# Patient Record
Sex: MALE | Race: WHITE | NOT HISPANIC OR LATINO | Employment: OTHER | URBAN - NONMETROPOLITAN AREA
[De-identification: names, ages, dates, MRNs, and addresses within clinical notes are randomized per-mention and may not be internally consistent; named-entity substitution may affect disease eponyms.]

---

## 2017-01-01 ENCOUNTER — HOSPITAL ENCOUNTER (OUTPATIENT)
Dept: PHYSICAL THERAPY | Facility: HOSPITAL | Age: 62
Setting detail: THERAPIES SERIES
Discharge: HOME | End: 2017-01-20
Attending: ORTHOPAEDIC SURGERY | Admitting: ORTHOPAEDIC SURGERY

## 2017-01-10 ENCOUNTER — OFFICE VISIT (OUTPATIENT)
Dept: ORTHOPEDIC SURGERY | Facility: CLINIC | Age: 62
End: 2017-01-10

## 2017-01-10 ENCOUNTER — DOCUMENTATION (OUTPATIENT)
Dept: CARDIOLOGY | Facility: CLINIC | Age: 62
End: 2017-01-10

## 2017-01-10 ENCOUNTER — OFFICE VISIT (OUTPATIENT)
Dept: CARDIOLOGY | Facility: CLINIC | Age: 62
End: 2017-01-10

## 2017-01-10 VITALS
WEIGHT: 312 LBS | SYSTOLIC BLOOD PRESSURE: 130 MMHG | DIASTOLIC BLOOD PRESSURE: 80 MMHG | HEART RATE: 80 BPM | HEIGHT: 71 IN | BODY MASS INDEX: 43.68 KG/M2

## 2017-01-10 VITALS — WEIGHT: 315 LBS | BODY MASS INDEX: 44.1 KG/M2 | HEIGHT: 71 IN

## 2017-01-10 DIAGNOSIS — Z96.641 PRESENCE OF RIGHT ARTIFICIAL HIP JOINT: Primary | ICD-10-CM

## 2017-01-10 DIAGNOSIS — E78.2 MIXED HYPERLIPIDEMIA: ICD-10-CM

## 2017-01-10 DIAGNOSIS — I25.10 CORONARY ARTERY DISEASE INVOLVING NATIVE CORONARY ARTERY OF NATIVE HEART WITHOUT ANGINA PECTORIS: ICD-10-CM

## 2017-01-10 DIAGNOSIS — I10 ESSENTIAL HYPERTENSION: Primary | ICD-10-CM

## 2017-01-10 PROBLEM — Z95.1 S/P CABG (CORONARY ARTERY BYPASS GRAFT): Status: ACTIVE | Noted: 2017-01-10

## 2017-01-10 PROCEDURE — 99214 OFFICE O/P EST MOD 30 MIN: CPT | Performed by: INTERNAL MEDICINE

## 2017-01-10 PROCEDURE — 99213 OFFICE O/P EST LOW 20 MIN: CPT | Performed by: ORTHOPAEDIC SURGERY

## 2017-01-10 RX ORDER — SULFAMETHOXAZOLE AND TRIMETHOPRIM 800; 160 MG/1; MG/1
1 TABLET ORAL DAILY
Qty: 52 TABLET | Refills: 0 | Status: SHIPPED | OUTPATIENT
Start: 2017-01-10 | End: 2017-08-14

## 2017-01-10 RX ORDER — LOSARTAN POTASSIUM 25 MG/1
50 TABLET ORAL DAILY
COMMUNITY
End: 2018-02-28 | Stop reason: HOSPADM

## 2017-01-10 RX ORDER — ISOSORBIDE MONONITRATE 30 MG/1
30 TABLET, EXTENDED RELEASE ORAL NIGHTLY
COMMUNITY
End: 2018-03-08 | Stop reason: HOSPADM

## 2017-01-10 RX ORDER — CITALOPRAM 20 MG/1
20 TABLET ORAL 2 TIMES DAILY
COMMUNITY
End: 2018-05-21

## 2017-01-10 RX ORDER — ASPIRIN 81 MG/1
81 TABLET, CHEWABLE ORAL TAKE AS DIRECTED
COMMUNITY

## 2017-01-10 RX ORDER — FUROSEMIDE 20 MG/1
20 TABLET ORAL DAILY
COMMUNITY

## 2017-01-10 NOTE — PROGRESS NOTES
"Clint Mack is a 61 y.o. male returns for     Chief Complaint   Patient presents with   • Right Hip - Follow-up   • Results     crp and inr done on 1/4/2017 at [facility].        HISTORY OF PRESENT ILLNESS:  Patient is having no new complaints.  No fevers or chills.  No change in activity.  No change in feeling of leg.  Using a walker for gait.  No cellulitis and no changes in wound appearance.       CONCURRENT MEDICAL HISTORY:    Past Medical History   Diagnosis Date   • Anxiety    • Arthritis      osteoarthritis   • Depression    • Diabetes mellitus    • DVT (deep venous thrombosis)    • Heart disease    • Hyperlipidemia    • Hypertension    • LUCIANA on CPAP        Allergies   Allergen Reactions   • Heparin Anaphylaxis     thrombocytopenia   • Atorvastatin    • Crestor [Rosuvastatin Calcium]    • Lisinopril      Constant cough   • Lyrica [Pregabalin] Swelling         Current Outpatient Prescriptions:   •  aspirin 81 MG chewable tablet, Chew 81 mg Daily., Disp: , Rfl:   •  citalopram (CeleXA) 20 MG tablet, Take 20 mg by mouth Daily., Disp: , Rfl:   •  clonazePAM (KlonoPIN) 1 MG tablet, , Disp: , Rfl:   •  COMFORT ASSIST INSULIN SYRINGE 31G X 5/16\" 0.3 ML misc, , Disp: , Rfl:   •  furosemide (LASIX) 20 MG tablet, Take 20 mg by mouth As Needed., Disp: , Rfl:   •  glimepiride (AMARYL) 4 MG tablet, , Disp: , Rfl:   •  isosorbide mononitrate (IMDUR) 30 MG 24 hr tablet, Take 30 mg by mouth Daily., Disp: , Rfl:   •  losartan (COZAAR) 25 MG tablet, Take 25 mg by mouth Daily., Disp: , Rfl:   •  metoclopramide (REGLAN) 10 MG tablet, , Disp: , Rfl:   •  Morphine (MS CONTIN) 30 MG 12 hr tablet, Take 1 tablet by mouth 3 (Three) Times a Day., Disp: 90 tablet, Rfl: 0  •  predniSONE (DELTASONE) 5 MG tablet, , Disp: , Rfl:   •  RANEXA 500 MG 12 hr tablet, , Disp: , Rfl:   •  sodium chloride 0.9 % solution, , Disp: , Rfl:   •  tamsulosin (FLOMAX) 0.4 MG capsule 24 hr capsule, , Disp: , Rfl:   •  triamcinolone (KENALOG) 0.1 % " "cream, , Disp: , Rfl:   •  vitamin D (ERGOCALCIFEROL) 36686 UNITS capsule capsule, , Disp: , Rfl:   •  warfarin (COUMADIN) 5 MG tablet, , Disp: , Rfl:   •  Evolocumab (REPATHA SURECLICK) 140 MG/ML solution auto-injector, Inject 140 mg under the skin. One shot bi-monthly, Disp: , Rfl:   •  Morphine (MS CONTIN) 30 MG 12 hr tablet, Take 1 tablet by mouth 2 (Two) Times a Day., Disp: 60 tablet, Rfl: 0  •  sulfamethoxazole-trimethoprim (BACTRIM DS) 800-160 MG per tablet, Take 1 tablet by mouth Daily. Bid for two weeks then qd for two weeks., Disp: 52 tablet, Rfl: 0    Past Surgical History   Procedure Laterality Date   • Cardiac surgery  2001   • Gallbladder surgery  2000   • Circumcision     • Joint replacement Right 05/02/2016     hip   • Spine surgery         ROS  No fevers or chills.  No chest pain or shortness of air.  No GI or  disturbances.    PHYSICAL EXAMINATION:       Visit Vitals   • Ht 71\" (180.3 cm)   • Wt (!) 317 lb (144 kg)   • BMI 44.21 kg/m2       Physical Exam   Constitutional: He is oriented to person, place, and time. He appears well-developed and well-nourished.   Neurological: He is alert and oriented to person, place, and time.   Psychiatric: He has a normal mood and affect. His behavior is normal. Judgment and thought content normal.       GAIT:     []  Normal  [x]  Antalgic    Assistive device: []  None  [x]  Walker     []  Crutches  []  Cane     []  Wheelchair  []  Stretcher    Right Hip Exam     Tenderness   The patient is experiencing no tenderness.         Range of Motion   Flexion: 110   Internal Rotation: 30   External Rotation: 40     Muscle Strength   Abduction: 4/5   Flexion: 4/5     Tests   KITTY: negative  Fadir:  Negative FADIR test    Other   Erythema: absent  Scars: present (incision is well healed with no fluctuance, no erythema, and no breakdown)  Sensation: normal  Pulse: present                      ASSESSMENT:    Diagnoses and all orders for this visit:    Presence of right " artificial hip joint  -     C-reactive Protein; Future    Other orders  -     sulfamethoxazole-trimethoprim (BACTRIM DS) 800-160 MG per tablet; Take 1 tablet by mouth Daily. Bid for two weeks then qd for two weeks.          PLAN    He shows no sign of clinical change.  CRP has elevated again.  Will monitor again.  However, I don't expect any change in management without a clinical change.  We discussed continued strength and conditioning    Return in about 4 weeks (around 2/7/2017) for Recheck with repeat xrays.    Jose Raul Leon MD

## 2017-01-10 NOTE — PROGRESS NOTES
Clint Mack  61 y.o. male    01/10/2017  1. Essential hypertension    2. Coronary artery disease involving native coronary artery of native heart without angina pectoris    3. Mixed hyperlipidemia        History of Present Illness    Mr. Mack is here for follow-up of his coronary artery disease, hypertension and hyperlipidemia.  He denied any chest pain but does have chronic shortness of breath.  He is multifactorial dyspnea and no signs of congestive heart failure were noted.  His last echocardiogram in 2014 had shown normal LV systolic function.  No bronchospasm was noted.  He denied any abdominal pain nausea vomiting diarrhea or melena.  I understand that his diabetes has not been under good control recently and Dr. Matute has adjusted his medicines.  His lipid profiles are abnormal with markedly elevated total cholesterol, triglycerides and LDL.  There were 259, 359 an his insurance company will approve this d 152 mg percent respectively.  He has been unable to tolerate statins.  I had a long discussion with him about diet and of the plan would be to consider PCSK9 inhibitor Repatha.  Hopefully his insurance company wouldn't approve this.        SUBJECTIVE    Allergies   Allergen Reactions   • Heparin Anaphylaxis     thrombocytopenia   • Atorvastatin    • Crestor [Rosuvastatin Calcium]    • Lisinopril      Constant cough   • Lyrica [Pregabalin] Swelling         Past Medical History   Diagnosis Date   • Anxiety    • Arthritis      osteoarthritis   • Depression    • Diabetes mellitus    • DVT (deep venous thrombosis)    • Heart disease    • Hyperlipidemia    • Hypertension    • LUCIANA on CPAP          Past Surgical History   Procedure Laterality Date   • Cardiac surgery  2001   • Gallbladder surgery  2000   • Circumcision     • Joint replacement Right 05/02/2016     hip   • Spine surgery           Family History   Problem Relation Age of Onset   • Heart disease Father    • Hypertension Father    • Stroke  "Father    • Diabetes Sister    • Heart disease Brother    • Hypertension Brother    • COPD Brother          Social History     Social History   • Marital status:      Spouse name: N/A   • Number of children: N/A   • Years of education: N/A     Occupational History   • Not on file.     Social History Main Topics   • Smoking status: Never Smoker   • Smokeless tobacco: Never Used   • Alcohol use No   • Drug use: No   • Sexual activity: Defer     Other Topics Concern   • Not on file     Social History Narrative         Current Outpatient Prescriptions   Medication Sig Dispense Refill   • aspirin 81 MG chewable tablet Chew 81 mg Daily.     • citalopram (CeleXA) 20 MG tablet Take 20 mg by mouth Daily.     • furosemide (LASIX) 20 MG tablet Take 20 mg by mouth As Needed.     • isosorbide mononitrate (IMDUR) 30 MG 24 hr tablet Take 30 mg by mouth Daily.     • losartan (COZAAR) 25 MG tablet Take 25 mg by mouth Daily.     • clonazePAM (KlonoPIN) 1 MG tablet      • COMFORT ASSIST INSULIN SYRINGE 31G X 5/16\" 0.3 ML misc      • glimepiride (AMARYL) 4 MG tablet      • metoclopramide (REGLAN) 10 MG tablet      • Morphine (MS CONTIN) 30 MG 12 hr tablet Take 1 tablet by mouth 3 (Three) Times a Day. 90 tablet 0   • predniSONE (DELTASONE) 5 MG tablet      • RANEXA 500 MG 12 hr tablet      • sodium chloride 0.9 % solution      • tamsulosin (FLOMAX) 0.4 MG capsule 24 hr capsule      • triamcinolone (KENALOG) 0.1 % cream      • vitamin D (ERGOCALCIFEROL) 98632 UNITS capsule capsule      • warfarin (COUMADIN) 5 MG tablet        No current facility-administered medications for this visit.          OBJECTIVE    Visit Vitals   • /80   • Pulse 80   • Ht 71\" (180.3 cm)   • Wt (!) 312 lb (142 kg)   • BMI 43.52 kg/m2           Review of Systems     Constitutional:  Denies recent weight loss, weight gain, fever or chills     HENT:  Denies any hearing loss, epistaxis, hoarseness, or difficulty speaking.     Eyes: Wears eyeglasses or " contact lenses     Respiratory:  Dyspnea with exertion, no cough, wheezing, or hemoptysis.     Cardiovascular: See HPI.    Gastrointestinal:  Denies change in bowel habits, dyspepsia, ulcer disease, hematochezia, or melena.     Endocrine: Negative for cold intolerance, heat intolerance, polydipsia, polyphagia and polyuria. Denies any history of weight change, heat/cold intolerance, polydipsia, polyuria     Genitourinary: Negative.      Musculoskeletal: c/o pain secondary to DJD    Skin:  Denies any change in hair or nails, rashes, or skin lesions.     Allergic/Immunologic: Negative.  Negative for environmental allergies, food allergies and immunocompromised state.     Neurological:  Denies any history of recurrent headaches, strokes, TIA, or seizure disorder.     Hematological: Denies any food allergies, seasonal allergies, bleeding disorders, or lymphadenopathy.     Psychiatric/Behavioral: Denies any history of depression, substance abuse, or change in cognitive function.         Physical Exam     Constitutional: Cooperative, alert and oriented, well-developed, well-nourished, in no acute distress.     HENT:   Head: Normocephalic, normal hair patterns, no masses or tenderness.  Ears, Nose, and Throat: No gross abnormalities. No pallor or cyanosis. Dentition good.   Eyes: EOMS intact, PERRL, conjunctivae and lids unremarkable. Fundoscopic exam and visual fields not performed.   Neck: No palpable masses or adenopathy, no thyromegaly, no JVD, carotid pulses are full and equal bilaterally and without  Bruits.     Cardiovascular: Regular rhythm, S1 and S2 normal, no S3 or S4. Apical impulse not displaced. No murmurs, gallops, or rubs detected.     Pulmonary/Chest: Chest: normal symmetry, no tenderness to palpation, normal respiratory excursion, no intercostal retraction, no use of accessory muscles.            Pulmonary: Normal breath sounds. No rales or ronchi.    Abdominal: Abdomen soft, bowel sounds normoactive, no  masses, no hepatosplenomegaly, non-tender, no bruits.     Musculoskeletal: No deformities, clubbing, cyanosis, erythema, or edema observed. There are no spinal abnormalities noted. Normal muscle strength and tone. Pulses full and equal in all extremities, no bruits auscultated.     Neurological: No gross motor or sensory deficits noted, affect appropriate, oriented to time, person, place.     Skin: Warm and dry to the touch, no apparent skin lesions or masses noted.     Psychiatric: He has a normal mood and affect. His behavior is normal. Judgment and thought content normal.         Procedures      Lab Results   Component Value Date    WBC 7.2 06/14/2016    HGB 9.4 (L) 06/14/2016    HCT 27.5 (L) 06/14/2016    MCV 85.1 06/14/2016     06/14/2016     Lab Results   Component Value Date    GLUCOSE 92 06/17/2016    BUN 30 (H) 07/05/2016    CREATININE 1.8 (H) 07/05/2016    CO2 29 06/17/2016    CALCIUM 8.3 (L) 06/17/2016    ALBUMIN 2.5 (L) 05/20/2016    AST 24 05/20/2016    ALT 50 05/20/2016     No results found for: CHOL  No results found for: TRIG  No results found for: HDL  No results found for: LDLCALC  No results found for: LDL  No results found for: HDLLDLRATIO  No components found for: CHOLHDL  Lab Results   Component Value Date    HGBA1C 7.2 (H) 10/20/2014     Lab Results   Component Value Date    TSH 0.69 05/13/2016           ASSESSMENT AND PLAN    Mr. Mack is no clinical evidence of progression of coronary artery disease.  No signs of congestive heart failure was noted.  To make sure that his left frontal function has not changed and echo cardiac gram is being arranged.  As mentioned above we will try to get Repatha approved by his insurance company.  Present antiplatelet therapy with aspirin, antianginal therapy with isosorbide mononitrate and Ranexa, antihypertensive therapy with losartan, anticoagulation with Coumadin have been continued.  Diagnoses and all orders for this visit:    Essential  hypertension  -     Adult Transthoracic Echo Complete; Future    Coronary artery disease involving native coronary artery of native heart without angina pectoris  -     Adult Transthoracic Echo Complete; Future    Mixed hyperlipidemia  -     Adult Transthoracic Echo Complete; Future        Keke Kumar MD  1/10/2017  10:23 AM

## 2017-01-10 NOTE — MR AVS SNAPSHOT
"                        Clint Mack   1/10/2017 9:30 AM   Office Visit    Dept Phone:  [phone]   Encounter #:  [id number]    Provider:  Keke Kumar MD   Department:  [facility] CARDIOLOGY                Your Full Care Plan              Today's Medication Changes          These changes are accurate as of: 1/10/17 10:39 AM.  If you have any questions, ask your nurse or doctor.               Medication(s)that have changed:     isosorbide mononitrate 30 MG 24 hr tablet   Commonly known as:  IMDUR   Take 30 mg by mouth Daily.   What changed:  Another medication with the same name was removed. Continue taking this medication, and follow the directions you see here.   Changed by:  Keke Kumar MD       Morphine 30 MG 12 hr tablet   Commonly known as:  MS CONTIN   Take 1 tablet by mouth 3 (Three) Times a Day.   What changed:  Another medication with the same name was removed. Continue taking this medication, and follow the directions you see here.   Changed by:  Robin Delgadillo MD         Stop taking medication(s)listed here:     ONE TOUCH ULTRA TEST test strip   Generic drug:  glucose blood   Stopped by:  Keke Kumar MD           onetouch ultrasoft lancets   Stopped by:  Keke Kumar MD           sodium chloride 0.9 % flush 0.9 % solution   Stopped by:  Keke Kumar MD           vancomycin 1000 MG injection   Commonly known as:  VANCOCIN   Stopped by:  Keke Kumar MD                      Your Updated Medication List          This list is accurate as of: 1/10/17 10:39 AM.  Always use your most recent med list.                aspirin 81 MG chewable tablet       citalopram 20 MG tablet   Commonly known as:  CeleXA       clonazePAM 1 MG tablet   Commonly known as:  KlonoPIN       COMFORT ASSIST INSULIN SYRINGE 31G X 5/16\" 0.3 ML misc   Generic drug:  Insulin Syringe-Needle U-100       furosemide 20 MG tablet   "   Commonly known as:  LASIX       glimepiride 4 MG tablet   Commonly known as:  AMARYL       isosorbide mononitrate 30 MG 24 hr tablet   Commonly known as:  IMDUR       losartan 25 MG tablet   Commonly known as:  COZAAR       metoclopramide 10 MG tablet   Commonly known as:  REGLAN       Morphine 30 MG 12 hr tablet   Commonly known as:  MS CONTIN   Take 1 tablet by mouth 3 (Three) Times a Day.       predniSONE 5 MG tablet   Commonly known as:  DELTASONE       RANEXA 500 MG 12 hr tablet   Generic drug:  ranolazine       sodium chloride 0.9 % solution       tamsulosin 0.4 MG capsule 24 hr capsule   Commonly known as:  FLOMAX       triamcinolone 0.1 % cream   Commonly known as:  KENALOG       vitamin D 11946 UNITS capsule capsule   Commonly known as:  ERGOCALCIFEROL       warfarin 5 MG tablet   Commonly known as:  COUMADIN               You Were Diagnosed With        Codes Comments    Essential hypertension    -  Primary ICD-10-CM: I10  ICD-9-CM: 401.9     Coronary artery disease involving native coronary artery of native heart without angina pectoris     ICD-10-CM: I25.10  ICD-9-CM: 414.01     Mixed hyperlipidemia     ICD-10-CM: E78.2  ICD-9-CM: 272.2       Instructions     None    Patient Instructions History      Upcoming Appointments     Visit Type Date Time Department    OFFICE VISIT 1/10/2017  9:30 AM [facility] [place] HEART CARE [facility]    FOLLOW UP 1/10/2017 10:20 AM [facility] [place] [facility] ORTHOPEDIC [facility]    FOLLOW UP 1/12/2017  9:10 AM [facility] [place] PAIN [facility]    ULTRASOUND 1/30/2017 11:30 AM [facility] [place] [facility] HEART [facility]    OFFICE VISIT 7/12/2017 11:00 AM [facility] [place] HEART [facility]    FOLLOW UP 9/12/2017  1:00 PM [facility] [place] SLEEP CENTER [facility]      MyChart Signup     Our records indicate that your [facility] [employer] account has been deactivated. If you would like to reactivate your account, please email [email or web address] or call [phone] to talk to our [facility] staff.             Other Info from Your Visit           Your Appointments     Jan 12, 2017  9:10 AM  "CST   Follow Up with Robin Delgadillo MD   [facility] PAIN MANAGEMENT (--)    [address]  [address]  [facility] [address] [phone]           Arrive 15 minutes prior to appointment.            Jan 30, 2017 11:30 AM CST   Ultrasound with [facility] HEARTCARE ECHO [facility] CARDIOLOGY (--)    [address]  [facility] [address] [phone]            Jul 12, 2017 11:00 AM CDT   Office Visit with Keke Kumar MD   [facility] CARDIOLOGY (--)    [address]  [facility] [address] [phone]           Arrive 15 minutes prior to appointment.            Sep 12, 2017  1:00 PM CDT   Follow Up with SLEEP CLINIC [facility] (--)    [address] [place] [address] [address] [place] [address] [phone]           Arrive 15 minutes prior to appointment.              Allergies     Heparin  Anaphylaxis    thrombocytopenia    Atorvastatin      Crestor [Rosuvastatin Calcium]      Lisinopril      Constant cough    Lyrica [Pregabalin]  Swelling      Vital Signs     Blood Pressure Pulse Height Weight Body Mass Index Smoking Status    130/80 80 71\" (180.3 cm) 312 lb (142 kg) 43.52 kg/m2 Never Smoker      Problems and Diagnoses Noted     Coronary artery disease involving native coronary artery of native heart without angina pectoris    High blood pressure    Mixed hyperlipidemia    History of coronary artery bypass graft        "

## 2017-01-10 NOTE — PROGRESS NOTES
Repatha started per Dr Kumar, shot given RT rectus femoris, pt jacky well , no s/s of any obvious reaction

## 2017-01-10 NOTE — MR AVS SNAPSHOT
"                        Clint Mack   1/10/2017 10:20 AM   Office Visit    Dept Phone:  [phone]   Encounter #:  [id number]    Provider:  Jose Raul Leon MD   Department:  [facility] ORTHOPEDICS                Your Full Care Plan              Today's Medication Changes          These changes are accurate as of: 1/10/17 11:40 AM.  If you have any questions, ask your nurse or doctor.               Medication(s)that have changed:     isosorbide mononitrate 30 MG 24 hr tablet   Commonly known as:  IMDUR   Take 30 mg by mouth Daily.   What changed:  Another medication with the same name was removed. Continue taking this medication, and follow the directions you see here.   Changed by:  Keke Kumar MD       Morphine 30 MG 12 hr tablet   Commonly known as:  MS CONTIN   Take 1 tablet by mouth 3 (Three) Times a Day.   What changed:  Another medication with the same name was removed. Continue taking this medication, and follow the directions you see here.   Changed by:  Robin Delgadillo MD         Stop taking medication(s)listed here:     ONE TOUCH ULTRA TEST test strip   Generic drug:  glucose blood   Stopped by:  Keke Kumar MD           onetouch ultrasoft lancets   Stopped by:  Keke Kumar MD           sodium chloride 0.9 % flush 0.9 % solution   Stopped by:  Keke Kumar MD           vancomycin 1000 MG injection   Commonly known as:  VANCOCIN   Stopped by:  Keke Kumar MD                      Your Updated Medication List          This list is accurate as of: 1/10/17 11:40 AM.  Always use your most recent med list.                aspirin 81 MG chewable tablet       citalopram 20 MG tablet   Commonly known as:  CeleXA       clonazePAM 1 MG tablet   Commonly known as:  KlonoPIN       COMFORT ASSIST INSULIN SYRINGE 31G X 5/16\" 0.3 ML misc   Generic drug:  Insulin Syringe-Needle U-100       furosemide 20 MG tablet   "   Commonly known as:  LASIX       glimepiride 4 MG tablet   Commonly known as:  AMARYL       isosorbide mononitrate 30 MG 24 hr tablet   Commonly known as:  IMDUR       losartan 25 MG tablet   Commonly known as:  COZAAR       metoclopramide 10 MG tablet   Commonly known as:  REGLAN       Morphine 30 MG 12 hr tablet   Commonly known as:  MS CONTIN   Take 1 tablet by mouth 3 (Three) Times a Day.       predniSONE 5 MG tablet   Commonly known as:  DELTASONE       RANEXA 500 MG 12 hr tablet   Generic drug:  ranolazine       sodium chloride 0.9 % solution       tamsulosin 0.4 MG capsule 24 hr capsule   Commonly known as:  FLOMAX       triamcinolone 0.1 % cream   Commonly known as:  KENALOG       vitamin D 08477 UNITS capsule capsule   Commonly known as:  ERGOCALCIFEROL       warfarin 5 MG tablet   Commonly known as:  COUMADIN               You Were Diagnosed With        Codes Comments    Presence of right artificial hip joint    -  Primary ICD-10-CM: Z96.641  ICD-9-CM: V43.64       Instructions     None    Patient Instructions History      Upcoming Appointments     Visit Type Date Time Department    OFFICE VISIT 1/10/2017  9:30 AM [facility] HEART CARE [facility]    FOLLOW UP 1/10/2017 10:20 AM [facility] ORTHOPEDIC [facility]    FOLLOW UP 1/12/2017  9:10 AM [facility] PAIN [facility]    ULTRASOUND 1/30/2017 11:30 AM [facility] HEART [facility]    OFFICE VISIT 7/12/2017 11:00 AM [facility] HEART [facility]    FOLLOW UP 9/12/2017  1:00 PM [facility] SLEEP CENTER [facility]      MyChart Signup     Our records indicate that your [employer] account has been deactivated. If you would like to reactivate your account, please email [email or web address] or call [phone] to talk to our [employer] staff.             Other Info from Your Visit           Your Appointments     Jan 12, 2017  9:10 AM CST   Follow Up with Robin Delgadillo MD   [facility] PAIN MANAGEMENT (--)    [address]  [address] [place] [address] [phone]        "    Arrive 15 minutes prior to appointment.            Jan 30, 2017 11:30 AM CST   [facility]    [address] [facility] [address]  [facility] [id number] [phone]            Jul 12, 2017 11:00 AM CDT   Office Visit with Keke Kumar MD   [facility] CARDIOLOGY (--)    [address]  [facility] [address] [phone]           Arrive 15 minutes prior to appointment.            Sep 12, 2017  1:00 PM CDT   Follow Up with SLEEP CLINIC [facility] (--)    [address] [place] [address] [phone]           Arrive 15 minutes prior to appointment.              Allergies     Heparin  Anaphylaxis    thrombocytopenia    Atorvastatin      Crestor [Rosuvastatin Calcium]      Lisinopril      Constant cough    Lyrica [Pregabalin]  Swelling      Reason for Visit     Right Hip - Follow-up     Results crp and inr done on 1/4/2017 at [facility].       Vital Signs     Height Weight Body Mass Index Smoking Status          71\" (180.3 cm) 317 lb (144 kg) 44.21 kg/m2 Never Smoker        Problems and Diagnoses Noted     Presence of right artificial hip joint        "

## 2017-01-12 ENCOUNTER — OFFICE VISIT (OUTPATIENT)
Dept: PAIN MEDICINE | Facility: CLINIC | Age: 62
End: 2017-01-12

## 2017-01-12 VITALS
DIASTOLIC BLOOD PRESSURE: 70 MMHG | WEIGHT: 315 LBS | HEIGHT: 71 IN | BODY MASS INDEX: 44.1 KG/M2 | SYSTOLIC BLOOD PRESSURE: 138 MMHG

## 2017-01-12 DIAGNOSIS — M47.817 LUMBOSACRAL SPONDYLOSIS WITHOUT MYELOPATHY: Primary | ICD-10-CM

## 2017-01-12 DIAGNOSIS — M25.551 PAIN OF RIGHT HIP JOINT: ICD-10-CM

## 2017-01-12 DIAGNOSIS — Z79.899 HIGH RISK MEDICATIONS (NOT ANTICOAGULANTS) LONG-TERM USE: ICD-10-CM

## 2017-01-12 DIAGNOSIS — M51.36 DDD (DEGENERATIVE DISC DISEASE), LUMBAR: ICD-10-CM

## 2017-01-12 PROCEDURE — 99214 OFFICE O/P EST MOD 30 MIN: CPT | Performed by: PAIN MEDICINE

## 2017-01-12 RX ORDER — MORPHINE SULFATE 30 MG/1
30 TABLET, FILM COATED, EXTENDED RELEASE ORAL 2 TIMES DAILY
Qty: 60 TABLET | Refills: 0 | Status: SHIPPED | OUTPATIENT
Start: 2017-01-12 | End: 2017-08-14

## 2017-01-12 NOTE — PROGRESS NOTES
"Clint Mack is a 61 y.o. male.   1955    HPI:   Location: lower back  Quality: aching and dull  Severity: 5/10  Timing: constant  Alleviating: nothing  Aggravating: nothing specific     Doing ok with morphine reduction.  Opioid still helping however.  No signs or symptoms of withdrawal.          The following portions of the patient's history were reviewed by me and updated as appropriate: allergies, current medications, past family history, past medical history, past social history, past surgical history and problem list.    Past Medical History   Diagnosis Date   • Anxiety    • Arthritis      osteoarthritis   • Depression    • Diabetes mellitus    • DVT (deep venous thrombosis)    • Heart disease    • Hyperlipidemia    • Hypertension    • LUCIANA on CPAP        Social History     Social History   • Marital status:      Spouse name: N/A   • Number of children: N/A   • Years of education: N/A     Occupational History   • Not on file.     Social History Main Topics   • Smoking status: Never Smoker   • Smokeless tobacco: Never Used   • Alcohol use No   • Drug use: No   • Sexual activity: Defer     Other Topics Concern   • Not on file     Social History Narrative       Family History   Problem Relation Age of Onset   • Heart disease Father    • Hypertension Father    • Stroke Father    • Diabetes Sister    • Heart disease Brother    • Hypertension Brother    • COPD Brother          Current Outpatient Prescriptions:   •  aspirin 81 MG chewable tablet, Chew 81 mg Daily., Disp: , Rfl:   •  citalopram (CeleXA) 20 MG tablet, Take 20 mg by mouth Daily., Disp: , Rfl:   •  clonazePAM (KlonoPIN) 1 MG tablet, , Disp: , Rfl:   •  COMFORT ASSIST INSULIN SYRINGE 31G X 5/16\" 0.3 ML misc, , Disp: , Rfl:   •  Evolocumab (REPATHA SURECLICK) 140 MG/ML solution auto-injector, Inject 140 mg under the skin. One shot bi-monthly, Disp: , Rfl:   •  furosemide (LASIX) 20 MG tablet, Take 20 mg by mouth As Needed., Disp: , " Rfl:   •  glimepiride (AMARYL) 4 MG tablet, , Disp: , Rfl:   •  isosorbide mononitrate (IMDUR) 30 MG 24 hr tablet, Take 30 mg by mouth Daily., Disp: , Rfl:   •  losartan (COZAAR) 25 MG tablet, Take 25 mg by mouth Daily., Disp: , Rfl:   •  metoclopramide (REGLAN) 10 MG tablet, , Disp: , Rfl:   •  Morphine (MS CONTIN) 30 MG 12 hr tablet, Take 1 tablet by mouth 3 (Three) Times a Day., Disp: 90 tablet, Rfl: 0  •  predniSONE (DELTASONE) 5 MG tablet, , Disp: , Rfl:   •  RANEXA 500 MG 12 hr tablet, , Disp: , Rfl:   •  sodium chloride 0.9 % solution, , Disp: , Rfl:   •  sulfamethoxazole-trimethoprim (BACTRIM DS) 800-160 MG per tablet, Take 1 tablet by mouth Daily. Bid for two weeks then qd for two weeks., Disp: 52 tablet, Rfl: 0  •  tamsulosin (FLOMAX) 0.4 MG capsule 24 hr capsule, , Disp: , Rfl:   •  triamcinolone (KENALOG) 0.1 % cream, , Disp: , Rfl:   •  vitamin D (ERGOCALCIFEROL) 94734 UNITS capsule capsule, , Disp: , Rfl:   •  warfarin (COUMADIN) 5 MG tablet, , Disp: , Rfl:   •  Morphine (MS CONTIN) 30 MG 12 hr tablet, Take 1 tablet by mouth 2 (Two) Times a Day., Disp: 60 tablet, Rfl: 0    Allergies   Allergen Reactions   • Heparin Anaphylaxis     thrombocytopenia   • Atorvastatin    • Crestor [Rosuvastatin Calcium]    • Lisinopril      Constant cough   • Lyrica [Pregabalin] Swelling         Review of Systems  10 system review of systems was reviewed and negative except for above.    Physical Exam   Constitutional: He appears well-developed and well-nourished. No distress.   Musculoskeletal:        Lumbar back: He exhibits decreased range of motion (mild facet loading while seated.  ) and tenderness (ttp lpsm).   Pain with flexion right hip.   Neurological: He is alert. Gait (walker) abnormal.   Psychiatric: He has a normal mood and affect. His behavior is normal. Judgment normal.       Clint was seen today for back pain.    Diagnoses and all orders for this visit:    Lumbosacral spondylosis without myelopathy    Pain  of right hip joint    DDD (degenerative disc disease), lumbar    High risk medications (not anticoagulants) long-term use    Other orders  -     Morphine (MS CONTIN) 30 MG 12 hr tablet; Take 1 tablet by mouth 2 (Two) Times a Day.        Medication: Patient reports no negative side effects, Patient reports appropriate usage and storage habits and Refill opioid medication as above.  This is a reduction.. Plan change to equivalent norco dose next visit.  Pt states norco has helped more in the past.    Interventional: none at this time.  Anticoagulated with coumadin for dvt's.    Rehab: none at this time    Behavioral: No aberrant behavior noted. SAMRA Report #[id number]  was reviewed and is consistent with stated history    Urine drug screen Reviewed from last visit and is appropriate          This document has been electronically signed by Robin Delgadillo MD on January 12, 2017 9:55 AM

## 2017-01-20 DIAGNOSIS — M16.11 PRIMARY OSTEOARTHRITIS OF RIGHT HIP: ICD-10-CM

## 2017-01-20 DIAGNOSIS — Z96.641 PRESENCE OF RIGHT ARTIFICIAL HIP JOINT: ICD-10-CM

## 2017-01-20 DIAGNOSIS — M25.551 RIGHT HIP PAIN: Primary | ICD-10-CM

## 2017-01-24 ENCOUNTER — HOSPITAL ENCOUNTER (OUTPATIENT)
Dept: PHYSICAL THERAPY | Facility: HOSPITAL | Age: 62
Setting detail: THERAPIES SERIES
Discharge: HOME | End: 2017-01-24

## 2017-01-24 DIAGNOSIS — M16.11 UNILATERAL PRIMARY OSTEOARTHRITIS, RIGHT HIP: Primary | ICD-10-CM

## 2017-01-24 PROCEDURE — G0283 ELEC STIM OTHER THAN WOUND: HCPCS | Performed by: PHYSICAL THERAPIST

## 2017-01-24 PROCEDURE — 97110 THERAPEUTIC EXERCISES: CPT | Performed by: PHYSICAL THERAPIST

## 2017-01-24 NOTE — PROGRESS NOTES
Outpatient Physical Therapy Ortho Treatment Note  [facility]     Patient Name: Clint Mack  : 1955  MRN: [id number]  Today's Date: 2017      Visit Date: 2017     25% improvement  Visit 18 of 18  recert date 17    Visit Dx:    ICD-10-CM ICD-9-CM   1. Unilateral primary osteoarthritis, right hip M16.11 715.15       Patient Active Problem List   Diagnosis   • LUCIANA on CPAP   • DVT (deep venous thrombosis)   • Hypertension   • Diabetes mellitus   • Right hip pain   • Osteoarthritis of right hip   • Staphylococcal arthritis of right hip   • Presence of right artificial hip joint   • Chronic infection of hip joint prosthesis   • Coronary artery disease involving native coronary artery of native heart without angina pectoris   • S/P CABG (coronary artery bypass graft)   • Mixed hyperlipidemia        Past Medical History   Diagnosis Date   • Anxiety    • Arthritis      osteoarthritis   • Depression    • Diabetes mellitus    • DVT (deep venous thrombosis)    • Heart disease    • Hyperlipidemia    • Hypertension    • LUCIANA on CPAP         Past Surgical History   Procedure Laterality Date   • Cardiac surgery     • Gallbladder surgery     • Circumcision     • Joint replacement Right 2016     hip   • Spine surgery                               PT Assessment/Plan       17 1100          PT Assessment    Functional Limitations Impaired gait;Limitation in home management;Limitations in community activities  -KW      PT Plan    PT Frequency 2x/week  -KW      Predicted Duration of Therapy Intervention (days/wks) 4 weeks  -KW      PT Plan Comments next visit will time ambulation  -KW        User Key  (r) = Recorded By, (t) = Taken By, (c) = Cosigned By    Initials Name Provider Type    CHANEL Mcdonough, PT Physical Therapist                Modalities       17 1100          Moist Heat    Rx Minutes 15 mins  -KW      MH Prior to Rx Yes  -KW      ELECTRICAL STIMULATION     Attended/Unattended Unattended  -KW      Stimulation Type IFC  -KW        User Key  (r) = Recorded By, (t) = Taken By, (c) = Cosigned By    Initials Name Provider Type    CHANEL Mcdonough, PT Physical Therapist                Exercises       01/24/17 1100          Subjective Pain    Able to rate subjective pain? yes  -KW      Pre-Treatment Pain Level 3  -KW      Exercise 1    Exercise Name 1 gait with out AD  -KW      Resistance 1 --   50 feet x2   -KW        User Key  (r) = Recorded By, (t) = Taken By, (c) = Cosigned By    Initials Name Provider Type    CHANEL Mcdonough, PT Physical Therapist                Knee Protocol Ex       01/24/17 1100          Subjective Pain    Able to rate subjective pain? yes  -KW      Pre-Treatment Pain Level 3  -KW      SAQ    Sets 3  -KW      Reps 10  -KW      Marching in Place    Sets 2  -KW      Reps 10  -KW      Calf Raises    Sets 2  -KW      Reps 10  -KW      Hamstring Curls Standing    Sets 2  -KW      Reps 10  -KW        User Key  (r) = Recorded By, (t) = Taken By, (c) = Cosigned By    Initials Name Provider Type    CHANEL Mcdonough, PT Physical Therapist                           PT OP Goals       01/24/17 1100 01/20/17 0900       Long Term Goals    LTG Date to Achieve 01/27/17  -KW 01/27/17  -LF     LTG 1 Pt will ambulate 200 ft x 2 with RW CGA  -KW Pt will ambulate 200 ft x 2 with RW CGA  -LF     LTG 2 Pt will ambulate 30 ft x 1 without asistive device CGA  -KW Pt will ambulate 30 ft x 1 without asistive device CGA  -LF     LTG 2 Progress Progressing  -KW      Time Calculation    PT Goal Re-Cert Due Date 02/03/17  -KW 01/17/17  -LF       User Key  (r) = Recorded By, (t) = Taken By, (c) = Cosigned By    Initials Name Provider Type    CHANEL Mcdonough, PT Physical Therapist    LF Esther Black, PT Physical Therapist                Therapy Education       01/24/17 1157    Therapy Education    Given HEP  -KW    Program Reinforced  -KW    How Provided Demonstration  -KW     Provided to Patient  -CHANEL    Level of Understanding Verbalized  -CHANEL      User Key  (r) = Recorded By, (t) = Taken By, (c) = Cosigned By    Initials Name Provider Type    KW Kylee Mcdonough, PT Physical Therapist                Time Calculation:   Start Time: 1101  Stop Time: 1150  Time Calculation (min): 49 min  Total Timed Code Minutes- PT: 49 minute(s)    Therapy Charges for Today     Code Description Service Date Service Provider Modifiers Qty    31391287537 HC ELECTRICAL STIM UNATTENDED 1/24/2017 Kylee Mcdonough, PT  2    26590837119 HC PT THER PROC EA 15 MIN 1/24/2017 Kylee Mcdonough, PT GP 1                    Kylee Mcdonough, PT  1/24/2017

## 2017-01-24 NOTE — MR AVS SNAPSHOT
"                        Clint Mack   1/24/2017 11:00 AM   Therapy Treatment    Dept Phone:  [phone]   Encounter #:  [id number]    Provider:  Kylee Mcdonough PT   Department:  [facility] OP                 Your Full Care Plan              Your Updated Medication List      TAKE these medications     COMFORT ASSIST INSULIN SYRINGE 31G X 5/16\" 0.3 ML misc   Generic drug:  Insulin Syringe-Needle U-100         ASK your doctor about these medications     aspirin 81 MG chewable tablet       citalopram 20 MG tablet   Commonly known as:  CeleXA       clonazePAM 1 MG tablet   Commonly known as:  KlonoPIN       furosemide 20 MG tablet   Commonly known as:  LASIX       glimepiride 4 MG tablet   Commonly known as:  AMARYL       isosorbide mononitrate 30 MG 24 hr tablet   Commonly known as:  IMDUR       losartan 25 MG tablet   Commonly known as:  COZAAR       metoclopramide 10 MG tablet   Commonly known as:  REGLAN       * Morphine 30 MG 12 hr tablet   Commonly known as:  MS CONTIN   Take 1 tablet by mouth 3 (Three) Times a Day.       * Morphine 30 MG 12 hr tablet   Commonly known as:  MS CONTIN   Take 1 tablet by mouth 2 (Two) Times a Day.       predniSONE 5 MG tablet   Commonly known as:  DELTASONE       RANEXA 500 MG 12 hr tablet   Generic drug:  ranolazine       REPATHA SURECLICK 140 MG/ML solution auto-injector   Generic drug:  Evolocumab       sodium chloride 0.9 % solution       sulfamethoxazole-trimethoprim 800-160 MG per tablet   Commonly known as:  BACTRIM DS   Take 1 tablet by mouth Daily. Bid for two weeks then qd for two weeks.       tamsulosin 0.4 MG capsule 24 hr capsule   Commonly known as:  FLOMAX       triamcinolone 0.1 % cream   Commonly known as:  KENALOG       vitamin D 45415 UNITS capsule capsule   Commonly known as:  ERGOCALCIFEROL       warfarin 5 MG tablet   Commonly known as:  COUMADIN       * Notice:  This list has 2 medication(s) that are the same as other " medications prescribed for you. Read the directions carefully, and ask your doctor or other care provider to review them with you.            Instructions     None    Patient Instructions History      Upcoming Appointments     Visit Type Date Time Department    TREATMENT 1/24/2017 11:00 AM [facility] OP     [facility] ECHO 2D COMPLETE [place] 1/30/2017 11:30 AM [facility] HEART CARE [facility]    TREATMENT 1/31/2017 11:00 AM  [facility]     TREATMENT 2/2/2017 11:00 AM  [facility]     FOLLOW UP 2/13/2017  9:40 AM [facility] PAIN [facility]    FOLLOW UP 2/14/2017  8:50 AM [facility] ORTHOPEDIC [facility]    OFFICE VISIT 7/12/2017 11:00 AM [facility] HEART CARE [facility]    FOLLOW UP 9/12/2017  1:00 PM [facility] SLEEP CENTER [facility]      MyChart Signup     Our records indicate that you have declined [facility] [employer] signup. If you would like to sign up for [employer], please email [email or web address] or call [phone] to obtain an activation code.             Other Info from Your Visit           Your Appointments     Jan 30, 2017 11:30 AM CST   ECHOCARDIOGRAM 2D COMPLETE VISIT with [facility] HEARTCARE ECHO [facility] CARDIOLOGY (--)    [address]  [facility] [address] [phone]           Bring your insurance cards with you. Wear comfortable clothing and shoes.            Jan 31, 2017 11:00 AM CST   Therapy Treatment with Kylee Mcdonough, PT   [facility] OP  (--)    [address] [place] [id number] [phone]            Feb 02, 2017 11:00 AM CST   Therapy Treatment with Kylee Mcdonough, PT   [facility] OP  (--)    [address] [place] [address] [phone]            Feb 13, 2017  9:40 AM CST   Follow Up with Robin Delgadillo MD   [facility] PAIN MANAGEMENT (--)    [address]  [address] [place] [address] [phone]           Arrive 15 minutes prior to appointment.            Feb 14,  2017  8:50 AM CST   Follow Up with Jose Raul Leon MD   [facility] ORTHOPEDICS (--)    [address]  [facility] [address] [phone]           Arrive 15 minutes prior to appointment.              Allergies     Heparin  Anaphylaxis    thrombocytopenia    Atorvastatin      Crestor [Rosuvastatin Calcium]      Lisinopril      Constant cough    Lyrica [Pregabalin]  Swelling      Reason for Visit     Right Knee - Pain     PT Treatment           Vital Signs     Smoking Status                   Never Smoker

## 2017-01-31 ENCOUNTER — HOSPITAL ENCOUNTER (OUTPATIENT)
Dept: PHYSICAL THERAPY | Facility: HOSPITAL | Age: 62
Setting detail: THERAPIES SERIES
Discharge: HOME | End: 2017-01-31

## 2017-01-31 DIAGNOSIS — M16.11 PRIMARY OSTEOARTHRITIS OF RIGHT HIP: Primary | ICD-10-CM

## 2017-01-31 PROCEDURE — G0283 ELEC STIM OTHER THAN WOUND: HCPCS | Performed by: PHYSICAL THERAPIST

## 2017-01-31 PROCEDURE — 97110 THERAPEUTIC EXERCISES: CPT | Performed by: PHYSICAL THERAPIST

## 2017-01-31 PROCEDURE — 97140 MANUAL THERAPY 1/> REGIONS: CPT | Performed by: PHYSICAL THERAPIST

## 2017-02-02 ENCOUNTER — DOCUMENTATION (OUTPATIENT)
Dept: CARDIOLOGY | Facility: CLINIC | Age: 62
End: 2017-02-02

## 2017-02-02 ENCOUNTER — HOSPITAL ENCOUNTER (OUTPATIENT)
Dept: PHYSICAL THERAPY | Facility: HOSPITAL | Age: 62
Setting detail: THERAPIES SERIES
Discharge: HOME | End: 2017-02-02

## 2017-02-02 DIAGNOSIS — M16.11 PRIMARY OSTEOARTHRITIS OF RIGHT HIP: Primary | ICD-10-CM

## 2017-02-02 PROCEDURE — 97164 PT RE-EVAL EST PLAN CARE: CPT | Performed by: PHYSICAL THERAPIST

## 2017-02-02 PROCEDURE — G8978 MOBILITY CURRENT STATUS: HCPCS | Performed by: PHYSICAL THERAPIST

## 2017-02-02 PROCEDURE — 97530 THERAPEUTIC ACTIVITIES: CPT | Performed by: PHYSICAL THERAPIST

## 2017-02-02 PROCEDURE — G0283 ELEC STIM OTHER THAN WOUND: HCPCS | Performed by: PHYSICAL THERAPIST

## 2017-02-02 PROCEDURE — G8979 MOBILITY GOAL STATUS: HCPCS | Performed by: PHYSICAL THERAPIST

## 2017-02-02 PROCEDURE — 97110 THERAPEUTIC EXERCISES: CPT | Performed by: PHYSICAL THERAPIST

## 2017-02-02 NOTE — PROGRESS NOTES
Outpatient Physical Therapy Ortho Re-Evaluation  [facility]     Patient Name: Clint Mack  : 1955  MRN: [id number]  Today's Date: 2017      Visit Date: 2017     40% improvement  Recert date 17  Visit 20 of 20    Patient Active Problem List   Diagnosis   • LUCIANA on CPAP   • DVT (deep venous thrombosis)   • Hypertension   • Diabetes mellitus   • Right hip pain   • Osteoarthritis of right hip   • Staphylococcal arthritis of right hip   • Presence of right artificial hip joint   • Chronic infection of hip joint prosthesis   • Coronary artery disease involving native coronary artery of native heart without angina pectoris   • S/P CABG (coronary artery bypass graft)   • Mixed hyperlipidemia        Past Medical History   Diagnosis Date   • Anxiety    • Arthritis      osteoarthritis   • Depression    • Diabetes mellitus    • DVT (deep venous thrombosis)    • Heart disease    • Hyperlipidemia    • Hypertension    • LUCIANA on CPAP         Past Surgical History   Procedure Laterality Date   • Cardiac surgery     • Gallbladder surgery     • Circumcision     • Joint replacement Right 2016     hip   • Spine surgery         Visit Dx:     ICD-10-CM ICD-9-CM   1. Primary osteoarthritis of right hip M16.11 715.15                 PT Ortho       17 1100    Posture/Observations    Posture/Observations Comments ambulates with forward flexion of trunk  -KW    ROM (Range of Motion)    General ROM Detail R knee AROM 0-110, R hip flex 0-70 degrees  -KW    MMT (Manual Muscle Testing)    General MMT Assessment Detail R knee ext MS 3+/5, R hip flex MS 3/5  -KW    Gait Assessment/Treatment    Gait, Comment ambulate 200 feet x 2 with RW and Close supervision  -KW      User Key  (r) = Recorded By, (t) = Taken By, (c) = Cosigned By    Initials Name Provider Type    CHANEL Mcdonough, PT Physical Therapist                            Therapy Education       17 1116    Therapy Education     Given HEP  -KW    Program Reinforced  -KW    How Provided Demonstration  -KW    Provided to Patient  -KW    Level of Understanding Demonstrated  -KW      01/31/17 1126    Therapy Education    Given HEP  -KW    Program Reinforced  -KW    How Provided Demonstration  -KW    Provided to Patient  -KW    Level of Understanding Demonstrated  -KW      User Key  (r) = Recorded By, (t) = Taken By, (c) = Cosigned By    Initials Name Provider Type    KW Emilia Mcdonough, PT Physical Therapist                PT OP Goals       02/02/17 1100 01/31/17 1100 01/24/17 1100    PT Short Term Goals    STG Date to Achieve 02/11/17  -KW      STG 1 decrease R knee pain down to 1/10  -KW      STG 2 Increase R knee ext MS to 4-/5  -KW      Long Term Goals    LTG Date to Achieve 02/22/17  -KW 02/03/17  -KW 01/27/17  -KW    LTG 1 Pt will ambulate 400 ft x 2 with RW CGA  -KW Pt will ambulate 200 ft x 2 with RW CGA  -KW Pt will ambulate 200 ft x 2 with RW CGA  -KW    LTG 1 Progress --  -KW Progressing  -KW     LTG 2 Pt will ambulate 100 ft x 1 without asistive device CGA  -KW Pt will ambulate 30 ft x 1 without asistive device CGA  -KW Pt will ambulate 30 ft x 1 without asistive device CGA  -KW    LTG 2 Progress --  -KW Progressing  -KW Progressing  -KW    LTG 3 Inc R knee ext MS to 4/5  -KW      Time Calculation    PT Goal Re-Cert Due Date 02/23/17  -KW 02/03/17  -KW 02/03/17  -KW      01/20/17 0900          Long Term Goals    LTG Date to Achieve 01/27/17  -LF      LTG 1 Pt will ambulate 200 ft x 2 with RW CGA  -LF      LTG 2 Pt will ambulate 30 ft x 1 without asistive device CGA  -LF      Time Calculation    PT Goal Re-Cert Due Date 01/17/17  -LF        User Key  (r) = Recorded By, (t) = Taken By, (c) = Cosigned By    Initials Name Provider Type    CHANEL Mcdonough, PT Physical Therapist    LF Esther Black, PT Physical Therapist                PT Assessment/Plan       01/31/17 1100          PT Assessment    Functional Limitations Impaired  gait;Limitation in home management;Limitations in community activities  -KW      Assessment Comments patient improving with gait and endurance  -KW      Rehab Potential Good  -KW      Patient would benefit from skilled therapy intervention Yes  -KW      PT Plan    PT Frequency 2x/week  -KW      Predicted Duration of Therapy Intervention (days/wks) 4 weeks  -KW      PT Plan Comments will continue plan of care  -KW        User Key  (r) = Recorded By, (t) = Taken By, (c) = Cosigned By    Initials Name Provider Type    CHANEL Mcdonough, PT Physical Therapist                Modalities       02/02/17 1100          Moist Heat    Rx Minutes 15 mins  -KW      MH Prior to Rx Yes  -KW      ELECTRICAL STIMULATION    Attended/Unattended Unattended  -KW      Stimulation Type IFC  -KW        User Key  (r) = Recorded By, (t) = Taken By, (c) = Cosigned By    Initials Name Provider Type    CHANEL Mcdonough, PT Physical Therapist              Exercises       02/02/17 1100          Subjective Comments    Subjective Comments Patient reports a 40% improvement . still painful in low back and less in R knee  -KW      Subjective Pain    Able to rate subjective pain? yes  -KW      Pre-Treatment Pain Level 3  -KW      Exercise 1    Exercise Name 1 gait in parallell bars  -KW      Time (Minutes) 1 10 min  -KW      Exercise 2    Exercise Name 2 R knee ext  -KW      Sets 2 3  -KW      Reps 2 10  -KW      Exercise 3    Exercise Name 3 R hip flex seated  -KW      Sets 3 3  -KW      Reps 3 10  -KW      Exercise 4    Exercise Name 4 stretch HS  -KW      Time (Seconds) 4 45  -KW      Exercise 5    Exercise Name 5 heel slides  -KW      Sets 5 3  -KW      Reps 5 10  -KW      Exercise 6    Exercise Name 6 step ups  -KW      Sets 6 1  -KW      Reps 6 10  -KW        User Key  (r) = Recorded By, (t) = Taken By, (c) = Cosigned By    Initials Name Provider Type    CHANEL Mcdonough, PT Physical Therapist                   Knee Protocol Ex        01/31/17 1100          SAQ    Sets 3  -KW      Reps 10  -KW        User Key  (r) = Recorded By, (t) = Taken By, (c) = Cosigned By    Initials Name Provider Type    CHANEL Mcdonough, PT Physical Therapist                       Time Calculation:   Start Time: 1100  Stop Time: 1155  Time Calculation (min): 55 min  Total Timed Code Minutes- PT: 55 minute(s)     Therapy Charges for Today     Code Description Service Date Service Provider Modifiers Qty    16091203600 HC PT MOBILITY CURRENT 2/2/2017 Emilia Mcdonough, PT GP, CK 1    46964733339 HC PT MOBILITY PROJECTED 2/2/2017 Emilia Mcdonough, PT GP, CJ 1    00390680505 HC PT RE-EVAL ESTABLISHED PLAN 2 2/2/2017 Emilia Mcdonough, PT GP 1    78716423649 HC PT THER PROC EA 15 MIN 2/2/2017 Emliia Mcdonough, PT GP 1    99469748918 HC ELECTRICAL STIM UNATTENDED 2/2/2017 Emilia Mcdonough, PT  1    29645645992 HC PT THERAPEUTIC ACT EA 15 MIN 2/2/2017 Emilia Mcdonough, PT GP 1          PT G-Codes  PT Professional Judgement Used?: Yes  Functional Limitation: Mobility: Walking and moving around  Mobility: Walking and Moving Around Current Status (): At least 40 percent but less than 60 percent impaired, limited or restricted  Mobility: Walking and Moving Around Goal Status (): At least 20 percent but less than 40 percent impaired, limited or restricted         Emilia Mcdonough, PT  2/2/2017

## 2017-02-03 ENCOUNTER — DOCUMENTATION (OUTPATIENT)
Dept: CARDIOLOGY | Facility: CLINIC | Age: 62
End: 2017-02-03

## 2017-02-06 ENCOUNTER — DOCUMENTATION (OUTPATIENT)
Dept: CARDIOLOGY | Facility: CLINIC | Age: 62
End: 2017-02-06

## 2017-02-07 ENCOUNTER — HOSPITAL ENCOUNTER (OUTPATIENT)
Dept: PHYSICAL THERAPY | Facility: HOSPITAL | Age: 62
Setting detail: THERAPIES SERIES
Discharge: HOME | End: 2017-02-07

## 2017-02-07 DIAGNOSIS — M16.11 PRIMARY OSTEOARTHRITIS OF RIGHT HIP: Primary | ICD-10-CM

## 2017-02-07 PROCEDURE — G0283 ELEC STIM OTHER THAN WOUND: HCPCS | Performed by: PHYSICAL THERAPIST

## 2017-02-07 PROCEDURE — 97110 THERAPEUTIC EXERCISES: CPT | Performed by: PHYSICAL THERAPIST

## 2017-02-07 NOTE — PROGRESS NOTES
Outpatient Physical Therapy Ortho Treatment Note  [facility]     Patient Name: Clint Mack  : 1955  MRN: [id number]  Today's Date: 2017      Visit Date: 2017    Visit   Recert date 17  40% improvement    Visit Dx:    ICD-10-CM ICD-9-CM   1. Primary osteoarthritis of right hip M16.11 715.15       Patient Active Problem List   Diagnosis   • LUCIANA on CPAP   • DVT (deep venous thrombosis)   • Hypertension   • Diabetes mellitus   • Right hip pain   • Osteoarthritis of right hip   • Staphylococcal arthritis of right hip   • Presence of right artificial hip joint   • Chronic infection of hip joint prosthesis   • Coronary artery disease involving native coronary artery of native heart without angina pectoris   • S/P CABG (coronary artery bypass graft)   • Mixed hyperlipidemia        Past Medical History   Diagnosis Date   • Anxiety    • Arthritis      osteoarthritis   • Depression    • Diabetes mellitus    • DVT (deep venous thrombosis)    • Heart disease    • Hyperlipidemia    • Hypertension    • LUCIANA on CPAP         Past Surgical History   Procedure Laterality Date   • Cardiac surgery     • Gallbladder surgery     • Circumcision     • Joint replacement Right 2016     hip   • Spine surgery                               PT Assessment/Plan       17 1124          PT Assessment    Functional Limitations Impaired gait  -KW      Impairments Balance;Gait;Endurance;Pain;Muscle strength;Locomotion  -KW      Assessment Comments improving in R knee pain and muscle strength  -KW      Please refer to paper survey for additional self-reported information No  -KW      Rehab Potential Good  -KW      Patient/caregiver participated in establishment of treatment plan and goals Yes  -KW      Patient would benefit from skilled therapy intervention Yes  -KW      PT Plan    PT Frequency 2x/week  -KW      Predicted Duration of Therapy Intervention (days/wks) 4 weeks  -KW      PT  Plan Comments will continue plan of care  -KW        User Key  (r) = Recorded By, (t) = Taken By, (c) = Cosigned By    Initials Name Provider Type    CHANEL Mcdonough PT Physical Therapist                Modalities       02/07/17 1100          Moist Heat    Rx Minutes 15 mins  -KW      MH Prior to Rx Yes  -KW      ELECTRICAL STIMULATION    Attended/Unattended Unattended  -KW      Stimulation Type IFC  -KW        User Key  (r) = Recorded By, (t) = Taken By, (c) = Cosigned By    Initials Name Provider Type    CHANEL Mcdonough, THOR Physical Therapist                Exercises       02/07/17 1100          Subjective Comments    Subjective Comments reports 40% improvement in R knee pain  -KW      Subjective Pain    Able to rate subjective pain? yes  -KW      Pre-Treatment Pain Level 2  -KW      Post-Treatment Pain Level 2  -KW      Exercise 1    Exercise Name 1 gait around gym w/ RW  -KW      Resistance 1 --   320 feet 1 time around  -KW      Time (Minutes) 1 3min 14 sec  -KW      Exercise 2    Exercise Name 2 R knee ext  -KW      Sets 2 3  -KW      Reps 2 10  -KW      Exercise 3    Exercise Name 3 R hip flex seated  -KW      Sets 3 3  -KW      Reps 3 10  -KW      Exercise 4    Exercise Name 4 stretch HS  -KW      Time (Seconds) 4 45  -KW      Exercise 5    Exercise Name 5 heel slides  -KW      Sets 5 3  -KW      Reps 5 10  -KW      Exercise 7    Exercise Name 7 standing hip ext  -KW      Sets 7 2  -KW      Reps 7 10  -KW        User Key  (r) = Recorded By, (t) = Taken By, (c) = Cosigned By    Initials Name Provider Type    CHANEL Mcdonough PT Physical Therapist                               PT OP Goals       02/07/17 1100 02/02/17 1100 01/31/17 1100    PT Short Term Goals    STG Date to Achieve 02/11/17  -KW 02/11/17  -KW     STG 1 decrease R knee pain down to 1/10  -KW decrease R knee pain down to 1/10  -KW     STG 1 Progress Progressing  -KW      STG 2 Increase R knee ext MS to 4-/5  -KW Increase R knee ext  MS to 4-/5  -KW     STG 2 Progress Progressing  -KW      Long Term Goals    LTG Date to Achieve 02/22/17  -KW 02/22/17  -KW 02/03/17  -KW    LTG 1 Pt will ambulate 400 ft x 2 with RW CGA  -KW Pt will ambulate 400 ft x 2 with RW CGA  -KW Pt will ambulate 200 ft x 2 with RW CGA  -KW    LTG 1 Progress Progressing  -KW --  -KW Progressing  -KW    LTG 2 Pt will ambulate 100 ft x 1 without asistive device CGA  -KW Pt will ambulate 100 ft x 1 without asistive device CGA  -KW Pt will ambulate 30 ft x 1 without asistive device CGA  -KW    LTG 2 Progress Progressing  -KW --  -KW Progressing  -KW    LTG 3 Inc R knee ext MS to 4/5  -KW Inc R knee ext MS to 4/5  -KW     Time Calculation    PT Goal Re-Cert Due Date  02/23/17  -KW 02/03/17  -KW      User Key  (r) = Recorded By, (t) = Taken By, (c) = Cosigned By    Initials Name Provider Type    CHANEL Mcdonough, PT Physical Therapist                Therapy Education       02/07/17 1120    Therapy Education    Given HEP  -KW    Program Reinforced  -KW    How Provided Demonstration  -KW    Provided to Patient  -KW    Level of Understanding Demonstrated  -KW      02/02/17 1116    Therapy Education    Given HEP  -KW    Program Reinforced  -KW    How Provided Demonstration  -KW    Provided to Patient  -KW    Level of Understanding Demonstrated  -KW      User Key  (r) = Recorded By, (t) = Taken By, (c) = Cosigned By    Initials Name Provider Type    CHANEL Mcdonough, PT Physical Therapist                Time Calculation:   Start Time: 1100  Stop Time: 1150  Time Calculation (min): 50 min  Total Timed Code Minutes- PT: 50 minute(s)    Therapy Charges for Today     Code Description Service Date Service Provider Modifiers Qty    71104218758 HC PT THER PROC EA 15 MIN 2/7/2017 Emilia Mcdonough, PT GP 2    24194784267 HC ELECTRICAL STIM UNATTENDED 2/7/2017 Emilia Mcdonough, PT  1                    Emilia Mcdonough, PT  2/7/2017

## 2017-02-09 ENCOUNTER — HOSPITAL ENCOUNTER (OUTPATIENT)
Dept: PHYSICAL THERAPY | Facility: HOSPITAL | Age: 62
Setting detail: THERAPIES SERIES
Discharge: HOME | End: 2017-02-09

## 2017-02-09 DIAGNOSIS — G89.29 CHRONIC BILATERAL LOW BACK PAIN WITHOUT SCIATICA: Primary | ICD-10-CM

## 2017-02-09 DIAGNOSIS — M54.50 CHRONIC BILATERAL LOW BACK PAIN WITHOUT SCIATICA: Primary | ICD-10-CM

## 2017-02-09 PROCEDURE — 97140 MANUAL THERAPY 1/> REGIONS: CPT | Performed by: PHYSICAL THERAPIST

## 2017-02-09 PROCEDURE — G0283 ELEC STIM OTHER THAN WOUND: HCPCS | Performed by: PHYSICAL THERAPIST

## 2017-02-09 PROCEDURE — 97110 THERAPEUTIC EXERCISES: CPT | Performed by: PHYSICAL THERAPIST

## 2017-02-09 NOTE — PROGRESS NOTES
Outpatient Physical Therapy Ortho Treatment Note  [facility]     Patient Name: Clint Mack  : 1955  MRN: [id number]  Today's Date: 2017      Visit Date: 2017     Visit 22 of 22  40% improvement  Recert date 17  MD visit 17      Visit Dx:    ICD-10-CM ICD-9-CM   1. Chronic bilateral low back pain without sciatica M54.5 724.2    G89.29 338.29       Patient Active Problem List   Diagnosis   • LUCIANA on CPAP   • DVT (deep venous thrombosis)   • Hypertension   • Diabetes mellitus   • Right hip pain   • Osteoarthritis of right hip   • Staphylococcal arthritis of right hip   • Presence of right artificial hip joint   • Chronic infection of hip joint prosthesis   • Coronary artery disease involving native coronary artery of native heart without angina pectoris   • S/P CABG (coronary artery bypass graft)   • Mixed hyperlipidemia        Past Medical History   Diagnosis Date   • Anxiety    • Arthritis      osteoarthritis   • Depression    • Diabetes mellitus    • DVT (deep venous thrombosis)    • Heart disease    • Hyperlipidemia    • Hypertension    • LUCIANA on CPAP         Past Surgical History   Procedure Laterality Date   • Cardiac surgery     • Gallbladder surgery     • Circumcision     • Joint replacement Right 2016     hip   • Spine surgery                               PT Assessment/Plan       17 1100 17 1124       PT Assessment    Functional Limitations Impaired gait  -KW Impaired gait  -KW     Impairments Balance;Gait;Endurance;Pain;Muscle strength;Locomotion  -KW Balance;Gait;Endurance;Pain;Muscle strength;Locomotion  -KW     Assessment Comments  improving in R knee pain and muscle strength  -KW     Please refer to paper survey for additional self-reported information  No  -KW     Rehab Potential Good  -KW Good  -KW     Patient/caregiver participated in establishment of treatment plan and goals Yes  -KW Yes  -KW     Patient would benefit from  skilled therapy intervention Yes  -KW Yes  -KW     PT Plan    PT Frequency 2x/week  -KW 2x/week  -KW     Predicted Duration of Therapy Intervention (days/wks)  4 weeks  -KW     PT Plan Comments continue plan of care  -KW will continue plan of care  -KW       User Key  (r) = Recorded By, (t) = Taken By, (c) = Cosigned By    Initials Name Provider Type    CHANEL Mcdonough, PT Physical Therapist                Modalities       02/09/17 1100          Subjective Pain    Able to rate subjective pain? yes  -KW      Pre-Treatment Pain Level 3  -KW      Post-Treatment Pain Level 1  -KW      Moist Heat    Rx Minutes 15 mins  -KW      MH Prior to Rx Yes  -KW      ELECTRICAL STIMULATION    Attended/Unattended Unattended  -KW      Stimulation Type IFC  -KW        User Key  (r) = Recorded By, (t) = Taken By, (c) = Cosigned By    Initials Name Provider Type    CHANEL Mcdonough, PT Physical Therapist                Exercises       02/09/17 1100          Subjective Pain    Able to rate subjective pain? yes  -KW      Pre-Treatment Pain Level 3  -KW      Post-Treatment Pain Level 1  -KW      Exercise 1    Exercise Name 1 gait around gym w/ RW  -KW      Resistance 1 --   320 feet 1 time around  -KW      Time (Minutes) 1 3min 14 sec  -KW      Exercise 2    Exercise Name 2 R knee ext  -KW      Sets 2 3  -KW      Reps 2 10  -KW      Exercise 3    Exercise Name 3 R hip flex seated  -KW      Sets 3 3  -KW      Reps 3 10  -KW      Exercise 4    Exercise Name 4 stretch HS  -KW      Time (Seconds) 4 45  -KW      Exercise 5    Exercise Name 5 heel slides  -KW      Sets 5 3  -KW      Reps 5 10  -KW      Exercise 6    Exercise Name 6 step ups  -KW      Sets 6 1  -KW      Reps 6 10  -KW      Exercise 7    Exercise Name 7 standing hip ext  -KW      Sets 7 2  -KW      Reps 7 10  -KW        User Key  (r) = Recorded By, (t) = Taken By, (c) = Cosigned By    Initials Name Provider Type    CHANEL Mcdonough PT Physical Therapist                         Manual Rx (last 36 hours)      Manual Treatments       02/09/17 1100          Manual Rx 1    Manual Rx 1 Location R knee   -KW      Manual Rx 1 Type STM / cupping  -KW      Manual Rx 1 Duration 15 min  -KW        User Key  (r) = Recorded By, (t) = Taken By, (c) = Cosigned By    Initials Name Provider Type    KW Emilia Mcdonough, PT Physical Therapist                PT OP Goals       02/09/17 1100 02/07/17 1100 02/02/17 1100    PT Short Term Goals    STG Date to Achieve 02/11/17  -KW 02/11/17  -KW 02/11/17  -KW    STG 1 decrease R knee pain down to 1/10  -KW decrease R knee pain down to 1/10  -KW decrease R knee pain down to 1/10  -KW    STG 1 Progress Progressing  -KW Progressing  -KW     STG 2 Increase R knee ext MS to 4-/5  -KW Increase R knee ext MS to 4-/5  -KW Increase R knee ext MS to 4-/5  -KW    STG 2 Progress Progressing  -KW Progressing  -KW     Long Term Goals    LTG Date to Achieve 02/22/17  -KW 02/22/17  -KW 02/22/17  -KW    LTG 1 Pt will ambulate 400 ft x 2 with RW CGA  -KW Pt will ambulate 400 ft x 2 with RW CGA  -KW Pt will ambulate 400 ft x 2 with RW CGA  -KW    LTG 1 Progress Progressing  -KW Progressing  -KW --  -KW    LTG 2 Pt will ambulate 100 ft x 1 without asistive device CGA  -KW Pt will ambulate 100 ft x 1 without asistive device CGA  -KW Pt will ambulate 100 ft x 1 without asistive device CGA  -KW    LTG 2 Progress Progressing  -KW Progressing  -KW --  -KW    LTG 3 Inc R knee ext MS to 4/5  -KW Inc R knee ext MS to 4/5  -KW Inc R knee ext MS to 4/5  -KW    Time Calculation    PT Goal Re-Cert Due Date 02/23/17  -KW  02/23/17  -KW      01/31/17 1100          Long Term Goals    LTG Date to Achieve 02/03/17  -KW      LTG 1 Pt will ambulate 200 ft x 2 with RW CGA  -KW      LTG 1 Progress Progressing  -KW      LTG 2 Pt will ambulate 30 ft x 1 without asistive device CGA  -KW      LTG 2 Progress Progressing  -KW      Time Calculation    PT Goal Re-Cert Due Date 02/03/17  -KW        User Key   (r) = Recorded By, (t) = Taken By, (c) = Cosigned By    Initials Name Provider Type    CHANEL Mcdonough, PT Physical Therapist                Therapy Education       02/09/17 1120    Therapy Education    Given HEP  -KW    Program Reinforced  -KW    How Provided Verbal  -KW    Provided to Patient  -KW    Level of Understanding Verbalized  -KW      02/07/17 1120    Therapy Education    Given HEP  -KW    Program Reinforced  -KW    How Provided Demonstration  -KW    Provided to Patient  -KW    Level of Understanding Demonstrated  -KW      User Key  (r) = Recorded By, (t) = Taken By, (c) = Cosigned By    Initials Name Provider Type    CHANEL Mcdonough, PT Physical Therapist                Time Calculation:   Start Time: 1100  Stop Time: 1155  Time Calculation (min): 55 min  Total Timed Code Minutes- PT: 55 minute(s)    Therapy Charges for Today     Code Description Service Date Service Provider Modifiers Qty    34096340668 HC PT MANUAL THERAPY EA 15 MIN 2/9/2017 Emilia Mcdonough, PT GP 1    94718644618 HC PT THER PROC EA 15 MIN 2/9/2017 Emilia Mcdonough, PT GP 1    67274979760 HC ELECTRICAL STIM UNATTENDED 2/9/2017 Emilia Mcdonough, PT  1    [id number] HC PT THER SUPP EA 15 MIN 2/9/2017 Emilia Mcdonough, PT GP 1                    Emilia Mcdonough, PT  2/9/2017

## 2017-02-13 ENCOUNTER — OFFICE VISIT (OUTPATIENT)
Dept: PAIN MEDICINE | Facility: CLINIC | Age: 62
End: 2017-02-13

## 2017-02-13 VITALS
DIASTOLIC BLOOD PRESSURE: 70 MMHG | WEIGHT: 315 LBS | SYSTOLIC BLOOD PRESSURE: 136 MMHG | HEIGHT: 71 IN | BODY MASS INDEX: 44.1 KG/M2

## 2017-02-13 DIAGNOSIS — Z79.899 HIGH RISK MEDICATIONS (NOT ANTICOAGULANTS) LONG-TERM USE: ICD-10-CM

## 2017-02-13 DIAGNOSIS — M47.817 LUMBOSACRAL SPONDYLOSIS WITHOUT MYELOPATHY: Primary | ICD-10-CM

## 2017-02-13 DIAGNOSIS — M51.36 DDD (DEGENERATIVE DISC DISEASE), LUMBAR: ICD-10-CM

## 2017-02-13 DIAGNOSIS — M25.551 PAIN OF RIGHT HIP JOINT: ICD-10-CM

## 2017-02-13 PROCEDURE — 99214 OFFICE O/P EST MOD 30 MIN: CPT | Performed by: NURSE PRACTITIONER

## 2017-02-13 RX ORDER — ISOSORBIDE MONONITRATE 30 MG/1
TABLET, EXTENDED RELEASE ORAL
COMMUNITY
Start: 2016-05-20 | End: 2017-02-13

## 2017-02-13 RX ORDER — RANOLAZINE 500 MG/1
TABLET, EXTENDED RELEASE ORAL
COMMUNITY
Start: 2017-01-26 | End: 2017-02-13

## 2017-02-13 RX ORDER — MORPHINE SULFATE 60 MG/1
60 TABLET, FILM COATED, EXTENDED RELEASE ORAL EVERY 12 HOURS
COMMUNITY
Start: 2015-07-08 | End: 2017-08-14

## 2017-02-13 NOTE — PROGRESS NOTES
Clint Mack is a 61 y.o. male.   1955    HPI:   Location: lower back  Quality: aching and dull  Severity: 5/10  Timing: constant  Alleviating: nothing  Aggravating: nothing specific       Opiate medications reduction Morphine related to today's visit starting back on Norco. Pt states Norco has helped better in past. NO SE noted. Patient with chronic lower back pain with back surgery x 2 in past.  Patient states has taken Norco in the past and has helped better than the morphine.  Discussed case Dr. Robin Delgadillo, we'll changed to Norco for better pain management efficacy.      The following portions of the patient's history were reviewed by me and updated as appropriate: allergies, current medications, past family history, past medical history, past social history, past surgical history and problem list.    Past Medical History   Diagnosis Date   • Anxiety    • Arthritis      osteoarthritis   • Depression    • Diabetes mellitus    • DVT (deep venous thrombosis)    • Heart disease    • Hyperlipidemia    • Hypertension    • LUCIANA on CPAP        Social History     Social History   • Marital status:      Spouse name: N/A   • Number of children: N/A   • Years of education: N/A     Occupational History   • Not on file.     Social History Main Topics   • Smoking status: Never Smoker   • Smokeless tobacco: Never Used      Comment: smoking cessation information   • Alcohol use No   • Drug use: No   • Sexual activity: Defer     Other Topics Concern   • Not on file     Social History Narrative       Family History   Problem Relation Age of Onset   • Heart disease Father    • Hypertension Father    • Stroke Father    • Diabetes Sister    • Heart disease Brother    • Hypertension Brother    • COPD Brother          Current Outpatient Prescriptions:   •  Alirocumab (PRALUENT) 75 MG/ML solution pen-injector, Inject 75 mg under the skin As Needed (q 2 weeks)., Disp: 2 pen, Rfl: 6  •  aspirin 81 MG chewable  "tablet, Chew 81 mg Daily., Disp: , Rfl:   •  citalopram (CeleXA) 20 MG tablet, Take 20 mg by mouth Daily., Disp: , Rfl:   •  clonazePAM (KlonoPIN) 1 MG tablet, , Disp: , Rfl:   •  COMFORT ASSIST INSULIN SYRINGE 31G X 5/16\" 0.3 ML misc, , Disp: , Rfl:   •  furosemide (LASIX) 20 MG tablet, Take 20 mg by mouth As Needed., Disp: , Rfl:   •  glimepiride (AMARYL) 4 MG tablet, , Disp: , Rfl:   •  isosorbide mononitrate (IMDUR) 30 MG 24 hr tablet, Take 30 mg by mouth Daily., Disp: , Rfl:   •  losartan (COZAAR) 25 MG tablet, Take 25 mg by mouth Daily., Disp: , Rfl:   •  metoclopramide (REGLAN) 10 MG tablet, , Disp: , Rfl:   •  Morphine (MS CONTIN) 30 MG 12 hr tablet, Take 1 tablet by mouth 3 (Three) Times a Day., Disp: 90 tablet, Rfl: 0  •  Omega-3 Fatty Acids (FISH OIL PO), Take 4 capsules by mouth., Disp: , Rfl:   •  predniSONE (DELTASONE) 5 MG tablet, , Disp: , Rfl:   •  RANEXA 500 MG 12 hr tablet, , Disp: , Rfl:   •  tamsulosin (FLOMAX) 0.4 MG capsule 24 hr capsule, , Disp: , Rfl:   •  vitamin D (ERGOCALCIFEROL) 54128 UNITS capsule capsule, , Disp: , Rfl:   •  warfarin (COUMADIN) 5 MG tablet, , Disp: , Rfl:   •  Morphine (MS CONTIN) 30 MG 12 hr tablet, Take 1 tablet by mouth 2 (Two) Times a Day., Disp: 60 tablet, Rfl: 0  •  Morphine (MS CONTIN) 60 MG 12 hr tablet, Take 60 mg by mouth Every 12 (Twelve) Hours., Disp: , Rfl:   •  sodium chloride 0.9 % solution, , Disp: , Rfl:   •  sulfamethoxazole-trimethoprim (BACTRIM DS) 800-160 MG per tablet, Take 1 tablet by mouth Daily. Bid for two weeks then qd for two weeks., Disp: 52 tablet, Rfl: 0  •  triamcinolone (KENALOG) 0.1 % cream, , Disp: , Rfl:     Allergies   Allergen Reactions   • Heparin Anaphylaxis     thrombocytopenia   • Atorvastatin    • Crestor [Rosuvastatin Calcium]    • Lisinopril      Constant cough   • Lyrica [Pregabalin] Swelling         Review of Systems   Musculoskeletal: Positive for back pain (lower).     10 system review of systems was reviewed and " "negative except for above.    Physical Exam   Constitutional: He is oriented to person, place, and time. He appears well-developed and well-nourished. No distress.   HENT:   Head: Normocephalic and atraumatic.   Eyes: EOM are normal. Pupils are equal, round, and reactive to light.   Cardiovascular: Normal rate.    Pulmonary/Chest: Effort normal.   Musculoskeletal:        Lumbar back: He exhibits decreased range of motion (flex 60 deg and 10 deg ex with mild facet loading while seated.   ) and tenderness (ttp lpsm).   Pain with flexion right hip.   Neurological: He is alert and oriented to person, place, and time. Gait (walker ) abnormal.   Skin: Skin is warm and dry.   Psychiatric: He has a normal mood and affect. His behavior is normal. Judgment normal.   Nursing note and vitals reviewed.      Clint was seen today for back pain.    Diagnoses and all orders for this visit:    Lumbosacral spondylosis without myelopathy    Pain of right hip joint    DDD (degenerative disc disease), lumbar    High risk medications (not anticoagulants) long-term use        Medication: Patient reports no negative side effects, Patient reports appropriate usage and storage habits, Patient's opioid provides enough reflief to be more active and perform activities of daily living with less discomfort. and Refill opioid medication as above.  After reviewing Dr. Sierra's notes from last visit, morphine has been given is a tapering dose for today's visit of initiation Norco.  Patient will be placed on Norco 10 mg 6 times a day as discussed with Dr. Robin Delgadillo, opiate medication given 1 month prescription with a follow-up.    Interventional:  Recent reduction and tapering dose of morphine effective with initiating Norco for increase efficacy. Pt had Sept MRI that reveled \"2 pinch nerves\", Dr. Davies.  Patient has been gradually taken reduction of morphine, today we will be initiating Norco pain management regimen. Discussed case Dr. Kelly " Elie, opiate medication for 1 month per Dr. Robin Delgadillo.  Instructions or precautions given to patient with medicines.    Rehab: Pt performs home stretches and exercises.     Behavioral: No aberrant behavior noted. [employer] Report # [id number] was reviewed and is consistent with stated history    Urine drug screen None at this time           This document has been electronically signed by DIANA Watt on February 13, 2017 4:01 PM        This document has been electronically signed by DIANA Watt on February 13, 2017 4:01 PM

## 2017-02-14 ENCOUNTER — OFFICE VISIT (OUTPATIENT)
Dept: ORTHOPEDIC SURGERY | Facility: CLINIC | Age: 62
End: 2017-02-14

## 2017-02-14 ENCOUNTER — HOSPITAL ENCOUNTER (OUTPATIENT)
Dept: PHYSICAL THERAPY | Facility: HOSPITAL | Age: 62
Setting detail: THERAPIES SERIES
Discharge: HOME | End: 2017-02-14

## 2017-02-14 VITALS — HEIGHT: 67 IN | WEIGHT: 315 LBS | BODY MASS INDEX: 49.44 KG/M2

## 2017-02-14 DIAGNOSIS — M25.551 RIGHT HIP PAIN: Primary | ICD-10-CM

## 2017-02-14 DIAGNOSIS — E66.01 MORBID OBESITY WITH BMI OF 50.0-59.9, ADULT (HCC): ICD-10-CM

## 2017-02-14 DIAGNOSIS — E11.9 TYPE 2 DIABETES MELLITUS WITHOUT COMPLICATION, WITHOUT LONG-TERM CURRENT USE OF INSULIN (HCC): ICD-10-CM

## 2017-02-14 DIAGNOSIS — M54.50 CHRONIC BILATERAL LOW BACK PAIN WITHOUT SCIATICA: Primary | ICD-10-CM

## 2017-02-14 DIAGNOSIS — Z96.641 PRESENCE OF RIGHT ARTIFICIAL HIP JOINT: ICD-10-CM

## 2017-02-14 DIAGNOSIS — M16.11 PRIMARY OSTEOARTHRITIS OF RIGHT HIP: ICD-10-CM

## 2017-02-14 DIAGNOSIS — G89.29 CHRONIC BILATERAL LOW BACK PAIN WITHOUT SCIATICA: Primary | ICD-10-CM

## 2017-02-14 PROCEDURE — G0283 ELEC STIM OTHER THAN WOUND: HCPCS | Performed by: PHYSICAL THERAPIST

## 2017-02-14 PROCEDURE — 97140 MANUAL THERAPY 1/> REGIONS: CPT | Performed by: PHYSICAL THERAPIST

## 2017-02-14 PROCEDURE — 99214 OFFICE O/P EST MOD 30 MIN: CPT | Performed by: ORTHOPAEDIC SURGERY

## 2017-02-14 PROCEDURE — 97110 THERAPEUTIC EXERCISES: CPT | Performed by: PHYSICAL THERAPIST

## 2017-02-14 NOTE — PROGRESS NOTES
"Clint Mack is a 61 y.o. male returns for     Chief Complaint   Patient presents with   • Right Hip - Follow-up       HISTORY OF PRESENT ILLNESS: Drainage and removal of metal implants, removal of  poly, exchange of poly, insertion of antibiotic vancomycin don on 5/17/2016, xrays done today in office. [facility]  2 days/week. Pain level 5  Patient has multiple questions in regards to the possibility of pinched nerves in his back and whether or not he should use a back company that was advertised on TV.  Otherwise he is still having difficulties with mobility but no changes in his leg.  He is using a walker for mobility.  He denies any fevers or chills.  He denies any drainage.  No new problems.     CONCURRENT MEDICAL HISTORY:    Past Medical History   Diagnosis Date   • Anxiety    • Arthritis      osteoarthritis   • Depression    • Diabetes mellitus    • DVT (deep venous thrombosis)    • Heart disease    • Hyperlipidemia    • Hypertension    • LUCIANA on CPAP        Allergies   Allergen Reactions   • Heparin Anaphylaxis     thrombocytopenia   • Atorvastatin    • Crestor [Rosuvastatin Calcium]    • Lisinopril      Constant cough   • Lyrica [Pregabalin] Swelling         Current Outpatient Prescriptions:   •  Alirocumab (PRALUENT) 75 MG/ML solution pen-injector, Inject 75 mg under the skin As Needed (q 2 weeks)., Disp: 2 pen, Rfl: 6  •  aspirin 81 MG chewable tablet, Chew 81 mg Daily., Disp: , Rfl:   •  citalopram (CeleXA) 20 MG tablet, Take 20 mg by mouth Daily., Disp: , Rfl:   •  clonazePAM (KlonoPIN) 1 MG tablet, , Disp: , Rfl:   •  COMFORT ASSIST INSULIN SYRINGE 31G X 5/16\" 0.3 ML misc, , Disp: , Rfl:   •  furosemide (LASIX) 20 MG tablet, Take 20 mg by mouth As Needed., Disp: , Rfl:   •  glimepiride (AMARYL) 4 MG tablet, , Disp: , Rfl:   •  isosorbide mononitrate (IMDUR) 30 MG 24 hr tablet, Take 30 mg by mouth Daily., Disp: , Rfl:   •  losartan (COZAAR) 25 MG tablet, Take 25 mg by " "mouth Daily., Disp: , Rfl:   •  metoclopramide (REGLAN) 10 MG tablet, , Disp: , Rfl:   •  Morphine (MS CONTIN) 30 MG 12 hr tablet, Take 1 tablet by mouth 3 (Three) Times a Day., Disp: 90 tablet, Rfl: 0  •  Morphine (MS CONTIN) 30 MG 12 hr tablet, Take 1 tablet by mouth 2 (Two) Times a Day., Disp: 60 tablet, Rfl: 0  •  Morphine (MS CONTIN) 60 MG 12 hr tablet, Take 60 mg by mouth Every 12 (Twelve) Hours., Disp: , Rfl:   •  Omega-3 Fatty Acids (FISH OIL PO), Take 4 capsules by mouth., Disp: , Rfl:   •  predniSONE (DELTASONE) 5 MG tablet, , Disp: , Rfl:   •  RANEXA 500 MG 12 hr tablet, , Disp: , Rfl:   •  sodium chloride 0.9 % solution, , Disp: , Rfl:   •  tamsulosin (FLOMAX) 0.4 MG capsule 24 hr capsule, , Disp: , Rfl:   •  triamcinolone (KENALOG) 0.1 % cream, , Disp: , Rfl:   •  vitamin D (ERGOCALCIFEROL) 13838 UNITS capsule capsule, , Disp: , Rfl:   •  warfarin (COUMADIN) 5 MG tablet, , Disp: , Rfl:   •  sulfamethoxazole-trimethoprim (BACTRIM DS) 800-160 MG per tablet, Take 1 tablet by mouth Daily. Bid for two weeks then qd for two weeks., Disp: 52 tablet, Rfl: 0    Past Surgical History   Procedure Laterality Date   • Cardiac surgery  2001   • Gallbladder surgery  2000   • Circumcision     • Joint replacement Right 05/02/2016     hip   • Spine surgery         ROS  No fevers or chills.  No chest pain or shortness of air.  No GI or  disturbances.    PHYSICAL EXAMINATION:       Visit Vitals   • Ht 67\" (170.2 cm)   • Wt (!) 320 lb (145 kg)   • BMI 50.12 kg/m2       Physical Exam   Constitutional: He is oriented to person, place, and time. He appears well-developed and well-nourished.   Neurological: He is alert and oriented to person, place, and time.   Psychiatric: He has a normal mood and affect. His behavior is normal. Judgment and thought content normal.       GAIT:     []  Normal  [x]  Antalgic    Assistive device: []  None  [x]  Walker     []  Crutches  []  Cane     []  Wheelchair  []  Stretcher    Right Hip " Exam     Tenderness   Right hip tenderness location: No tenderness.  Incisions are well-healed.    Range of Motion   Flexion: 100     Muscle Strength   Abduction: 4/5   Adduction: 4/5   Flexion: 4/5     Other   Erythema: absent  Sensation: normal  Pulse: present              Xr Hip With Or Without Pelvis 2 - 3 View Right    Result Date: 2/14/2017  Narrative: AP and lateral of the right hip with an AP pelvis shows acceptable position alignment with no evidence of acute bony abnormality.  Good fixation is felt to be achieved in the femur with no sign of implant loosening or failure.  There appears to be a skin staple located just lateral to the hip joint most likely in the anterior groin crease.  No acute bony abnormality is noted.  AP pelvis shows mild-to-moderate osteoarthritic changes in the left hip and fixation device in the lower lumbar/sacral region.  Comparison views September 7, 2016. 02/14/17 at 5:17 PM by Jose Raul Leon MD       Lab Results   Component Value Date    CRP 2.7 (H) 07/05/2016     CRP 2/1/17:  1.1      ASSESSMENT:    Diagnoses and all orders for this visit:    Right hip pain  -     XR Hip With or Without Pelvis 2 - 3 View Right    Presence of right artificial hip joint  -     XR Hip With or Without Pelvis 2 - 3 View Right    Primary osteoarthritis of right hip  -     XR Hip With or Without Pelvis 2 - 3 View Right    Morbid obesity with BMI of 50.0-59.9, adult    Type 2 diabetes mellitus without complication, without long-term current use of insulin          PLAN    We discussed continuing physical therapy and continue with range of motion and strengthening with conditioning as tolerated.  Continued use of a walker.  We also discussed the possibility of seeing an infectious disease doctor however at this point he is showing no signs of chronic infection and therefore it is not warranted.    Return if symptoms worsen or fail to improve, for recheck.    Jose Raul Leon MD

## 2017-02-14 NOTE — PROGRESS NOTES
Outpatient Physical Therapy Ortho Treatment Note  [facility]     Patient Name: Clint Mack  : 1955  MRN: [id number]  Today's Date: 2017      Visit Date: 2017    Visit   recert date 17  40% improvement  MD visit 17      Visit Dx:    ICD-10-CM ICD-9-CM   1. Chronic bilateral low back pain without sciatica M54.5 724.2    G89.29 338.29       Patient Active Problem List   Diagnosis   • LUCIANA on CPAP   • DVT (deep venous thrombosis)   • Hypertension   • Diabetes mellitus   • Right hip pain   • Osteoarthritis of right hip   • Staphylococcal arthritis of right hip   • Presence of right artificial hip joint   • Chronic infection of hip joint prosthesis   • Coronary artery disease involving native coronary artery of native heart without angina pectoris   • S/P CABG (coronary artery bypass graft)   • Mixed hyperlipidemia   • Lumbosacral spondylosis without myelopathy   • Pain of right hip joint   • DDD (degenerative disc disease), lumbar   • High risk medications (not anticoagulants) long-term use        Past Medical History   Diagnosis Date   • Anxiety    • Arthritis      osteoarthritis   • Depression    • Diabetes mellitus    • DVT (deep venous thrombosis)    • Heart disease    • Hyperlipidemia    • Hypertension    • LUCIANA on CPAP         Past Surgical History   Procedure Laterality Date   • Cardiac surgery     • Gallbladder surgery     • Circumcision     • Joint replacement Right 2016     hip   • Spine surgery                               PT Assessment/Plan       17 1136 17 1100       PT Assessment    Functional Limitations Impaired gait;Limitation in home management;Limitations in community activities  -KW Impaired gait  -KW     Impairments Balance;Gait;Pain;Muscle strength;Range of motion;Joint mobility  -KW Balance;Gait;Endurance;Pain;Muscle strength;Locomotion  -KW     Please refer to paper survey for additional self-reported information No   -KW      Rehab Potential Good  -KW Good  -KW     Patient/caregiver participated in establishment of treatment plan and goals Yes  -KW Yes  -KW     Patient would benefit from skilled therapy intervention Yes  -KW Yes  -KW     PT Plan    PT Frequency 2x/week  -KW 2x/week  -KW     Predicted Duration of Therapy Intervention (days/wks) 4 weeks  -KW      PT Plan Comments will do one more week of PT then take a break  -KW continue plan of care  -KW       User Key  (r) = Recorded By, (t) = Taken By, (c) = Cosigned By    Initials Name Provider Type    CHANEL Mcdonough PT Physical Therapist                Modalities       02/14/17 1100          Moist Heat    Rx Minutes 15 mins  -KW      MH Prior to Rx Yes  -KW      ELECTRICAL STIMULATION    Attended/Unattended Unattended  -KW      Stimulation Type IFC  -KW      Rx Minutes 20 mins  -KW        User Key  (r) = Recorded By, (t) = Taken By, (c) = Cosigned By    Initials Name Provider Type    CHANEL Mcdonough PT Physical Therapist                Exercises       02/14/17 1100          Subjective Pain    Able to rate subjective pain? yes  -KW      Pre-Treatment Pain Level 2  -KW      Post-Treatment Pain Level 1  -KW      Exercise 2    Exercise Name 2 knee ext  -KW      Sets 2 3  -KW      Reps 2 10  -KW      Exercise 3    Exercise Name 3 leg press  -KW      Sets 3 3  -KW      Reps 3 10  -KW      Exercise 4    Exercise Name 4 stretch HS  -KW      Time (Seconds) 4 45  -KW      Exercise 8    Exercise Name 8 chest press  -KW      Sets 8 3  -KW      Reps 8 10  -KW        User Key  (r) = Recorded By, (t) = Taken By, (c) = Cosigned By    Initials Name Provider Type    CHANEL Mcdonough PT Physical Therapist                               PT OP Goals       02/14/17 1100 02/09/17 1100 02/07/17 1100    PT Short Term Goals    STG Date to Achieve 02/11/17  -KW 02/11/17  -KW 02/11/17  -KW    STG 1 decrease R knee pain down to 1/10  -KW decrease R knee pain down to 1/10  -KW decrease R  knee pain down to 1/10  -KW    STG 1 Progress Progressing  -KW Progressing  -KW Progressing  -KW    STG 2 Increase R knee ext MS to 4-/5  -KW Increase R knee ext MS to 4-/5  -KW Increase R knee ext MS to 4-/5  -KW    STG 2 Progress Progressing  -KW Progressing  -KW Progressing  -KW    Long Term Goals    LTG Date to Achieve 02/22/17  -KW 02/22/17  -KW 02/22/17  -KW    LTG 1 Pt will ambulate 400 ft x 2 with RW CGA  -KW Pt will ambulate 400 ft x 2 with RW CGA  -KW Pt will ambulate 400 ft x 2 with RW CGA  -KW    LTG 1 Progress Progressing  -KW Progressing  -KW Progressing  -KW    LTG 2 Pt will ambulate 100 ft x 1 without asistive device CGA  -KW Pt will ambulate 100 ft x 1 without asistive device CGA  -KW Pt will ambulate 100 ft x 1 without asistive device CGA  -KW    LTG 2 Progress Progressing  -KW Progressing  -KW Progressing  -KW    LTG 3 Inc R knee ext MS to 4/5  -KW Inc R knee ext MS to 4/5  -KW Inc R knee ext MS to 4/5  -KW    LTG 3 Progress Progressing  -KW      Time Calculation    PT Goal Re-Cert Due Date 02/23/17  -KW 02/23/17  -KW       02/02/17 1100          PT Short Term Goals    STG Date to Achieve 02/11/17  -KW      STG 1 decrease R knee pain down to 1/10  -KW      STG 2 Increase R knee ext MS to 4-/5  -KW      Long Term Goals    LTG Date to Achieve 02/22/17  -KW      LTG 1 Pt will ambulate 400 ft x 2 with RW CGA  -KW      LTG 1 Progress --  -KW      LTG 2 Pt will ambulate 100 ft x 1 without asistive device CGA  -KW      LTG 2 Progress --  -KW      LTG 3 Inc R knee ext MS to 4/5  -KW      Time Calculation    PT Goal Re-Cert Due Date 02/23/17  -KW        User Key  (r) = Recorded By, (t) = Taken By, (c) = Cosigned By    Initials Name Provider Type    CHANEL Mcdonough, PT Physical Therapist                Therapy Education       02/14/17 1135    Therapy Education    Given Fall prevention and home safety;Mobility training  -KW      02/09/17 1120    Therapy Education    Given HEP  -KW    Program Reinforced   -KW    How Provided Verbal  -KW    Provided to Patient  -KW    Level of Understanding Verbalized  -KW      User Key  (r) = Recorded By, (t) = Taken By, (c) = Cosigned By    Initials Name Provider Type    KW Emilia Mcdonough, PT Physical Therapist                Time Calculation:   Start Time: 1100  Stop Time: 1150  Time Calculation (min): 50 min  Total Timed Code Minutes- PT: 50 minute(s)    Therapy Charges for Today     Code Description Service Date Service Provider Modifiers Qty    13946171926 HC ELECTRICAL STIM UNATTENDED 2/14/2017 Emilia Mcdonough, PT  1    33069618692 HC PT MANUAL THERAPY EA 15 MIN 2/14/2017 Emilia Mcdonough, PT GP 1    01645616553 HC PT THER PROC EA 15 MIN 2/14/2017 Emilia Mcdonough, PT GP 1                    Emilia Mcdonough, PT  2/14/2017

## 2017-02-16 ENCOUNTER — HOSPITAL ENCOUNTER (OUTPATIENT)
Dept: PHYSICAL THERAPY | Facility: HOSPITAL | Age: 62
Setting detail: THERAPIES SERIES
Discharge: HOME | End: 2017-02-16

## 2017-02-16 DIAGNOSIS — G89.29 CHRONIC BILATERAL LOW BACK PAIN WITHOUT SCIATICA: Primary | ICD-10-CM

## 2017-02-16 DIAGNOSIS — M54.50 CHRONIC BILATERAL LOW BACK PAIN WITHOUT SCIATICA: Primary | ICD-10-CM

## 2017-02-16 PROCEDURE — G0283 ELEC STIM OTHER THAN WOUND: HCPCS | Performed by: PHYSICAL THERAPIST

## 2017-02-16 PROCEDURE — 97110 THERAPEUTIC EXERCISES: CPT | Performed by: PHYSICAL THERAPIST

## 2017-02-16 PROCEDURE — 97140 MANUAL THERAPY 1/> REGIONS: CPT | Performed by: PHYSICAL THERAPIST

## 2017-02-16 NOTE — PROGRESS NOTES
Outpatient Physical Therapy Ortho Treatment Note  [facility]     Patient Name: Clint Mack  : 1955  MRN: [id number]  Today's Date: 2017      Visit Date: 2017    Visit   Recert date 17  40% improvement      Visit Dx:    ICD-10-CM ICD-9-CM   1. Chronic bilateral low back pain without sciatica M54.5 724.2    G89.29 338.29       Patient Active Problem List   Diagnosis   • LUCIANA on CPAP   • DVT (deep venous thrombosis)   • Hypertension   • Diabetes mellitus   • Right hip pain   • Osteoarthritis of right hip   • Staphylococcal arthritis of right hip   • Presence of right artificial hip joint   • Chronic infection of hip joint prosthesis   • Coronary artery disease involving native coronary artery of native heart without angina pectoris   • S/P CABG (coronary artery bypass graft)   • Mixed hyperlipidemia   • Lumbosacral spondylosis without myelopathy   • Pain of right hip joint   • DDD (degenerative disc disease), lumbar   • High risk medications (not anticoagulants) long-term use        Past Medical History   Diagnosis Date   • Anxiety    • Arthritis      osteoarthritis   • Depression    • Diabetes mellitus    • DVT (deep venous thrombosis)    • Heart disease    • Hyperlipidemia    • Hypertension    • LUCIANA on CPAP         Past Surgical History   Procedure Laterality Date   • Cardiac surgery     • Gallbladder surgery     • Circumcision     • Joint replacement Right 2016     hip   • Spine surgery                               PT Assessment/Plan       17 1136          PT Assessment    Functional Limitations Impaired gait;Limitation in home management;Limitations in community activities  -KW      Impairments Balance;Gait;Pain;Muscle strength;Range of motion;Joint mobility  -KW      Please refer to paper survey for additional self-reported information No  -KW      Rehab Potential Good  -KW      Patient/caregiver participated in establishment of treatment plan  and goals Yes  -KW      Patient would benefit from skilled therapy intervention Yes  -KW      PT Plan    PT Frequency 2x/week  -KW      Predicted Duration of Therapy Intervention (days/wks) 4 weeks  -KW      PT Plan Comments will do one more week of PT then take a break  -KW        User Key  (r) = Recorded By, (t) = Taken By, (c) = Cosigned By    Initials Name Provider Type    CHANEL Mcdonough, PT Physical Therapist                Modalities       02/16/17 1100          Moist Heat    Rx Minutes 15 mins  -KW      MH Prior to Rx Yes  -KW      ELECTRICAL STIMULATION    Attended/Unattended Unattended  -KW      Stimulation Type IFC  -KW      Rx Minutes 20 mins  -KW        User Key  (r) = Recorded By, (t) = Taken By, (c) = Cosigned By    Initials Name Provider Type    CHANEL Mcdonough, PT Physical Therapist                Exercises       02/16/17 1100          Subjective Comments    Subjective Comments Reports moderate pain after new work out last visit  -KW      Subjective Pain    Pre-Treatment Pain Level 2  -KW      Post-Treatment Pain Level 1  -KW      Exercise 1    Resistance 1 --  -KW      Exercise 2    Exercise Name 2 knee ext  -KW      Sets 2 3  -KW      Reps 2 10  -KW      Exercise 3    Exercise Name 3 leg press  -KW      Sets 3 3  -KW      Reps 3 10  -KW      Exercise 4    Exercise Name 4 stretch HS  -KW      Time (Seconds) 4 45  -KW      Exercise 5    Exercise Name 5 heel slides  -KW      Sets 5 3  -KW      Reps 5 10  -KW      Exercise 6    Exercise Name 6 step ups  -KW      Sets 6 1  -KW      Reps 6 10  -KW      Exercise 7    Exercise Name 7 standing hip ext  -KW      Sets 7 2  -KW      Reps 7 10  -KW      Exercise 8    Exercise Name 8 chest press  -KW      Sets 8 3  -KW      Reps 8 10  -KW        User Key  (r) = Recorded By, (t) = Taken By, (c) = Cosigned By    Initials Name Provider Type    CHANEL Mcdonough, PT Physical Therapist                        Manual Rx (last 36 hours)      Manual Treatments        02/16/17 1100          Manual Rx 1    Manual Rx 1 Location R knee   -KW      Manual Rx 1 Type STM / cupping  -KW      Manual Rx 1 Duration 15 min  -KW        User Key  (r) = Recorded By, (t) = Taken By, (c) = Cosigned By    Initials Name Provider Type    KW Emilia Mcdonough, PT Physical Therapist                PT OP Goals       02/16/17 1100 02/14/17 1100 02/09/17 1100    PT Short Term Goals    STG Date to Achieve 02/11/17  -KW 02/11/17  -KW 02/11/17  -KW    STG 1 decrease R knee pain down to 1/10  -KW decrease R knee pain down to 1/10  -KW decrease R knee pain down to 1/10  -KW    STG 1 Progress Progressing  -KW Progressing  -KW Progressing  -KW    STG 2 Increase R knee ext MS to 4-/5  -KW Increase R knee ext MS to 4-/5  -KW Increase R knee ext MS to 4-/5  -KW    STG 2 Progress Progressing  -KW Progressing  -KW Progressing  -KW    Long Term Goals    LTG Date to Achieve 02/22/17  -KW 02/22/17  -KW 02/22/17  -KW    LTG 1 Pt will ambulate 400 ft x 2 with RW CGA  -KW Pt will ambulate 400 ft x 2 with RW CGA  -KW Pt will ambulate 400 ft x 2 with RW CGA  -KW    LTG 1 Progress Progressing  -KW Progressing  -KW Progressing  -KW    LTG 2 Pt will ambulate 100 ft x 1 without asistive device CGA  -KW Pt will ambulate 100 ft x 1 without asistive device CGA  -KW Pt will ambulate 100 ft x 1 without asistive device CGA  -KW    LTG 2 Progress Progressing  -KW Progressing  -KW Progressing  -KW    LTG 3 Inc R knee ext MS to 4/5  -KW Inc R knee ext MS to 4/5  -KW Inc R knee ext MS to 4/5  -KW    LTG 3 Progress Progressing  -KW Progressing  -KW     Time Calculation    PT Goal Re-Cert Due Date 02/23/17  -KW 02/23/17  -KW 02/23/17  -KW      02/07/17 1100          PT Short Term Goals    STG Date to Achieve 02/11/17  -KW      STG 1 decrease R knee pain down to 1/10  -KW      STG 1 Progress Progressing  -KW      STG 2 Increase R knee ext MS to 4-/5  -KW      STG 2 Progress Progressing  -KW      Long Term Goals    LTG Date to Achieve  02/22/17  -KW      LTG 1 Pt will ambulate 400 ft x 2 with RW CGA  -KW      LTG 1 Progress Progressing  -KW      LTG 2 Pt will ambulate 100 ft x 1 without asistive device CGA  -KW      LTG 2 Progress Progressing  -KW      LTG 3 Inc R knee ext MS to 4/5  -KW        User Key  (r) = Recorded By, (t) = Taken By, (c) = Cosigned By    Initials Name Provider Type    CHANEL Mcdonough PT Physical Therapist                Therapy Education       02/16/17 1112    Therapy Education    How Provided Verbal  -KW    Provided to Patient  -KW    Level of Understanding Verbalized  -KW      02/14/17 1135    Therapy Education    Given Fall prevention and home safety;Mobility training  -KW      User Key  (r) = Recorded By, (t) = Taken By, (c) = Cosigned By    Initials Name Provider Type    CHANEL Mcdonough, PT Physical Therapist                Time Calculation:   Start Time: 1100  Stop Time: 1153  Time Calculation (min): 53 min  Total Timed Code Minutes- PT: 53 minute(s)    Therapy Charges for Today     Code Description Service Date Service Provider Modifiers Qty    60232271562 HC ELECTRICAL STIM UNATTENDED 2/16/2017 Emilia Mcdonough, PT  1    57147424832 HC PT MANUAL THERAPY EA 15 MIN 2/16/2017 Emilia Mcdonough, PT GP 1    94437137113 HC PT THER PROC EA 15 MIN 2/16/2017 Emilia Mcdonough, PT GP 1                    Emilia Mcdonough, PT  2/16/2017

## 2017-02-21 ENCOUNTER — APPOINTMENT (OUTPATIENT)
Dept: PHYSICAL THERAPY | Facility: HOSPITAL | Age: 62
End: 2017-02-21

## 2017-02-22 NOTE — PROGRESS NOTES
Addendum:  I have reviewed this patient with DIANA Ibrahim.  I agree with the above findings and plan.  I have personally spoken with the patient today, and confirmed key findings.

## 2017-02-23 ENCOUNTER — APPOINTMENT (OUTPATIENT)
Dept: PHYSICAL THERAPY | Facility: HOSPITAL | Age: 62
End: 2017-02-23

## 2017-03-14 ENCOUNTER — OFFICE VISIT (OUTPATIENT)
Dept: PAIN MEDICINE | Facility: CLINIC | Age: 62
End: 2017-03-14

## 2017-03-14 VITALS
SYSTOLIC BLOOD PRESSURE: 150 MMHG | DIASTOLIC BLOOD PRESSURE: 90 MMHG | HEIGHT: 71 IN | BODY MASS INDEX: 44.1 KG/M2 | WEIGHT: 315 LBS

## 2017-03-14 DIAGNOSIS — M51.36 DDD (DEGENERATIVE DISC DISEASE), LUMBAR: ICD-10-CM

## 2017-03-14 DIAGNOSIS — M47.817 LUMBOSACRAL SPONDYLOSIS WITHOUT MYELOPATHY: ICD-10-CM

## 2017-03-14 DIAGNOSIS — Z79.899 HIGH RISK MEDICATIONS (NOT ANTICOAGULANTS) LONG-TERM USE: Primary | ICD-10-CM

## 2017-03-14 DIAGNOSIS — M25.551 PAIN OF RIGHT HIP JOINT: ICD-10-CM

## 2017-03-14 PROCEDURE — 99213 OFFICE O/P EST LOW 20 MIN: CPT | Performed by: NURSE PRACTITIONER

## 2017-03-14 RX ORDER — LANCETS
EACH MISCELLANEOUS
COMMUNITY
Start: 2017-03-06 | End: 2017-08-14

## 2017-03-14 RX ORDER — SULFAMETHOXAZOLE AND TRIMETHOPRIM 400; 80 MG/1; MG/1
1 TABLET ORAL
COMMUNITY
Start: 2017-03-11 | End: 2017-03-22

## 2017-03-14 RX ORDER — LANCETS
EACH MISCELLANEOUS
COMMUNITY
Start: 2017-03-06

## 2017-03-14 RX ORDER — METOCLOPRAMIDE 10 MG/1
TABLET ORAL
COMMUNITY
Start: 2017-02-20 | End: 2018-03-08 | Stop reason: HOSPADM

## 2017-03-14 RX ORDER — ISOSORBIDE MONONITRATE 30 MG/1
TABLET, EXTENDED RELEASE ORAL
COMMUNITY
Start: 2017-02-14 | End: 2017-08-14

## 2017-03-14 NOTE — PROGRESS NOTES
"Clint Mack is a 61 y.o. male.   1955    HPI:   Location: lower back  Quality: aching and dull  Severity: 5/10  Timing: constant  Alleviating: nothing  Aggravating: nothing specific      Pt states \"unable to receive nerve block--\". Pt states \"too much travel back and forth....my PCP will handle my opiate medications after these next 2 RXs\". Pt recently sent disc to \"[facility] and DX Bulging Herniated Disc and Foraminal Narrowing\". Opiate medications provides enough relief for daily activity and ambulation. NO SE. Pt happy with pain management visit and regimen.           The following portions of the patient's history were reviewed by me and updated as appropriate: allergies, current medications, past family history, past medical history, past social history, past surgical history and problem list.    Past Medical History   Diagnosis Date   • Anxiety    • Arthritis      osteoarthritis   • Depression    • Diabetes mellitus    • DVT (deep venous thrombosis)    • Heart disease    • Hyperlipidemia    • Hypertension    • LUCIANA on CPAP        Social History     Social History   • Marital status:      Spouse name: N/A   • Number of children: N/A   • Years of education: N/A     Occupational History   • Not on file.     Social History Main Topics   • Smoking status: Never Smoker   • Smokeless tobacco: Never Used   • Alcohol use No   • Drug use: No   • Sexual activity: Defer     Other Topics Concern   • Not on file     Social History Narrative       Family History   Problem Relation Age of Onset   • Heart disease Father    • Hypertension Father    • Stroke Father    • Diabetes Sister    • Heart disease Brother    • Hypertension Brother    • COPD Brother          Current Outpatient Prescriptions:   •  Alirocumab (PRALUENT) 75 MG/ML solution pen-injector, Inject 75 mg under the skin As Needed (q 2 weeks)., Disp: 2 pen, Rfl: 6  •  aspirin 81 MG chewable tablet, Chew 81 mg Daily., Disp: , Rfl:   • " " citalopram (CeleXA) 20 MG tablet, Take 20 mg by mouth Daily., Disp: , Rfl:   •  clonazePAM (KlonoPIN) 1 MG tablet, , Disp: , Rfl:   •  COMFORT ASSIST INSULIN SYRINGE 31G X 5/16\" 0.3 ML misc, , Disp: , Rfl:   •  furosemide (LASIX) 20 MG tablet, Take 20 mg by mouth As Needed., Disp: , Rfl:   •  glimepiride (AMARYL) 4 MG tablet, , Disp: , Rfl:   •  isosorbide mononitrate (IMDUR) 30 MG 24 hr tablet, Take 30 mg by mouth Daily., Disp: , Rfl:   •  isosorbide mononitrate (IMDUR) 30 MG 24 hr tablet, TAKE ONE TABLET BY MOUTH DAILY, Disp: , Rfl:   •  Lancets (ONETOUCH ULTRASOFT) lancets, USE TO TEST BLOOD SUGAR TWO TIMES A DAY, Disp: , Rfl:   •  losartan (COZAAR) 25 MG tablet, Take 25 mg by mouth Daily., Disp: , Rfl:   •  metoclopramide (REGLAN) 10 MG tablet, , Disp: , Rfl:   •  metoclopramide (REGLAN) 10 MG tablet, TAKE ONE TABLET BY MOUTH THREE TIMES A DAY, Disp: , Rfl:   •  Morphine (MS CONTIN) 30 MG 12 hr tablet, Take 1 tablet by mouth 3 (Three) Times a Day., Disp: 90 tablet, Rfl: 0  •  Morphine (MS CONTIN) 30 MG 12 hr tablet, Take 1 tablet by mouth 2 (Two) Times a Day., Disp: 60 tablet, Rfl: 0  •  Morphine (MS CONTIN) 60 MG 12 hr tablet, Take 60 mg by mouth Every 12 (Twelve) Hours., Disp: , Rfl:   •  Omega-3 Fatty Acids (FISH OIL PO), Take 4 capsules by mouth., Disp: , Rfl:   •  predniSONE (DELTASONE) 5 MG tablet, , Disp: , Rfl:   •  RANEXA 500 MG 12 hr tablet, , Disp: , Rfl:   •  sodium chloride 0.9 % solution, , Disp: , Rfl:   •  sulfamethoxazole-trimethoprim (BACTRIM DS) 800-160 MG per tablet, Take 1 tablet by mouth Daily. Bid for two weeks then qd for two weeks., Disp: 52 tablet, Rfl: 0  •  sulfamethoxazole-trimethoprim (BACTRIM) 400-80 MG tablet, Take 1 tablet by mouth., Disp: , Rfl:   •  tamsulosin (FLOMAX) 0.4 MG capsule 24 hr capsule, , Disp: , Rfl:   •  triamcinolone (KENALOG) 0.1 % cream, , Disp: , Rfl:   •  vitamin D (ERGOCALCIFEROL) 62189 UNITS capsule capsule, , Disp: , Rfl:   •  warfarin (COUMADIN) 5 MG " tablet, , Disp: , Rfl:   •  Lancets (ONETOUCH ULTRASOFT) lancets, , Disp: , Rfl:   •  ONE TOUCH ULTRA TEST test strip, , Disp: , Rfl:     Allergies   Allergen Reactions   • Heparin Anaphylaxis     thrombocytopenia   • Atorvastatin    • Crestor [Rosuvastatin Calcium]    • Lisinopril      Constant cough   • Lyrica [Pregabalin] Swelling         Review of Systems   Musculoskeletal: Positive for back pain.     10 system review of systems was reviewed and negative except for above.    Physical Exam   Constitutional: He is oriented to person, place, and time. He appears well-developed and well-nourished. No distress.   HENT:   Head: Normocephalic and atraumatic.   Eyes: EOM are normal. Pupils are equal, round, and reactive to light.   Cardiovascular: Normal rate.    Pulmonary/Chest: Effort normal.   Musculoskeletal:        Lumbar back: He exhibits decreased range of motion (flex 40 deg and 10 deg ex with mild facet loading while seated.     ) and tenderness (ttp lpsm).   Pain with flexion right hip.   Neurological: He is alert and oriented to person, place, and time. Gait (walker ) abnormal.   Skin: Skin is warm and dry.   Psychiatric: He has a normal mood and affect. His behavior is normal. Judgment normal. He expresses no homicidal and no suicidal ideation. He expresses no suicidal plans and no homicidal plans.   Nursing note and vitals reviewed.      Clint was seen today for back pain.    Diagnoses and all orders for this visit:    High risk medications (not anticoagulants) long-term use    DDD (degenerative disc disease), lumbar    Pain of right hip joint    Lumbosacral spondylosis without myelopathy        Medication: Patient reports no negative side effects, Patient reports appropriate usage and storage habits and Patient's opioid provides enough reflief to be more active and perform activities of daily living with less discomfort. Norco 10mg 6 x a day. Discussed case with Rigoberto Delgadillo, opiate medication refilled  "×2 hand written scripts.  Patient performs that his primary care will be taken over his pain medication this will cut down on his travel expense is a well.  Patient may follow up when necessary.      Interventional: Pt currently battling \"Staph infection--right hip\" Pt states \"PCP will overtake his opiate medication after these visit. I am unable to get Lumbar nerve block. And this will cut down on a travel cost\"    Rehab: home stretching with walker.    Behavioral: No aberrant behavior noted. [facility] Report # [id number]  was reviewed and is consistent with stated history    Urine drug screen None at this time          This document has been electronically signed by DIANA Watt on March 14, 2017 6:14 PM          This document has been electronically signed by DIANA Watt on March 14, 2017 6:14 PM     "

## 2017-05-23 ENCOUNTER — APPOINTMENT (OUTPATIENT)
Dept: LAB | Facility: HOSPITAL | Age: 62
End: 2017-05-23

## 2017-05-23 ENCOUNTER — OFFICE VISIT (OUTPATIENT)
Dept: PAIN MEDICINE | Facility: CLINIC | Age: 62
End: 2017-05-23

## 2017-05-23 VITALS
BODY MASS INDEX: 44.1 KG/M2 | SYSTOLIC BLOOD PRESSURE: 140 MMHG | WEIGHT: 315 LBS | HEIGHT: 71 IN | DIASTOLIC BLOOD PRESSURE: 90 MMHG

## 2017-05-23 DIAGNOSIS — Z79.899 HIGH RISK MEDICATIONS (NOT ANTICOAGULANTS) LONG-TERM USE: ICD-10-CM

## 2017-05-23 DIAGNOSIS — M51.36 DDD (DEGENERATIVE DISC DISEASE), LUMBAR: Primary | ICD-10-CM

## 2017-05-23 DIAGNOSIS — M25.551 RIGHT HIP PAIN: ICD-10-CM

## 2017-05-23 DIAGNOSIS — M47.817 LUMBOSACRAL SPONDYLOSIS WITHOUT MYELOPATHY: ICD-10-CM

## 2017-05-23 PROCEDURE — G0481 DRUG TEST DEF 8-14 CLASSES: HCPCS | Performed by: NURSE PRACTITIONER

## 2017-05-23 PROCEDURE — 99214 OFFICE O/P EST MOD 30 MIN: CPT | Performed by: NURSE PRACTITIONER

## 2017-05-23 PROCEDURE — 80307 DRUG TEST PRSMV CHEM ANLYZR: CPT | Performed by: NURSE PRACTITIONER

## 2017-05-23 RX ORDER — HYDROCODONE BITARTRATE AND ACETAMINOPHEN 10; 325 MG/1; MG/1
1 TABLET ORAL EVERY 6 HOURS
COMMUNITY
End: 2017-08-14

## 2017-05-30 LAB — CONV REPORT SUMMARY: NORMAL

## 2017-07-19 ENCOUNTER — OFFICE VISIT (OUTPATIENT)
Dept: PAIN MEDICINE | Facility: CLINIC | Age: 62
End: 2017-07-19

## 2017-07-19 VITALS
SYSTOLIC BLOOD PRESSURE: 158 MMHG | BODY MASS INDEX: 44.1 KG/M2 | DIASTOLIC BLOOD PRESSURE: 102 MMHG | WEIGHT: 315 LBS | HEIGHT: 71 IN

## 2017-07-19 DIAGNOSIS — M25.551 PAIN OF RIGHT HIP JOINT: ICD-10-CM

## 2017-07-19 DIAGNOSIS — Z79.899 HIGH RISK MEDICATIONS (NOT ANTICOAGULANTS) LONG-TERM USE: ICD-10-CM

## 2017-07-19 DIAGNOSIS — M47.817 LUMBOSACRAL SPONDYLOSIS WITHOUT MYELOPATHY: ICD-10-CM

## 2017-07-19 DIAGNOSIS — M51.36 DDD (DEGENERATIVE DISC DISEASE), LUMBAR: Primary | ICD-10-CM

## 2017-07-19 PROCEDURE — 99214 OFFICE O/P EST MOD 30 MIN: CPT | Performed by: PAIN MEDICINE

## 2017-07-19 RX ORDER — BUSPIRONE HYDROCHLORIDE 10 MG/1
10 TABLET ORAL
COMMUNITY
Start: 2017-06-28 | End: 2017-08-14

## 2017-07-19 RX ORDER — HYDROCODONE BITARTRATE AND ACETAMINOPHEN 10; 325 MG/1; MG/1
1 TABLET ORAL
Qty: 180 TABLET | Refills: 0 | Status: SHIPPED | OUTPATIENT
Start: 2017-07-19 | End: 2017-08-18

## 2017-07-19 RX ORDER — HYDROCODONE BITARTRATE AND ACETAMINOPHEN 10; 325 MG/1; MG/1
1 TABLET ORAL
Qty: 180 TABLET | Refills: 0 | Status: SHIPPED | OUTPATIENT
Start: 2017-07-19 | End: 2017-08-14

## 2017-07-19 NOTE — PROGRESS NOTES
"Clint Mack is a 61 y.o. male.   1955    HPI:   Location: lower back  Quality: aching and dull  Severity: 5/10  Timing: constant  Alleviating: nothing  Aggravating: nothing specific     Dealing with \"nerves\".  PCP trying to manage. Feels like it is because his klonopin has been d/c'd.   Patient reports that the opioid medication still provides him good relief and allows increased activity than he would have without the opioid medication. He denies side effects.        The following portions of the patient's history were reviewed by me and updated as appropriate: allergies, current medications, past family history, past medical history, past social history, past surgical history and problem list.    Past Medical History:   Diagnosis Date   • Anxiety    • Arthritis     osteoarthritis   • Depression    • Diabetes mellitus    • DVT (deep venous thrombosis)    • Heart disease    • Hyperlipidemia    • Hypertension    • LUCIANA on CPAP        Social History     Social History   • Marital status:      Spouse name: N/A   • Number of children: N/A   • Years of education: N/A     Occupational History   • Not on file.     Social History Main Topics   • Smoking status: Never Smoker   • Smokeless tobacco: Never Used   • Alcohol use No   • Drug use: No   • Sexual activity: Defer     Other Topics Concern   • Not on file     Social History Narrative       Family History   Problem Relation Age of Onset   • Heart disease Father    • Hypertension Father    • Stroke Father    • Diabetes Sister    • Heart disease Brother    • Hypertension Brother    • COPD Brother          Current Outpatient Prescriptions:   •  Alirocumab (PRALUENT) 75 MG/ML solution pen-injector, Inject 75 mg under the skin As Needed (q 2 weeks)., Disp: 2 pen, Rfl: 6  •  aspirin 81 MG chewable tablet, Chew 81 mg Daily., Disp: , Rfl:   •  busPIRone (BUSPAR) 10 MG tablet, Take 10 mg by mouth., Disp: , Rfl:   •  citalopram (CeleXA) 20 MG tablet, Take 20 " "mg by mouth Daily., Disp: , Rfl:   •  COMFORT ASSIST INSULIN SYRINGE 31G X 5/16\" 0.3 ML misc, , Disp: , Rfl:   •  furosemide (LASIX) 20 MG tablet, Take 20 mg by mouth As Needed., Disp: , Rfl:   •  glimepiride (AMARYL) 4 MG tablet, , Disp: , Rfl:   •  HYDROcodone-acetaminophen (NORCO)  MG per tablet, Take 1 tablet by mouth Every 6 (Six) Hours., Disp: , Rfl:   •  isosorbide mononitrate (IMDUR) 30 MG 24 hr tablet, Take 30 mg by mouth Daily., Disp: , Rfl:   •  isosorbide mononitrate (IMDUR) 30 MG 24 hr tablet, TAKE ONE TABLET BY MOUTH DAILY, Disp: , Rfl:   •  Lancets (ONETOUCH ULTRASOFT) lancets, USE TO TEST BLOOD SUGAR TWO TIMES A DAY, Disp: , Rfl:   •  Lancets (ONETOUCH ULTRASOFT) lancets, , Disp: , Rfl:   •  losartan (COZAAR) 25 MG tablet, Take 25 mg by mouth Daily., Disp: , Rfl:   •  metoclopramide (REGLAN) 10 MG tablet, , Disp: , Rfl:   •  metoclopramide (REGLAN) 10 MG tablet, TAKE ONE TABLET BY MOUTH THREE TIMES A DAY, Disp: , Rfl:   •  Morphine (MS CONTIN) 30 MG 12 hr tablet, Take 1 tablet by mouth 3 (Three) Times a Day., Disp: 90 tablet, Rfl: 0  •  Morphine (MS CONTIN) 30 MG 12 hr tablet, Take 1 tablet by mouth 2 (Two) Times a Day., Disp: 60 tablet, Rfl: 0  •  Morphine (MS CONTIN) 60 MG 12 hr tablet, Take 60 mg by mouth Every 12 (Twelve) Hours., Disp: , Rfl:   •  Omega-3 Fatty Acids (FISH OIL PO), Take 4 capsules by mouth., Disp: , Rfl:   •  ONE TOUCH ULTRA TEST test strip, , Disp: , Rfl:   •  predniSONE (DELTASONE) 5 MG tablet, , Disp: , Rfl:   •  RANEXA 500 MG 12 hr tablet, , Disp: , Rfl:   •  sodium chloride 0.9 % solution, , Disp: , Rfl:   •  sulfamethoxazole-trimethoprim (BACTRIM DS) 800-160 MG per tablet, Take 1 tablet by mouth Daily. Bid for two weeks then qd for two weeks., Disp: 52 tablet, Rfl: 0  •  tamsulosin (FLOMAX) 0.4 MG capsule 24 hr capsule, , Disp: , Rfl:   •  triamcinolone (KENALOG) 0.1 % cream, , Disp: , Rfl:   •  vitamin D (ERGOCALCIFEROL) 47364 UNITS capsule capsule, , Disp: , Rfl: "   •  warfarin (COUMADIN) 5 MG tablet, , Disp: , Rfl:   •  clonazePAM (KlonoPIN) 1 MG tablet, , Disp: , Rfl:   •  HYDROcodone-acetaminophen (NORCO)  MG per tablet, Take 1 tablet by mouth 6 (Six) Times a Day for 30 days., Disp: 180 tablet, Rfl: 0  •  HYDROcodone-acetaminophen (NORCO)  MG per tablet, Take 1 tablet by mouth 6 (Six) Times a Day for 30 days., Disp: 180 tablet, Rfl: 0    Allergies   Allergen Reactions   • Heparin Anaphylaxis     thrombocytopenia   • Atorvastatin Other (See Comments)     Aches and pains all over   • Crestor [Rosuvastatin Calcium]    • Lisinopril Other (See Comments)     cough  Constant cough   • Lyrica [Pregabalin] Swelling   • Rosuvastatin Other (See Comments)     Aches and pains all over       Review of Systems   Musculoskeletal: Positive for back pain (lower).   All other systems reviewed and are negative.    All systems reviewed and negative except for above.    Physical Exam   Constitutional: He appears well-developed and well-nourished. No distress.   Musculoskeletal:        Lumbar back: He exhibits decreased range of motion (mild facet loading while seated.  ) and tenderness (ttp lpsm).   Neurological: He is alert. Gait (walker) abnormal.   Weakness right hip flexion   Psychiatric: He has a normal mood and affect. His behavior is normal. Judgment normal.       Clint was seen today for back pain.    Diagnoses and all orders for this visit:    DDD (degenerative disc disease), lumbar    Pain of right hip joint    Lumbosacral spondylosis without myelopathy    High risk medications (not anticoagulants) long-term use    Other orders  -     HYDROcodone-acetaminophen (NORCO)  MG per tablet; Take 1 tablet by mouth 6 (Six) Times a Day for 30 days.  -     HYDROcodone-acetaminophen (NORCO)  MG per tablet; Take 1 tablet by mouth 6 (Six) Times a Day for 30 days.        Medication: Patient reports no negative side effects, Patient reports appropriate usage and storage  habits, Patient's opioid provides enough reflief to be more active and perform activities of daily living with less discomfort. and Refill opioid medication as above.  Following up with pcp regarding anxiety.     Interventional: none at this time    Rehab: none at this time    Behavioral: No aberrant behavior noted. [facility] Report #[id number]  was reviewed and is consistent with stated history    Urine drug screen Reviewed from last visit and is appropriate          This document has been electronically signed by Robin Delgadillo MD on July 19, 2017 12:02 PM

## 2017-08-14 ENCOUNTER — OFFICE VISIT (OUTPATIENT)
Dept: CARDIOLOGY | Facility: CLINIC | Age: 62
End: 2017-08-14

## 2017-08-14 VITALS
BODY MASS INDEX: 44.1 KG/M2 | SYSTOLIC BLOOD PRESSURE: 120 MMHG | WEIGHT: 315 LBS | HEART RATE: 96 BPM | HEIGHT: 71 IN | DIASTOLIC BLOOD PRESSURE: 70 MMHG

## 2017-08-14 DIAGNOSIS — E78.2 MIXED HYPERLIPIDEMIA: ICD-10-CM

## 2017-08-14 DIAGNOSIS — Z95.1 S/P CABG (CORONARY ARTERY BYPASS GRAFT): ICD-10-CM

## 2017-08-14 DIAGNOSIS — I25.10 CORONARY ARTERY DISEASE INVOLVING NATIVE CORONARY ARTERY OF NATIVE HEART WITHOUT ANGINA PECTORIS: Primary | ICD-10-CM

## 2017-08-14 PROCEDURE — 99213 OFFICE O/P EST LOW 20 MIN: CPT | Performed by: INTERNAL MEDICINE

## 2017-08-14 PROCEDURE — 93000 ELECTROCARDIOGRAM COMPLETE: CPT | Performed by: INTERNAL MEDICINE

## 2017-08-14 NOTE — PROGRESS NOTES
Clint Mack  61 y.o. male    08/14/2017  1. Coronary artery disease involving native coronary artery of native heart without angina pectoris    2. Mixed hyperlipidemia    3. S/P CABG (coronary artery bypass graft)        History of Present Illness    Mr. Mack is here for follow-up of his above stated problems.  His predominant complaint relates to tremors and generalized aches which she attributed to Praluent and he went off this medication.  He was restarted on Klonopin by Dr. Matute this seems to have helped him.  His lipid profiles did improve considerably with LDL decreasing from 150 to 75.  No chest pain or shortness of breath was reported.  Does have hematuria for which he will be evaluated by Dr. Holt this afternoon.  He is continued on anticoagulation with Coumadin and I understand his latest INR was 3.  Hemoglobin was 13.7 and creatinine was 1.8.  No signs of congestive heart failure was noted.  EKG today showed sinus rhythm heart rate of 96 bpm with evidence of old inferior wall microinfarction and nonspecific T-wave changes.  Poor R wave progression was noted in the anterior leads.        SUBJECTIVE    Allergies   Allergen Reactions   • Heparin Anaphylaxis     thrombocytopenia   • Atorvastatin Other (See Comments)     Aches and pains all over   • Crestor [Rosuvastatin Calcium]    • Lisinopril Other (See Comments)     cough  Constant cough   • Lyrica [Pregabalin] Swelling   • Rosuvastatin Other (See Comments)     Aches and pains all over         Past Medical History:   Diagnosis Date   • Anxiety    • Arthritis     osteoarthritis   • Depression    • Diabetes mellitus    • DVT (deep venous thrombosis)    • Heart disease    • Hyperlipidemia    • Hypertension    • LUCIANA on CPAP          Past Surgical History:   Procedure Laterality Date   • CARDIAC SURGERY  2001   • CIRCUMCISION     • GALLBLADDER SURGERY  2000   • JOINT REPLACEMENT Right 05/02/2016    hip   • SPINE SURGERY           Family History  "  Problem Relation Age of Onset   • Heart disease Father    • Hypertension Father    • Stroke Father    • Diabetes Sister    • Heart disease Brother    • Hypertension Brother    • COPD Brother          Social History     Social History   • Marital status:      Spouse name: N/A   • Number of children: N/A   • Years of education: N/A     Occupational History   • Not on file.     Social History Main Topics   • Smoking status: Never Smoker   • Smokeless tobacco: Never Used   • Alcohol use No   • Drug use: No   • Sexual activity: Defer     Other Topics Concern   • Not on file     Social History Narrative         Current Outpatient Prescriptions   Medication Sig Dispense Refill   • aspirin 81 MG chewable tablet Chew 81 mg Daily.     • citalopram (CeleXA) 20 MG tablet Take 20 mg by mouth Daily.     • clonazePAM (KlonoPIN) 1 MG tablet      • COMFORT ASSIST INSULIN SYRINGE 31G X 5/16\" 0.3 ML misc      • furosemide (LASIX) 20 MG tablet Take 20 mg by mouth As Needed.     • glimepiride (AMARYL) 4 MG tablet      • HYDROcodone-acetaminophen (NORCO)  MG per tablet Take 1 tablet by mouth 6 (Six) Times a Day for 30 days. 180 tablet 0   • isosorbide mononitrate (IMDUR) 30 MG 24 hr tablet Take 30 mg by mouth Daily.     • Lancets (ONETOUCH ULTRASOFT) lancets      • losartan (COZAAR) 25 MG tablet Take 25 mg by mouth Daily.     • metoclopramide (REGLAN) 10 MG tablet TAKE ONE TABLET BY MOUTH THREE TIMES A DAY     • Omega-3 Fatty Acids (FISH OIL PO) Take 4 capsules by mouth.     • ONE TOUCH ULTRA TEST test strip      • RANEXA 500 MG 12 hr tablet      • sodium chloride 0.9 % solution      • tamsulosin (FLOMAX) 0.4 MG capsule 24 hr capsule      • triamcinolone (KENALOG) 0.1 % cream      • vitamin D (ERGOCALCIFEROL) 79847 UNITS capsule capsule      • warfarin (COUMADIN) 5 MG tablet        No current facility-administered medications for this visit.          OBJECTIVE    /70  Pulse 96  Ht 71\" (180.3 cm)  Wt (!) 320 lb " (145 kg)  BMI 44.63 kg/m2        Review of Systems     Constitutional:  Denies recent weight loss, weight gain, fever or chills    HENT:  Denies any hearing loss, epistaxis, hoarseness, or difficulty speaking.     Eyes: Wears eyeglasses or contact lenses     Respiratory:  Denies dyspnea with exertion,no cough, wheezing, or hemoptysis.     Cardiovascular: No chest pain, orthopnea, PND.  Palpitations improved after he was restarted on Klonopin and went off Praluent    Gastrointestinal:  Denies change in bowel habits, dyspepsia, ulcer disease, hematochezia, or melena.     Endocrine: Negative for cold intolerance, heat intolerance, polydipsia, polyphagia and polyuria.    Genitourinary: Hematuria, CK D      Musculoskeletal: DJD    Skin:  Denies any change in hair or nails, rashes, or skin lesions.     Neurological:  Tremors, Denies any history of recurrent headaches, strokes, TIA, or seizure disorder.     Hematological: Denies any food allergies, seasonal allergies, bleeding disorders, or lymphadenopathy.       Physical Exam     Constitutional: Cooperative, alert and oriented,  in no acute distress.  obese    HENT:   Head: Normocephalic, normal hair patterns, no masses or tenderness.  Ears, Nose, and Throat: No gross abnormalities. No pallor or cyanosis.   Eyes: EOMS intact, PERRL, conjunctivae and lids unremarkable. Fundoscopic exam and visual fields not performed.   Neck: No palpable masses or adenopathy, no thyromegaly, no JVD, carotid pulses are full and equal bilaterally and without  Bruits.     Cardiovascular: Regular rhythm, S1 and S2 normal, no S3 or S4. No murmurs, gallops, or rubs detected.     Pulmonary/Chest: Chest: normal symmetry, no tenderness to palpation, normal respiratory excursion, no use of accessory muscles.            Pulmonary: Normal breath sounds. No rales or ronchi.    Abdominal: Abdomen soft, bowel sounds normoactive, no masses, no hepatosplenomegaly, non-tender, no bruits.      Musculoskeletal: No deformities, clubbing, cyanosis, erythema, or edema observed.  Normal muscle strength and tone. Pulses full and equal in all extremities, no bruits auscultated.     Neurological: No gross motor or sensory deficits noted, affect appropriate, oriented to time, person, place.     Skin: Warm and dry to the touch, no apparent skin lesions or masses noted.         ECG 12 Lead  Date/Time: 8/14/2017 1:43 PM  Performed by: KO LORD  Authorized by: KO LORD   Comparison: not compared with previous ECG   Rhythm: sinus rhythm  Rate: normal  QRS axis: normal  Comments: EKG shows sinus rhythm heart rate of 96 bpm.  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  Nonspecific ST-T changes.  Poor R wave progression in the anterior leads.              Lab Results   Component Value Date    WBC 7.2 06/14/2016    HGB 9.4 (L) 06/14/2016    HCT 27.5 (L) 06/14/2016    MCV 85.1 06/14/2016     06/14/2016     Lab Results   Component Value Date    GLUCOSE 92 06/17/2016    BUN 30 (H) 07/05/2016    CREATININE 1.8 (H) 07/05/2016    CO2 29 06/17/2016    CALCIUM 8.3 (L) 06/17/2016    ALBUMIN 2.5 (L) 05/20/2016    AST 24 05/20/2016    ALT 50 05/20/2016     No results found for: CHOL  No results found for: TRIG  No results found for: HDL  No results found for: LDLCALC  No results found for: LDL  No results found for: HDLLDLRATIO  No components found for: CHOLHDL  Lab Results   Component Value Date    HGBA1C 7.2 (H) 10/20/2014     Lab Results   Component Value Date    TSH 0.69 05/13/2016           ASSESSMENT AND PLAN  Mr. Mack has no definite evidence of angina, arrhythmia or congestive heart failure.  His unfortunate that he is not able to continue Praluent since this really helped him to lower his LDL.  His tremors are most likely unrelated to the medication.  His present antiplatelet therapy with aspirin, antianginal therapy with isosorbide mononitrate and Ranexa, antihypertensive therapy with  losartan, and Lasix have been continued.  His PT and INR are monitored by Dr. Matute.  As mentioned above he will follow up with nephrology later today.    Diagnoses and all orders for this visit:    Coronary artery disease involving native coronary artery of native heart without angina pectoris    Mixed hyperlipidemia    S/P CABG (coronary artery bypass graft)      Keke Kumar MD  8/14/2017  1:33 PM

## 2017-09-12 ENCOUNTER — OFFICE VISIT (OUTPATIENT)
Dept: SLEEP MEDICINE | Facility: HOSPITAL | Age: 62
End: 2017-09-12

## 2017-09-12 VITALS
HEART RATE: 87 BPM | HEIGHT: 71 IN | WEIGHT: 315 LBS | BODY MASS INDEX: 44.1 KG/M2 | OXYGEN SATURATION: 97 % | SYSTOLIC BLOOD PRESSURE: 130 MMHG | DIASTOLIC BLOOD PRESSURE: 80 MMHG

## 2017-09-12 DIAGNOSIS — G47.33 OBSTRUCTIVE SLEEP APNEA, ADULT: Primary | ICD-10-CM

## 2017-09-12 DIAGNOSIS — F41.1 GAD (GENERALIZED ANXIETY DISORDER): ICD-10-CM

## 2017-09-12 DIAGNOSIS — G25.81 RESTLESS LEGS SYNDROME (RLS): ICD-10-CM

## 2017-09-12 PROCEDURE — 99214 OFFICE O/P EST MOD 30 MIN: CPT | Performed by: INTERNAL MEDICINE

## 2017-09-12 RX ORDER — CLONAZEPAM 1 MG/1
1 TABLET ORAL 3 TIMES DAILY PRN
Qty: 90 TABLET | Refills: 1 | Status: SHIPPED | OUTPATIENT
Start: 2017-09-12 | End: 2017-10-12

## 2017-09-12 NOTE — PROGRESS NOTES
New Patient Sleep Medicine Consultation    Encounter Date: 9/12/2017         Patient's PCP: Efrain Matute MD  Referring provider: No ref. provider found    Clint Mack is a 61 y.o. male who presents with above complaints for many years. He  admits to daytime fatigue, and non-restorative sleep. His bedtime is ~ 1900. He  falls asleep after 4-5 hours, and is up 3-4 times per night. He wakes up ~ 0730. He drinks 0 cups of coffee, 0 teas, and 2 sodas per day. He drinks no alcoholic beverages per week.    He denies cataplexy, sleep paralysis, or hypnagogic hallucinations. He takes OTC sleep aid 50mg nightly. He does not drive.  He does nap for 4 hours at a time, around 0800 and does not often fall asleep during this time.  He describes abnormal dreams and restless legs at night.     Patient was diagnosed with obstructive sleep apnea in 2009 and uses CPAP with 2L of oxygen every night. He uses 2L oxygen via nasal canula during waking hours at home.    CPAP Data:    Time frame: 09/13/2016 - 09/11/2017    Compliance 100 %  CPAP/APAP settings: 12.5  Average 90% pressure: 12. cmH2O  Leak: 30 seconds  Average AHI 5.4 events/hr  Mask type: Nasal  Machine Type: BG        Past Medical History:   Diagnosis Date   • Anxiety    • Arthritis     osteoarthritis   • Depression    • Diabetes mellitus    • DVT (deep venous thrombosis)    • Heart disease    • Hyperlipidemia    • Hypertension    • LUCIANA on CPAP      Social History     Social History   • Marital status:      Spouse name: N/A   • Number of children: N/A   • Years of education: N/A     Occupational History   • Not on file.     Social History Main Topics   • Smoking status: Never Smoker   • Smokeless tobacco: Never Used   • Alcohol use No   • Drug use: No   • Sexual activity: Defer     Other Topics Concern   • Not on file     Social History Narrative     Family History   Problem Relation Age of Onset   • Heart disease Father    • Hypertension Father    • Stroke  Father    • Diabetes Sister    • Heart disease Brother    • Hypertension Brother    • COPD Brother      Patient has stage III chronic kidney disease.   Patient has had three right total hip arthroplasty operations. He uses a wheelchair.  He has bilateral resting tremor in his hands and states this began when he stopped taking Clonapen in May.  He has history of CABG in 2001 and lower back operation with CAGE placement in 2006 and again in 2013 due to disc herniation.     Smoking history: Patient has never smoked.  FH of sleep disorders: None known.     Review of Systems:  A comprehensive review of systems was negative. Patient advised to discuss any positive ROS with PCP.      Vitals:    09/12/17 1318   BP: 130/80   Pulse: 87   SpO2: 97%     Body mass index is 44.63 kg/(m^2).          General: Alert. Cooperative. Well developed. No acute distress, mild resting tremor             Head:  Normocephalic. Symmetrical. Atraumatic.              Eyes: Sclera clear. No icterus. PERRLA. Normal EOM.             Ears: No deformities. Normal hearing.             Nose: No septal deviation. No drainage.          Throat: No oral lesions. No thrush. Moist mucous membranes.    Tongue is enlarged    Dentition is fair       Pharynx: Posterior pharyngeal pillars are wide    Mallampati score of IV (only hard palate visible)    Pharynx is nonerythematous   Chest Wall:  Normal shape. Symmetric expansion with respiration. No tenderness.             Neck:  Trachea midline.           Lungs:  Clear to auscultation bilaterally. No wheezes. No rhonchi. No rales. Respirations regular, even and unlabored.            Heart:  Regular rhythm and normal rate. Normal S1 and S2. No murmur.     Abdomen:  Soft, non-tender obese, BS WNL  Extremities:  No edema. In wheelchir          Pulses: Pulses palpable and equal bilaterally.               Skin: Dry. Intact. No bleeding. No rash.                   Current Outpatient Prescriptions:   •  aspirin 81 MG  "chewable tablet, Chew 81 mg Daily., Disp: , Rfl:   •  citalopram (CeleXA) 20 MG tablet, Take 20 mg by mouth Daily., Disp: , Rfl:   •  clonazePAM (KlonoPIN) 1 MG tablet, , Disp: , Rfl:   •  COMFORT ASSIST INSULIN SYRINGE 31G X 5/16\" 0.3 ML misc, , Disp: , Rfl:   •  furosemide (LASIX) 20 MG tablet, Take 20 mg by mouth As Needed., Disp: , Rfl:   •  glimepiride (AMARYL) 4 MG tablet, , Disp: , Rfl:   •  isosorbide mononitrate (IMDUR) 30 MG 24 hr tablet, Take 30 mg by mouth Daily., Disp: , Rfl:   •  Lancets (ONETOUCH ULTRASOFT) lancets, , Disp: , Rfl:   •  losartan (COZAAR) 25 MG tablet, Take 25 mg by mouth Daily., Disp: , Rfl:   •  metoclopramide (REGLAN) 10 MG tablet, TAKE ONE TABLET BY MOUTH THREE TIMES A DAY, Disp: , Rfl:   •  Omega-3 Fatty Acids (FISH OIL PO), Take 4 capsules by mouth., Disp: , Rfl:   •  ONE TOUCH ULTRA TEST test strip, , Disp: , Rfl:   •  RANEXA 500 MG 12 hr tablet, , Disp: , Rfl:   •  sodium chloride 0.9 % solution, , Disp: , Rfl:   •  tamsulosin (FLOMAX) 0.4 MG capsule 24 hr capsule, , Disp: , Rfl:   •  triamcinolone (KENALOG) 0.1 % cream, , Disp: , Rfl:   •  vitamin D (ERGOCALCIFEROL) 75982 UNITS capsule capsule, , Disp: , Rfl:   •  warfarin (COUMADIN) 5 MG tablet, , Disp: , Rfl:     ASSESSMENT:  1. Obstructive sleep apnea PSG with baseline AHI of 26 on CPAP of 10 cm H2O  2. Generalized anxiety disorder - uncontrolled  3. Mild RLS    Script for klonopin for 1 monthSAMRA reviewed from 07/19/2017  Script for new supplies  Follow up in 3-6 months           This document has been electronically signed by Lexie Phan on September 12, 2017         CC: Efrain Matute MD          No ref. provider found    "

## 2017-09-13 ENCOUNTER — OFFICE VISIT (OUTPATIENT)
Dept: PAIN MEDICINE | Facility: CLINIC | Age: 62
End: 2017-09-13

## 2017-09-13 VITALS
SYSTOLIC BLOOD PRESSURE: 130 MMHG | DIASTOLIC BLOOD PRESSURE: 80 MMHG | HEIGHT: 71 IN | BODY MASS INDEX: 44.1 KG/M2 | WEIGHT: 315 LBS

## 2017-09-13 DIAGNOSIS — M51.36 DDD (DEGENERATIVE DISC DISEASE), LUMBAR: Primary | ICD-10-CM

## 2017-09-13 DIAGNOSIS — Z79.899 HIGH RISK MEDICATIONS (NOT ANTICOAGULANTS) LONG-TERM USE: ICD-10-CM

## 2017-09-13 DIAGNOSIS — M25.551 PAIN OF RIGHT HIP JOINT: ICD-10-CM

## 2017-09-13 DIAGNOSIS — M47.817 LUMBOSACRAL SPONDYLOSIS WITHOUT MYELOPATHY: ICD-10-CM

## 2017-09-13 PROCEDURE — 99213 OFFICE O/P EST LOW 20 MIN: CPT | Performed by: PAIN MEDICINE

## 2017-09-13 RX ORDER — CLONAZEPAM 0.5 MG/1
1 TABLET ORAL
COMMUNITY
Start: 2017-07-31 | End: 2017-10-05 | Stop reason: SDUPTHER

## 2017-09-13 RX ORDER — HYDROCODONE BITARTRATE AND ACETAMINOPHEN 10; 325 MG/1; MG/1
1 TABLET ORAL
Qty: 180 TABLET | Refills: 0 | Status: SHIPPED | OUTPATIENT
Start: 2017-09-13 | End: 2017-10-13

## 2017-09-13 NOTE — PROGRESS NOTES
Clint Mack is a 61 y.o. male.   1955    HPI:   Location: lower back  Quality: aching and dull  Severity: 5/10  Timing: constant  Alleviating: nothing  Aggravating: nothing specific     Klonopin has been restarted.  Patient was having significant anxiety previously.  Still utilizing opioid medication to manage his chronic low back pain.  This allows him to have some relief during the day.  He is not very active however.  He has difficulty walking.  He denies side effects of his opioid medication.      The following portions of the patient's history were reviewed by me and updated as appropriate: allergies, current medications, past family history, past medical history, past social history, past surgical history and problem list.    Past Medical History:   Diagnosis Date   • Anxiety    • Arthritis     osteoarthritis   • Depression    • Diabetes mellitus    • DVT (deep venous thrombosis)    • Heart disease    • Hyperlipidemia    • Hypertension    • LUCIANA on CPAP        Social History     Social History   • Marital status:      Spouse name: N/A   • Number of children: N/A   • Years of education: N/A     Occupational History   • Not on file.     Social History Main Topics   • Smoking status: Never Smoker   • Smokeless tobacco: Never Used   • Alcohol use No   • Drug use: No   • Sexual activity: Defer     Other Topics Concern   • Not on file     Social History Narrative       Family History   Problem Relation Age of Onset   • Heart disease Father    • Hypertension Father    • Stroke Father    • Diabetes Sister    • Heart disease Brother    • Hypertension Brother    • COPD Brother          Current Outpatient Prescriptions:   •  aspirin 81 MG chewable tablet, Chew 81 mg Daily., Disp: , Rfl:   •  citalopram (CeleXA) 20 MG tablet, Take 20 mg by mouth Daily., Disp: , Rfl:   •  clonazePAM (KlonoPIN) 0.5 MG tablet, 1 mg., Disp: , Rfl:   •  clonazePAM (KlonoPIN) 1 MG tablet, Take 1 tablet by mouth 3 (Three)  "Times a Day As Needed for Seizures for up to 30 days., Disp: 90 tablet, Rfl: 1  •  COMFORT ASSIST INSULIN SYRINGE 31G X 5/16\" 0.3 ML misc, , Disp: , Rfl:   •  furosemide (LASIX) 20 MG tablet, Take 20 mg by mouth As Needed., Disp: , Rfl:   •  glimepiride (AMARYL) 4 MG tablet, , Disp: , Rfl:   •  isosorbide mononitrate (IMDUR) 30 MG 24 hr tablet, Take 30 mg by mouth Daily., Disp: , Rfl:   •  Lancets (ONETOUCH ULTRASOFT) lancets, , Disp: , Rfl:   •  losartan (COZAAR) 25 MG tablet, Take 25 mg by mouth Daily., Disp: , Rfl:   •  metoclopramide (REGLAN) 10 MG tablet, TAKE ONE TABLET BY MOUTH THREE TIMES A DAY, Disp: , Rfl:   •  Omega-3 Fatty Acids (FISH OIL PO), Take 4 capsules by mouth., Disp: , Rfl:   •  ONE TOUCH ULTRA TEST test strip, , Disp: , Rfl:   •  RANEXA 500 MG 12 hr tablet, , Disp: , Rfl:   •  sodium chloride 0.9 % solution, , Disp: , Rfl:   •  tamsulosin (FLOMAX) 0.4 MG capsule 24 hr capsule, , Disp: , Rfl:   •  triamcinolone (KENALOG) 0.1 % cream, , Disp: , Rfl:   •  vitamin D (ERGOCALCIFEROL) 89219 UNITS capsule capsule, , Disp: , Rfl:   •  warfarin (COUMADIN) 5 MG tablet, , Disp: , Rfl:   •  HYDROcodone-acetaminophen (NORCO)  MG per tablet, Take 1 tablet by mouth 6 (Six) Times a Day for 30 days., Disp: 180 tablet, Rfl: 0  •  HYDROcodone-acetaminophen (NORCO)  MG per tablet, Take 1 tablet by mouth 6 (Six) Times a Day for 30 days., Disp: 180 tablet, Rfl: 0    Allergies   Allergen Reactions   • Heparin Anaphylaxis     thrombocytopenia   • Atorvastatin Other (See Comments)     Aches and pains all over   • Crestor [Rosuvastatin Calcium]    • Lisinopril Other (See Comments)     cough  Constant cough   • Lyrica [Pregabalin] Swelling   • Rosuvastatin Other (See Comments)     Aches and pains all over       Review of Systems   Musculoskeletal: Positive for back pain (lower).   All other systems reviewed and are negative.    All systems reviewed and negative except for above.    Physical Exam "   Constitutional: He appears well-developed and well-nourished. No distress.   Musculoskeletal:        Lumbar back: He exhibits decreased range of motion (Discomfort with both flexion and extension while seated.) and tenderness (ttp lpsm).   Neurological: He is alert. Gait (Wheelchair today.) abnormal.   Weakness right hip flexion   Psychiatric: He has a normal mood and affect. His behavior is normal. Judgment normal.       Clint was seen today for back pain.    Diagnoses and all orders for this visit:    DDD (degenerative disc disease), lumbar    Pain of right hip joint    Lumbosacral spondylosis without myelopathy    High risk medications (not anticoagulants) long-term use    Other orders  -     HYDROcodone-acetaminophen (NORCO)  MG per tablet; Take 1 tablet by mouth 6 (Six) Times a Day for 30 days.  -     HYDROcodone-acetaminophen (NORCO)  MG per tablet; Take 1 tablet by mouth 6 (Six) Times a Day for 30 days.      Medication: Patient reports no negative side effects, Patient reports appropriate usage and storage habits, Patient's opioid provides enough reflief to be more active and perform activities of daily living with less discomfort. and Refill opioid medication as above    Interventional: none at this time    Rehab: none at this time    Behavioral: No aberrant behavior noted. [facility] Report #[id number]  was reviewed and is consistent with stated history    Urine drug screen None at this time          This document has been electronically signed by Robin Delgadillo MD on September 13, 2017 1:25 PM

## 2017-10-05 ENCOUNTER — OFFICE VISIT (OUTPATIENT)
Dept: PODIATRY | Facility: CLINIC | Age: 62
End: 2017-10-05

## 2017-10-05 VITALS
OXYGEN SATURATION: 93 % | BODY MASS INDEX: 44.1 KG/M2 | HEART RATE: 88 BPM | WEIGHT: 315 LBS | SYSTOLIC BLOOD PRESSURE: 93 MMHG | HEIGHT: 71 IN | DIASTOLIC BLOOD PRESSURE: 61 MMHG

## 2017-10-05 DIAGNOSIS — B35.1 ONYCHOMYCOSIS: Primary | ICD-10-CM

## 2017-10-05 DIAGNOSIS — M79.674 PAIN IN TOES OF BOTH FEET: ICD-10-CM

## 2017-10-05 DIAGNOSIS — M79.675 PAIN IN TOES OF BOTH FEET: ICD-10-CM

## 2017-10-05 DIAGNOSIS — E11.42 DIABETIC POLYNEUROPATHY ASSOCIATED WITH TYPE 2 DIABETES MELLITUS (HCC): ICD-10-CM

## 2017-10-05 DIAGNOSIS — S90.222A SUBUNGUAL HEMATOMA OF LEFT FOOT, INITIAL ENCOUNTER: ICD-10-CM

## 2017-10-05 PROCEDURE — 11721 DEBRIDE NAIL 6 OR MORE: CPT | Performed by: PODIATRIST

## 2017-10-05 PROCEDURE — 99203 OFFICE O/P NEW LOW 30 MIN: CPT | Performed by: PODIATRIST

## 2017-10-05 NOTE — PROGRESS NOTES
Clint Mack  1955  61 y.o. male   BS-132 per patient  PCP- Dr. Matute  Patient presents today for diabetic nail care.    10/05/2017  Chief Complaint   Patient presents with   • Left Foot - diabetic nail care   • Right Foot - diabetic nail care           History of Present Illness    Clint Mack is a 61 y.o. male presents for diabetic foot exam and nail care.  He is an insulin-dependent diabetic.  He is uncertain of her most recent hemoglobin A1c.  He does also have an extensive history of back and hip surgery as well as an injury at Work which has rendered him disabled and weak on his right side.  He does note some chronic numbness, tingling and burning sensations to his toes on both feet.  He does also note thickened nails which do cause some pain in close toed shoe gear.  He does also have some concern over a black spot on his left great toenail which is been present for a month or so.  He denies any other acute pedal complaints.        Past Medical History:   Diagnosis Date   • Anxiety    • Arthritis     osteoarthritis   • Depression    • Diabetes mellitus    • DVT (deep venous thrombosis)    • Heart disease    • Hyperlipidemia    • Hypertension    • LUCIANA on CPAP          Past Surgical History:   Procedure Laterality Date   • CARDIAC SURGERY  2001   • CIRCUMCISION     • GALLBLADDER SURGERY  2000   • JOINT REPLACEMENT Right 05/02/2016    hip   • SPINE SURGERY           Family History   Problem Relation Age of Onset   • Heart disease Father    • Hypertension Father    • Stroke Father    • Diabetes Sister    • Heart disease Brother    • Hypertension Brother    • COPD Brother          Social History     Social History   • Marital status:      Spouse name: N/A   • Number of children: N/A   • Years of education: N/A     Occupational History   • Not on file.     Social History Main Topics   • Smoking status: Never Smoker   • Smokeless tobacco: Never Used   • Alcohol use No   • Drug use:  "No   • Sexual activity: Defer     Other Topics Concern   • Not on file     Social History Narrative         Current Outpatient Prescriptions   Medication Sig Dispense Refill   • aspirin 81 MG chewable tablet Chew 81 mg Daily.     • citalopram (CeleXA) 20 MG tablet Take 20 mg by mouth Daily.     • clonazePAM (KlonoPIN) 1 MG tablet Take 1 tablet by mouth 3 (Three) Times a Day As Needed for Seizures for up to 30 days. 90 tablet 1   • COMFORT ASSIST INSULIN SYRINGE 31G X 5/16\" 0.3 ML misc      • furosemide (LASIX) 20 MG tablet Take 20 mg by mouth As Needed.     • glimepiride (AMARYL) 4 MG tablet      • HYDROcodone-acetaminophen (NORCO)  MG per tablet Take 1 tablet by mouth 6 (Six) Times a Day for 30 days. 180 tablet 0   • HYDROcodone-acetaminophen (NORCO)  MG per tablet Take 1 tablet by mouth 6 (Six) Times a Day for 30 days. 180 tablet 0   • isosorbide mononitrate (IMDUR) 30 MG 24 hr tablet Take 30 mg by mouth Daily.     • Lancets (ONETOUCH ULTRASOFT) lancets      • losartan (COZAAR) 25 MG tablet Take 25 mg by mouth Daily.     • metoclopramide (REGLAN) 10 MG tablet TAKE ONE TABLET BY MOUTH THREE TIMES A DAY     • Omega-3 Fatty Acids (FISH OIL PO) Take 4 capsules by mouth.     • ONE TOUCH ULTRA TEST test strip      • RANEXA 500 MG 12 hr tablet      • sodium chloride 0.9 % solution      • tamsulosin (FLOMAX) 0.4 MG capsule 24 hr capsule      • triamcinolone (KENALOG) 0.1 % cream      • vitamin D (ERGOCALCIFEROL) 90677 UNITS capsule capsule      • warfarin (COUMADIN) 5 MG tablet        No current facility-administered medications for this visit.          OBJECTIVE    BP 93/61  Pulse 88  Ht 71\" (180.3 cm)  Wt (!) 320 lb (145 kg)  SpO2 93%  BMI 44.63 kg/m2      Review of Systems   Constitutional:  Denies recent weight loss, weight gain, fever or chills, no change in exercise tolerance  Musculoskeletal: Toe pain.   Skin:  Thickened nails. Shin discoloration.  Neurological:  Burning sensations to feet " b/l.  Psychiatric/Behavioral: Denies depression        Physical Exam   Constitutional: he appears well-developed and well-nourished.   HEENT: Normocephalic. Atraumatic  CV: No tenderness. RRR  Resp: Non-labored respiration. No wheezes.   Psychiatric: he has a normal mood and affect. his   behavior is normal.      Lower Extremity Exam:  Vascular: DP/PT pulses palpable 1+.   Negative hair growth.   1+ perimalleolar edema  Toes cool  Neuro: Protective sensation diminished to lesser toes, b/l.  DTRs intact  Integument: No open wounds or lesions.  Mature subungual hematoma left hallux.  No signs of infection.  Atrophic skin noted b/l with chronic venous stasis dermatitis changes  No masses  Musculoskeletal: LE muscle strength 4/5 on left, 3/5 on right  Gait assisted with walker, wheel chair  Semi-rigid hammertoe deformity toes 2-5 b/l.  Nails 1-5 b/l thickened, elongated with subungual debris. +pain on palpation              ASSESSMENT AND PLAN    Clint was seen today for diabetic nail care and diabetic nail care.    Diagnoses and all orders for this visit:    Onychomycosis    Diabetic polyneuropathy associated with type 2 diabetes mellitus    Subungual hematoma of left foot, initial encounter      -Comprehensive DM foot exam performed. Pt educated on importance of tight glucose control and daily foot checks. Pt education materials dispensed.  -Pt educated on padding techniques for rigid hammertoes.  Proper extra depth diabetic shoe gear.  Limit barefoot walking.  -Nails 1-5 b/l debrided in length and thickness with nail nipper to decrease pain, fungal load and risk of infection  -Follow up 3 months PRN          This document has been electronically signed by Jose C Novak DPM on October 11, 2017 8:04 PM     EMR Dragon/Transcription disclaimer:   Much of this encounter note is an electronic transcription/translation of spoken language to printed text. The electronic translation of spoken language may permit  erroneous, or at times, nonsensical words or phrases to be inadvertently transcribed; Although I have reviewed the note for such errors, some may still exist.    Jose C Novak DPM  10/11/2017  8:04 PM

## 2017-11-01 ENCOUNTER — LAB (OUTPATIENT)
Dept: LAB | Facility: HOSPITAL | Age: 62
End: 2017-11-01

## 2017-11-01 ENCOUNTER — OFFICE VISIT (OUTPATIENT)
Dept: ORTHOPEDIC SURGERY | Facility: CLINIC | Age: 62
End: 2017-11-01

## 2017-11-01 VITALS — BODY MASS INDEX: 44.1 KG/M2 | HEIGHT: 71 IN | WEIGHT: 315 LBS

## 2017-11-01 DIAGNOSIS — M47.817 LUMBOSACRAL SPONDYLOSIS WITHOUT MYELOPATHY: ICD-10-CM

## 2017-11-01 DIAGNOSIS — M25.561 CHRONIC PAIN OF RIGHT KNEE: ICD-10-CM

## 2017-11-01 DIAGNOSIS — G89.29 CHRONIC PAIN OF RIGHT KNEE: ICD-10-CM

## 2017-11-01 DIAGNOSIS — Z96.641 PRESENCE OF ARTIFICIAL HIP, RIGHT: Primary | ICD-10-CM

## 2017-11-01 DIAGNOSIS — M51.36 DDD (DEGENERATIVE DISC DISEASE), LUMBAR: ICD-10-CM

## 2017-11-01 LAB
BASOPHILS # BLD AUTO: 0.01 10*3/MM3 (ref 0–0.2)
BASOPHILS NFR BLD AUTO: 0.2 % (ref 0–2)
CRP SERPL-MCNC: 0.9 MG/DL (ref 0–1)
DEPRECATED RDW RBC AUTO: 46 FL (ref 35.1–43.9)
EOSINOPHIL # BLD AUTO: 0.15 10*3/MM3 (ref 0–0.7)
EOSINOPHIL NFR BLD AUTO: 2.3 % (ref 0–7)
ERYTHROCYTE [DISTWIDTH] IN BLOOD BY AUTOMATED COUNT: 14 % (ref 11.5–14.5)
HCT VFR BLD AUTO: 44.9 % (ref 39–49)
HGB BLD-MCNC: 15.5 G/DL (ref 13.7–17.3)
IMM GRANULOCYTES # BLD: 0.02 10*3/MM3 (ref 0–0.02)
IMM GRANULOCYTES NFR BLD: 0.3 % (ref 0–0.5)
LYMPHOCYTES # BLD AUTO: 2.45 10*3/MM3 (ref 0.6–4.2)
LYMPHOCYTES NFR BLD AUTO: 37 % (ref 10–50)
MCH RBC QN AUTO: 31.1 PG (ref 26.5–34)
MCHC RBC AUTO-ENTMCNC: 34.5 G/DL (ref 31.5–36.3)
MCV RBC AUTO: 90.2 FL (ref 80–98)
MONOCYTES # BLD AUTO: 0.85 10*3/MM3 (ref 0–0.9)
MONOCYTES NFR BLD AUTO: 12.8 % (ref 0–12)
NEUTROPHILS # BLD AUTO: 3.15 10*3/MM3 (ref 2–8.6)
NEUTROPHILS NFR BLD AUTO: 47.4 % (ref 37–80)
PLATELET # BLD AUTO: 195 10*3/MM3 (ref 150–450)
PMV BLD AUTO: 10.8 FL (ref 8–12)
RBC # BLD AUTO: 4.98 10*6/MM3 (ref 4.37–5.74)
WBC NRBC COR # BLD: 6.63 10*3/MM3 (ref 3.2–9.8)

## 2017-11-01 PROCEDURE — 85025 COMPLETE CBC W/AUTO DIFF WBC: CPT | Performed by: NURSE PRACTITIONER

## 2017-11-01 PROCEDURE — 99214 OFFICE O/P EST MOD 30 MIN: CPT | Performed by: NURSE PRACTITIONER

## 2017-11-01 PROCEDURE — 86140 C-REACTIVE PROTEIN: CPT | Performed by: NURSE PRACTITIONER

## 2017-11-01 PROCEDURE — 36415 COLL VENOUS BLD VENIPUNCTURE: CPT

## 2017-11-01 NOTE — PROGRESS NOTES
"Clint Mack is a 62 y.o. male returns for     Chief Complaint   Patient presents with   • Right Knee - Follow-up   • Right Hip - Follow-up       HISTORY OF PRESENT ILLNESS:   62-year-old  male in the office today for follow-up of his right hip and knee pain.  He reports that he recently he is noted an increase in the knee and hip pain without any evidence of infection and/or a new traumatic event.  He was evaluated by his primary care provider who [facility]       CONCURRENT MEDICAL HISTORY:    Past Medical History:   Diagnosis Date   • Anxiety    • Arthritis     osteoarthritis   • Depression    • Diabetes mellitus    • DVT (deep venous thrombosis)    • Heart disease    • Hyperlipidemia    • Hypertension    • LUCIANA on CPAP        Allergies   Allergen Reactions   • Heparin Anaphylaxis     thrombocytopenia   • Atorvastatin Other (See Comments)     Aches and pains all over   • Crestor [Rosuvastatin Calcium]    • Lisinopril Other (See Comments)     cough  Constant cough   • Lyrica [Pregabalin] Swelling   • Rosuvastatin Other (See Comments)     Aches and pains all over         Current Outpatient Prescriptions:   •  aspirin 81 MG chewable tablet, Chew 81 mg Daily., Disp: , Rfl:   •  citalopram (CeleXA) 20 MG tablet, Take 20 mg by mouth Daily., Disp: , Rfl:   •  COMFORT ASSIST INSULIN SYRINGE 31G X 5/16\" 0.3 ML misc, , Disp: , Rfl:   •  furosemide (LASIX) 20 MG tablet, Take 20 mg by mouth As Needed., Disp: , Rfl:   •  glimepiride (AMARYL) 4 MG tablet, , Disp: , Rfl:   •  isosorbide mononitrate (IMDUR) 30 MG 24 hr tablet, Take 30 mg by mouth Daily., Disp: , Rfl:   •  Lancets (ONETOUCH ULTRASOFT) lancets, , Disp: , Rfl:   •  losartan (COZAAR) 25 MG tablet, Take 25 mg by mouth Daily., Disp: , Rfl:   •  metoclopramide (REGLAN) 10 MG tablet, TAKE ONE TABLET BY MOUTH THREE TIMES A DAY, Disp: , Rfl:   •  Omega-3 Fatty Acids (FISH OIL PO), Take 4 capsules by mouth., Disp: , Rfl:   •  " "ONE TOUCH ULTRA TEST test strip, , Disp: , Rfl:   •  RANEXA 500 MG 12 hr tablet, , Disp: , Rfl:   •  sodium chloride 0.9 % solution, , Disp: , Rfl:   •  tamsulosin (FLOMAX) 0.4 MG capsule 24 hr capsule, , Disp: , Rfl:   •  triamcinolone (KENALOG) 0.1 % cream, , Disp: , Rfl:   •  vitamin D (ERGOCALCIFEROL) 54550 UNITS capsule capsule, , Disp: , Rfl:   •  warfarin (COUMADIN) 5 MG tablet, , Disp: , Rfl:     Past Surgical History:   Procedure Laterality Date   • CARDIAC SURGERY  2001   • CIRCUMCISION     • GALLBLADDER SURGERY  2000   • JOINT REPLACEMENT Right 05/02/2016    hip   • SPINE SURGERY         ROS  No fevers or chills.  No chest pain or shortness of air.  No GI or  disturbances.    PHYSICAL EXAMINATION:       Ht 71\" (180.3 cm)  Wt (!) 320 lb (145 kg)  BMI 44.63 kg/m2    Physical Exam   Constitutional: He is oriented to person, place, and time. Vital signs are normal. He appears well-developed and well-nourished. He is cooperative.   HENT:   Head: Normocephalic and atraumatic.   Neck: Trachea normal and phonation normal.   Pulmonary/Chest: Effort normal. No respiratory distress.   Abdominal: Soft. Normal appearance. He exhibits no distension.   Musculoskeletal:        Right knee: He exhibits no effusion.   Neurological: He is alert and oriented to person, place, and time. GCS eye subscore is 4. GCS verbal subscore is 5. GCS motor subscore is 6.   Skin: Skin is warm, dry and intact.   Psychiatric: He has a normal mood and affect. His speech is normal and behavior is normal. Judgment and thought content normal. Cognition and memory are normal.   Vitals reviewed.      GAIT:     []  Normal  [x]  Antalgic    Assistive device: []  None  []  Walker     []  Crutches  []  Cane     [x]  Wheelchair  []  Stretcher    Right Knee Exam     Tenderness   The patient is experiencing no tenderness.         Range of Motion   Extension: normal   Right knee flexion: 115.     Other   Erythema: absent  Scars: present  Sensation: " normal  Pulse: present  Swelling: none  Other tests: no effusion present      Left Knee Exam   Left knee exam is normal.    Muscle Strength     The patient has normal left knee strength.      Right Hip Exam     Tenderness   The patient is experiencing tenderness in the greater trochanter.    Range of Motion   Flexion: abnormal   Internal Rotation: abnormal   External Rotation: abnormal   Abduction: abnormal     Muscle Strength   Abduction: 4/5   Adduction: 4/5   Flexion: 4/5     Other   Erythema: absent  Scars: present (no evidence of infection noted. )  Sensation: normal  Pulse: present      Left Hip Exam   Left hip exam is normal.              Xr Knee 1 Or 2 View Right    Result Date: 11/2/2017  Narrative: Mild patellofemoral compartmental degenerative changes noted on the lateral view of the right knee 11/02/17 at 8:01 AM by DIANA Lipscomb     Xr Knee Bilateral Ap Standing    Result Date: 11/2/2017  Narrative: Standing AP of both knees with views of the right knee only reveal no acute radiological abnormality and mild tricompartmental degenerative changes. 11/02/17 at 8:00 AM by DIANA Lipscomb     Xr Hip With Or Without Pelvis 2 - 3 View Right    Result Date: 11/2/2017  Narrative: AP view of the pelvis with views of the right hip reveal stable total hip arthroplasty without any evidence of hardware failure.  Comparisons made previous films obtained in our office previously which reveal no change in the position alignment of the implant. 11/02/17 at 8:02 AM by DIANA Lipscomb             ASSESSMENT:    Diagnoses and all orders for this visit:    Presence of artificial hip, right  -     XR Hip With or Without Pelvis 2 - 3 View Right; Future  -     XR Knee Bilateral AP Standing  -     XR Knee 1 or 2 View Right    Lumbosacral spondylosis without myelopathy  -     C-reactive Protein; Future  -     CBC & Differential; Future  -     XR Hip With or Without Pelvis 2 - 3 View Right; Future  -      XR Knee Bilateral AP Standing  -     XR Knee 1 or 2 View Right    DDD (degenerative disc disease), lumbar  -     C-reactive Protein; Future  -     CBC & Differential; Future  -     XR Hip With or Without Pelvis 2 - 3 View Right; Future  -     XR Knee Bilateral AP Standing  -     XR Knee 1 or 2 View Right    Chronic pain of right knee  -     XR Hip With or Without Pelvis 2 - 3 View Right; Future  -     XR Knee Bilateral AP Standing  -     XR Knee 1 or 2 View Right          PLAN  Recommend repeat CPR and CBC to r/o reoccurrence of the infection   No Follow-up on file.    Edvin Montoya, APRN

## 2017-11-08 ENCOUNTER — OFFICE VISIT (OUTPATIENT)
Dept: PAIN MEDICINE | Facility: CLINIC | Age: 62
End: 2017-11-08

## 2017-11-08 VITALS
BODY MASS INDEX: 44.1 KG/M2 | WEIGHT: 315 LBS | SYSTOLIC BLOOD PRESSURE: 120 MMHG | DIASTOLIC BLOOD PRESSURE: 64 MMHG | HEIGHT: 71 IN

## 2017-11-08 DIAGNOSIS — Z79.899 HIGH RISK MEDICATIONS (NOT ANTICOAGULANTS) LONG-TERM USE: ICD-10-CM

## 2017-11-08 DIAGNOSIS — M25.551 PAIN OF RIGHT HIP JOINT: ICD-10-CM

## 2017-11-08 DIAGNOSIS — M47.817 LUMBOSACRAL SPONDYLOSIS WITHOUT MYELOPATHY: ICD-10-CM

## 2017-11-08 DIAGNOSIS — M51.36 DDD (DEGENERATIVE DISC DISEASE), LUMBAR: Primary | ICD-10-CM

## 2017-11-08 PROCEDURE — 99213 OFFICE O/P EST LOW 20 MIN: CPT | Performed by: NURSE PRACTITIONER

## 2017-11-08 RX ORDER — HYDROCODONE BITARTRATE AND ACETAMINOPHEN 10; 325 MG/1; MG/1
1 TABLET ORAL
Qty: 180 TABLET | Refills: 0 | Status: SHIPPED | OUTPATIENT
Start: 2017-11-08 | End: 2017-12-08

## 2017-11-08 RX ORDER — HYDROXYZINE PAMOATE 25 MG/1
CAPSULE ORAL
COMMUNITY
Start: 2017-11-02 | End: 2018-03-08 | Stop reason: HOSPADM

## 2017-11-08 NOTE — PROGRESS NOTES
"Clint Mack is a 62 y.o. male.   1955    HPI:   Location: lower back  Quality: aching and dull  Severity: 4/10  Timing: constant  Alleviating: nothing  Aggravating: nothing specific     Patient reports that the opioid medication still provides him good relief and allows more activity than he would have without the opioid medication. He denies side effects.  Reducing klonopin slowly.  Following ortho for right hip and back.         The following portions of the patient's history were reviewed by me and updated as appropriate: allergies, current medications, past family history, past medical history, past social history, past surgical history and problem list.    Past Medical History:   Diagnosis Date   • Anxiety    • Arthritis     osteoarthritis   • Depression    • Diabetes mellitus    • DVT (deep venous thrombosis)    • Heart disease    • Hyperlipidemia    • Hypertension    • LUCIANA on CPAP        Social History     Social History   • Marital status:      Spouse name: N/A   • Number of children: N/A   • Years of education: N/A     Occupational History   • Not on file.     Social History Main Topics   • Smoking status: Never Smoker   • Smokeless tobacco: Never Used   • Alcohol use No   • Drug use: No   • Sexual activity: Defer     Other Topics Concern   • Not on file     Social History Narrative       Family History   Problem Relation Age of Onset   • Heart disease Father    • Hypertension Father    • Stroke Father    • Diabetes Sister    • Heart disease Brother    • Hypertension Brother    • COPD Brother          Current Outpatient Prescriptions:   •  aspirin 81 MG chewable tablet, Chew 81 mg Daily., Disp: , Rfl:   •  citalopram (CeleXA) 20 MG tablet, Take 20 mg by mouth Daily., Disp: , Rfl:   •  COMFORT ASSIST INSULIN SYRINGE 31G X 5/16\" 0.3 ML misc, , Disp: , Rfl:   •  furosemide (LASIX) 20 MG tablet, Take 20 mg by mouth As Needed., Disp: , Rfl:   •  glimepiride (AMARYL) 4 MG tablet, , Disp: , " Rfl:   •  hydrOXYzine (VISTARIL) 25 MG capsule, , Disp: , Rfl:   •  isosorbide mononitrate (IMDUR) 30 MG 24 hr tablet, Take 30 mg by mouth Daily., Disp: , Rfl:   •  Lancets (ONETOUCH ULTRASOFT) lancets, , Disp: , Rfl:   •  losartan (COZAAR) 25 MG tablet, Take 25 mg by mouth Daily., Disp: , Rfl:   •  metoclopramide (REGLAN) 10 MG tablet, TAKE ONE TABLET BY MOUTH THREE TIMES A DAY, Disp: , Rfl:   •  Omega-3 Fatty Acids (FISH OIL PO), Take 4 capsules by mouth., Disp: , Rfl:   •  ONE TOUCH ULTRA TEST test strip, , Disp: , Rfl:   •  RANEXA 500 MG 12 hr tablet, , Disp: , Rfl:   •  sodium chloride 0.9 % solution, , Disp: , Rfl:   •  tamsulosin (FLOMAX) 0.4 MG capsule 24 hr capsule, , Disp: , Rfl:   •  triamcinolone (KENALOG) 0.1 % cream, , Disp: , Rfl:   •  vitamin D (ERGOCALCIFEROL) 82116 UNITS capsule capsule, , Disp: , Rfl:   •  warfarin (COUMADIN) 5 MG tablet, , Disp: , Rfl:   •  HYDROcodone-acetaminophen (NORCO)  MG per tablet, Take 1 tablet by mouth 6 (Six) Times a Day for 30 days., Disp: 180 tablet, Rfl: 0  •  HYDROcodone-acetaminophen (NORCO)  MG per tablet, Take 1 tablet by mouth 6 (Six) Times a Day for 30 days., Disp: 180 tablet, Rfl: 0    Allergies   Allergen Reactions   • Heparin Anaphylaxis     thrombocytopenia   • Atorvastatin Other (See Comments)     Aches and pains all over   • Crestor [Rosuvastatin Calcium]    • Lisinopril Other (See Comments)     cough  Constant cough   • Lyrica [Pregabalin] Swelling   • Rosuvastatin Other (See Comments)     Aches and pains all over       Review of Systems   Musculoskeletal: Positive for back pain (lower).   All other systems reviewed and are negative.    All systems reviewed and negative except for above.    Physical Exam   Constitutional: He appears well-developed and well-nourished. No distress.   Abdominal:   obese   Musculoskeletal:        Lumbar back: He exhibits decreased range of motion (mild facet loading while seated.  ) and tenderness (ttp lpsm).    Neurological: He is alert. Gait (wheelchair) abnormal.   Weakness right hip flexion   Psychiatric: He has a normal mood and affect. His behavior is normal. Judgment normal.       Clint was seen today for back pain.    Diagnoses and all orders for this visit:    DDD (degenerative disc disease), lumbar    Pain of right hip joint    Lumbosacral spondylosis without myelopathy    High risk medications (not anticoagulants) long-term use    Other orders  -     HYDROcodone-acetaminophen (NORCO)  MG per tablet; Take 1 tablet by mouth 6 (Six) Times a Day for 30 days.  -     HYDROcodone-acetaminophen (NORCO)  MG per tablet; Take 1 tablet by mouth 6 (Six) Times a Day for 30 days.        Medication: Patient reports no negative side effects, Patient reports appropriate usage and storage habits, Patient's opioid provides enough relief to be more active and perform activities of daily living with less discomfort. and Refill opioid medication as above.    Interventional: none at this time    Rehab: none at this time    Behavioral: No aberrant behavior noted. SAMRA Report #[id number] was reviewed and is consistent with stated history    Urine drug screen None at this time          This document has been electronically signed by Robin Delgadillo MD on November 8, 2017 1:57 PM

## 2017-11-09 ENCOUNTER — TELEPHONE (OUTPATIENT)
Dept: ORTHOPEDIC SURGERY | Facility: CLINIC | Age: 62
End: 2017-11-09

## 2017-12-12 ENCOUNTER — OFFICE VISIT (OUTPATIENT)
Dept: PAIN MEDICINE | Facility: CLINIC | Age: 62
End: 2017-12-12

## 2017-12-12 VITALS
WEIGHT: 315 LBS | HEIGHT: 71 IN | DIASTOLIC BLOOD PRESSURE: 82 MMHG | BODY MASS INDEX: 44.1 KG/M2 | SYSTOLIC BLOOD PRESSURE: 130 MMHG

## 2017-12-12 DIAGNOSIS — Z79.899 HIGH RISK MEDICATIONS (NOT ANTICOAGULANTS) LONG-TERM USE: ICD-10-CM

## 2017-12-12 DIAGNOSIS — M47.817 LUMBOSACRAL SPONDYLOSIS WITHOUT MYELOPATHY: ICD-10-CM

## 2017-12-12 DIAGNOSIS — M51.36 DDD (DEGENERATIVE DISC DISEASE), LUMBAR: Primary | ICD-10-CM

## 2017-12-12 DIAGNOSIS — M25.551 PAIN OF RIGHT HIP JOINT: ICD-10-CM

## 2017-12-12 PROCEDURE — 99213 OFFICE O/P EST LOW 20 MIN: CPT | Performed by: PAIN MEDICINE

## 2017-12-12 RX ORDER — CLONAZEPAM 0.5 MG/1
TABLET ORAL
COMMUNITY
Start: 2017-11-20 | End: 2018-03-08 | Stop reason: HOSPADM

## 2017-12-12 RX ORDER — HYDROCODONE BITARTRATE AND ACETAMINOPHEN 10; 325 MG/1; MG/1
1 TABLET ORAL
Qty: 180 TABLET | Refills: 0 | Status: SHIPPED | OUTPATIENT
Start: 2017-12-12 | End: 2017-12-13

## 2017-12-12 RX ORDER — HYDROCODONE BITARTRATE AND ACETAMINOPHEN 10; 325 MG/1; MG/1
1 TABLET ORAL
Qty: 180 TABLET | Refills: 0 | Status: SHIPPED | OUTPATIENT
Start: 2017-12-12 | End: 2017-12-13 | Stop reason: SDUPTHER

## 2017-12-12 NOTE — PROGRESS NOTES
Clint Mack is a 62 y.o. male.   1955    HPI:   Location: lower back  Quality: aching and dull  Severity: 6/10  Timing: constant  Alleviating: nothing  Aggravating: nothing specific     Low back is primary concern.  States he is nearly recovered from hip infection he thinks.  Wants to pursue low back trx in near future.  Patient reports that the opioid medication still provides him good relief and allows more activity than he would have without the opioid medication. He denies side effects.  No loss of bowel or bladder control.          The following portions of the patient's history were reviewed by me and updated as appropriate: allergies, current medications, past family history, past medical history, past social history, past surgical history and problem list.    Past Medical History:   Diagnosis Date   • Anxiety    • Arthritis     osteoarthritis   • Depression    • Diabetes mellitus    • DVT (deep venous thrombosis)    • Heart disease    • Hyperlipidemia    • Hypertension    • LUCIANA on CPAP        Social History     Social History   • Marital status:      Spouse name: N/A   • Number of children: N/A   • Years of education: N/A     Occupational History   • Not on file.     Social History Main Topics   • Smoking status: Never Smoker   • Smokeless tobacco: Never Used   • Alcohol use No   • Drug use: No   • Sexual activity: Defer     Other Topics Concern   • Not on file     Social History Narrative       Family History   Problem Relation Age of Onset   • Heart disease Father    • Hypertension Father    • Stroke Father    • Diabetes Sister    • Heart disease Brother    • Hypertension Brother    • COPD Brother          Current Outpatient Prescriptions:   •  aspirin 81 MG chewable tablet, Chew 81 mg Daily., Disp: , Rfl:   •  citalopram (CeleXA) 20 MG tablet, Take 20 mg by mouth Daily., Disp: , Rfl:   •  clonazePAM (KlonoPIN) 0.5 MG tablet, , Disp: , Rfl:   •  COMFORT ASSIST INSULIN SYRINGE 31G X  "5/16\" 0.3 ML misc, , Disp: , Rfl:   •  furosemide (LASIX) 20 MG tablet, Take 20 mg by mouth As Needed., Disp: , Rfl:   •  glimepiride (AMARYL) 4 MG tablet, , Disp: , Rfl:   •  hydrOXYzine (VISTARIL) 25 MG capsule, , Disp: , Rfl:   •  isosorbide mononitrate (IMDUR) 30 MG 24 hr tablet, Take 30 mg by mouth Daily., Disp: , Rfl:   •  Lancets (ONETOUCH ULTRASOFT) lancets, , Disp: , Rfl:   •  losartan (COZAAR) 25 MG tablet, Take 25 mg by mouth Daily., Disp: , Rfl:   •  metoclopramide (REGLAN) 10 MG tablet, TAKE ONE TABLET BY MOUTH THREE TIMES A DAY, Disp: , Rfl:   •  Omega-3 Fatty Acids (FISH OIL PO), Take 4 capsules by mouth., Disp: , Rfl:   •  ONE TOUCH ULTRA TEST test strip, , Disp: , Rfl:   •  RANEXA 500 MG 12 hr tablet, , Disp: , Rfl:   •  sodium chloride 0.9 % solution, , Disp: , Rfl:   •  tamsulosin (FLOMAX) 0.4 MG capsule 24 hr capsule, , Disp: , Rfl:   •  triamcinolone (KENALOG) 0.1 % cream, , Disp: , Rfl:   •  vitamin D (ERGOCALCIFEROL) 61347 UNITS capsule capsule, , Disp: , Rfl:   •  warfarin (COUMADIN) 5 MG tablet, , Disp: , Rfl:   •  HYDROcodone-acetaminophen (NORCO)  MG per tablet, Take 1 tablet by mouth 6 (Six) Times a Day for 30 days., Disp: 180 tablet, Rfl: 0    Allergies   Allergen Reactions   • Heparin Anaphylaxis     thrombocytopenia   • Atorvastatin Other (See Comments)     Aches and pains all over   • Crestor [Rosuvastatin Calcium]    • Lisinopril Other (See Comments)     cough  Constant cough   • Lyrica [Pregabalin] Swelling   • Rosuvastatin Other (See Comments)     Aches and pains all over       Review of Systems   Musculoskeletal: Positive for back pain (lower).   All other systems reviewed and are negative.    All systems reviewed and negative except for above.    Physical Exam   Constitutional: He appears well-developed and well-nourished. No distress.   Abdominal:   obese   Musculoskeletal:        Lumbar back: He exhibits decreased range of motion (mild facet loading while seated.  ) and " tenderness (ttp lpsm).   Neurological: He is alert. Gait (wheelchair) abnormal.   Weakness right hip flexion   Psychiatric: He has a normal mood and affect. His behavior is normal. Judgment normal.       Clint was seen today for back pain.    Diagnoses and all orders for this visit:    DDD (degenerative disc disease), lumbar    Pain of right hip joint    Lumbosacral spondylosis without myelopathy    High risk medications (not anticoagulants) long-term use    Other orders  -     Discontinue: HYDROcodone-acetaminophen (NORCO)  MG per tablet; Take 1 tablet by mouth 6 (Six) Times a Day for 30 days.  -     Discontinue: HYDROcodone-acetaminophen (NORCO)  MG per tablet; Take 1 tablet by mouth 6 (Six) Times a Day for 30 days.  -     HYDROcodone-acetaminophen (NORCO)  MG per tablet; Take 1 tablet by mouth 6 (Six) Times a Day for 30 days.        Medication: Patient reports no negative side effects, Patient reports appropriate usage and storage habits, Patient's opioid provides enough relief to be more active and perform activities of daily living with less discomfort. and Refill opioid medication as above.   I explained that the pain clinic will be closing after the first of the year.  I let the pt know where I will be located.     Interventional: none at this time    Rehab: none at this time    Behavioral: No aberrant behavior noted. SAMRA Report #[id number]  was reviewed and is consistent with stated history    Urine drug screen None at this time          This document has been electronically signed by Robin Delgadillo MD on December 13, 2017 11:22 AM

## 2017-12-13 RX ORDER — HYDROCODONE BITARTRATE AND ACETAMINOPHEN 10; 325 MG/1; MG/1
1 TABLET ORAL
Qty: 180 TABLET | Refills: 0 | Status: SHIPPED | OUTPATIENT
Start: 2017-12-13 | End: 2018-01-12

## 2018-01-09 ENCOUNTER — OFFICE VISIT (OUTPATIENT)
Dept: PODIATRY | Facility: CLINIC | Age: 63
End: 2018-01-09

## 2018-01-09 VITALS — BODY MASS INDEX: 44.1 KG/M2 | HEIGHT: 71 IN | WEIGHT: 315 LBS

## 2018-01-09 DIAGNOSIS — M79.674 PAIN IN TOES OF BOTH FEET: ICD-10-CM

## 2018-01-09 DIAGNOSIS — B35.1 ONYCHOMYCOSIS: Primary | ICD-10-CM

## 2018-01-09 DIAGNOSIS — E11.42 DIABETIC POLYNEUROPATHY ASSOCIATED WITH TYPE 2 DIABETES MELLITUS (HCC): ICD-10-CM

## 2018-01-09 DIAGNOSIS — S90.222D SUBUNGUAL HEMATOMA OF LEFT FOOT, SUBSEQUENT ENCOUNTER: ICD-10-CM

## 2018-01-09 DIAGNOSIS — M79.675 PAIN IN TOES OF BOTH FEET: ICD-10-CM

## 2018-01-09 PROCEDURE — 11721 DEBRIDE NAIL 6 OR MORE: CPT | Performed by: PODIATRIST

## 2018-01-09 NOTE — PROGRESS NOTES
"Clint Mack  1955  62 y.o. male   BS-153 per patient  PCP- Dr. Matute last seen October 2017.  Patient presents today for diabetic nail care.    01/09/2018    Chief Complaint   Patient presents with   • Left Foot - Diabetic Foot Care   • Right Foot - Diabetic Foot Care           History of Present Illness    Clint Mack is a 62 y.o. male presents for f/u diabetic foot exam and nail care.  Used to complain of nerve pain to his toes, but otherwise denies any acute changes.    Past Medical History:   Diagnosis Date   • Anxiety    • Arthritis     osteoarthritis   • Depression    • Diabetes mellitus    • DVT (deep venous thrombosis)    • Heart disease    • Hyperlipidemia    • Hypertension    • LUCIANA on CPAP          Past Surgical History:   Procedure Laterality Date   • CARDIAC SURGERY  2001   • CIRCUMCISION     • GALLBLADDER SURGERY  2000   • JOINT REPLACEMENT Right 05/02/2016    hip   • SPINE SURGERY           Family History   Problem Relation Age of Onset   • Heart disease Father    • Hypertension Father    • Stroke Father    • Diabetes Sister    • Heart disease Brother    • Hypertension Brother    • COPD Brother          Social History     Social History   • Marital status:      Spouse name: N/A   • Number of children: N/A   • Years of education: N/A     Occupational History   • Not on file.     Social History Main Topics   • Smoking status: Never Smoker   • Smokeless tobacco: Never Used   • Alcohol use No   • Drug use: No   • Sexual activity: Defer     Other Topics Concern   • Not on file     Social History Narrative         Current Outpatient Prescriptions   Medication Sig Dispense Refill   • aspirin 81 MG chewable tablet Chew 81 mg Daily.     • citalopram (CeleXA) 20 MG tablet Take 20 mg by mouth Daily.     • clonazePAM (KlonoPIN) 0.5 MG tablet      • COMFORT ASSIST INSULIN SYRINGE 31G X 5/16\" 0.3 ML misc      • furosemide (LASIX) 20 MG tablet Take 20 mg by mouth As Needed.     • " "glimepiride (AMARYL) 4 MG tablet      • HYDROcodone-acetaminophen (NORCO)  MG per tablet Take 1 tablet by mouth 6 (Six) Times a Day for 30 days. 180 tablet 0   • hydrOXYzine (VISTARIL) 25 MG capsule      • isosorbide mononitrate (IMDUR) 30 MG 24 hr tablet Take 30 mg by mouth Daily.     • Lancets (ONETOUCH ULTRASOFT) lancets      • losartan (COZAAR) 25 MG tablet Take 25 mg by mouth Daily.     • metoclopramide (REGLAN) 10 MG tablet TAKE ONE TABLET BY MOUTH THREE TIMES A DAY     • Omega-3 Fatty Acids (FISH OIL PO) Take 4 capsules by mouth.     • ONE TOUCH ULTRA TEST test strip      • RANEXA 500 MG 12 hr tablet      • sodium chloride 0.9 % solution      • tamsulosin (FLOMAX) 0.4 MG capsule 24 hr capsule      • triamcinolone (KENALOG) 0.1 % cream      • vitamin D (ERGOCALCIFEROL) 68524 UNITS capsule capsule      • warfarin (COUMADIN) 5 MG tablet        No current facility-administered medications for this visit.          OBJECTIVE    Ht 180.3 cm (71\")  Wt (!) 145 kg (320 lb)  BMI 44.63 kg/m2      Review of Systems   Constitutional:  Denies recent weight loss, weight gain, fever or chills, no change in exercise tolerance  Musculoskeletal: Toe pain.   Skin:  Thickened nails. Shin discoloration.  Neurological:  Burning sensations to feet b/l.  Psychiatric/Behavioral: Denies depression      Physical Exam    Clint had a diabetic foot exam performed today.      Constitutional: he appears well-developed and well-nourished.   HEENT: Normocephalic. Atraumatic  CV: No tenderness. RRR  Resp: Non-labored respiration. No wheezes.   Psychiatric: he has a normal mood and affect. his   behavior is normal.      Lower Extremity Exam:  Vascular: DP/PT pulses palpable 1+.   Negative hair growth.   1+ perimalleolar edema  Toes cool  Neuro: Protective sensation diminished to lesser toes, b/l.  DTRs intact  Integument: No open wounds or lesions.  Mature subungual hematoma left hallux.  No signs of infection.  Atrophic skin noted b/l " with chronic venous stasis dermatitis changes  No masses  Musculoskeletal: LE muscle strength 4/5 on left, 3/5 on right  Gait assisted with walker, wheel chair  Semi-rigid hammertoe deformity toes 2-5 b/l.  Nails 1-5 b/l thickened, elongated with subungual debris. +pain on palpation              ASSESSMENT AND PLAN    Clint was seen today for diabetic foot care and diabetic foot care.    Diagnoses and all orders for this visit:    Onychomycosis    Diabetic polyneuropathy associated with type 2 diabetes mellitus    Pain in toes of both feet    Subungual hematoma of left foot, subsequent encounter    -Comprehensive DM foot exam performed. Pt educated on importance of tight glucose control and daily foot checks.  -Pt educated on padding techniques for rigid hammertoes.  Proper extra depth diabetic shoe gear.  Limit barefoot walking.  -Nails 1-5 b/l debrided in length and thickness with nail nipper to decrease pain, fungal load and risk of infection  -Follow up 3 months PRN          This document has been electronically signed by Jose C Novak DPM on January 9, 2018 8:11 AM     EMR Dragon/Transcription disclaimer:   Much of this encounter note is an electronic transcription/translation of spoken language to printed text. The electronic translation of spoken language may permit erroneous, or at times, nonsensical words or phrases to be inadvertently transcribed; Although I have reviewed the note for such errors, some may still exist.    Jose C Novak DPM  1/9/2018  8:11 AM

## 2018-02-22 ENCOUNTER — LAB (OUTPATIENT)
Dept: LAB | Facility: HOSPITAL | Age: 63
End: 2018-02-22

## 2018-02-22 ENCOUNTER — DOCUMENTATION (OUTPATIENT)
Dept: CARDIOLOGY | Facility: CLINIC | Age: 63
End: 2018-02-22

## 2018-02-22 DIAGNOSIS — R06.00 DYSPNEA, UNSPECIFIED TYPE: Primary | ICD-10-CM

## 2018-02-22 DIAGNOSIS — R06.00 DYSPNEA, UNSPECIFIED TYPE: ICD-10-CM

## 2018-02-22 LAB — NT-PROBNP SERPL-MCNC: 40 PG/ML (ref 0–900)

## 2018-02-22 PROCEDURE — 83880 ASSAY OF NATRIURETIC PEPTIDE: CPT

## 2018-02-22 PROCEDURE — 36415 COLL VENOUS BLD VENIPUNCTURE: CPT

## 2018-02-23 ENCOUNTER — APPOINTMENT (OUTPATIENT)
Dept: NUCLEAR MEDICINE | Facility: HOSPITAL | Age: 63
End: 2018-02-23

## 2018-02-23 ENCOUNTER — HOSPITAL ENCOUNTER (OUTPATIENT)
Facility: HOSPITAL | Age: 63
Setting detail: OBSERVATION
Discharge: HOME | End: 2018-02-28
Attending: EMERGENCY MEDICINE | Admitting: HOSPITALIST

## 2018-02-23 ENCOUNTER — APPOINTMENT (OUTPATIENT)
Dept: GENERAL RADIOLOGY | Facility: HOSPITAL | Age: 63
End: 2018-02-23

## 2018-02-23 DIAGNOSIS — Z74.09 IMPAIRED FUNCTIONAL MOBILITY, BALANCE, GAIT, AND ENDURANCE: ICD-10-CM

## 2018-02-23 DIAGNOSIS — R06.09 DYSPNEA ON EXERTION: Primary | ICD-10-CM

## 2018-02-23 LAB
ALBUMIN SERPL-MCNC: 3.9 G/DL (ref 3.4–4.8)
ALBUMIN/GLOB SERPL: 1 G/DL (ref 1.1–1.8)
ALP SERPL-CCNC: 66 U/L (ref 38–126)
ALT SERPL W P-5'-P-CCNC: 36 U/L (ref 21–72)
ANION GAP SERPL CALCULATED.3IONS-SCNC: 13 MMOL/L (ref 5–15)
ANION GAP SERPL CALCULATED.3IONS-SCNC: 15 MMOL/L (ref 5–15)
APTT PPP: 34 SECONDS (ref 20–40.3)
ARTERIAL PATENCY WRIST A: ABNORMAL
AST SERPL-CCNC: 22 U/L (ref 17–59)
ATMOSPHERIC PRESS: ABNORMAL MMHG
BACTERIA UR QL AUTO: ABNORMAL /HPF
BASE EXCESS BLDA CALC-SCNC: -0.8 MMOL/L (ref -2.4–2.4)
BASOPHILS # BLD AUTO: 0.01 10*3/MM3 (ref 0–0.2)
BASOPHILS # BLD AUTO: 0.01 10*3/MM3 (ref 0–0.2)
BASOPHILS NFR BLD AUTO: 0.1 % (ref 0–2)
BASOPHILS NFR BLD AUTO: 0.2 % (ref 0–2)
BDY SITE: ABNORMAL
BILIRUB SERPL-MCNC: 0.4 MG/DL (ref 0.2–1.3)
BILIRUB UR QL STRIP: NEGATIVE
BUN BLD-MCNC: 23 MG/DL (ref 7–21)
BUN BLD-MCNC: 23 MG/DL (ref 7–21)
BUN/CREAT SERPL: 13 (ref 7–25)
BUN/CREAT SERPL: 13.1 (ref 7–25)
CA-I BLD-MCNC: 4.6 MG/DL (ref 4.5–4.9)
CALCIUM SPEC-SCNC: 8.9 MG/DL (ref 8.4–10.2)
CALCIUM SPEC-SCNC: 9 MG/DL (ref 8.4–10.2)
CHLORIDE SERPL-SCNC: 103 MMOL/L (ref 95–110)
CHLORIDE SERPL-SCNC: 104 MMOL/L (ref 95–110)
CLARITY UR: CLEAR
CO2 BLDA-SCNC: 17.7 MMOL/L (ref 23–27)
CO2 SERPL-SCNC: 19 MMOL/L (ref 22–31)
CO2 SERPL-SCNC: 21 MMOL/L (ref 22–31)
COLOR UR: YELLOW
CREAT BLD-MCNC: 1.75 MG/DL (ref 0.7–1.3)
CREAT BLD-MCNC: 1.77 MG/DL (ref 0.7–1.3)
DEPRECATED RDW RBC AUTO: 44 FL (ref 35.1–43.9)
DEPRECATED RDW RBC AUTO: 44.3 FL (ref 35.1–43.9)
EOSINOPHIL # BLD AUTO: 0.02 10*3/MM3 (ref 0–0.7)
EOSINOPHIL # BLD AUTO: 0.03 10*3/MM3 (ref 0–0.7)
EOSINOPHIL # BLD MANUAL: 0.19 10*3/MM3 (ref 0–0.7)
EOSINOPHIL NFR BLD AUTO: 0.3 % (ref 0–7)
EOSINOPHIL NFR BLD AUTO: 0.3 % (ref 0–7)
EOSINOPHIL NFR BLD MANUAL: 2 % (ref 0–7)
ERYTHROCYTE [DISTWIDTH] IN BLOOD BY AUTOMATED COUNT: 13.7 % (ref 11.5–14.5)
ERYTHROCYTE [DISTWIDTH] IN BLOOD BY AUTOMATED COUNT: 13.8 % (ref 11.5–14.5)
FLUAV AG NPH QL: NEGATIVE
FLUBV AG NPH QL IA: NEGATIVE
GFR SERPL CREATININE-BSD FRML MDRD: 39 ML/MIN/1.73 (ref 49–113)
GFR SERPL CREATININE-BSD FRML MDRD: 40 ML/MIN/1.73 (ref 60–113)
GLOBULIN UR ELPH-MCNC: 3.8 GM/DL (ref 2.3–3.5)
GLUCOSE BLD-MCNC: 193 MG/DL (ref 60–100)
GLUCOSE BLD-MCNC: 207 MG/DL (ref 60–100)
GLUCOSE BLDA-MCNC: 168 MMOL/L
GLUCOSE BLDC GLUCOMTR-MCNC: 107 MG/DL (ref 70–130)
GLUCOSE BLDC GLUCOMTR-MCNC: 128 MG/DL (ref 70–130)
GLUCOSE BLDC GLUCOMTR-MCNC: 195 MG/DL (ref 70–130)
GLUCOSE UR STRIP-MCNC: ABNORMAL MG/DL
HCO3 BLDA-SCNC: 17.2 MMOL/L (ref 22–26)
HCT VFR BLD AUTO: 43.8 % (ref 39–49)
HCT VFR BLD AUTO: 44.4 % (ref 39–49)
HCT VFR BLD CALC: 48 % (ref 40–54)
HGB BLD-MCNC: 15.5 G/DL (ref 13.7–17.3)
HGB BLD-MCNC: 15.6 G/DL (ref 13.7–17.3)
HGB BLDA-MCNC: 16.3 G/DL (ref 14–18)
HGB UR QL STRIP.AUTO: ABNORMAL
HOLD SPECIMEN: NORMAL
HOLD SPECIMEN: NORMAL
HYALINE CASTS UR QL AUTO: ABNORMAL /LPF
IMM GRANULOCYTES # BLD: 0.01 10*3/MM3 (ref 0–0.02)
IMM GRANULOCYTES # BLD: 0.03 10*3/MM3 (ref 0–0.02)
IMM GRANULOCYTES NFR BLD: 0.2 % (ref 0–0.5)
IMM GRANULOCYTES NFR BLD: 0.3 % (ref 0–0.5)
INR PPP: 1.61 (ref 0.8–1.2)
INR PPP: 1.91 (ref 0.8–1.2)
KETONES UR QL STRIP: NEGATIVE
LEUKOCYTE ESTERASE UR QL STRIP.AUTO: NEGATIVE
LIPASE SERPL-CCNC: 212 U/L (ref 23–300)
LYMPHOCYTES # BLD AUTO: 1.72 10*3/MM3 (ref 0.6–4.2)
LYMPHOCYTES # BLD AUTO: 2.69 10*3/MM3 (ref 0.6–4.2)
LYMPHOCYTES # BLD MANUAL: 1.92 10*3/MM3 (ref 0.6–4.2)
LYMPHOCYTES NFR BLD AUTO: 26.7 % (ref 10–50)
LYMPHOCYTES NFR BLD AUTO: 28.3 % (ref 10–50)
LYMPHOCYTES NFR BLD MANUAL: 10.1 % (ref 0–12)
LYMPHOCYTES NFR BLD MANUAL: 20.2 % (ref 10–50)
MCH RBC QN AUTO: 30.6 PG (ref 26.5–34)
MCH RBC QN AUTO: 31.1 PG (ref 26.5–34)
MCHC RBC AUTO-ENTMCNC: 35.1 G/DL (ref 31.5–36.3)
MCHC RBC AUTO-ENTMCNC: 35.4 G/DL (ref 31.5–36.3)
MCV RBC AUTO: 87.2 FL (ref 80–98)
MCV RBC AUTO: 88 FL (ref 80–98)
MODALITY: ABNORMAL
MONOCYTES # BLD AUTO: 0.61 10*3/MM3 (ref 0–0.9)
MONOCYTES # BLD AUTO: 0.86 10*3/MM3 (ref 0–0.9)
MONOCYTES # BLD AUTO: 0.96 10*3/MM3 (ref 0–0.9)
MONOCYTES NFR BLD AUTO: 9.1 % (ref 0–12)
MONOCYTES NFR BLD AUTO: 9.5 % (ref 0–12)
NEUTROPHILS # BLD AUTO: 4.07 10*3/MM3 (ref 2–8.6)
NEUTROPHILS # BLD AUTO: 5.87 10*3/MM3 (ref 2–8.6)
NEUTROPHILS # BLD AUTO: 6.42 10*3/MM3 (ref 2–8.6)
NEUTROPHILS NFR BLD AUTO: 61.9 % (ref 37–80)
NEUTROPHILS NFR BLD AUTO: 63.1 % (ref 37–80)
NEUTROPHILS NFR BLD MANUAL: 66.7 % (ref 37–80)
NEUTS BAND NFR BLD MANUAL: 1 % (ref 0–5)
NITRITE UR QL STRIP: NEGATIVE
NT-PROBNP SERPL-MCNC: 55.3 PG/ML (ref 0–900)
PCO2 BLDA: 17.4 MM HG (ref 35–45)
PH BLDA: 7.61 PH UNITS (ref 7.35–7.45)
PH UR STRIP.AUTO: <=5 [PH] (ref 5–9)
PLAT MORPH BLD: NORMAL
PLATELET # BLD AUTO: 180 10*3/MM3 (ref 150–450)
PLATELET # BLD AUTO: 190 10*3/MM3 (ref 150–450)
PMV BLD AUTO: 10.1 FL (ref 8–12)
PMV BLD AUTO: 10.2 FL (ref 8–12)
PO2 BLDA: 110.5 MM HG (ref 80–105)
POTASSIUM BLD-SCNC: 3.7 MMOL/L (ref 3.5–5.1)
POTASSIUM BLD-SCNC: 4 MMOL/L (ref 3.5–5.1)
POTASSIUM BLDA-SCNC: 3.71 MMOL/L (ref 3.6–4.9)
PROT SERPL-MCNC: 7.7 G/DL (ref 6.3–8.6)
PROT UR QL STRIP: NEGATIVE
PROTHROMBIN TIME: 19.2 SECONDS (ref 11.1–15.3)
PROTHROMBIN TIME: 22 SECONDS (ref 11.1–15.3)
RBC # BLD AUTO: 4.98 10*6/MM3 (ref 4.37–5.74)
RBC # BLD AUTO: 5.09 10*6/MM3 (ref 4.37–5.74)
RBC # UR: ABNORMAL /HPF
RBC MORPH BLD: NORMAL
REF LAB TEST METHOD: ABNORMAL
SAO2 % BLDCOA: 98.7 %
SODIUM BLD-SCNC: 137 MMOL/L (ref 137–145)
SODIUM BLD-SCNC: 138 MMOL/L (ref 137–145)
SODIUM BLDA-SCNC: 133.2 MMOL/L (ref 138–146)
SP GR UR STRIP: 1.02 (ref 1–1.03)
SQUAMOUS #/AREA URNS HPF: ABNORMAL /HPF
TROPONIN I SERPL-MCNC: <0.012 NG/ML
TROPONIN I SERPL-MCNC: <0.012 NG/ML
UROBILINOGEN UR QL STRIP: ABNORMAL
WBC MORPH BLD: NORMAL
WBC NRBC COR # BLD: 6.44 10*3/MM3 (ref 3.2–9.8)
WBC NRBC COR # BLD: 9.49 10*3/MM3 (ref 3.2–9.8)
WBC UR QL AUTO: ABNORMAL /HPF
WHOLE BLOOD HOLD SPECIMEN: NORMAL

## 2018-02-23 PROCEDURE — 84484 ASSAY OF TROPONIN QUANT: CPT | Performed by: HOSPITALIST

## 2018-02-23 PROCEDURE — 78580 LUNG PERFUSION IMAGING: CPT

## 2018-02-23 PROCEDURE — 85730 THROMBOPLASTIN TIME PARTIAL: CPT | Performed by: EMERGENCY MEDICINE

## 2018-02-23 PROCEDURE — 93010 ELECTROCARDIOGRAM REPORT: CPT | Performed by: INTERNAL MEDICINE

## 2018-02-23 PROCEDURE — 94799 UNLISTED PULMONARY SVC/PX: CPT

## 2018-02-23 PROCEDURE — G0378 HOSPITAL OBSERVATION PER HR: HCPCS

## 2018-02-23 PROCEDURE — 0 TECHNETIUM ALBUMIN AGGREGATED: Performed by: HOSPITALIST

## 2018-02-23 PROCEDURE — 80053 COMPREHEN METABOLIC PANEL: CPT | Performed by: EMERGENCY MEDICINE

## 2018-02-23 PROCEDURE — 96372 THER/PROPH/DIAG INJ SC/IM: CPT

## 2018-02-23 PROCEDURE — 25010000002 HEPARIN (PORCINE) PER 1000 UNITS: Performed by: HOSPITALIST

## 2018-02-23 PROCEDURE — 82962 GLUCOSE BLOOD TEST: CPT

## 2018-02-23 PROCEDURE — A9540 TC99M MAA: HCPCS | Performed by: HOSPITALIST

## 2018-02-23 PROCEDURE — 82803 BLOOD GASES ANY COMBINATION: CPT | Performed by: EMERGENCY MEDICINE

## 2018-02-23 PROCEDURE — 85007 BL SMEAR W/DIFF WBC COUNT: CPT | Performed by: HOSPITALIST

## 2018-02-23 PROCEDURE — 85025 COMPLETE CBC W/AUTO DIFF WBC: CPT | Performed by: EMERGENCY MEDICINE

## 2018-02-23 PROCEDURE — 87804 INFLUENZA ASSAY W/OPTIC: CPT | Performed by: EMERGENCY MEDICINE

## 2018-02-23 PROCEDURE — 83880 ASSAY OF NATRIURETIC PEPTIDE: CPT | Performed by: EMERGENCY MEDICINE

## 2018-02-23 PROCEDURE — 85610 PROTHROMBIN TIME: CPT | Performed by: HOSPITALIST

## 2018-02-23 PROCEDURE — 85025 COMPLETE CBC W/AUTO DIFF WBC: CPT | Performed by: HOSPITALIST

## 2018-02-23 PROCEDURE — 71046 X-RAY EXAM CHEST 2 VIEWS: CPT

## 2018-02-23 PROCEDURE — 81001 URINALYSIS AUTO W/SCOPE: CPT | Performed by: EMERGENCY MEDICINE

## 2018-02-23 PROCEDURE — 83690 ASSAY OF LIPASE: CPT | Performed by: EMERGENCY MEDICINE

## 2018-02-23 PROCEDURE — 84484 ASSAY OF TROPONIN QUANT: CPT | Performed by: EMERGENCY MEDICINE

## 2018-02-23 PROCEDURE — 94760 N-INVAS EAR/PLS OXIMETRY 1: CPT

## 2018-02-23 PROCEDURE — 85610 PROTHROMBIN TIME: CPT | Performed by: EMERGENCY MEDICINE

## 2018-02-23 PROCEDURE — 93005 ELECTROCARDIOGRAM TRACING: CPT | Performed by: EMERGENCY MEDICINE

## 2018-02-23 PROCEDURE — 99285 EMERGENCY DEPT VISIT HI MDM: CPT

## 2018-02-23 PROCEDURE — 36600 WITHDRAWAL OF ARTERIAL BLOOD: CPT

## 2018-02-23 RX ORDER — METOCLOPRAMIDE 10 MG/1
10 TABLET ORAL
Status: DISCONTINUED | OUTPATIENT
Start: 2018-02-23 | End: 2018-02-28 | Stop reason: HOSPADM

## 2018-02-23 RX ORDER — WARFARIN SODIUM 7.5 MG/1
7.5 TABLET ORAL
Status: DISCONTINUED | OUTPATIENT
Start: 2018-02-23 | End: 2018-02-28 | Stop reason: HOSPADM

## 2018-02-23 RX ORDER — TAMSULOSIN HYDROCHLORIDE 0.4 MG/1
0.4 CAPSULE ORAL NIGHTLY
Status: DISCONTINUED | OUTPATIENT
Start: 2018-02-23 | End: 2018-02-28 | Stop reason: HOSPADM

## 2018-02-23 RX ORDER — CITALOPRAM 20 MG/1
20 TABLET ORAL 2 TIMES DAILY
Status: DISCONTINUED | OUTPATIENT
Start: 2018-02-23 | End: 2018-02-28 | Stop reason: HOSPADM

## 2018-02-23 RX ORDER — WARFARIN SODIUM 5 MG/1
5 TABLET ORAL
Status: DISCONTINUED | OUTPATIENT
Start: 2018-02-24 | End: 2018-02-28 | Stop reason: HOSPADM

## 2018-02-23 RX ORDER — LORAZEPAM 1 MG/1
1 TABLET ORAL EVERY 6 HOURS PRN
Status: DISCONTINUED | OUTPATIENT
Start: 2018-02-23 | End: 2018-02-23

## 2018-02-23 RX ORDER — HYDROCODONE BITARTRATE AND ACETAMINOPHEN 10; 325 MG/1; MG/1
1 TABLET ORAL EVERY 4 HOURS PRN
Status: DISCONTINUED | OUTPATIENT
Start: 2018-02-23 | End: 2018-02-28 | Stop reason: HOSPADM

## 2018-02-23 RX ORDER — ASPIRIN 81 MG/1
81 TABLET, CHEWABLE ORAL 3 TIMES WEEKLY
Status: DISCONTINUED | OUTPATIENT
Start: 2018-02-26 | End: 2018-02-28 | Stop reason: HOSPADM

## 2018-02-23 RX ORDER — IPRATROPIUM BROMIDE AND ALBUTEROL SULFATE 2.5; .5 MG/3ML; MG/3ML
3 SOLUTION RESPIRATORY (INHALATION) ONCE
Status: COMPLETED | OUTPATIENT
Start: 2018-02-23 | End: 2018-02-23

## 2018-02-23 RX ORDER — SODIUM CHLORIDE 0.9 % (FLUSH) 0.9 %
1-10 SYRINGE (ML) INJECTION AS NEEDED
Status: DISCONTINUED | OUTPATIENT
Start: 2018-02-23 | End: 2018-02-28 | Stop reason: HOSPADM

## 2018-02-23 RX ORDER — WARFARIN SODIUM 5 MG/1
5 TABLET ORAL TAKE AS DIRECTED
Status: DISCONTINUED | OUTPATIENT
Start: 2018-02-23 | End: 2018-02-23 | Stop reason: DRUGHIGH

## 2018-02-23 RX ORDER — HYDROCODONE BITARTRATE AND ACETAMINOPHEN 10; 325 MG/1; MG/1
1 TABLET ORAL EVERY 4 HOURS PRN
COMMUNITY
End: 2018-03-08 | Stop reason: HOSPADM

## 2018-02-23 RX ORDER — NICOTINE POLACRILEX 4 MG
15 LOZENGE BUCCAL
Status: DISCONTINUED | OUTPATIENT
Start: 2018-02-23 | End: 2018-02-28 | Stop reason: HOSPADM

## 2018-02-23 RX ORDER — RISPERIDONE 1 MG/1
1 TABLET ORAL 2 TIMES DAILY
COMMUNITY
End: 2018-03-21

## 2018-02-23 RX ORDER — LOSARTAN POTASSIUM 50 MG/1
50 TABLET ORAL DAILY
Status: DISCONTINUED | OUTPATIENT
Start: 2018-02-24 | End: 2018-02-25

## 2018-02-23 RX ORDER — RANOLAZINE 500 MG/1
500 TABLET, EXTENDED RELEASE ORAL EVERY 12 HOURS SCHEDULED
Status: DISCONTINUED | OUTPATIENT
Start: 2018-02-23 | End: 2018-02-28 | Stop reason: HOSPADM

## 2018-02-23 RX ORDER — ISOSORBIDE MONONITRATE 30 MG/1
30 TABLET, EXTENDED RELEASE ORAL DAILY
Status: DISCONTINUED | OUTPATIENT
Start: 2018-02-24 | End: 2018-02-28 | Stop reason: HOSPADM

## 2018-02-23 RX ORDER — ASPIRIN 325 MG
325 TABLET ORAL ONCE
Status: COMPLETED | OUTPATIENT
Start: 2018-02-23 | End: 2018-02-23

## 2018-02-23 RX ORDER — DEXTROSE MONOHYDRATE 25 G/50ML
25 INJECTION, SOLUTION INTRAVENOUS
Status: DISCONTINUED | OUTPATIENT
Start: 2018-02-23 | End: 2018-02-28 | Stop reason: HOSPADM

## 2018-02-23 RX ORDER — HEPARIN SODIUM 5000 [USP'U]/ML
5000 INJECTION, SOLUTION INTRAVENOUS; SUBCUTANEOUS EVERY 8 HOURS SCHEDULED
Status: DISCONTINUED | OUTPATIENT
Start: 2018-02-23 | End: 2018-02-25

## 2018-02-23 RX ADMIN — IPRATROPIUM BROMIDE AND ALBUTEROL SULFATE 3 ML: 2.5; .5 SOLUTION RESPIRATORY (INHALATION) at 11:16

## 2018-02-23 RX ADMIN — ASPIRIN 325 MG: 325 TABLET, COATED ORAL at 09:53

## 2018-02-23 RX ADMIN — TAMSULOSIN HYDROCHLORIDE 0.4 MG: 0.4 CAPSULE ORAL at 20:46

## 2018-02-23 RX ADMIN — LORAZEPAM 1 MG: 1 TABLET ORAL at 11:30

## 2018-02-23 RX ADMIN — CITALOPRAM HYDROBROMIDE 20 MG: 20 TABLET ORAL at 20:46

## 2018-02-23 RX ADMIN — Medication 1 DOSE: at 14:07

## 2018-02-23 RX ADMIN — WARFARIN SODIUM 7.5 MG: 7.5 TABLET ORAL at 20:46

## 2018-02-23 RX ADMIN — RANOLAZINE 500 MG: 500 TABLET, FILM COATED, EXTENDED RELEASE ORAL at 20:46

## 2018-02-23 RX ADMIN — HEPARIN SODIUM 5000 UNITS: 5000 INJECTION, SOLUTION INTRAVENOUS; SUBCUTANEOUS at 21:35

## 2018-02-23 RX ADMIN — METOCLOPRAMIDE HYDROCHLORIDE 10 MG: 10 TABLET ORAL at 20:46

## 2018-02-23 RX ADMIN — NITROGLYCERIN 1 INCH: 20 OINTMENT TOPICAL at 11:31

## 2018-02-24 ENCOUNTER — APPOINTMENT (OUTPATIENT)
Dept: GENERAL RADIOLOGY | Facility: HOSPITAL | Age: 63
End: 2018-02-24

## 2018-02-24 ENCOUNTER — APPOINTMENT (OUTPATIENT)
Dept: CARDIOLOGY | Facility: HOSPITAL | Age: 63
End: 2018-02-24
Attending: FAMILY MEDICINE

## 2018-02-24 LAB
ANION GAP SERPL CALCULATED.3IONS-SCNC: 11 MMOL/L (ref 5–15)
ARTERIAL PATENCY WRIST A: ABNORMAL
ATMOSPHERIC PRESS: ABNORMAL MMHG
BASE EXCESS BLDA CALC-SCNC: -0.8 MMOL/L (ref -2.4–2.4)
BASOPHILS # BLD AUTO: 0.01 10*3/MM3 (ref 0–0.2)
BASOPHILS NFR BLD AUTO: 0.1 % (ref 0–2)
BDY SITE: ABNORMAL
BH CV ECHO MEAS - AO ISTHMUS: 3.2 CM
BH CV ECHO MEAS - AO MAX PG (FULL): 2.5 MMHG
BH CV ECHO MEAS - AO MAX PG: 6.2 MMHG
BH CV ECHO MEAS - AO MEAN PG (FULL): 2 MMHG
BH CV ECHO MEAS - AO MEAN PG: 4 MMHG
BH CV ECHO MEAS - AO ROOT AREA (BSA CORRECTED): 1.6
BH CV ECHO MEAS - AO ROOT AREA: 11.9 CM^2
BH CV ECHO MEAS - AO ROOT DIAM: 3.9 CM
BH CV ECHO MEAS - AO V2 MAX: 124 CM/SEC
BH CV ECHO MEAS - AO V2 MEAN: 93.3 CM/SEC
BH CV ECHO MEAS - AO V2 VTI: 19 CM
BH CV ECHO MEAS - ASC AORTA: 3.6 CM
BH CV ECHO MEAS - AVA(I,A): 3.9 CM^2
BH CV ECHO MEAS - AVA(I,D): 3.9 CM^2
BH CV ECHO MEAS - AVA(V,A): 3.8 CM^2
BH CV ECHO MEAS - AVA(V,D): 3.8 CM^2
BH CV ECHO MEAS - BSA(HAYCOCK): 2.6 M^2
BH CV ECHO MEAS - BSA: 2.5 M^2
BH CV ECHO MEAS - BZI_BMI: 40.6 KILOGRAMS/M^2
BH CV ECHO MEAS - BZI_METRIC_HEIGHT: 180.3 CM
BH CV ECHO MEAS - BZI_METRIC_WEIGHT: 132 KG
BH CV ECHO MEAS - EDV(CUBED): 129.6 ML
BH CV ECHO MEAS - EDV(TEICH): 121.6 ML
BH CV ECHO MEAS - EF(CUBED): 63.1 %
BH CV ECHO MEAS - EF(TEICH): 54.3 %
BH CV ECHO MEAS - ESV(CUBED): 47.8 ML
BH CV ECHO MEAS - ESV(TEICH): 55.5 ML
BH CV ECHO MEAS - FS: 28.3 %
BH CV ECHO MEAS - IVS/LVPW: 1
BH CV ECHO MEAS - IVSD: 1.2 CM
BH CV ECHO MEAS - LA DIMENSION: 2 CM
BH CV ECHO MEAS - LA/AO: 0.51
BH CV ECHO MEAS - LV MASS(C)D: 234.1 GRAMS
BH CV ECHO MEAS - LV MASS(C)DI: 94.6 GRAMS/M^2
BH CV ECHO MEAS - LV MAX PG: 3.6 MMHG
BH CV ECHO MEAS - LV MEAN PG: 2 MMHG
BH CV ECHO MEAS - LV V1 MAX: 95.3 CM/SEC
BH CV ECHO MEAS - LV V1 MEAN: 73.2 CM/SEC
BH CV ECHO MEAS - LV V1 VTI: 15.1 CM
BH CV ECHO MEAS - LVIDD: 5.1 CM
BH CV ECHO MEAS - LVIDS: 3.6 CM
BH CV ECHO MEAS - LVOT AREA (M): 4.9 CM^2
BH CV ECHO MEAS - LVOT AREA: 4.9 CM^2
BH CV ECHO MEAS - LVOT DIAM: 2.5 CM
BH CV ECHO MEAS - LVPWD: 1.2 CM
BH CV ECHO MEAS - MV A MAX VEL: 81 CM/SEC
BH CV ECHO MEAS - MV DEC SLOPE: 557 CM/SEC^2
BH CV ECHO MEAS - MV E MAX VEL: 51.9 CM/SEC
BH CV ECHO MEAS - MV E/A: 0.64
BH CV ECHO MEAS - MV MAX PG: 4.2 MMHG
BH CV ECHO MEAS - MV MEAN PG: 2 MMHG
BH CV ECHO MEAS - MV P1/2T MAX VEL: 74.5 CM/SEC
BH CV ECHO MEAS - MV P1/2T: 39.2 MSEC
BH CV ECHO MEAS - MV V2 MAX: 103 CM/SEC
BH CV ECHO MEAS - MV V2 MEAN: 64.6 CM/SEC
BH CV ECHO MEAS - MV V2 VTI: 17.4 CM
BH CV ECHO MEAS - MVA P1/2T LCG: 3 CM^2
BH CV ECHO MEAS - MVA(P1/2T): 5.6 CM^2
BH CV ECHO MEAS - MVA(VTI): 4.3 CM^2
BH CV ECHO MEAS - PA MAX PG: 6 MMHG
BH CV ECHO MEAS - PA V2 MAX: 122 CM/SEC
BH CV ECHO MEAS - RAP SYSTOLE: 5 MMHG
BH CV ECHO MEAS - RVDD: 2.6 CM
BH CV ECHO MEAS - SI(AO): 91.8 ML/M^2
BH CV ECHO MEAS - SI(CUBED): 33 ML/M^2
BH CV ECHO MEAS - SI(LVOT): 30 ML/M^2
BH CV ECHO MEAS - SI(TEICH): 26.7 ML/M^2
BH CV ECHO MEAS - SV(AO): 227 ML
BH CV ECHO MEAS - SV(CUBED): 81.7 ML
BH CV ECHO MEAS - SV(LVOT): 74.1 ML
BH CV ECHO MEAS - SV(TEICH): 66 ML
BUN BLD-MCNC: 22 MG/DL (ref 7–21)
BUN/CREAT SERPL: 12.8 (ref 7–25)
CA-I BLD-MCNC: 4.6 MG/DL (ref 4.5–4.9)
CALCIUM SPEC-SCNC: 8.5 MG/DL (ref 8.4–10.2)
CHLORIDE SERPL-SCNC: 105 MMOL/L (ref 95–110)
CO2 BLDA-SCNC: 19.8 MMOL/L (ref 23–27)
CO2 SERPL-SCNC: 21 MMOL/L (ref 22–31)
CREAT BLD-MCNC: 1.72 MG/DL (ref 0.7–1.3)
DEPRECATED RDW RBC AUTO: 44.6 FL (ref 35.1–43.9)
EOSINOPHIL # BLD AUTO: 0.08 10*3/MM3 (ref 0–0.7)
EOSINOPHIL NFR BLD AUTO: 1.1 % (ref 0–7)
ERYTHROCYTE [DISTWIDTH] IN BLOOD BY AUTOMATED COUNT: 13.8 % (ref 11.5–14.5)
GFR SERPL CREATININE-BSD FRML MDRD: 40 ML/MIN/1.73
GLUCOSE BLD-MCNC: 147 MG/DL (ref 60–100)
GLUCOSE BLDA-MCNC: 150 MMOL/L
GLUCOSE BLDC GLUCOMTR-MCNC: 157 MG/DL (ref 70–130)
GLUCOSE BLDC GLUCOMTR-MCNC: 160 MG/DL (ref 70–130)
GLUCOSE BLDC GLUCOMTR-MCNC: 182 MG/DL (ref 70–130)
GLUCOSE BLDC GLUCOMTR-MCNC: 256 MG/DL (ref 70–130)
HCO3 BLDA-SCNC: 19.1 MMOL/L (ref 22–26)
HCT VFR BLD AUTO: 42.6 % (ref 39–49)
HCT VFR BLD CALC: 46 % (ref 40–54)
HGB BLD-MCNC: 14.9 G/DL (ref 13.7–17.3)
HGB BLDA-MCNC: 15.5 G/DL (ref 14–18)
IMM GRANULOCYTES # BLD: 0.01 10*3/MM3 (ref 0–0.02)
IMM GRANULOCYTES NFR BLD: 0.1 % (ref 0–0.5)
INR PPP: 1.87 (ref 0.8–1.2)
LYMPHOCYTES # BLD AUTO: 2.34 10*3/MM3 (ref 0.6–4.2)
LYMPHOCYTES NFR BLD AUTO: 32.2 % (ref 10–50)
MCH RBC QN AUTO: 30.8 PG (ref 26.5–34)
MCHC RBC AUTO-ENTMCNC: 35 G/DL (ref 31.5–36.3)
MCV RBC AUTO: 88.2 FL (ref 80–98)
MODALITY: ABNORMAL
MONOCYTES # BLD AUTO: 0.92 10*3/MM3 (ref 0–0.9)
MONOCYTES NFR BLD AUTO: 12.7 % (ref 0–12)
NEUTROPHILS # BLD AUTO: 3.91 10*3/MM3 (ref 2–8.6)
NEUTROPHILS NFR BLD AUTO: 53.8 % (ref 37–80)
PCO2 BLDA: 22.1 MM HG (ref 35–45)
PH BLDA: 7.55 PH UNITS (ref 7.35–7.45)
PLATELET # BLD AUTO: 174 10*3/MM3 (ref 150–450)
PMV BLD AUTO: 9.9 FL (ref 8–12)
PO2 BLDA: 135.3 MM HG (ref 80–105)
POTASSIUM BLD-SCNC: 4.3 MMOL/L (ref 3.5–5.1)
POTASSIUM BLDA-SCNC: 3.96 MMOL/L (ref 3.6–4.9)
PROTHROMBIN TIME: 21.6 SECONDS (ref 11.1–15.3)
RBC # BLD AUTO: 4.83 10*6/MM3 (ref 4.37–5.74)
SAO2 % BLDCOA: 99 % (ref 94–100)
SODIUM BLD-SCNC: 137 MMOL/L (ref 137–145)
SODIUM BLDA-SCNC: 136 MMOL/L (ref 138–146)
TROPONIN I SERPL-MCNC: <0.012 NG/ML
TROPONIN I SERPL-MCNC: <0.012 NG/ML
WBC NRBC COR # BLD: 7.27 10*3/MM3 (ref 3.2–9.8)

## 2018-02-24 PROCEDURE — 94799 UNLISTED PULMONARY SVC/PX: CPT

## 2018-02-24 PROCEDURE — 63710000001 INSULIN ASPART PER 5 UNITS: Performed by: HOSPITALIST

## 2018-02-24 PROCEDURE — 96376 TX/PRO/DX INJ SAME DRUG ADON: CPT

## 2018-02-24 PROCEDURE — 80048 BASIC METABOLIC PNL TOTAL CA: CPT | Performed by: HOSPITALIST

## 2018-02-24 PROCEDURE — 25010000002 LEVOFLOXACIN PER 250 MG: Performed by: FAMILY MEDICINE

## 2018-02-24 PROCEDURE — 85025 COMPLETE CBC W/AUTO DIFF WBC: CPT | Performed by: HOSPITALIST

## 2018-02-24 PROCEDURE — 82803 BLOOD GASES ANY COMBINATION: CPT | Performed by: FAMILY MEDICINE

## 2018-02-24 PROCEDURE — 84484 ASSAY OF TROPONIN QUANT: CPT | Performed by: HOSPITALIST

## 2018-02-24 PROCEDURE — G0378 HOSPITAL OBSERVATION PER HR: HCPCS

## 2018-02-24 PROCEDURE — 71046 X-RAY EXAM CHEST 2 VIEWS: CPT

## 2018-02-24 PROCEDURE — 25010000002 ONDANSETRON PER 1 MG: Performed by: FAMILY MEDICINE

## 2018-02-24 PROCEDURE — 93306 TTE W/DOPPLER COMPLETE: CPT

## 2018-02-24 PROCEDURE — 93306 TTE W/DOPPLER COMPLETE: CPT | Performed by: INTERNAL MEDICINE

## 2018-02-24 PROCEDURE — 25010000002 METHYLPREDNISOLONE PER 40 MG: Performed by: FAMILY MEDICINE

## 2018-02-24 PROCEDURE — 96375 TX/PRO/DX INJ NEW DRUG ADDON: CPT

## 2018-02-24 PROCEDURE — 94640 AIRWAY INHALATION TREATMENT: CPT

## 2018-02-24 PROCEDURE — 96372 THER/PROPH/DIAG INJ SC/IM: CPT

## 2018-02-24 PROCEDURE — 85610 PROTHROMBIN TIME: CPT | Performed by: HOSPITALIST

## 2018-02-24 PROCEDURE — 94760 N-INVAS EAR/PLS OXIMETRY 1: CPT

## 2018-02-24 PROCEDURE — 25010000002 HEPARIN (PORCINE) PER 1000 UNITS: Performed by: HOSPITALIST

## 2018-02-24 PROCEDURE — 82962 GLUCOSE BLOOD TEST: CPT

## 2018-02-24 RX ORDER — ALPRAZOLAM 0.25 MG/1
0.25 TABLET ORAL 2 TIMES DAILY PRN
Status: DISCONTINUED | OUTPATIENT
Start: 2018-02-24 | End: 2018-02-25

## 2018-02-24 RX ORDER — METHYLPREDNISOLONE SODIUM SUCCINATE 40 MG/ML
40 INJECTION, POWDER, LYOPHILIZED, FOR SOLUTION INTRAMUSCULAR; INTRAVENOUS EVERY 8 HOURS
Status: DISCONTINUED | OUTPATIENT
Start: 2018-02-24 | End: 2018-02-28 | Stop reason: HOSPADM

## 2018-02-24 RX ORDER — ONDANSETRON 2 MG/ML
4 INJECTION INTRAMUSCULAR; INTRAVENOUS EVERY 4 HOURS PRN
Status: DISCONTINUED | OUTPATIENT
Start: 2018-02-24 | End: 2018-02-28 | Stop reason: HOSPADM

## 2018-02-24 RX ORDER — IPRATROPIUM BROMIDE AND ALBUTEROL SULFATE 2.5; .5 MG/3ML; MG/3ML
3 SOLUTION RESPIRATORY (INHALATION) EVERY 4 HOURS PRN
Status: DISCONTINUED | OUTPATIENT
Start: 2018-02-24 | End: 2018-02-28 | Stop reason: HOSPADM

## 2018-02-24 RX ORDER — LEVOFLOXACIN 5 MG/ML
500 INJECTION, SOLUTION INTRAVENOUS EVERY 24 HOURS
Status: DISCONTINUED | OUTPATIENT
Start: 2018-02-24 | End: 2018-02-28 | Stop reason: HOSPADM

## 2018-02-24 RX ORDER — IPRATROPIUM BROMIDE AND ALBUTEROL SULFATE 2.5; .5 MG/3ML; MG/3ML
3 SOLUTION RESPIRATORY (INHALATION)
Status: DISCONTINUED | OUTPATIENT
Start: 2018-02-24 | End: 2018-02-26

## 2018-02-24 RX ADMIN — METOCLOPRAMIDE HYDROCHLORIDE 10 MG: 10 TABLET ORAL at 11:35

## 2018-02-24 RX ADMIN — METOCLOPRAMIDE HYDROCHLORIDE 10 MG: 10 TABLET ORAL at 17:21

## 2018-02-24 RX ADMIN — IPRATROPIUM BROMIDE AND ALBUTEROL SULFATE 3 ML: 2.5; .5 SOLUTION RESPIRATORY (INHALATION) at 11:54

## 2018-02-24 RX ADMIN — HEPARIN SODIUM 5000 UNITS: 5000 INJECTION, SOLUTION INTRAVENOUS; SUBCUTANEOUS at 13:50

## 2018-02-24 RX ADMIN — HEPARIN SODIUM 5000 UNITS: 5000 INJECTION, SOLUTION INTRAVENOUS; SUBCUTANEOUS at 05:26

## 2018-02-24 RX ADMIN — ISOSORBIDE MONONITRATE 30 MG: 30 TABLET, EXTENDED RELEASE ORAL at 09:48

## 2018-02-24 RX ADMIN — ONDANSETRON 4 MG: 2 INJECTION INTRAMUSCULAR; INTRAVENOUS at 11:37

## 2018-02-24 RX ADMIN — CITALOPRAM HYDROBROMIDE 20 MG: 20 TABLET ORAL at 20:35

## 2018-02-24 RX ADMIN — TAMSULOSIN HYDROCHLORIDE 0.4 MG: 0.4 CAPSULE ORAL at 20:35

## 2018-02-24 RX ADMIN — RANOLAZINE 500 MG: 500 TABLET, FILM COATED, EXTENDED RELEASE ORAL at 20:35

## 2018-02-24 RX ADMIN — IPRATROPIUM BROMIDE AND ALBUTEROL SULFATE 3 ML: 2.5; .5 SOLUTION RESPIRATORY (INHALATION) at 14:46

## 2018-02-24 RX ADMIN — WARFARIN SODIUM 5 MG: 5 TABLET ORAL at 17:22

## 2018-02-24 RX ADMIN — INSULIN ASPART 3 UNITS: 100 INJECTION, SOLUTION INTRAVENOUS; SUBCUTANEOUS at 09:47

## 2018-02-24 RX ADMIN — METHYLPREDNISOLONE SODIUM SUCCINATE 40 MG: 40 INJECTION, POWDER, FOR SOLUTION INTRAMUSCULAR; INTRAVENOUS at 20:37

## 2018-02-24 RX ADMIN — INSULIN ASPART 3 UNITS: 100 INJECTION, SOLUTION INTRAVENOUS; SUBCUTANEOUS at 17:22

## 2018-02-24 RX ADMIN — INSULIN ASPART 3 UNITS: 100 INJECTION, SOLUTION INTRAVENOUS; SUBCUTANEOUS at 11:35

## 2018-02-24 RX ADMIN — METOCLOPRAMIDE HYDROCHLORIDE 10 MG: 10 TABLET ORAL at 20:35

## 2018-02-24 RX ADMIN — ALPRAZOLAM 0.25 MG: 0.25 TABLET ORAL at 15:30

## 2018-02-24 RX ADMIN — LEVOFLOXACIN 500 MG: 5 INJECTION, SOLUTION INTRAVENOUS at 13:45

## 2018-02-24 RX ADMIN — CITALOPRAM HYDROBROMIDE 20 MG: 20 TABLET ORAL at 09:48

## 2018-02-24 RX ADMIN — HEPARIN SODIUM 5000 UNITS: 5000 INJECTION, SOLUTION INTRAVENOUS; SUBCUTANEOUS at 20:37

## 2018-02-24 RX ADMIN — IPRATROPIUM BROMIDE AND ALBUTEROL SULFATE 3 ML: 2.5; .5 SOLUTION RESPIRATORY (INHALATION) at 19:25

## 2018-02-24 RX ADMIN — LOSARTAN POTASSIUM 50 MG: 50 TABLET, FILM COATED ORAL at 09:48

## 2018-02-24 RX ADMIN — HYDROCODONE BITARTRATE AND ACETAMINOPHEN 1 TABLET: 10; 325 TABLET ORAL at 11:36

## 2018-02-24 RX ADMIN — INSULIN ASPART 8 UNITS: 100 INJECTION, SOLUTION INTRAVENOUS; SUBCUTANEOUS at 20:38

## 2018-02-24 RX ADMIN — RANOLAZINE 500 MG: 500 TABLET, FILM COATED, EXTENDED RELEASE ORAL at 09:48

## 2018-02-24 RX ADMIN — METOCLOPRAMIDE HYDROCHLORIDE 10 MG: 10 TABLET ORAL at 09:48

## 2018-02-24 RX ADMIN — METHYLPREDNISOLONE SODIUM SUCCINATE 40 MG: 40 INJECTION, POWDER, FOR SOLUTION INTRAMUSCULAR; INTRAVENOUS at 13:45

## 2018-02-25 LAB
ANION GAP SERPL CALCULATED.3IONS-SCNC: 11 MMOL/L (ref 5–15)
BASOPHILS # BLD AUTO: 0 10*3/MM3 (ref 0–0.2)
BASOPHILS NFR BLD AUTO: 0 % (ref 0–2)
BUN BLD-MCNC: 24 MG/DL (ref 7–21)
BUN/CREAT SERPL: 13.7 (ref 7–25)
CALCIUM SPEC-SCNC: 8.7 MG/DL (ref 8.4–10.2)
CHLORIDE SERPL-SCNC: 102 MMOL/L (ref 95–110)
CO2 SERPL-SCNC: 21 MMOL/L (ref 22–31)
CREAT BLD-MCNC: 1.75 MG/DL (ref 0.7–1.3)
DEPRECATED RDW RBC AUTO: 43.5 FL (ref 35.1–43.9)
EOSINOPHIL # BLD AUTO: 0 10*3/MM3 (ref 0–0.7)
EOSINOPHIL NFR BLD AUTO: 0 % (ref 0–7)
ERYTHROCYTE [DISTWIDTH] IN BLOOD BY AUTOMATED COUNT: 13.7 % (ref 11.5–14.5)
GFR SERPL CREATININE-BSD FRML MDRD: 40 ML/MIN/1.73 (ref 60–113)
GLUCOSE BLD-MCNC: 207 MG/DL (ref 60–100)
GLUCOSE BLDC GLUCOMTR-MCNC: 173 MG/DL (ref 70–130)
GLUCOSE BLDC GLUCOMTR-MCNC: 180 MG/DL (ref 70–130)
GLUCOSE BLDC GLUCOMTR-MCNC: 222 MG/DL (ref 70–130)
GLUCOSE BLDC GLUCOMTR-MCNC: 297 MG/DL (ref 70–130)
HCT VFR BLD AUTO: 40.4 % (ref 39–49)
HGB BLD-MCNC: 14.4 G/DL (ref 13.7–17.3)
IMM GRANULOCYTES # BLD: 0.04 10*3/MM3 (ref 0–0.02)
IMM GRANULOCYTES NFR BLD: 0.4 % (ref 0–0.5)
INR PPP: 2.13 (ref 0.8–1.2)
LYMPHOCYTES # BLD AUTO: 1.25 10*3/MM3 (ref 0.6–4.2)
LYMPHOCYTES NFR BLD AUTO: 11.8 % (ref 10–50)
MCH RBC QN AUTO: 31.2 PG (ref 26.5–34)
MCHC RBC AUTO-ENTMCNC: 35.6 G/DL (ref 31.5–36.3)
MCV RBC AUTO: 87.4 FL (ref 80–98)
MONOCYTES # BLD AUTO: 1.04 10*3/MM3 (ref 0–0.9)
MONOCYTES NFR BLD AUTO: 9.8 % (ref 0–12)
NEUTROPHILS # BLD AUTO: 8.29 10*3/MM3 (ref 2–8.6)
NEUTROPHILS NFR BLD AUTO: 78 % (ref 37–80)
PLATELET # BLD AUTO: 199 10*3/MM3 (ref 150–450)
PMV BLD AUTO: 10 FL (ref 8–12)
POTASSIUM BLD-SCNC: 4.4 MMOL/L (ref 3.5–5.1)
PROTHROMBIN TIME: 24 SECONDS (ref 11.1–15.3)
RBC # BLD AUTO: 4.62 10*6/MM3 (ref 4.37–5.74)
SODIUM BLD-SCNC: 134 MMOL/L (ref 137–145)
WBC NRBC COR # BLD: 10.62 10*3/MM3 (ref 3.2–9.8)

## 2018-02-25 PROCEDURE — 63710000001 INSULIN ASPART PER 5 UNITS: Performed by: HOSPITALIST

## 2018-02-25 PROCEDURE — 25010000002 LEVOFLOXACIN PER 250 MG: Performed by: FAMILY MEDICINE

## 2018-02-25 PROCEDURE — G0378 HOSPITAL OBSERVATION PER HR: HCPCS

## 2018-02-25 PROCEDURE — 25010000002 METHYLPREDNISOLONE PER 40 MG: Performed by: FAMILY MEDICINE

## 2018-02-25 PROCEDURE — 94760 N-INVAS EAR/PLS OXIMETRY 1: CPT

## 2018-02-25 PROCEDURE — 94799 UNLISTED PULMONARY SVC/PX: CPT

## 2018-02-25 PROCEDURE — 96372 THER/PROPH/DIAG INJ SC/IM: CPT

## 2018-02-25 PROCEDURE — 25010000002 HEPARIN (PORCINE) PER 1000 UNITS: Performed by: HOSPITALIST

## 2018-02-25 PROCEDURE — 82962 GLUCOSE BLOOD TEST: CPT

## 2018-02-25 PROCEDURE — 80048 BASIC METABOLIC PNL TOTAL CA: CPT | Performed by: HOSPITALIST

## 2018-02-25 PROCEDURE — 85610 PROTHROMBIN TIME: CPT | Performed by: HOSPITALIST

## 2018-02-25 PROCEDURE — 85025 COMPLETE CBC W/AUTO DIFF WBC: CPT | Performed by: HOSPITALIST

## 2018-02-25 PROCEDURE — 96376 TX/PRO/DX INJ SAME DRUG ADON: CPT

## 2018-02-25 RX ORDER — LOSARTAN POTASSIUM 25 MG/1
25 TABLET ORAL DAILY
Status: DISCONTINUED | OUTPATIENT
Start: 2018-02-26 | End: 2018-02-27

## 2018-02-25 RX ORDER — LORAZEPAM 1 MG/1
1 TABLET ORAL EVERY 6 HOURS PRN
Status: DISCONTINUED | OUTPATIENT
Start: 2018-02-25 | End: 2018-02-28 | Stop reason: HOSPADM

## 2018-02-25 RX ADMIN — LEVOFLOXACIN 500 MG: 5 INJECTION, SOLUTION INTRAVENOUS at 13:46

## 2018-02-25 RX ADMIN — INSULIN ASPART 8 UNITS: 100 INJECTION, SOLUTION INTRAVENOUS; SUBCUTANEOUS at 11:13

## 2018-02-25 RX ADMIN — TAMSULOSIN HYDROCHLORIDE 0.4 MG: 0.4 CAPSULE ORAL at 21:09

## 2018-02-25 RX ADMIN — METOCLOPRAMIDE HYDROCHLORIDE 10 MG: 10 TABLET ORAL at 08:51

## 2018-02-25 RX ADMIN — METOCLOPRAMIDE HYDROCHLORIDE 10 MG: 10 TABLET ORAL at 11:13

## 2018-02-25 RX ADMIN — CITALOPRAM HYDROBROMIDE 20 MG: 20 TABLET ORAL at 21:09

## 2018-02-25 RX ADMIN — IPRATROPIUM BROMIDE AND ALBUTEROL SULFATE 3 ML: 2.5; .5 SOLUTION RESPIRATORY (INHALATION) at 08:17

## 2018-02-25 RX ADMIN — RANOLAZINE 500 MG: 500 TABLET, FILM COATED, EXTENDED RELEASE ORAL at 08:51

## 2018-02-25 RX ADMIN — IPRATROPIUM BROMIDE AND ALBUTEROL SULFATE 3 ML: 2.5; .5 SOLUTION RESPIRATORY (INHALATION) at 15:24

## 2018-02-25 RX ADMIN — LORAZEPAM 1 MG: 1 TABLET ORAL at 13:46

## 2018-02-25 RX ADMIN — INSULIN ASPART 5 UNITS: 100 INJECTION, SOLUTION INTRAVENOUS; SUBCUTANEOUS at 21:13

## 2018-02-25 RX ADMIN — METOCLOPRAMIDE HYDROCHLORIDE 10 MG: 10 TABLET ORAL at 17:17

## 2018-02-25 RX ADMIN — ALPRAZOLAM 0.25 MG: 0.25 TABLET ORAL at 05:20

## 2018-02-25 RX ADMIN — METHYLPREDNISOLONE SODIUM SUCCINATE 40 MG: 40 INJECTION, POWDER, FOR SOLUTION INTRAMUSCULAR; INTRAVENOUS at 13:45

## 2018-02-25 RX ADMIN — INSULIN ASPART 3 UNITS: 100 INJECTION, SOLUTION INTRAVENOUS; SUBCUTANEOUS at 17:17

## 2018-02-25 RX ADMIN — METHYLPREDNISOLONE SODIUM SUCCINATE 40 MG: 40 INJECTION, POWDER, FOR SOLUTION INTRAMUSCULAR; INTRAVENOUS at 21:13

## 2018-02-25 RX ADMIN — ISOSORBIDE MONONITRATE 30 MG: 30 TABLET, EXTENDED RELEASE ORAL at 08:51

## 2018-02-25 RX ADMIN — RANOLAZINE 500 MG: 500 TABLET, FILM COATED, EXTENDED RELEASE ORAL at 21:08

## 2018-02-25 RX ADMIN — LORAZEPAM 1 MG: 1 TABLET ORAL at 21:08

## 2018-02-25 RX ADMIN — HEPARIN SODIUM 5000 UNITS: 5000 INJECTION, SOLUTION INTRAVENOUS; SUBCUTANEOUS at 13:46

## 2018-02-25 RX ADMIN — METOCLOPRAMIDE HYDROCHLORIDE 10 MG: 10 TABLET ORAL at 21:09

## 2018-02-25 RX ADMIN — INSULIN ASPART 3 UNITS: 100 INJECTION, SOLUTION INTRAVENOUS; SUBCUTANEOUS at 08:51

## 2018-02-25 RX ADMIN — METHYLPREDNISOLONE SODIUM SUCCINATE 40 MG: 40 INJECTION, POWDER, FOR SOLUTION INTRAMUSCULAR; INTRAVENOUS at 05:20

## 2018-02-25 RX ADMIN — HYDROCODONE BITARTRATE AND ACETAMINOPHEN 1 TABLET: 10; 325 TABLET ORAL at 21:09

## 2018-02-25 RX ADMIN — WARFARIN SODIUM 5 MG: 5 TABLET ORAL at 17:17

## 2018-02-25 RX ADMIN — LOSARTAN POTASSIUM 50 MG: 50 TABLET, FILM COATED ORAL at 08:51

## 2018-02-25 RX ADMIN — CITALOPRAM HYDROBROMIDE 20 MG: 20 TABLET ORAL at 08:51

## 2018-02-25 RX ADMIN — HEPARIN SODIUM 5000 UNITS: 5000 INJECTION, SOLUTION INTRAVENOUS; SUBCUTANEOUS at 05:20

## 2018-02-26 ENCOUNTER — APPOINTMENT (OUTPATIENT)
Dept: NUCLEAR MEDICINE | Facility: HOSPITAL | Age: 63
End: 2018-02-26

## 2018-02-26 ENCOUNTER — APPOINTMENT (OUTPATIENT)
Dept: CARDIOLOGY | Facility: HOSPITAL | Age: 63
End: 2018-02-26

## 2018-02-26 LAB
ANION GAP SERPL CALCULATED.3IONS-SCNC: 10 MMOL/L (ref 5–15)
BASOPHILS # BLD AUTO: 0 10*3/MM3 (ref 0–0.2)
BASOPHILS NFR BLD AUTO: 0 % (ref 0–2)
BH CV STRESS BP STAGE 1: NORMAL
BH CV STRESS COMMENTS STAGE 1: NORMAL
BH CV STRESS DOSE REGADENOSON STAGE 1: 0.4
BH CV STRESS DURATION MIN STAGE 1: 0
BH CV STRESS DURATION SEC STAGE 1: 10
BH CV STRESS HR STAGE 1: 79
BH CV STRESS PROTOCOL 1: NORMAL
BH CV STRESS RECOVERY BP: NORMAL MMHG
BH CV STRESS RECOVERY HR: 94 BPM
BH CV STRESS STAGE 1: 1
BUN BLD-MCNC: 28 MG/DL (ref 7–21)
BUN/CREAT SERPL: 16.9 (ref 7–25)
CALCIUM SPEC-SCNC: 8.7 MG/DL (ref 8.4–10.2)
CHLORIDE SERPL-SCNC: 102 MMOL/L (ref 95–110)
CO2 SERPL-SCNC: 21 MMOL/L (ref 22–31)
CREAT BLD-MCNC: 1.66 MG/DL (ref 0.7–1.3)
DEPRECATED RDW RBC AUTO: 44.7 FL (ref 35.1–43.9)
EOSINOPHIL # BLD AUTO: 0 10*3/MM3 (ref 0–0.7)
EOSINOPHIL NFR BLD AUTO: 0 % (ref 0–7)
ERYTHROCYTE [DISTWIDTH] IN BLOOD BY AUTOMATED COUNT: 13.8 % (ref 11.5–14.5)
GFR SERPL CREATININE-BSD FRML MDRD: 42 ML/MIN/1.73 (ref 60–113)
GLUCOSE BLD-MCNC: 208 MG/DL (ref 60–100)
GLUCOSE BLDC GLUCOMTR-MCNC: 184 MG/DL (ref 70–130)
GLUCOSE BLDC GLUCOMTR-MCNC: 191 MG/DL (ref 70–130)
GLUCOSE BLDC GLUCOMTR-MCNC: 256 MG/DL (ref 70–130)
GLUCOSE BLDC GLUCOMTR-MCNC: 275 MG/DL (ref 70–130)
HCT VFR BLD AUTO: 41.5 % (ref 39–49)
HGB BLD-MCNC: 14.3 G/DL (ref 13.7–17.3)
IMM GRANULOCYTES # BLD: 0.03 10*3/MM3 (ref 0–0.02)
IMM GRANULOCYTES NFR BLD: 0.3 % (ref 0–0.5)
INR PPP: 2.37 (ref 0.8–1.2)
LV EF NUC BP: 68 %
LYMPHOCYTES # BLD AUTO: 1.23 10*3/MM3 (ref 0.6–4.2)
LYMPHOCYTES NFR BLD AUTO: 12.1 % (ref 10–50)
MAXIMAL PREDICTED HEART RATE: 158 BPM
MCH RBC QN AUTO: 30.5 PG (ref 26.5–34)
MCHC RBC AUTO-ENTMCNC: 34.5 G/DL (ref 31.5–36.3)
MCV RBC AUTO: 88.5 FL (ref 80–98)
MONOCYTES # BLD AUTO: 0.69 10*3/MM3 (ref 0–0.9)
MONOCYTES NFR BLD AUTO: 6.8 % (ref 0–12)
NEUTROPHILS # BLD AUTO: 8.23 10*3/MM3 (ref 2–8.6)
NEUTROPHILS NFR BLD AUTO: 80.8 % (ref 37–80)
PERCENT MAX PREDICTED HR: 72.15 %
PLATELET # BLD AUTO: 195 10*3/MM3 (ref 150–450)
PMV BLD AUTO: 10.1 FL (ref 8–12)
POTASSIUM BLD-SCNC: 5 MMOL/L (ref 3.5–5.1)
PROTHROMBIN TIME: 26.1 SECONDS (ref 11.1–15.3)
RBC # BLD AUTO: 4.69 10*6/MM3 (ref 4.37–5.74)
SODIUM BLD-SCNC: 133 MMOL/L (ref 137–145)
STRESS BASELINE BP: NORMAL MMHG
STRESS BASELINE HR: 73 BPM
STRESS PERCENT HR: 85 %
STRESS POST ESTIMATED WORKLOAD: 1 METS
STRESS POST PEAK BP: NORMAL MMHG
STRESS POST PEAK HR: 114 BPM
STRESS TARGET HR: 134 BPM
WBC NRBC COR # BLD: 10.18 10*3/MM3 (ref 3.2–9.8)

## 2018-02-26 PROCEDURE — A9500 TC99M SESTAMIBI: HCPCS | Performed by: HOSPITALIST

## 2018-02-26 PROCEDURE — 25010000002 LEVOFLOXACIN PER 250 MG: Performed by: FAMILY MEDICINE

## 2018-02-26 PROCEDURE — 93018 CV STRESS TEST I&R ONLY: CPT | Performed by: INTERNAL MEDICINE

## 2018-02-26 PROCEDURE — 93016 CV STRESS TEST SUPVJ ONLY: CPT | Performed by: INTERNAL MEDICINE

## 2018-02-26 PROCEDURE — 78452 HT MUSCLE IMAGE SPECT MULT: CPT | Performed by: INTERNAL MEDICINE

## 2018-02-26 PROCEDURE — 25010000002 METHYLPREDNISOLONE PER 40 MG: Performed by: FAMILY MEDICINE

## 2018-02-26 PROCEDURE — A9500 TC99M SESTAMIBI: HCPCS | Performed by: INTERNAL MEDICINE

## 2018-02-26 PROCEDURE — G0378 HOSPITAL OBSERVATION PER HR: HCPCS

## 2018-02-26 PROCEDURE — 63710000001 INSULIN ASPART PER 5 UNITS: Performed by: HOSPITALIST

## 2018-02-26 PROCEDURE — 94799 UNLISTED PULMONARY SVC/PX: CPT

## 2018-02-26 PROCEDURE — 0 TECHNETIUM SESTAMIBI: Performed by: INTERNAL MEDICINE

## 2018-02-26 PROCEDURE — 82962 GLUCOSE BLOOD TEST: CPT

## 2018-02-26 PROCEDURE — 94760 N-INVAS EAR/PLS OXIMETRY 1: CPT

## 2018-02-26 PROCEDURE — 25010000002 REGADENOSON 0.4 MG/5ML SOLUTION: Performed by: INTERNAL MEDICINE

## 2018-02-26 PROCEDURE — 25010000002 FUROSEMIDE PER 20 MG: Performed by: INTERNAL MEDICINE

## 2018-02-26 PROCEDURE — 96376 TX/PRO/DX INJ SAME DRUG ADON: CPT

## 2018-02-26 PROCEDURE — 80048 BASIC METABOLIC PNL TOTAL CA: CPT | Performed by: HOSPITALIST

## 2018-02-26 PROCEDURE — 96375 TX/PRO/DX INJ NEW DRUG ADDON: CPT

## 2018-02-26 PROCEDURE — 85025 COMPLETE CBC W/AUTO DIFF WBC: CPT | Performed by: HOSPITALIST

## 2018-02-26 PROCEDURE — 93017 CV STRESS TEST TRACING ONLY: CPT

## 2018-02-26 PROCEDURE — 0 TECHNETIUM SESTAMIBI: Performed by: HOSPITALIST

## 2018-02-26 PROCEDURE — 85610 PROTHROMBIN TIME: CPT | Performed by: HOSPITALIST

## 2018-02-26 PROCEDURE — 78452 HT MUSCLE IMAGE SPECT MULT: CPT

## 2018-02-26 RX ORDER — IPRATROPIUM BROMIDE AND ALBUTEROL SULFATE 2.5; .5 MG/3ML; MG/3ML
3 SOLUTION RESPIRATORY (INHALATION)
Status: DISCONTINUED | OUTPATIENT
Start: 2018-02-26 | End: 2018-02-28 | Stop reason: HOSPADM

## 2018-02-26 RX ORDER — 0.9 % SODIUM CHLORIDE 0.9 %
10 VIAL (ML) INJECTION AS NEEDED
Status: DISCONTINUED | OUTPATIENT
Start: 2018-02-26 | End: 2018-02-28 | Stop reason: HOSPADM

## 2018-02-26 RX ORDER — FUROSEMIDE 10 MG/ML
40 INJECTION INTRAMUSCULAR; INTRAVENOUS ONCE
Status: COMPLETED | OUTPATIENT
Start: 2018-02-26 | End: 2018-02-26

## 2018-02-26 RX ADMIN — INSULIN ASPART 3 UNITS: 100 INJECTION, SOLUTION INTRAVENOUS; SUBCUTANEOUS at 18:24

## 2018-02-26 RX ADMIN — METOCLOPRAMIDE HYDROCHLORIDE 10 MG: 10 TABLET ORAL at 20:03

## 2018-02-26 RX ADMIN — WARFARIN SODIUM 7.5 MG: 7.5 TABLET ORAL at 18:23

## 2018-02-26 RX ADMIN — SODIUM CHLORIDE, PRESERVATIVE FREE 10 ML: 5 INJECTION INTRAVENOUS at 10:01

## 2018-02-26 RX ADMIN — METHYLPREDNISOLONE SODIUM SUCCINATE 40 MG: 40 INJECTION, POWDER, FOR SOLUTION INTRAMUSCULAR; INTRAVENOUS at 06:03

## 2018-02-26 RX ADMIN — LOSARTAN POTASSIUM 25 MG: 25 TABLET ORAL at 13:02

## 2018-02-26 RX ADMIN — CITALOPRAM HYDROBROMIDE 20 MG: 20 TABLET ORAL at 11:30

## 2018-02-26 RX ADMIN — INSULIN ASPART 8 UNITS: 100 INJECTION, SOLUTION INTRAVENOUS; SUBCUTANEOUS at 11:37

## 2018-02-26 RX ADMIN — IPRATROPIUM BROMIDE AND ALBUTEROL SULFATE 3 ML: 2.5; .5 SOLUTION RESPIRATORY (INHALATION) at 15:43

## 2018-02-26 RX ADMIN — CITALOPRAM HYDROBROMIDE 20 MG: 20 TABLET ORAL at 20:03

## 2018-02-26 RX ADMIN — FUROSEMIDE 40 MG: 10 INJECTION, SOLUTION INTRAMUSCULAR; INTRAVENOUS at 16:09

## 2018-02-26 RX ADMIN — HYDROCODONE BITARTRATE AND ACETAMINOPHEN 1 TABLET: 10; 325 TABLET ORAL at 21:50

## 2018-02-26 RX ADMIN — IPRATROPIUM BROMIDE AND ALBUTEROL SULFATE 3 ML: 2.5; .5 SOLUTION RESPIRATORY (INHALATION) at 19:21

## 2018-02-26 RX ADMIN — TAMSULOSIN HYDROCHLORIDE 0.4 MG: 0.4 CAPSULE ORAL at 20:03

## 2018-02-26 RX ADMIN — LEVOFLOXACIN 500 MG: 5 INJECTION, SOLUTION INTRAVENOUS at 12:48

## 2018-02-26 RX ADMIN — METHYLPREDNISOLONE SODIUM SUCCINATE 40 MG: 40 INJECTION, POWDER, FOR SOLUTION INTRAMUSCULAR; INTRAVENOUS at 20:03

## 2018-02-26 RX ADMIN — METOCLOPRAMIDE HYDROCHLORIDE 10 MG: 10 TABLET ORAL at 18:23

## 2018-02-26 RX ADMIN — RANOLAZINE 500 MG: 500 TABLET, FILM COATED, EXTENDED RELEASE ORAL at 20:03

## 2018-02-26 RX ADMIN — TECHNETIUM TC 99M SESTAMIBI 1 DOSE: 1 INJECTION INTRAVENOUS at 10:02

## 2018-02-26 RX ADMIN — REGADENOSON 0.4 MG: 0.08 INJECTION, SOLUTION INTRAVENOUS at 10:01

## 2018-02-26 RX ADMIN — INSULIN ASPART 5 UNITS: 100 INJECTION, SOLUTION INTRAVENOUS; SUBCUTANEOUS at 21:00

## 2018-02-26 RX ADMIN — RANOLAZINE 500 MG: 500 TABLET, FILM COATED, EXTENDED RELEASE ORAL at 11:30

## 2018-02-26 RX ADMIN — ASPIRIN 81 MG 81 MG: 81 TABLET ORAL at 11:30

## 2018-02-26 RX ADMIN — LORAZEPAM 1 MG: 1 TABLET ORAL at 16:20

## 2018-02-26 RX ADMIN — METOCLOPRAMIDE HYDROCHLORIDE 10 MG: 10 TABLET ORAL at 11:30

## 2018-02-26 RX ADMIN — ISOSORBIDE MONONITRATE 30 MG: 30 TABLET, EXTENDED RELEASE ORAL at 11:29

## 2018-02-26 RX ADMIN — TECHNETIUM TC 99M SESTAMIBI 1 DOSE: 1 INJECTION INTRAVENOUS at 09:00

## 2018-02-26 RX ADMIN — METHYLPREDNISOLONE SODIUM SUCCINATE 40 MG: 40 INJECTION, POWDER, FOR SOLUTION INTRAMUSCULAR; INTRAVENOUS at 12:48

## 2018-02-26 RX ADMIN — LORAZEPAM 1 MG: 1 TABLET ORAL at 21:50

## 2018-02-27 ENCOUNTER — APPOINTMENT (OUTPATIENT)
Dept: ULTRASOUND IMAGING | Facility: HOSPITAL | Age: 63
End: 2018-02-27

## 2018-02-27 LAB
ANION GAP SERPL CALCULATED.3IONS-SCNC: 13 MMOL/L (ref 5–15)
BASOPHILS # BLD AUTO: 0 10*3/MM3 (ref 0–0.2)
BASOPHILS NFR BLD AUTO: 0 % (ref 0–2)
BUN BLD-MCNC: 38 MG/DL (ref 7–21)
BUN/CREAT SERPL: 21.1 (ref 7–25)
CALCIUM SPEC-SCNC: 8.6 MG/DL (ref 8.4–10.2)
CHLORIDE SERPL-SCNC: 99 MMOL/L (ref 95–110)
CO2 SERPL-SCNC: 23 MMOL/L (ref 22–31)
CREAT BLD-MCNC: 1.8 MG/DL (ref 0.7–1.3)
CREAT UR-MCNC: 79.4 MG/DL
DEPRECATED RDW RBC AUTO: 44.4 FL (ref 35.1–43.9)
EOSINOPHIL # BLD AUTO: 0 10*3/MM3 (ref 0–0.7)
EOSINOPHIL NFR BLD AUTO: 0 % (ref 0–7)
ERYTHROCYTE [DISTWIDTH] IN BLOOD BY AUTOMATED COUNT: 13.7 % (ref 11.5–14.5)
GFR SERPL CREATININE-BSD FRML MDRD: 38 ML/MIN/1.73 (ref 60–113)
GLUCOSE BLD-MCNC: 223 MG/DL (ref 60–100)
GLUCOSE BLDC GLUCOMTR-MCNC: 219 MG/DL (ref 70–130)
GLUCOSE BLDC GLUCOMTR-MCNC: 233 MG/DL (ref 70–130)
GLUCOSE BLDC GLUCOMTR-MCNC: 239 MG/DL (ref 70–130)
GLUCOSE BLDC GLUCOMTR-MCNC: 308 MG/DL (ref 70–130)
HCT VFR BLD AUTO: 42.6 % (ref 39–49)
HGB BLD-MCNC: 14.7 G/DL (ref 13.7–17.3)
IMM GRANULOCYTES # BLD: 0.06 10*3/MM3 (ref 0–0.02)
IMM GRANULOCYTES NFR BLD: 0.6 % (ref 0–0.5)
INR PPP: 2.65 (ref 0.8–1.2)
LYMPHOCYTES # BLD AUTO: 1.38 10*3/MM3 (ref 0.6–4.2)
LYMPHOCYTES NFR BLD AUTO: 14.8 % (ref 10–50)
MCH RBC QN AUTO: 30.4 PG (ref 26.5–34)
MCHC RBC AUTO-ENTMCNC: 34.5 G/DL (ref 31.5–36.3)
MCV RBC AUTO: 88 FL (ref 80–98)
MONOCYTES # BLD AUTO: 0.82 10*3/MM3 (ref 0–0.9)
MONOCYTES NFR BLD AUTO: 8.8 % (ref 0–12)
NEUTROPHILS # BLD AUTO: 7.07 10*3/MM3 (ref 2–8.6)
NEUTROPHILS NFR BLD AUTO: 75.8 % (ref 37–80)
PLATELET # BLD AUTO: 203 10*3/MM3 (ref 150–450)
PMV BLD AUTO: 10 FL (ref 8–12)
POTASSIUM BLD-SCNC: 4.6 MMOL/L (ref 3.5–5.1)
PROT UR-MCNC: 10.1 MG/DL
PROT/CREAT UR: 127.2 MG/G CREA (ref 0–200)
PROTHROMBIN TIME: 28.6 SECONDS (ref 11.1–15.3)
RBC # BLD AUTO: 4.84 10*6/MM3 (ref 4.37–5.74)
SODIUM BLD-SCNC: 135 MMOL/L (ref 137–145)
WBC NRBC COR # BLD: 9.33 10*3/MM3 (ref 3.2–9.8)

## 2018-02-27 PROCEDURE — 82570 ASSAY OF URINE CREATININE: CPT | Performed by: INTERNAL MEDICINE

## 2018-02-27 PROCEDURE — 25010000002 METHYLPREDNISOLONE PER 40 MG: Performed by: FAMILY MEDICINE

## 2018-02-27 PROCEDURE — 97116 GAIT TRAINING THERAPY: CPT

## 2018-02-27 PROCEDURE — 63710000001 INSULIN ASPART PER 5 UNITS: Performed by: HOSPITALIST

## 2018-02-27 PROCEDURE — 25010000002 LEVOFLOXACIN PER 250 MG: Performed by: FAMILY MEDICINE

## 2018-02-27 PROCEDURE — 96365 THER/PROPH/DIAG IV INF INIT: CPT

## 2018-02-27 PROCEDURE — G8979 MOBILITY GOAL STATUS: HCPCS

## 2018-02-27 PROCEDURE — G8978 MOBILITY CURRENT STATUS: HCPCS

## 2018-02-27 PROCEDURE — 82962 GLUCOSE BLOOD TEST: CPT

## 2018-02-27 PROCEDURE — G0378 HOSPITAL OBSERVATION PER HR: HCPCS

## 2018-02-27 PROCEDURE — 84156 ASSAY OF PROTEIN URINE: CPT | Performed by: INTERNAL MEDICINE

## 2018-02-27 PROCEDURE — 97161 PT EVAL LOW COMPLEX 20 MIN: CPT

## 2018-02-27 PROCEDURE — 93970 EXTREMITY STUDY: CPT

## 2018-02-27 PROCEDURE — 85025 COMPLETE CBC W/AUTO DIFF WBC: CPT | Performed by: HOSPITALIST

## 2018-02-27 PROCEDURE — 94799 UNLISTED PULMONARY SVC/PX: CPT

## 2018-02-27 PROCEDURE — 80048 BASIC METABOLIC PNL TOTAL CA: CPT | Performed by: HOSPITALIST

## 2018-02-27 PROCEDURE — 96366 THER/PROPH/DIAG IV INF ADDON: CPT

## 2018-02-27 PROCEDURE — 85610 PROTHROMBIN TIME: CPT | Performed by: HOSPITALIST

## 2018-02-27 PROCEDURE — 96376 TX/PRO/DX INJ SAME DRUG ADON: CPT

## 2018-02-27 RX ADMIN — TAMSULOSIN HYDROCHLORIDE 0.4 MG: 0.4 CAPSULE ORAL at 21:25

## 2018-02-27 RX ADMIN — CITALOPRAM HYDROBROMIDE 20 MG: 20 TABLET ORAL at 21:25

## 2018-02-27 RX ADMIN — INSULIN ASPART 5 UNITS: 100 INJECTION, SOLUTION INTRAVENOUS; SUBCUTANEOUS at 11:43

## 2018-02-27 RX ADMIN — CITALOPRAM HYDROBROMIDE 20 MG: 20 TABLET ORAL at 08:22

## 2018-02-27 RX ADMIN — METOCLOPRAMIDE HYDROCHLORIDE 10 MG: 10 TABLET ORAL at 08:22

## 2018-02-27 RX ADMIN — METOCLOPRAMIDE HYDROCHLORIDE 10 MG: 10 TABLET ORAL at 17:44

## 2018-02-27 RX ADMIN — ISOSORBIDE MONONITRATE 30 MG: 30 TABLET, EXTENDED RELEASE ORAL at 08:22

## 2018-02-27 RX ADMIN — METOCLOPRAMIDE HYDROCHLORIDE 10 MG: 10 TABLET ORAL at 21:25

## 2018-02-27 RX ADMIN — WARFARIN SODIUM 5 MG: 5 TABLET ORAL at 17:44

## 2018-02-27 RX ADMIN — METHYLPREDNISOLONE SODIUM SUCCINATE 40 MG: 40 INJECTION, POWDER, FOR SOLUTION INTRAMUSCULAR; INTRAVENOUS at 21:25

## 2018-02-27 RX ADMIN — METHYLPREDNISOLONE SODIUM SUCCINATE 40 MG: 40 INJECTION, POWDER, FOR SOLUTION INTRAMUSCULAR; INTRAVENOUS at 05:29

## 2018-02-27 RX ADMIN — LORAZEPAM 1 MG: 1 TABLET ORAL at 11:42

## 2018-02-27 RX ADMIN — IPRATROPIUM BROMIDE AND ALBUTEROL SULFATE 3 ML: 2.5; .5 SOLUTION RESPIRATORY (INHALATION) at 19:14

## 2018-02-27 RX ADMIN — IPRATROPIUM BROMIDE AND ALBUTEROL SULFATE 3 ML: 2.5; .5 SOLUTION RESPIRATORY (INHALATION) at 10:58

## 2018-02-27 RX ADMIN — LEVOFLOXACIN 500 MG: 5 INJECTION, SOLUTION INTRAVENOUS at 11:42

## 2018-02-27 RX ADMIN — METOCLOPRAMIDE HYDROCHLORIDE 10 MG: 10 TABLET ORAL at 11:38

## 2018-02-27 RX ADMIN — INSULIN ASPART 5 UNITS: 100 INJECTION, SOLUTION INTRAVENOUS; SUBCUTANEOUS at 17:44

## 2018-02-27 RX ADMIN — HYDROCODONE BITARTRATE AND ACETAMINOPHEN 1 TABLET: 10; 325 TABLET ORAL at 21:24

## 2018-02-27 RX ADMIN — INSULIN ASPART 10 UNITS: 100 INJECTION, SOLUTION INTRAVENOUS; SUBCUTANEOUS at 21:24

## 2018-02-27 RX ADMIN — METHYLPREDNISOLONE SODIUM SUCCINATE 40 MG: 40 INJECTION, POWDER, FOR SOLUTION INTRAMUSCULAR; INTRAVENOUS at 11:38

## 2018-02-27 RX ADMIN — Medication 10 ML: at 08:27

## 2018-02-27 RX ADMIN — INSULIN ASPART 5 UNITS: 100 INJECTION, SOLUTION INTRAVENOUS; SUBCUTANEOUS at 08:22

## 2018-02-27 RX ADMIN — LOSARTAN POTASSIUM 25 MG: 25 TABLET ORAL at 08:22

## 2018-02-27 RX ADMIN — RANOLAZINE 500 MG: 500 TABLET, FILM COATED, EXTENDED RELEASE ORAL at 08:22

## 2018-02-27 RX ADMIN — RANOLAZINE 500 MG: 500 TABLET, FILM COATED, EXTENDED RELEASE ORAL at 21:25

## 2018-02-27 RX ADMIN — LORAZEPAM 1 MG: 1 TABLET ORAL at 21:24

## 2018-02-28 VITALS
HEIGHT: 71 IN | WEIGHT: 289.4 LBS | TEMPERATURE: 98.8 F | OXYGEN SATURATION: 95 % | SYSTOLIC BLOOD PRESSURE: 120 MMHG | BODY MASS INDEX: 40.52 KG/M2 | DIASTOLIC BLOOD PRESSURE: 72 MMHG | HEART RATE: 70 BPM | RESPIRATION RATE: 20 BRPM

## 2018-02-28 LAB
ANION GAP SERPL CALCULATED.3IONS-SCNC: 14 MMOL/L (ref 5–15)
BASOPHILS # BLD AUTO: 0.01 10*3/MM3 (ref 0–0.2)
BASOPHILS NFR BLD AUTO: 0.1 % (ref 0–2)
BUN BLD-MCNC: 38 MG/DL (ref 7–21)
BUN/CREAT SERPL: 22.8 (ref 7–25)
CALCIUM SPEC-SCNC: 8.5 MG/DL (ref 8.4–10.2)
CHLORIDE SERPL-SCNC: 99 MMOL/L (ref 95–110)
CO2 SERPL-SCNC: 22 MMOL/L (ref 22–31)
CREAT BLD-MCNC: 1.67 MG/DL (ref 0.7–1.3)
DEPRECATED RDW RBC AUTO: 46 FL (ref 35.1–43.9)
EOSINOPHIL # BLD AUTO: 0 10*3/MM3 (ref 0–0.7)
EOSINOPHIL NFR BLD AUTO: 0 % (ref 0–7)
ERYTHROCYTE [DISTWIDTH] IN BLOOD BY AUTOMATED COUNT: 13.9 % (ref 11.5–14.5)
GFR SERPL CREATININE-BSD FRML MDRD: 42 ML/MIN/1.73 (ref 60–113)
GLUCOSE BLD-MCNC: 237 MG/DL (ref 60–100)
GLUCOSE BLDC GLUCOMTR-MCNC: 210 MG/DL (ref 70–130)
GLUCOSE BLDC GLUCOMTR-MCNC: 217 MG/DL (ref 70–130)
HCT VFR BLD AUTO: 44 % (ref 39–49)
HGB BLD-MCNC: 15.4 G/DL (ref 13.7–17.3)
IMM GRANULOCYTES # BLD: 0.06 10*3/MM3 (ref 0–0.02)
IMM GRANULOCYTES NFR BLD: 0.8 % (ref 0–0.5)
INR PPP: 3.11 (ref 0.8–1.2)
LYMPHOCYTES # BLD AUTO: 1.1 10*3/MM3 (ref 0.6–4.2)
LYMPHOCYTES NFR BLD AUTO: 14.6 % (ref 10–50)
MCH RBC QN AUTO: 31.4 PG (ref 26.5–34)
MCHC RBC AUTO-ENTMCNC: 35 G/DL (ref 31.5–36.3)
MCV RBC AUTO: 89.6 FL (ref 80–98)
MONOCYTES # BLD AUTO: 0.81 10*3/MM3 (ref 0–0.9)
MONOCYTES NFR BLD AUTO: 10.8 % (ref 0–12)
NEUTROPHILS # BLD AUTO: 5.54 10*3/MM3 (ref 2–8.6)
NEUTROPHILS NFR BLD AUTO: 73.7 % (ref 37–80)
NRBC BLD MANUAL-RTO: 0 /100 WBC (ref 0–0)
PLATELET # BLD AUTO: 199 10*3/MM3 (ref 150–450)
PMV BLD AUTO: 9.7 FL (ref 8–12)
POTASSIUM BLD-SCNC: 4.6 MMOL/L (ref 3.5–5.1)
PROTHROMBIN TIME: 32.5 SECONDS (ref 11.1–15.3)
RBC # BLD AUTO: 4.91 10*6/MM3 (ref 4.37–5.74)
SODIUM BLD-SCNC: 135 MMOL/L (ref 137–145)
WBC NRBC COR # BLD: 7.52 10*3/MM3 (ref 3.2–9.8)

## 2018-02-28 PROCEDURE — 96376 TX/PRO/DX INJ SAME DRUG ADON: CPT

## 2018-02-28 PROCEDURE — 94799 UNLISTED PULMONARY SVC/PX: CPT

## 2018-02-28 PROCEDURE — 80048 BASIC METABOLIC PNL TOTAL CA: CPT | Performed by: HOSPITALIST

## 2018-02-28 PROCEDURE — G0378 HOSPITAL OBSERVATION PER HR: HCPCS

## 2018-02-28 PROCEDURE — 63710000001 INSULIN ASPART PER 5 UNITS: Performed by: HOSPITALIST

## 2018-02-28 PROCEDURE — 82962 GLUCOSE BLOOD TEST: CPT

## 2018-02-28 PROCEDURE — 25010000002 METHYLPREDNISOLONE PER 40 MG: Performed by: FAMILY MEDICINE

## 2018-02-28 PROCEDURE — 85610 PROTHROMBIN TIME: CPT | Performed by: HOSPITALIST

## 2018-02-28 PROCEDURE — 94760 N-INVAS EAR/PLS OXIMETRY 1: CPT

## 2018-02-28 PROCEDURE — 85025 COMPLETE CBC W/AUTO DIFF WBC: CPT | Performed by: HOSPITALIST

## 2018-02-28 RX ORDER — LOSARTAN POTASSIUM 50 MG/1
50 TABLET ORAL
Status: DISCONTINUED | OUTPATIENT
Start: 2018-03-01 | End: 2018-02-28 | Stop reason: HOSPADM

## 2018-02-28 RX ORDER — BUDESONIDE AND FORMOTEROL FUMARATE DIHYDRATE 160; 4.5 UG/1; UG/1
2 AEROSOL RESPIRATORY (INHALATION)
Qty: 1 INHALER | Refills: 12 | Status: SHIPPED | OUTPATIENT
Start: 2018-02-28 | End: 2018-03-21

## 2018-02-28 RX ORDER — METHYLPREDNISOLONE 4 MG/1
TABLET ORAL
Qty: 21 EACH | Refills: 0 | Status: SHIPPED | OUTPATIENT
Start: 2018-02-28 | End: 2018-03-08 | Stop reason: HOSPADM

## 2018-02-28 RX ORDER — LOSARTAN POTASSIUM 50 MG/1
50 TABLET ORAL
Qty: 30 TABLET | Refills: 1 | Status: SHIPPED | OUTPATIENT
Start: 2018-03-01 | End: 2018-12-20

## 2018-02-28 RX ORDER — ALBUTEROL SULFATE 90 UG/1
2 AEROSOL, METERED RESPIRATORY (INHALATION) EVERY 4 HOURS PRN
Qty: 1 INHALER | Refills: 0 | Status: SHIPPED | OUTPATIENT
Start: 2018-02-28 | End: 2018-03-21

## 2018-02-28 RX ADMIN — INSULIN ASPART 5 UNITS: 100 INJECTION, SOLUTION INTRAVENOUS; SUBCUTANEOUS at 08:43

## 2018-02-28 RX ADMIN — LORAZEPAM 1 MG: 1 TABLET ORAL at 11:50

## 2018-02-28 RX ADMIN — RANOLAZINE 500 MG: 500 TABLET, FILM COATED, EXTENDED RELEASE ORAL at 08:57

## 2018-02-28 RX ADMIN — METOCLOPRAMIDE HYDROCHLORIDE 10 MG: 10 TABLET ORAL at 08:43

## 2018-02-28 RX ADMIN — HYDROCODONE BITARTRATE AND ACETAMINOPHEN 1 TABLET: 10; 325 TABLET ORAL at 11:50

## 2018-02-28 RX ADMIN — INSULIN ASPART 5 UNITS: 100 INJECTION, SOLUTION INTRAVENOUS; SUBCUTANEOUS at 11:17

## 2018-02-28 RX ADMIN — CITALOPRAM HYDROBROMIDE 20 MG: 20 TABLET ORAL at 08:56

## 2018-02-28 RX ADMIN — ISOSORBIDE MONONITRATE 30 MG: 30 TABLET, EXTENDED RELEASE ORAL at 08:56

## 2018-02-28 RX ADMIN — IPRATROPIUM BROMIDE AND ALBUTEROL SULFATE 3 ML: 2.5; .5 SOLUTION RESPIRATORY (INHALATION) at 07:26

## 2018-02-28 RX ADMIN — METOCLOPRAMIDE HYDROCHLORIDE 10 MG: 10 TABLET ORAL at 11:07

## 2018-02-28 RX ADMIN — ASPIRIN 81 MG 81 MG: 81 TABLET ORAL at 08:55

## 2018-02-28 RX ADMIN — METHYLPREDNISOLONE SODIUM SUCCINATE 40 MG: 40 INJECTION, POWDER, FOR SOLUTION INTRAMUSCULAR; INTRAVENOUS at 05:15

## 2018-03-06 ENCOUNTER — APPOINTMENT (OUTPATIENT)
Dept: CT IMAGING | Facility: HOSPITAL | Age: 63
End: 2018-03-06

## 2018-03-06 ENCOUNTER — APPOINTMENT (OUTPATIENT)
Dept: GENERAL RADIOLOGY | Facility: HOSPITAL | Age: 63
End: 2018-03-06

## 2018-03-06 ENCOUNTER — HOSPITAL ENCOUNTER (OUTPATIENT)
Facility: HOSPITAL | Age: 63
Setting detail: OBSERVATION
Discharge: HOME | End: 2018-03-08
Attending: FAMILY MEDICINE | Admitting: FAMILY MEDICINE

## 2018-03-06 DIAGNOSIS — F41.9 ANXIETY: ICD-10-CM

## 2018-03-06 DIAGNOSIS — R07.2 PRECORDIAL PAIN: Primary | ICD-10-CM

## 2018-03-06 LAB
ALBUMIN SERPL-MCNC: 4 G/DL (ref 3.4–4.8)
ALBUMIN/GLOB SERPL: 1.2 G/DL (ref 1.1–1.8)
ALP SERPL-CCNC: 66 U/L (ref 38–126)
ALT SERPL W P-5'-P-CCNC: 52 U/L (ref 21–72)
ANION GAP SERPL CALCULATED.3IONS-SCNC: 16 MMOL/L (ref 5–15)
AST SERPL-CCNC: 23 U/L (ref 17–59)
BACTERIA UR QL AUTO: ABNORMAL /HPF
BASOPHILS # BLD AUTO: 0.01 10*3/MM3 (ref 0–0.2)
BASOPHILS NFR BLD AUTO: 0.1 % (ref 0–2)
BILIRUB SERPL-MCNC: 0.5 MG/DL (ref 0.2–1.3)
BILIRUB UR QL STRIP: NEGATIVE
BUN BLD-MCNC: 28 MG/DL (ref 7–21)
BUN/CREAT SERPL: 17.5 (ref 7–25)
CALCIUM SPEC-SCNC: 9.3 MG/DL (ref 8.4–10.2)
CHLORIDE SERPL-SCNC: 104 MMOL/L (ref 95–110)
CK MB SERPL-CCNC: 0.62 NG/ML (ref 0–5)
CK SERPL-CCNC: 46 U/L (ref 55–170)
CLARITY UR: CLEAR
CO2 SERPL-SCNC: 19 MMOL/L (ref 22–31)
COLOR UR: YELLOW
CREAT BLD-MCNC: 1.6 MG/DL (ref 0.7–1.3)
D-DIMER, QUANTITATIVE (MAD,POW, STR): 277 NG/ML (FEU) (ref 0–470)
DEPRECATED RDW RBC AUTO: 43.7 FL (ref 35.1–43.9)
EOSINOPHIL # BLD AUTO: 0.04 10*3/MM3 (ref 0–0.7)
EOSINOPHIL NFR BLD AUTO: 0.3 % (ref 0–7)
ERYTHROCYTE [DISTWIDTH] IN BLOOD BY AUTOMATED COUNT: 13.8 % (ref 11.5–14.5)
GFR SERPL CREATININE-BSD FRML MDRD: 44 ML/MIN/1.73 (ref 49–113)
GLOBULIN UR ELPH-MCNC: 3.4 GM/DL (ref 2.3–3.5)
GLUCOSE BLD-MCNC: 110 MG/DL (ref 60–100)
GLUCOSE BLDC GLUCOMTR-MCNC: 128 MG/DL (ref 70–130)
GLUCOSE BLDC GLUCOMTR-MCNC: 136 MG/DL (ref 70–130)
GLUCOSE BLDC GLUCOMTR-MCNC: 224 MG/DL (ref 70–130)
GLUCOSE UR STRIP-MCNC: ABNORMAL MG/DL
HCT VFR BLD AUTO: 46.1 % (ref 39–49)
HGB BLD-MCNC: 16.7 G/DL (ref 13.7–17.3)
HGB UR QL STRIP.AUTO: ABNORMAL
HOLD SPECIMEN: NORMAL
HYALINE CASTS UR QL AUTO: ABNORMAL /LPF
IMM GRANULOCYTES # BLD: 0.13 10*3/MM3 (ref 0–0.02)
IMM GRANULOCYTES NFR BLD: 0.9 % (ref 0–0.5)
INR PPP: 1.63 (ref 0.8–1.2)
KETONES UR QL STRIP: NEGATIVE
LEUKOCYTE ESTERASE UR QL STRIP.AUTO: NEGATIVE
LIPASE SERPL-CCNC: 381 U/L (ref 23–300)
LYMPHOCYTES # BLD AUTO: 2.22 10*3/MM3 (ref 0.6–4.2)
LYMPHOCYTES NFR BLD AUTO: 14.7 % (ref 10–50)
MAGNESIUM SERPL-MCNC: 2.1 MG/DL (ref 1.6–2.3)
MCH RBC QN AUTO: 31.6 PG (ref 26.5–34)
MCHC RBC AUTO-ENTMCNC: 36.2 G/DL (ref 31.5–36.3)
MCV RBC AUTO: 87.1 FL (ref 80–98)
MONOCYTES # BLD AUTO: 1.84 10*3/MM3 (ref 0–0.9)
MONOCYTES NFR BLD AUTO: 12.2 % (ref 0–12)
NEUTROPHILS # BLD AUTO: 10.86 10*3/MM3 (ref 2–8.6)
NEUTROPHILS NFR BLD AUTO: 71.8 % (ref 37–80)
NITRITE UR QL STRIP: NEGATIVE
PH UR STRIP.AUTO: <=5 [PH] (ref 5–9)
PLATELET # BLD AUTO: 171 10*3/MM3 (ref 150–450)
PMV BLD AUTO: 9.9 FL (ref 8–12)
POTASSIUM BLD-SCNC: 3.9 MMOL/L (ref 3.5–5.1)
PROT SERPL-MCNC: 7.4 G/DL (ref 6.3–8.6)
PROT UR QL STRIP: NEGATIVE
PROTHROMBIN TIME: 18.8 SECONDS (ref 11.1–15.3)
RBC # BLD AUTO: 5.29 10*6/MM3 (ref 4.37–5.74)
RBC # UR: ABNORMAL /HPF
REF LAB TEST METHOD: ABNORMAL
SODIUM BLD-SCNC: 139 MMOL/L (ref 137–145)
SP GR UR STRIP: 1.02 (ref 1–1.03)
SQUAMOUS #/AREA URNS HPF: ABNORMAL /HPF
TROPONIN I SERPL-MCNC: <0.012 NG/ML
TROPONIN I SERPL-MCNC: <0.012 NG/ML
UROBILINOGEN UR QL STRIP: ABNORMAL
WBC NRBC COR # BLD: 15.1 10*3/MM3 (ref 3.2–9.8)
WBC UR QL AUTO: ABNORMAL /HPF

## 2018-03-06 PROCEDURE — 25010000002 LORAZEPAM PER 2 MG: Performed by: FAMILY MEDICINE

## 2018-03-06 PROCEDURE — 84484 ASSAY OF TROPONIN QUANT: CPT | Performed by: NURSE PRACTITIONER

## 2018-03-06 PROCEDURE — 96374 THER/PROPH/DIAG INJ IV PUSH: CPT

## 2018-03-06 PROCEDURE — 80053 COMPREHEN METABOLIC PANEL: CPT | Performed by: FAMILY MEDICINE

## 2018-03-06 PROCEDURE — 70450 CT HEAD/BRAIN W/O DYE: CPT

## 2018-03-06 PROCEDURE — 82553 CREATINE MB FRACTION: CPT | Performed by: FAMILY MEDICINE

## 2018-03-06 PROCEDURE — 83735 ASSAY OF MAGNESIUM: CPT | Performed by: FAMILY MEDICINE

## 2018-03-06 PROCEDURE — 82550 ASSAY OF CK (CPK): CPT | Performed by: FAMILY MEDICINE

## 2018-03-06 PROCEDURE — 93010 ELECTROCARDIOGRAM REPORT: CPT | Performed by: INTERNAL MEDICINE

## 2018-03-06 PROCEDURE — G0378 HOSPITAL OBSERVATION PER HR: HCPCS

## 2018-03-06 PROCEDURE — 85379 FIBRIN DEGRADATION QUANT: CPT | Performed by: FAMILY MEDICINE

## 2018-03-06 PROCEDURE — 85025 COMPLETE CBC W/AUTO DIFF WBC: CPT | Performed by: FAMILY MEDICINE

## 2018-03-06 PROCEDURE — 71046 X-RAY EXAM CHEST 2 VIEWS: CPT

## 2018-03-06 PROCEDURE — 82962 GLUCOSE BLOOD TEST: CPT

## 2018-03-06 PROCEDURE — 63710000001 INSULIN ASPART PER 5 UNITS: Performed by: NURSE PRACTITIONER

## 2018-03-06 PROCEDURE — 85610 PROTHROMBIN TIME: CPT | Performed by: FAMILY MEDICINE

## 2018-03-06 PROCEDURE — 93005 ELECTROCARDIOGRAM TRACING: CPT | Performed by: FAMILY MEDICINE

## 2018-03-06 PROCEDURE — 99284 EMERGENCY DEPT VISIT MOD MDM: CPT

## 2018-03-06 PROCEDURE — 81001 URINALYSIS AUTO W/SCOPE: CPT | Performed by: FAMILY MEDICINE

## 2018-03-06 PROCEDURE — 83690 ASSAY OF LIPASE: CPT | Performed by: FAMILY MEDICINE

## 2018-03-06 PROCEDURE — 84484 ASSAY OF TROPONIN QUANT: CPT | Performed by: FAMILY MEDICINE

## 2018-03-06 RX ORDER — DEXTROSE MONOHYDRATE 25 G/50ML
25 INJECTION, SOLUTION INTRAVENOUS
Status: DISCONTINUED | OUTPATIENT
Start: 2018-03-06 | End: 2018-03-08 | Stop reason: HOSPADM

## 2018-03-06 RX ORDER — ONDANSETRON 4 MG/1
4 TABLET, FILM COATED ORAL EVERY 6 HOURS PRN
Status: DISCONTINUED | OUTPATIENT
Start: 2018-03-06 | End: 2018-03-08 | Stop reason: HOSPADM

## 2018-03-06 RX ORDER — NICOTINE POLACRILEX 4 MG
15 LOZENGE BUCCAL
Status: DISCONTINUED | OUTPATIENT
Start: 2018-03-06 | End: 2018-03-08 | Stop reason: HOSPADM

## 2018-03-06 RX ORDER — LORAZEPAM 2 MG/ML
1 INJECTION INTRAMUSCULAR ONCE
Status: COMPLETED | OUTPATIENT
Start: 2018-03-06 | End: 2018-03-06

## 2018-03-06 RX ORDER — SODIUM CHLORIDE 0.9 % (FLUSH) 0.9 %
1-10 SYRINGE (ML) INJECTION AS NEEDED
Status: DISCONTINUED | OUTPATIENT
Start: 2018-03-06 | End: 2018-03-08 | Stop reason: HOSPADM

## 2018-03-06 RX ORDER — ACETAMINOPHEN 325 MG/1
650 TABLET ORAL EVERY 4 HOURS PRN
Status: DISCONTINUED | OUTPATIENT
Start: 2018-03-06 | End: 2018-03-08 | Stop reason: HOSPADM

## 2018-03-06 RX ORDER — LORAZEPAM 0.5 MG/1
0.5 TABLET ORAL ONCE
Status: COMPLETED | OUTPATIENT
Start: 2018-03-06 | End: 2018-03-06

## 2018-03-06 RX ORDER — ONDANSETRON 2 MG/ML
4 INJECTION INTRAMUSCULAR; INTRAVENOUS EVERY 6 HOURS PRN
Status: DISCONTINUED | OUTPATIENT
Start: 2018-03-06 | End: 2018-03-08 | Stop reason: HOSPADM

## 2018-03-06 RX ORDER — ONDANSETRON 4 MG/1
4 TABLET, ORALLY DISINTEGRATING ORAL EVERY 6 HOURS PRN
Status: DISCONTINUED | OUTPATIENT
Start: 2018-03-06 | End: 2018-03-08 | Stop reason: HOSPADM

## 2018-03-06 RX ORDER — WARFARIN SODIUM 5 MG/1
5 TABLET ORAL
Status: COMPLETED | OUTPATIENT
Start: 2018-03-06 | End: 2018-03-06

## 2018-03-06 RX ADMIN — LORAZEPAM 1 MG: 2 INJECTION INTRAMUSCULAR; INTRAVENOUS at 13:13

## 2018-03-06 RX ADMIN — INSULIN ASPART 4 UNITS: 100 INJECTION, SOLUTION INTRAVENOUS; SUBCUTANEOUS at 21:18

## 2018-03-06 RX ADMIN — WARFARIN SODIUM 5 MG: 5 TABLET ORAL at 21:13

## 2018-03-06 RX ADMIN — LORAZEPAM 0.5 MG: 0.5 TABLET ORAL at 21:13

## 2018-03-06 NOTE — ED NOTES
Called pt transport for update for transportation.  Unknown at this time.     Ce Richards RN  03/06/18 [id number]

## 2018-03-06 NOTE — ED PROVIDER NOTES
Subjective   HPI Comments: Patient with a history of heart disease with coronary artery bypass graft performed roughly 10 years ago with no recent stent placement.  Patient also has a history of pulmonary emboli, renal failure, and diabetes.  Patient's main complaint today shortness of breath and anxiety.  He admits to being on Klonopin for many years that was discontinued 2-3 months ago.    Patient is a 62 y.o. male presenting with shortness of breath and chest pain.   Shortness of Breath   Severity:  Mild  Onset quality:  Gradual  Duration:  2 days  Timing:  Constant  Progression:  Worsening  Chronicity:  Recurrent  Relieved by:  Nothing  Worsened by:  Exertion and movement  Ineffective treatments:  None tried  Associated symptoms: chest pain and cough    Associated symptoms: no abdominal pain, no diaphoresis, no ear pain, no fever, no headaches, no hemoptysis, no neck pain, no rash, no sore throat, no sputum production, no syncope, no vomiting and no wheezing    Chest Pain   Pain location:  Substernal area  Pain quality: aching    Pain severity:  Moderate  Duration:  1 day  Timing:  Constant  Progression:  Worsening  Chronicity:  Recurrent  Context: breathing    Relieved by:  Nothing  Worsened by:  Movement  Ineffective treatments:  None tried  Associated symptoms: cough and shortness of breath    Associated symptoms: no abdominal pain, no diaphoresis, no dizziness, no dysphagia, no fatigue, no fever, no headache, no nausea, no syncope, no vomiting and no weakness    Risk factors: coronary artery disease, diabetes mellitus and hypertension        Review of Systems   Constitutional: Negative for appetite change, chills, diaphoresis, fatigue and fever.   HENT: Negative for congestion, ear discharge, ear pain, nosebleeds, rhinorrhea, sinus pressure, sore throat and trouble swallowing.    Eyes: Negative for discharge and redness.   Respiratory: Positive for cough and shortness of breath. Negative for apnea,  hemoptysis, sputum production, chest tightness and wheezing.    Cardiovascular: Positive for chest pain. Negative for syncope.   Gastrointestinal: Negative for abdominal pain, diarrhea, nausea and vomiting.   Endocrine: Negative for polyuria.   Genitourinary: Negative for dysuria, frequency and urgency.   Musculoskeletal: Negative for myalgias and neck pain.   Skin: Negative for color change and rash.   Allergic/Immunologic: Negative for immunocompromised state.   Neurological: Negative for dizziness, seizures, syncope, weakness, light-headedness and headaches.   Hematological: Negative for adenopathy. Does not bruise/bleed easily.   Psychiatric/Behavioral: Negative for behavioral problems and confusion.   All other systems reviewed and are negative.      Past Medical History:   Diagnosis Date   • Anxiety    • Arthritis     osteoarthritis   • CKD (chronic kidney disease), stage III    • COPD (chronic obstructive pulmonary disease)    • Coronary artery disease    • Depression    • Diabetes mellitus    • DVT (deep venous thrombosis)    • Heart disease    • History of embolic filter insertion    • Hyperlipidemia    • Hypertension    • Injury of back     back surgery x3    • LUCIANA on CPAP    • Pulmonary embolism        Allergies   Allergen Reactions   • Atorvastatin Other (See Comments)     Aches and pains all over   • Crestor [Rosuvastatin Calcium]    • Lisinopril Other (See Comments)     cough  Constant cough   • Lyrica [Pregabalin] Swelling   • Rosuvastatin Other (See Comments)     Aches and pains all over       Past Surgical History:   Procedure Laterality Date   • BACK SURGERY     • CARDIAC SURGERY  2001   • CIRCUMCISION     • GALLBLADDER SURGERY  2000   • JOINT REPLACEMENT Right 05/02/2016    hip   • SPINE SURGERY         Family History   Problem Relation Age of Onset   • Heart disease Father    • Hypertension Father    • Stroke Father    • Diabetes Sister    • Heart disease Brother    • Hypertension Brother    •  COPD Brother        Social History     Social History   • Marital status:      Social History Main Topics   • Smoking status: Never Smoker   • Smokeless tobacco: Never Used   • Alcohol use No   • Drug use: No   • Sexual activity: Defer           Objective   Physical Exam   Constitutional: He is oriented to person, place, and time. He appears well-developed and well-nourished.   HENT:   Head: Normocephalic and atraumatic.   Nose: Nose normal.   Mouth/Throat: Oropharynx is clear and moist.   Eyes: Conjunctivae and EOM are normal. Pupils are equal, round, and reactive to light. Right eye exhibits no discharge. Left eye exhibits no discharge. No scleral icterus.   Neck: Normal range of motion. Neck supple. No tracheal deviation present.   Cardiovascular: Normal rate, regular rhythm and normal heart sounds.    No murmur heard.  Pulmonary/Chest: Effort normal and breath sounds normal. No stridor. No respiratory distress. He has no wheezes. He has no rales.   Abdominal: Soft. Bowel sounds are normal. He exhibits no distension and no mass. There is no tenderness. There is no rebound and no guarding.   Musculoskeletal: He exhibits no edema.   Neurological: He is alert and oriented to person, place, and time. He is not disoriented. He displays tremor. He displays no atrophy. He displays no seizure activity. Coordination normal. GCS eye subscore is 4. GCS verbal subscore is 5. GCS motor subscore is 6.   Generalized tremors on PE   Skin: Skin is warm and dry. No rash noted. No erythema.   Psychiatric: He has a normal mood and affect. His behavior is normal. Thought content normal.   Nursing note and vitals reviewed.      ECG 12 Lead    Date/Time: 3/6/2018 1:21 PM  Performed by: VASYL FUNG  Authorized by: VASYL FUNG   Interpreted by physician  Rhythm: sinus rhythm  Rate: normal  BPM: 80  ST Segments: ST segments normal                 ED Course  ED Course          Labs Reviewed   COMPREHENSIVE  METABOLIC PANEL - Abnormal; Notable for the following:        Result Value    Glucose 110 (*)     BUN 28 (*)     Creatinine 1.60 (*)     CO2 19.0 (*)     eGFR Non African Amer 44 (*)     Anion Gap 16.0 (*)     All other components within normal limits   URINALYSIS W/ CULTURE IF INDICATED - Abnormal; Notable for the following:     Glucose,  mg/dL (2+) (*)     Blood, UA Trace (*)     All other components within normal limits   CK - Abnormal; Notable for the following:     Creatine Kinase 46 (*)     All other components within normal limits   LIPASE - Abnormal; Notable for the following:     Lipase 381 (*)     All other components within normal limits   CBC WITH AUTO DIFFERENTIAL - Abnormal; Notable for the following:     WBC 15.10 (*)     Monocyte % 12.2 (*)     Immature Grans % 0.9 (*)     Neutrophils, Absolute 10.86 (*)     Monocytes, Absolute 1.84 (*)     Immature Grans, Absolute 0.13 (*)     All other components within normal limits   URINALYSIS, MICROSCOPIC ONLY - Abnormal; Notable for the following:     RBC, UA 3-5 (*)     All other components within normal limits   MAGNESIUM - Normal   CK MB - Normal   TROPONIN (IN-HOUSE) - Normal   POCT GLUCOSE FINGERSTICK - Normal   PROTIME-INR   D-DIMER, QUANTITATIVE   POCT PROTIME - INR   CBC AND DIFFERENTIAL    Narrative:     The following orders were created for panel order CBC & Differential.  Procedure                               Abnormality         Status                     ---------                               -----------         ------                     CBC Auto Differential[[id number]]        Abnormal            Final result                 Please view results for these tests on the individual orders.   EXTRA TUBES    Narrative:     The following orders were created for panel order Extra Tubes.  Procedure                               Abnormality         Status                     ---------                               -----------         ------                      Gold Top - [facility] [id number]]                                   In process                   Please view results for these tests on the individual orders.   GOLD TOP - SST       CT Head Without Contrast   Final Result   CONCLUSION:     1.  Negative examination for acute intracranial pathology.             If signs or symptoms persist beyond reasonable expectations, a   MRI examination is suggested as is deemed clinically appropriate.                            Electronically signed by:  IFEOMA Mejia MD  3/6/2018 12:21   PM CST Workstation: [id number]      XR Chest 2 View   Final Result   CONCLUSION:          1. No evidence of an active cardiopulmonary process.                                                       Electronically signed by:  IFEOMA Mejia MD  3/6/2018 12:08   PM CST Workstation: [id number]                    MDM    Final diagnoses:   Precordial pain   Anxiety            Kody Cavanaugh MD  03/06/18 [id number]

## 2018-03-06 NOTE — H&P
[facility] Medicine Admission      Date of Admission: 3/6/2018      Primary Care Physician: Tam Greco MD      Chief Complaint: chest pain    HPI: 62-year-old  male past medical history of CK D stage III, COPD, coronary artery disease status post CABG, type 2 diabetes, history of DVT/PE requiring anticoagulation, hypertension, hyperlipidemia, obstructive sleep apnea who presents today with complaints of a 2 day history of chest pain and shortness of breath.  The patient reports that he started experiencing a heaviness in his chest that started around 4 PM yesterday afternoon.  He reports being unable to lay down without having worsening dyspnea and a heaviness in the center portion of his chest.  He reports also having nausea and diaphoresis intermittently as well since yesterday afternoon.  He denies any palpitations, radiating pains, or vomiting.  Patient was recently admitted to the hospital for similar complaints and underwent stress testing with cardiology that showed no signs of ischemia.    Concurrent Medical History:  has a past medical history of Anxiety; Arthritis; CKD (chronic kidney disease), stage III; COPD (chronic obstructive pulmonary disease); Coronary artery disease; Depression; Diabetes mellitus; DVT (deep venous thrombosis); Heart disease; History of embolic filter insertion; Hyperlipidemia; Hypertension; Injury of back; LUCIANA on CPAP; and Pulmonary embolism.    Past Surgical History: Coronary artery bypass surgery, right hip replacement, cholecystectomy, circumcision    Family History: Family history is significant for coronary artery disease requiring CABG in patient's father who is .  Patient also reports history of coronary artery disease in the patient's brother as well as coronary artery disease in the patient's daughter who is .    Social History:  reports that he has never smoked. He has never used smokeless  "tobacco. He reports that he does not drink alcohol or use illicit drugs.    Allergies:   Allergies   Allergen Reactions   • Atorvastatin Other (See Comments)     Aches and pains all over   • Crestor [Rosuvastatin Calcium]    • Lisinopril Other (See Comments)     cough  Constant cough   • Lyrica [Pregabalin] Swelling   • Rosuvastatin Other (See Comments)     Aches and pains all over       Medications:   Prior to Admission medications    Medication Sig Start Date End Date Taking? Authorizing Provider   albuterol (PROVENTIL HFA;VENTOLIN HFA) 108 (90 Base) MCG/ACT inhaler Inhale 2 puffs Every 4 (Four) Hours As Needed for Wheezing. 2/28/18   Fiona Mcdonald MD   aspirin 81 MG chewable tablet Chew 81 mg Take As Directed. Take 1 pill by mouth Monday, Wednesday, Friday    Historical Provider, MD   budesonide-formoterol (SYMBICORT) 160-4.5 MCG/ACT inhaler Inhale 2 puffs 2 (Two) Times a Day. 2/28/18   Fiona Mcdnoald MD   citalopram (CeleXA) 20 MG tablet Take 20 mg by mouth 2 (Two) Times a Day.    Historical Provider, MD   clonazePAM (KlonoPIN) 0.5 MG tablet  11/20/17   Historical Provider, MD   COMFORT ASSIST INSULIN SYRINGE 31G X 5/16\" 0.3 ML misc  8/28/16   Historical Provider, MD   furosemide (LASIX) 20 MG tablet Take 20 mg by mouth As Needed.    Historical Provider, MD   glimepiride (AMARYL) 4 MG tablet Take 4 mg by mouth 2 (Two) Times a Day. 8/1/16   Historical Provider, MD   HYDROcodone-acetaminophen (NORCO)  MG per tablet Take 1 tablet by mouth Every 4 (Four) Hours As Needed for Moderate Pain .    Historical Provider, MD   hydrOXYzine (VISTARIL) 25 MG capsule  11/2/17   Historical Provider, MD   insulin aspart (novoLOG) 100 UNIT/ML injection Inject  under the skin 3 (Three) Times a Day Before Meals.    Historical Provider, MD   isosorbide mononitrate (IMDUR) 30 MG 24 hr tablet Take 30 mg by mouth Daily.    Historical Provider, MD   Lancets (ONETOUCH ULTRASOFT) lancets  3/6/17   Historical Provider, MD "   losartan (COZAAR) 50 MG tablet Take 1 tablet by mouth Daily. 3/1/18   Fiona Mcdonald MD   MethylPREDNISolone (MEDROL, MELIDA,) 4 MG tablet Take as directed on package instructions. 2/28/18   Fiona Mcdonald MD   metoclopramide (REGLAN) 10 MG tablet TAKE ONE TABLET BY MOUTH TWO TIMES A DAY 2/20/17   Historical Provider, MD   Omega-3 Fatty Acids (FISH OIL PO) Take 4 capsules by mouth 2 (Two) Times a Day.    Historical Provider, MD   ONE TOUCH ULTRA TEST test strip  3/12/17   Historical Provider, MD   RANEXA 500 MG 12 hr tablet Take 500 mg by mouth Every 12 (Twelve) Hours. 7/30/16   Historical Provider, MD   risperiDONE (risperDAL) 1 MG tablet Take 1 mg by mouth 2 (Two) Times a Day.    Historical Provider, MD   tamsulosin (FLOMAX) 0.4 MG capsule 24 hr capsule Take 1 capsule by mouth Every Night. 6/12/16   Historical Provider, MD   triamcinolone (KENALOG) 0.1 % cream  6/24/16   Historical Provider, MD   vitamin D (ERGOCALCIFEROL) 96735 UNITS capsule capsule Takes every 2 weeks 7/2/16   Historical Provider, MD   warfarin (COUMADIN) 5 MG tablet Take 5 mg by mouth Take As Directed. Patient takes 5 mg every day except MWF when he takes 7.5 mg 7/22/16   Historical Provider, MD       Review of Systems:  Review of Systems   Constitutional: Positive for diaphoresis. Negative for chills and fatigue.   Respiratory: Positive for chest tightness and shortness of breath. Negative for cough and wheezing.    Cardiovascular: Positive for chest pain. Negative for palpitations and leg swelling.   Gastrointestinal: Positive for nausea. Negative for abdominal pain, constipation, diarrhea and vomiting.   Musculoskeletal: Positive for back pain.   Skin: Negative for pallor.   Neurological: Positive for weakness. Negative for dizziness.   Psychiatric/Behavioral: Negative for confusion.      Otherwise complete ROS is negative except as mentioned above.    Physical Exam:   Temp:  [97.7 °F (36.5 °C)] 97.7 °F (36.5 °C)  Heart Rate:  []  81  Resp:  [20-21] 21  BP: (135-152)/(90-91) 150/90  Physical Exam   Constitutional: He is oriented to person, place, and time. He appears well-developed and well-nourished.   HENT:   Head: Normocephalic.   Eyes: Conjunctivae are normal.   Neck: Neck supple.   Cardiovascular: Normal rate, regular rhythm, normal heart sounds and intact distal pulses.    Pulmonary/Chest: Effort normal and breath sounds normal.   Abdominal: Soft. Bowel sounds are normal.   Musculoskeletal: Normal range of motion. He exhibits no edema.   Tremor of left hand (chronic per patient report)   Neurological: He is alert and oriented to person, place, and time.   Skin: Skin is warm and dry.   Psychiatric: He has a normal mood and affect. His behavior is normal.   Vitals reviewed.        Results Reviewed:  I have personally reviewed current lab, radiology, and data and agree with results.  Lab Results (last 24 hours)     Procedure Component Value Units Date/Time    Urinalysis With / Culture If Indicated - Urine, Clean Catch [[id number]]  (Abnormal) Collected:  03/06/18 1222    Specimen:  Urine from Urine, Clean Catch Updated:  03/06/18 1231     Color, UA Yellow     Appearance, UA Clear     pH, UA <=5.0     Specific Gravity, UA 1.023     Glucose,  mg/dL (2+) (A)     Ketones, UA Negative     Bilirubin, UA Negative     Blood, UA Trace (A)     Protein, UA Negative     Leuk Esterase, UA Negative     Nitrite, UA Negative     Urobilinogen, UA 0.2 E.U./dL    Urinalysis, Microscopic Only - Urine, Clean Catch [[id number]]  (Abnormal) Collected:  03/06/18 1222    Specimen:  Urine from Urine, Clean Catch Updated:  03/06/18 1231     RBC, UA 3-5 (A) /HPF      WBC, UA None Seen /HPF      Bacteria, UA None Seen /HPF      Squamous Epithelial Cells, UA None Seen /HPF      Hyaline Casts, UA 0-2 /LPF      Methodology Automated Microscopy    POC Glucose Once [[id number]]  (Normal) Collected:  03/06/18 1220    Specimen:  Blood Updated:  03/06/18 1242     Glucose  128 mg/dL       Meter: [id number]: [id number] MARCO POTTERDANIS       Magnesium [[id number]]  (Normal) Collected:  03/06/18 1259    Specimen:  Blood Updated:  03/06/18 1325     Magnesium 2.1 mg/dL     CK [[id number]]  (Abnormal) Collected:  03/06/18 1259    Specimen:  Blood Updated:  03/06/18 1325     Creatine Kinase 46 (L) U/L     Lipase [[id number]]  (Abnormal) Collected:  03/06/18 1259    Specimen:  Blood Updated:  03/06/18 1330     Lipase 381 (H) U/L     Comprehensive Metabolic Panel [[id number]]  (Abnormal) Collected:  03/06/18 1259    Specimen:  Blood Updated:  03/06/18 1330     Glucose 110 (H) mg/dL      BUN 28 (H) mg/dL      Creatinine 1.60 (H) mg/dL      Sodium 139 mmol/L      Potassium 3.9 mmol/L      Chloride 104 mmol/L      CO2 19.0 (L) mmol/L      Calcium 9.3 mg/dL      Total Protein 7.4 g/dL      Albumin 4.00 g/dL      ALT (SGPT) 52 U/L      AST (SGOT) 23 U/L      Alkaline Phosphatase 66 U/L      Total Bilirubin 0.5 mg/dL      eGFR Non African Amer 44 (L) mL/min/1.73      Globulin 3.4 gm/dL      A/G Ratio 1.2 g/dL      BUN/Creatinine Ratio 17.5     Anion Gap 16.0 (H) mmol/L     CBC & Differential [[id number]] Collected:  03/06/18 1259    Specimen:  Blood Updated:  03/06/18 1330    Narrative:       The following orders were created for panel order CBC & Differential.  Procedure                               Abnormality         Status                     ---------                               -----------         ------                     CBC Auto Differential[[id number]]        Abnormal            Final result                 Please view results for these tests on the individual orders.    CBC Auto Differential [[id number]]  (Abnormal) Collected:  03/06/18 1259    Specimen:  Blood Updated:  03/06/18 1330     WBC 15.10 (H) 10*3/mm3      RBC 5.29 10*6/mm3      Hemoglobin 16.7 g/dL      Hematocrit 46.1 %      MCV 87.1 fL      MCH 31.6 pg      MCHC 36.2 g/dL      RDW 13.8 %      RDW-SD 43.7 fl      MPV  9.9 fL      Platelets 171 10*3/mm3      Neutrophil % 71.8 %      Lymphocyte % 14.7 %      Monocyte % 12.2 (H) %      Eosinophil % 0.3 %      Basophil % 0.1 %      Immature Grans % 0.9 (H) %      Neutrophils, Absolute 10.86 (H) 10*3/mm3      Lymphocytes, Absolute 2.22 10*3/mm3      Monocytes, Absolute 1.84 (H) 10*3/mm3      Eosinophils, Absolute 0.04 10*3/mm3      Basophils, Absolute 0.01 10*3/mm3      Immature Grans, Absolute 0.13 (H) 10*3/mm3     CK-MB [[id number]]  (Normal) Collected:  03/06/18 1259    Specimen:  Blood Updated:  03/06/18 1335     CKMB 0.62 ng/mL     Troponin [[id number]]  (Normal) Collected:  03/06/18 1259    Specimen:  Blood Updated:  03/06/18 1335     Troponin I <0.012 ng/mL     Extra Tubes [[id number]] Collected:  03/06/18 1309    Specimen:  Blood from Blood, Venous Line Updated:  03/06/18 1416    Narrative:       The following orders were created for panel order Extra Tubes.  Procedure                               Abnormality         Status                     ---------                               -----------         ------                     Gold Top - SST[[id number]]                                   Final result                 Please view results for these tests on the individual orders.    Gold Top - SST [[id number]] Collected:  03/06/18 1309    Specimen:  Blood Updated:  03/06/18 1416     Extra Tube Hold for add-ons.      Auto resulted.       Protime-INR [[id number]]  (Abnormal) Collected:  03/06/18 1259    Specimen:  Blood Updated:  03/06/18 1432     Protime 18.8 (H) Seconds      INR 1.63 (H)    Narrative:       Therapeutic range for most indications is 2.0-3.0 INR,  or 2.5-3.5 for mechanical heart valves.    D-dimer, Quantitative [[id number]]  (Normal) Collected:  03/06/18 1259    Specimen:  Blood Updated:  03/06/18 1432     D-Dimer, Quantitative 277 ng/mL (FEU)     Narrative:       Dimer values <500 ng/ml FEU are FDA approved as aid in diagnosis of deep venous thrombosis and pulmonary  embolism.  This test should not be used in an exclusion strategy with pretest probability alone.    A recent guideline regarding diagnosis for pulmonary thomboembolism recommends an adjusted exclusion criterion of age x 10 ng/ml FEU for patients >50 years of age ([employer] Intern Med 2015; 163: 701-711).        Imaging Results (last 24 hours)     Procedure Component Value Units Date/Time    XR Chest 2 View [[id number]] Collected:  03/06/18 1153     Updated:  03/06/18 1209    Narrative:         EXAM:          Radiograph(s), Chest   VIEWS:    PA / Lat ; 2       DATE/TIME:  3/6/2018 12:08 PM CST                INDICATION:   SOA    COMPARISON:  CXR: 2/24/18             FINDINGS:             - lines/tubes:    none     - cardiac:         size within normal limits         - mediastinum: contour within normal limits         - lungs:         no focal air space process, pulmonary  interstitial edema, nodule(s)/mass             - pleura:         no evidence of  fluid                  - osseous:         Status post median sternotomy                  - misc.:         Impression:       CONCLUSION:        1. No evidence of an active cardiopulmonary process.                                                Electronically signed by:  IFEOMA Mejia MD  3/6/2018 12:08  PM CST Workstation: [id number]    CT Head Without Contrast [[id number]] Collected:  03/06/18 1157     Updated:  03/06/18 1223    Narrative:       .      EXAMINATION:  Computed Tomography      REGION:  Head             INDICATION:  Speech changes (or aphasia), new or progressive    HISTORY:  CORRELATIVE IMAGING:    none    TECHNIQUE:  iv contrast:  no    This exam was performed according to the departmental  dose-optimization program which includes automated exposure  control, adjustment of the mA and/or kV according to patient size  and/or use of iterative reconstruction technique.              COMMENTS:                - atrophy: none     - cortex:                wnl for age     - deep white mat:  wnl for age    - hemorrhage:       none       - fluid collection: no intra/extra axial fluid collection     - mass / lesion:     no focal parenchymal lesion(s)       - gray/white jxn:   borders preserved         - brain stem:         wnl       - cerebellum:        wnl       - globes / retro:     wnl       - ventricles:          normal size / configuration       - midline shift:      no       - sinuses:              wnl       - mastoids:           wnl        - osseous:             wnl       - misc.:  .       Impression:       CONCLUSION:    1.  Negative examination for acute intracranial pathology.           If signs or symptoms persist beyond reasonable expectations, a  MRI examination is suggested as is deemed clinically appropriate.                     Electronically signed by:  IFEOMA Mejia MD  3/6/2018 12:21  PM CST Workstation: [id number]            Assessment:    Active Hospital Problems (** Indicates Principal Problem)    Diagnosis Date Noted   • Precordial pain [R07.2] 03/06/2018   • Coronary artery disease involving native coronary artery of native heart without angina pectoris [I25.10] 01/10/2017   • S/P CABG (coronary artery bypass graft) [Z95.1] 01/10/2017   • Mixed hyperlipidemia [E78.2] 01/10/2017   • LUCIANA on CPAP [G47.33, Z99.89]    • Hypertension [I10]    • Diabetes mellitus [E11.9]       Resolved Hospital Problems    Diagnosis Date Noted Date Resolved   No resolved problems to display.             Plan:  1. Chest pain r/o ACS: serial cardiac enzymes, EKG prn for chest pain.  Cardiology consult as patient has had continued chest pain since his nuclear stress test in February.  Patient follows with Dr. Kumar as outpatient.  Case discussed with Dr. Kumar who will see patient in AM.   2. Hx CAD: ASA, Imdur, Ranexa.  No statin related to myalgias.   3. HTN: Losartan  4. HLD: No statin ordered as patient has patient has history of muscle aches with statin.   5. Type 2 DM: FSBS  AC and HS with SSI.    6. Hx COPD: Continue Albuterol  7. Hx LUCIANA:   8. Hx DVT/PE on chronic anticoagulation.  Continue Warfarin with pharmacy to dose.   9. Hx CKD stage 3:   10. Hx BPH: continue home dose of Flomax.       Further orders on patient will depend upon hospital course.  Plan of care discussed with the patient and his wife who is at bedside.  Code status confirmed with patient and he wishes to be full code status.            This document has been electronically signed by DIANA Hernandez on March 6, 2018 2:51 PM

## 2018-03-07 PROBLEM — F41.9 ANXIETY: Chronic | Status: ACTIVE | Noted: 2018-03-07

## 2018-03-07 LAB
ANION GAP SERPL CALCULATED.3IONS-SCNC: 13 MMOL/L (ref 5–15)
BASOPHILS # BLD AUTO: 0.01 10*3/MM3 (ref 0–0.2)
BASOPHILS NFR BLD AUTO: 0.1 % (ref 0–2)
BUN BLD-MCNC: 28 MG/DL (ref 7–21)
BUN/CREAT SERPL: 16.8 (ref 7–25)
CALCIUM SPEC-SCNC: 8.8 MG/DL (ref 8.4–10.2)
CHLORIDE SERPL-SCNC: 105 MMOL/L (ref 95–110)
CO2 SERPL-SCNC: 20 MMOL/L (ref 22–31)
CREAT BLD-MCNC: 1.67 MG/DL (ref 0.7–1.3)
DEPRECATED RDW RBC AUTO: 44.3 FL (ref 35.1–43.9)
EOSINOPHIL # BLD AUTO: 0.09 10*3/MM3 (ref 0–0.7)
EOSINOPHIL NFR BLD AUTO: 1 % (ref 0–7)
ERYTHROCYTE [DISTWIDTH] IN BLOOD BY AUTOMATED COUNT: 13.9 % (ref 11.5–14.5)
GFR SERPL CREATININE-BSD FRML MDRD: 42 ML/MIN/1.73 (ref 60–113)
GLUCOSE BLD-MCNC: 155 MG/DL (ref 60–100)
GLUCOSE BLDC GLUCOMTR-MCNC: 100 MG/DL (ref 70–130)
GLUCOSE BLDC GLUCOMTR-MCNC: 158 MG/DL (ref 70–130)
GLUCOSE BLDC GLUCOMTR-MCNC: 161 MG/DL (ref 70–130)
GLUCOSE BLDC GLUCOMTR-MCNC: 163 MG/DL (ref 70–130)
HCT VFR BLD AUTO: 46.7 % (ref 39–49)
HGB BLD-MCNC: 16.7 G/DL (ref 13.7–17.3)
IMM GRANULOCYTES # BLD: 0.07 10*3/MM3 (ref 0–0.02)
IMM GRANULOCYTES NFR BLD: 0.8 % (ref 0–0.5)
INR PPP: 1.64 (ref 0.8–1.2)
LYMPHOCYTES # BLD AUTO: 2.23 10*3/MM3 (ref 0.6–4.2)
LYMPHOCYTES NFR BLD AUTO: 24.5 % (ref 10–50)
MCH RBC QN AUTO: 31.3 PG (ref 26.5–34)
MCHC RBC AUTO-ENTMCNC: 35.8 G/DL (ref 31.5–36.3)
MCV RBC AUTO: 87.6 FL (ref 80–98)
MONOCYTES # BLD AUTO: 1.48 10*3/MM3 (ref 0–0.9)
MONOCYTES NFR BLD AUTO: 16.2 % (ref 0–12)
NEUTROPHILS # BLD AUTO: 5.23 10*3/MM3 (ref 2–8.6)
NEUTROPHILS NFR BLD AUTO: 57.4 % (ref 37–80)
PLATELET # BLD AUTO: 130 10*3/MM3 (ref 150–450)
PMV BLD AUTO: 9.9 FL (ref 8–12)
POTASSIUM BLD-SCNC: 4.1 MMOL/L (ref 3.5–5.1)
PROTHROMBIN TIME: 18.9 SECONDS (ref 11.1–15.3)
RBC # BLD AUTO: 5.33 10*6/MM3 (ref 4.37–5.74)
SODIUM BLD-SCNC: 138 MMOL/L (ref 137–145)
TROPONIN I SERPL-MCNC: <0.012 NG/ML
TROPONIN I SERPL-MCNC: <0.012 NG/ML
WBC NRBC COR # BLD: 9.11 10*3/MM3 (ref 3.2–9.8)

## 2018-03-07 PROCEDURE — 80048 BASIC METABOLIC PNL TOTAL CA: CPT | Performed by: NURSE PRACTITIONER

## 2018-03-07 PROCEDURE — 84484 ASSAY OF TROPONIN QUANT: CPT | Performed by: NURSE PRACTITIONER

## 2018-03-07 PROCEDURE — G0378 HOSPITAL OBSERVATION PER HR: HCPCS

## 2018-03-07 PROCEDURE — 82962 GLUCOSE BLOOD TEST: CPT

## 2018-03-07 PROCEDURE — 85610 PROTHROMBIN TIME: CPT | Performed by: NURSE PRACTITIONER

## 2018-03-07 PROCEDURE — 99219 PR INITIAL OBSERVATION CARE/DAY 50 MINUTES: CPT | Performed by: INTERNAL MEDICINE

## 2018-03-07 PROCEDURE — 85025 COMPLETE CBC W/AUTO DIFF WBC: CPT | Performed by: NURSE PRACTITIONER

## 2018-03-07 PROCEDURE — 63710000001 INSULIN ASPART PER 5 UNITS: Performed by: NURSE PRACTITIONER

## 2018-03-07 RX ORDER — TAMSULOSIN HYDROCHLORIDE 0.4 MG/1
0.4 CAPSULE ORAL NIGHTLY
Status: DISCONTINUED | OUTPATIENT
Start: 2018-03-07 | End: 2018-03-08 | Stop reason: HOSPADM

## 2018-03-07 RX ORDER — RANOLAZINE 500 MG/1
1000 TABLET, EXTENDED RELEASE ORAL EVERY 12 HOURS SCHEDULED
Status: DISCONTINUED | OUTPATIENT
Start: 2018-03-07 | End: 2018-03-08 | Stop reason: HOSPADM

## 2018-03-07 RX ORDER — LOSARTAN POTASSIUM 50 MG/1
50 TABLET ORAL
Status: DISCONTINUED | OUTPATIENT
Start: 2018-03-07 | End: 2018-03-08 | Stop reason: HOSPADM

## 2018-03-07 RX ORDER — CITALOPRAM 20 MG/1
20 TABLET ORAL 2 TIMES DAILY
Status: DISCONTINUED | OUTPATIENT
Start: 2018-03-07 | End: 2018-03-08 | Stop reason: HOSPADM

## 2018-03-07 RX ORDER — WARFARIN SODIUM 3 MG/1
6 TABLET ORAL
Status: DISCONTINUED | OUTPATIENT
Start: 2018-03-08 | End: 2018-03-08 | Stop reason: HOSPADM

## 2018-03-07 RX ORDER — WARFARIN SODIUM 7.5 MG/1
7.5 TABLET ORAL
Status: COMPLETED | OUTPATIENT
Start: 2018-03-07 | End: 2018-03-07

## 2018-03-07 RX ORDER — ASPIRIN 81 MG/1
81 TABLET, CHEWABLE ORAL TAKE AS DIRECTED
Status: DISCONTINUED | OUTPATIENT
Start: 2018-03-07 | End: 2018-03-07

## 2018-03-07 RX ORDER — BUDESONIDE AND FORMOTEROL FUMARATE DIHYDRATE 160; 4.5 UG/1; UG/1
2 AEROSOL RESPIRATORY (INHALATION)
Status: DISCONTINUED | OUTPATIENT
Start: 2018-03-07 | End: 2018-03-08 | Stop reason: HOSPADM

## 2018-03-07 RX ORDER — RISPERIDONE 1 MG/1
1 TABLET ORAL EVERY 12 HOURS SCHEDULED
Status: DISCONTINUED | OUTPATIENT
Start: 2018-03-07 | End: 2018-03-08 | Stop reason: HOSPADM

## 2018-03-07 RX ORDER — CLONAZEPAM 0.5 MG/1
0.5 TABLET ORAL 2 TIMES DAILY
Status: DISCONTINUED | OUTPATIENT
Start: 2018-03-07 | End: 2018-03-08 | Stop reason: HOSPADM

## 2018-03-07 RX ORDER — ISOSORBIDE MONONITRATE 60 MG/1
60 TABLET, EXTENDED RELEASE ORAL
Status: DISCONTINUED | OUTPATIENT
Start: 2018-03-07 | End: 2018-03-08 | Stop reason: HOSPADM

## 2018-03-07 RX ORDER — ALBUTEROL SULFATE 2.5 MG/3ML
2.5 SOLUTION RESPIRATORY (INHALATION) EVERY 6 HOURS PRN
Status: DISCONTINUED | OUTPATIENT
Start: 2018-03-07 | End: 2018-03-08 | Stop reason: HOSPADM

## 2018-03-07 RX ORDER — GLIPIZIDE 10 MG/1
10 TABLET ORAL
Status: DISCONTINUED | OUTPATIENT
Start: 2018-03-07 | End: 2018-03-08 | Stop reason: HOSPADM

## 2018-03-07 RX ADMIN — WARFARIN SODIUM 7.5 MG: 7.5 TABLET ORAL at 18:04

## 2018-03-07 RX ADMIN — LOSARTAN POTASSIUM 50 MG: 50 TABLET, FILM COATED ORAL at 10:27

## 2018-03-07 RX ADMIN — ISOSORBIDE MONONITRATE 60 MG: 60 TABLET, EXTENDED RELEASE ORAL at 10:28

## 2018-03-07 RX ADMIN — GLIPIZIDE 10 MG: 10 TABLET ORAL at 10:27

## 2018-03-07 RX ADMIN — RANOLAZINE 1000 MG: 500 TABLET, FILM COATED, EXTENDED RELEASE ORAL at 22:27

## 2018-03-07 RX ADMIN — INSULIN ASPART 2 UNITS: 100 INJECTION, SOLUTION INTRAVENOUS; SUBCUTANEOUS at 11:12

## 2018-03-07 RX ADMIN — CLONAZEPAM 0.5 MG: 0.5 TABLET ORAL at 20:57

## 2018-03-07 RX ADMIN — TAMSULOSIN HYDROCHLORIDE 0.4 MG: 0.4 CAPSULE ORAL at 22:28

## 2018-03-07 RX ADMIN — CITALOPRAM HYDROBROMIDE 20 MG: 20 TABLET ORAL at 10:27

## 2018-03-07 RX ADMIN — INSULIN ASPART 2 UNITS: 100 INJECTION, SOLUTION INTRAVENOUS; SUBCUTANEOUS at 20:58

## 2018-03-07 RX ADMIN — INSULIN ASPART 2 UNITS: 100 INJECTION, SOLUTION INTRAVENOUS; SUBCUTANEOUS at 07:50

## 2018-03-07 RX ADMIN — RANOLAZINE 1000 MG: 500 TABLET, FILM COATED, EXTENDED RELEASE ORAL at 10:28

## 2018-03-07 RX ADMIN — CITALOPRAM HYDROBROMIDE 20 MG: 20 TABLET ORAL at 20:57

## 2018-03-07 NOTE — PLAN OF CARE
Problem: Patient Care Overview (Adult)  Goal: Plan of Care Review  Outcome: Ongoing (interventions implemented as appropriate)   03/06/18 1823   Coping/Psychosocial Response Interventions   Plan Of Care Reviewed With patient   Patient Care Overview   Progress no change   Outcome Evaluation   Outcome Summary/Follow up Plan initial assessment; new admission to [facility]       Problem: Acute Coronary Syndrome (ACS) (Adult)  Goal: Signs and Symptoms of Listed Potential Problems Will be Absent or Manageable (Acute Coronary Syndrome)  Outcome: Ongoing (interventions implemented as appropriate)      Problem: Fall Risk (Adult)  Goal: Identify Related Risk Factors and Signs and Symptoms  Outcome: Outcome(s) achieved Date Met: 03/06/18    Goal: Absence of Falls  Outcome: Ongoing (interventions implemented as appropriate)      Problem: Pain, Chronic (Adult)  Goal: Identify Related Risk Factors and Signs and Symptoms  Outcome: Outcome(s) achieved Date Met: 03/06/18

## 2018-03-07 NOTE — SIGNIFICANT NOTE
Patient and his wife requesting patient be restarted on Ativan or Klonopin for anxiety.  PCP notes including notes from Phill Matute and Angus that state that Klonopin has not been prescribed as the patient is on opiates related to back pain, and providers were unwilling to prescribe both.  Patient is currently seen by Rigoberto Delgadillo with pain management as well and pain management team is in the process of weaning patient from [place] in order to eventually restart his anxiety meds.  I have explained to patient the plans that are noted in outpatient notes.  I have discussed case with supervising hospitalist, Dr. Posadas, who recommends against benzos and wishes to offer Benadryl or other alternative for anxiety.  Patient unwilling to try anything for anxiety aside from Klonopin or anxiety and has asked to see a doctor for the remainder of his care.  Report given to Dr. Posadas who will assume care.

## 2018-03-07 NOTE — CONSULTS
CARDIOLOGY CONSULTATION NOTE    Referring Provider: Kody Cavanaugh MD/ hospitalist service    Reason for Consultation: Evaluation of dyspnea/chest pressure    Clint Mack  1955  62 y.o. male      HPI   Mr. Mack is a 62-year-old  male who is well-known to me.  He has multiple medical problems and presented to the emergency room with increasing fatigue, dyspnea on exertion and chest pressure over the past couple weeks.  Symptoms have been progressive.  The patient was admitted to [facility] in February this year with similar symptoms and did undergo an extensive workup.  His a past medical history is significant for  Anxiety; Arthritis, CKD (chronic kidney disease), stage III; COPD (chronic obstructive pulmonary disease); Coronary artery disease s/p CABG,  Depression; Diabetes mellitus; DVT (deep venous thrombosis);  History of Yuan Filter; Hyperlipidemia; Hypertension;  LUCIANA on CPAP; and Pulmonary embolism on long-term anticoagulation with Coumadin.  His echocardiogram performed in February of this year showed:  · The study is technically suboptimal for diagnosis. Definity contrasted study.  · Left Ventricle: Left ventricular systolic function is normal. Estimated EF appears to be in the range of 51 - 55%.  · Left ventricular wall thickness is consistent with mild concentric hypertrophy. Left ventricular diastolic function is normal.  · Right Ventricle: Normal right ventricular wall thickness, systolic function and septal motion noted. Right ventricular cavity is borderline dilated.  · No evidence of pulmonary hypertension is present.  · Pericardium: There is a moderate (1-2cm) pericardial effusion adjacent to the right ventricle and adjacent to the right atrium. There is no evidence of tamponade.    He underwent a Lexiscan Cardiolite stress test which showed:  · Findings consistent with an equivocal ECG stress test.  · Left ventricular ejection fraction is normal  (Calculated EF = 68%).  · Myocardial perfusion imaging indicates a small-sized infarct with no significant ischemia noted.  · Small fixed defect noted in the apex and inferior wall. No significant reversible ischemia    Venous Doppler studies of the lower extremities did not reveal any evidence of DVT.  His BNP is within normal limits.    The patient underwent cardiac catheterization in 2013 which showed a tendency LIMA to LAD and patent SVG to distal right coronary artery.  Circumflex coronary artery had no significant disease.  He reports that his symptoms do much better when he takes anxiolytic agents such as Klonopin.  His EKG did not show any acute ST-T wave changes and cardiac enzymes have been negative for myocardial injury.  D dimers have been within normal limits.  He does have CK D with a creatinine of more than 1.6 and is followed by nephrology and his PT and INR are subtherapeutic.    SUBJECTIVE    Past Medical History:   Diagnosis Date   • Anxiety    • Arthritis     osteoarthritis   • CKD (chronic kidney disease), stage III    • COPD (chronic obstructive pulmonary disease)    • Coronary artery disease    • Depression    • Diabetes mellitus    • DVT (deep venous thrombosis)    • Heart disease    • History of embolic filter insertion    • Hyperlipidemia    • Hypertension    • Injury of back     back surgery x3    • LUCIANA on CPAP    • Pulmonary embolism          Past Surgical History:   Procedure Laterality Date   • BACK SURGERY     • CARDIAC SURGERY  2001   • CIRCUMCISION     • GALLBLADDER SURGERY  2000   • JOINT REPLACEMENT Right 05/02/2016    hip   • SPINE SURGERY           Family History   Problem Relation Age of Onset   • Heart disease Father    • Hypertension Father    • Stroke Father    • Diabetes Sister    • Heart disease Brother    • Hypertension Brother    • COPD Brother          Social History     Social History   • Marital status:      Spouse name: N/A   • Number of children: N/A   • Years  of education: N/A     Occupational History   • Not on file.     Social History Main Topics   • Smoking status: Never Smoker   • Smokeless tobacco: Never Used   • Alcohol use No   • Drug use: No   • Sexual activity: Defer     Other Topics Concern   • Not on file     Social History Narrative   • No narrative on file         Allergies   Allergen Reactions   • Atorvastatin Other (See Comments)     Aches and pains all over   • Crestor [Rosuvastatin Calcium]    • Lisinopril Other (See Comments)     cough  Constant cough   • Lyrica [Pregabalin] Swelling   • Rosuvastatin Other (See Comments)     Aches and pains all over         Current Facility-Administered Medications   Medication Dose Route Frequency Provider Last Rate Last Dose   • acetaminophen (TYLENOL) tablet 650 mg  650 mg Oral Q4H PRN DIANA Hernandez       • albuterol (PROVENTIL) nebulizer solution 0.083% 2.5 mg/3mL  2.5 mg Nebulization Q6H PRN DIANA Hernandez       • budesonide-formoterol (SYMBICORT) 160-4.5 MCG/ACT inhaler 2 puff  2 puff Inhalation BID - RT DIANA Hernandez       • citalopram (CeleXA) tablet 20 mg  20 mg Oral BID DIANA Hernandez   20 mg at 03/07/18 1027   • dextrose (D50W) solution 25 g  25 g Intravenous Q15 Min PRN DIANA Hernandez       • dextrose (GLUTOSE) oral gel 15 g  15 g Oral Q15 Min PRN DIANA Hernandez       • glipiZIDE (GLUCOTROL) tablet 10 mg  10 mg Oral QAM AC DIANA Hernandez   10 mg at 03/07/18 1027   • glucagon (human recombinant) (GLUCAGEN DIAGNOSTIC) injection 1 mg  1 mg Subcutaneous Q15 Min PRN DIANA Hernandez       • insulin aspart (novoLOG) injection 0-9 Units  0-9 Units Subcutaneous 4x Daily AC & at Bedtime DIANA Hernandez   2 Units at 03/07/18 1112   • isosorbide mononitrate (IMDUR) 24 hr tablet 60 mg  60 mg Oral Q24H Keke Kumar MD   60 mg at 03/07/18 1028   • losartan (COZAAR) tablet 50 mg  50 mg Oral Q24H DIANA Hernandez   50 mg at 03/07/18 1027   • ondansetron (ZOFRAN)  "tablet 4 mg  4 mg Oral Q6H PRN DIANA Hernandez        Or   • ondansetron ODT (ZOFRAN-ODT) disintegrating tablet 4 mg  4 mg Oral Q6H PRN DIANA Hernandez        Or   • ondansetron (ZOFRAN) injection 4 mg  4 mg Intravenous Q6H PRN DIANA Hernandez       • Pharmacy to dose warfarin   Does not apply Continuous PRN DIANA Hernandez       • ranolazine (RANEXA) 12 hr tablet 1,000 mg  1,000 mg Oral Q12H Keke Kumar MD   1,000 mg at 03/07/18 1028   • risperiDONE (risperDAL) tablet 1 mg  1 mg Oral Q12H DIANA Hernandez       • sodium chloride 0.9 % flush 1-10 mL  1-10 mL Intravenous PRN DIANA Hernandez       • sodium chloride 0.9 % flush 1-10 mL  1-10 mL Intravenous PRN DIANA Hernandez       • tamsulosin (FLOMAX) 24 hr capsule 0.4 mg  0.4 mg Oral Nightly DIANA Hernandez       • warfarin (COUMADIN) tablet 7.5 mg  7.5 mg Oral Once Milo Trevino MD        Followed by   • [START ON 3/8/2018] warfarin (COUMADIN) tablet 6 mg  6 mg Oral Daily Milo Trevino MD             OBJECTIVE    /74 (BP Location: Right arm, Patient Position: Lying)  Pulse 111  Temp 97.6 °F (36.4 °C) (Temporal Artery )   Resp 18  Ht 180.3 cm (70.98\")  Wt 120 kg (264 lb 8.8 oz)  SpO2 92%  BMI 36.91 kg/m2      Review of Systems     Constitutional:  Denies recent weight loss, weight gain, fever or chills. Has chronic fatigue     HENT:  Denies any hearing loss, epistaxis, hoarseness, or difficulty speaking.     Eyes: Wears eyeglasses or contact lenses     Respiratory:  Dyspnea with exertion,no cough, wheezing, or hemoptysis.     Cardiovascular: See HPI    Gastrointestinal:  Denies change in bowel habits, dyspepsia, ulcer disease, hematochezia, or melena.     Endocrine: Negative for cold intolerance, heat intolerance, polydipsia, polyphagia and polyuria.    Genitourinary: Negative.      Musculoskeletal: DJD, chronic pain syndrome    Skin:  Denies any change in hair or nails, rashes, or skin lesions. "     Allergic/Immunologic: Negative.  Negative for environmental allergies, food allergies and immunocompromised state.     Neurological:  Denies any history of recurrent headaches, strokes, TIA, or seizure disorder.       Physical Exam     Constitutional: Cooperative, alert and oriented,  in no acute distress. Obese    HENT:   Head: Normocephalic, normal hair patterns, no masses or tenderness.  Ears, Nose, and Throat: No gross abnormalities. No pallor or cyanosis.   Eyes: EOMS intact, PERRL, conjunctivae and lids unremarkable. Fundoscopic exam and visual fields not performed.   Neck: No palpable masses or adenopathy, no thyromegaly, no JVD, carotid pulses are full and equal bilaterally and without  Bruits.     Cardiovascular: Regular rhythm, S1 and S2 normal, no S3 or S4.  No murmurs, gallops, or rubs detected.     Pulmonary/Chest: Chest: Increased AP diameter of the chest , normal respiratory excursion,  no use of accessory muscles.            Pulmonary: Normal breath sounds. No rales or ronchi.    Abdominal: Abdomen soft, bowel sounds normoactive, no masses,  non-tender, no bruits.     Musculoskeletal: No deformities, clubbing, cyanosis, erythema, or edema observed.     Neurological: No gross motor or sensory deficits noted, Cranial nerves 2-12 normal. affect appropriate, oriented to time, person, place.     Skin: Warm and dry to the touch, no apparent skin lesions or masses noted.     Psychiatric: Anxious    RESULTS  Lab Results (last 24 hours)     Procedure Component Value Units Date/Time    POC Glucose Once [[id number]]  (Abnormal) Collected:  03/06/18 1709    Specimen:  Blood Updated:  03/06/18 1721     Glucose 136 (H) mg/dL       Meter: [id number]: [id number] MARCO DANIS       Troponin [[id number]]  (Normal) Collected:  03/06/18 1840    Specimen:  Blood Updated:  03/06/18 1927     Troponin I <0.012 ng/mL     POC Glucose Once [[id number]]  (Abnormal) Collected:  03/06/18 2046    Specimen:  Blood  Updated:  03/06/18 2116     Glucose 224 (H) mg/dL       RN NotifiedMeter: [id number]: [id number] THEO JOHNSON       Troponin [[id number]]  (Normal) Collected:  03/07/18 0041    Specimen:  Blood Updated:  03/07/18 0123     Troponin I <0.012 ng/mL     CBC & Differential [[id number]] Collected:  03/07/18 0534    Specimen:  Blood Updated:  03/07/18 0607    Narrative:       The following orders were created for panel order CBC & Differential.  Procedure                               Abnormality         Status                     ---------                               -----------         ------                     CBC Auto Differential[[id number]]        Abnormal            Final result                 Please view results for these tests on the individual orders.    CBC Auto Differential [[id number]]  (Abnormal) Collected:  03/07/18 0534    Specimen:  Blood Updated:  03/07/18 0607     WBC 9.11 10*3/mm3      RBC 5.33 10*6/mm3      Hemoglobin 16.7 g/dL      Hematocrit 46.7 %      MCV 87.6 fL      MCH 31.3 pg      MCHC 35.8 g/dL      RDW 13.9 %      RDW-SD 44.3 (H) fl      MPV 9.9 fL      Platelets 130 (L) 10*3/mm3      Neutrophil % 57.4 %      Lymphocyte % 24.5 %      Monocyte % 16.2 (H) %      Eosinophil % 1.0 %      Basophil % 0.1 %      Immature Grans % 0.8 (H) %      Neutrophils, Absolute 5.23 10*3/mm3      Lymphocytes, Absolute 2.23 10*3/mm3      Monocytes, Absolute 1.48 (H) 10*3/mm3      Eosinophils, Absolute 0.09 10*3/mm3      Basophils, Absolute 0.01 10*3/mm3      Immature Grans, Absolute 0.07 (H) 10*3/mm3     Basic Metabolic Panel [[id number]]  (Abnormal) Collected:  03/07/18 0534    Specimen:  Blood Updated:  03/07/18 0626     Glucose 155 (H) mg/dL      BUN 28 (H) mg/dL      Creatinine 1.67 (H) mg/dL      Sodium 138 mmol/L      Potassium 4.1 mmol/L      Chloride 105 mmol/L      CO2 20.0 (L) mmol/L      Calcium 8.8 mg/dL      eGFR Non African Amer 42 (L) mL/min/1.73      BUN/Creatinine Ratio 16.8     Anion  Gap 13.0 mmol/L     Troponin [[id number]]  (Normal) Collected:  03/07/18 0534    Specimen:  Blood Updated:  03/07/18 0629     Troponin I <0.012 ng/mL     POC Glucose Once [[id number]]  (Abnormal) Collected:  03/07/18 0617    Specimen:  Blood Updated:  03/07/18 0634     Glucose 158 (H) mg/dL       RN NotifiedMeter: [id number]: [id number] THEO JOHNSON       Protime-INR [[id number]]  (Abnormal) Collected:  03/07/18 0534    Specimen:  Blood Updated:  03/07/18 0639     Protime 18.9 (H) Seconds      INR 1.64 (H)    Narrative:       Therapeutic range for most indications is 2.0-3.0 INR,  or 2.5-3.5 for mechanical heart valves.    POC Glucose Once [[id number]]  (Abnormal) Collected:  03/07/18 1051    Specimen:  Blood Updated:  03/07/18 1110     Glucose 163 (H) mg/dL       RN NotifiedMeter: [id number]: [id number] PAUL SMALLS                 ASSESSMENT AND PLAN        Mr. Mack has multiple medical problems as described in the history of present illness and his dyspnea is clearly multifactorial.  He has no objective evidence of myocardial injury and suspicion for ongoing ischemia is low.  Noninvasive testing including nuclear stress test have been negative recently.  He has CK D which clearly will pose increase risks, and invasive testing is considered.  I've explained all this to the patient and family in detail.  He has no clinical evidence of congestive heart failure and his BNP is within normal limits.  I would suggest increasing the dose of Imdur to 60 mg daily and Ranexa at 1 g twice a day.  He has had significant intolerance to statins and was not able to take Praluent.  He may have a component of Parkinson's disease.  He may benefit from a pulmonology consultation.  Thank you for asking me to see this patient.     Keke Kumar MD  3/7/2018  3:20 PM

## 2018-03-07 NOTE — PROGRESS NOTES
"Anticoagulation by Pharmacy - Warfarin    Clint Mack is a 62 y.o.male  [Ht: 180.3 cm (70.98\"); Wt: 120 kg (264 lb 8.8 oz)] on Warfarin 5 mg PO  for indication of DVT/PE history.    Goal INR: 2-3  Today's INR:   Lab Results   Component Value Date    INR 1.64 (H) 03/07/2018         Lab Results   Component Value Date    INR 1.64 (H) 03/07/2018    INR 1.63 (H) 03/06/2018    INR 3.11 (H) 02/28/2018    PROTIME 18.9 (H) 03/07/2018    PROTIME 18.8 (H) 03/06/2018    PROTIME 32.5 (H) 02/28/2018     Lab Results   Component Value Date    HGB 16.7 03/07/2018    HGB 16.7 03/06/2018    HGB 15.4 02/28/2018     Lab Results   Component Value Date    HCT 46.7 03/07/2018    HCT 46.1 03/06/2018    HCT 44.0 02/28/2018     Assessment/Plan:  Will give patient 7.5 mg tonight and then 6 mg nightly as this will give him about the same weekly dose as he is taking at home.  Patient currently not taking any interacting medications.      Mat Perez III, [facility]  03/07/18 1:27 PM     "

## 2018-03-07 NOTE — PLAN OF CARE
Problem: Patient Care Overview (Adult)  Goal: Plan of Care Review  Outcome: Ongoing (interventions implemented as appropriate)   03/07/18 6084   Coping/Psychosocial Response Interventions   Plan Of Care Reviewed With patient;spouse   Patient Care Overview   Progress progress toward functional goals as expected

## 2018-03-07 NOTE — PLAN OF CARE
Problem: Patient Care Overview (Adult)  Goal: Plan of Care Review  Outcome: Ongoing (interventions implemented as appropriate)  Pt declined Wt Loss diet ed and info indicating he had received ed previously and had info.   03/07/18 1244   Coping/Psychosocial Response Interventions   Plan Of Care Reviewed With patient   Patient Care Overview   Progress no change   Outcome Evaluation   Outcome Summary/Follow up Plan initial visit

## 2018-03-07 NOTE — CONSULTS
"Adult Nutrition  Assessment    Patient Name:  Clint Mack  YOB: 1955  MRN: [id number]  Admit Date:  3/6/2018    Assessment Date:  3/7/2018    Comments:  BMI 36 with pt at 151% IBW.  Pt indicated he had received Wt Loss Diet Ed previously and had info.  Declined Wt Loss Diet ed and info.          Reason for Assessment       03/07/18 1218    Reason for Assessment    Reason For Assessment/Visit education   WT Loss Diet    Identified At Risk By Screening Criteria need for education;diagnosis   dx Obesity                  Anthropometrics       03/07/18 1223    Anthropometrics    Height 180.3 cm (70.98\")    Weight 120 kg (264 lb 8.8 oz)    Ideal Body Weight (IBW)    Ideal Body Weight (IBW), Male (kg) 79.23    % Ideal Body Weight 151.78    Body Mass Index (BMI)    BMI (kg/m2) 36.99    BMI Grade 35 - 39.9 - obesity - grade II                Estimated/Assessed Needs       03/07/18 1223    Calculation Measurements    Weight Used For Calculations 88.6 kg (195 lb 5.2 oz)    Height Used for Calculations 1.803 m (5' 10.98\")    Estimated/Assessed Energy Needs    Energy Need Method [place] Jeor    Age 62    RMR ([place]-St[place] Jeor Equation) 1707.87    Activity Factors ([place] St Jeor)  Out of bed, ambulatory  1.3    Total estimated needs ([place] St. Jeor) 2220    Estimated/Assessed Protein Needs    Weight Used for Protein Calculation 88.6 kg (195 lb 5.2 oz)    Protein (gm/kg) 1.2    1.2 Gm Protein (gm) 106.32    Estimated Protein Range 88 - 106    Estimated/Assessed Fluid Needs    Fluid Need Method --   2658                Electronically signed by:  Rhoda Rodriges RD  03/07/18 12:41 PM  "

## 2018-03-07 NOTE — PROGRESS NOTES
"MEDICINE DAILY PROGRESS NOTE  NAME: Clint Mack  : 1955  MRN: [id number]     LOS: 0 days     PROVIDER OF SERVICE: Jack Blackwell MD    Chief Complaint: Precordial pain    Subjective:     Interval History:  History taken from: patient chart family RN  3/7. Patient very anxious at time of exam.  Nasal cannula in place.    Review of Systems:   Review of Systems   Constitutional: Positive for fatigue. Negative for activity change, appetite change and fever.   HENT: Negative for ear pain and sore throat.    Eyes: Negative for pain and visual disturbance.   Respiratory: Negative for cough and shortness of breath.    Cardiovascular: Negative for chest pain and palpitations.   Gastrointestinal: Negative for abdominal pain and nausea.   Endocrine: Negative for cold intolerance and heat intolerance.   Genitourinary: Negative for difficulty urinating and dysuria.   Musculoskeletal: Positive for arthralgias and back pain. Negative for gait problem.   Skin: Negative for color change and rash.   Neurological: Negative for dizziness, weakness and headaches.   Hematological: Negative for adenopathy. Does not bruise/bleed easily.   Psychiatric/Behavioral: Negative for agitation, confusion and sleep disturbance. The patient is nervous/anxious.        Objective:     Vital Signs  Vitals:    18 1223 18 1259 18 1557 18 1624   BP:  118/74  116/71   BP Location:  Right arm  Right arm   Patient Position:  Lying  Lying   Pulse:  111 99 90   Resp:  18  18   Temp:  97.6 °F (36.4 °C)  97.3 °F (36.3 °C)   TempSrc:  Temporal Artery   Temporal Artery    SpO2:  92%  91%   Weight: 120 kg (264 lb 8.8 oz)      Height: 180.3 cm (70.98\")          Physical Exam  Physical Exam   Constitutional: He is oriented to person, place, and time. He appears well-developed and well-nourished. No distress.   HENT:   Head: Normocephalic and atraumatic.   Right Ear: External ear normal.   Left Ear: External ear normal.   Nose: " Nose normal.   Eyes: Conjunctivae and EOM are normal. Pupils are equal, round, and reactive to light.   Neck: Normal range of motion. Neck supple.   Cardiovascular: Normal rate, regular rhythm, normal heart sounds and intact distal pulses.  Exam reveals no gallop and no friction rub.    No murmur heard.  Pulmonary/Chest: Effort normal and breath sounds normal. No respiratory distress. He has no wheezes. He has no rales. He exhibits no tenderness.   Abdominal: Soft. Bowel sounds are normal. He exhibits no distension and no mass. There is no tenderness. There is no rebound and no guarding.   Musculoskeletal: Normal range of motion.   Neurological: He is alert and oriented to person, place, and time.   Left hand tremor   Skin: Skin is warm and dry. No rash noted. He is not diaphoretic. No erythema. No pallor.   Psychiatric: His behavior is normal. His mood appears anxious.   Nursing note and vitals reviewed.      Medication Review    Current Facility-Administered Medications:   •  acetaminophen (TYLENOL) tablet 650 mg, 650 mg, Oral, Q4H PRN, DIANA Hernandez  •  albuterol (PROVENTIL) nebulizer solution 0.083% 2.5 mg/3mL, 2.5 mg, Nebulization, Q6H PRN, DIANA Hernandez  •  budesonide-formoterol (SYMBICORT) 160-4.5 MCG/ACT inhaler 2 puff, 2 puff, Inhalation, BID - RT, DIANA Hernandez  •  citalopram (CeleXA) tablet 20 mg, 20 mg, Oral, BID, DIANA Hernandez, 20 mg at 03/07/18 1027  •  clonazePAM (KlonoPIN) tablet 0.5 mg, 0.5 mg, Oral, BID, Jack Blackwell MD  •  dextrose (D50W) solution 25 g, 25 g, Intravenous, Q15 Min PRN, DIANA Hernandez  •  dextrose (GLUTOSE) oral gel 15 g, 15 g, Oral, Q15 Min PRN, DIANA Hernandez  •  glipiZIDE (GLUCOTROL) tablet 10 mg, 10 mg, Oral, QAM AC, DIANA Hernandez, 10 mg at 03/07/18 1027  •  glucagon (human recombinant) (GLUCAGEN DIAGNOSTIC) injection 1 mg, 1 mg, Subcutaneous, Q15 Min PRN, DIANA Hernandez  •  insulin aspart (novoLOG) injection 0-9 Units, 0-9 Units,  Subcutaneous, 4x Daily AC & at Bedtime, DIANA Hernandez, 2 Units at 03/07/18 1112  •  isosorbide mononitrate (IMDUR) 24 hr tablet 60 mg, 60 mg, Oral, Q24H, Keke Kumar MD, 60 mg at 03/07/18 1028  •  losartan (COZAAR) tablet 50 mg, 50 mg, Oral, Q24H, DIANA Hernandez, 50 mg at 03/07/18 1027  •  ondansetron (ZOFRAN) tablet 4 mg, 4 mg, Oral, Q6H PRN **OR** ondansetron ODT (ZOFRAN-ODT) disintegrating tablet 4 mg, 4 mg, Oral, Q6H PRN **OR** ondansetron (ZOFRAN) injection 4 mg, 4 mg, Intravenous, Q6H PRN, DIANA Hernandez  •  Pharmacy to dose warfarin, , Does not apply, Continuous PRN, DIANA Hernandez  •  ranolazine (RANEXA) 12 hr tablet 1,000 mg, 1,000 mg, Oral, Q12H, Keke Kumar MD, 1,000 mg at 03/07/18 1028  •  risperiDONE (risperDAL) tablet 1 mg, 1 mg, Oral, Q12H, DIANA Hernandez  •  sodium chloride 0.9 % flush 1-10 mL, 1-10 mL, Intravenous, PRN, DIANA Hernandez  •  sodium chloride 0.9 % flush 1-10 mL, 1-10 mL, Intravenous, PRN, DIANA Hernandez  •  tamsulosin (FLOMAX) 24 hr capsule 0.4 mg, 0.4 mg, Oral, Nightly, DIANA Hernandez  •  warfarin (COUMADIN) tablet 7.5 mg, 7.5 mg, Oral, Once **FOLLOWED BY** [START ON 3/8/2018] warfarin (COUMADIN) tablet 6 mg, 6 mg, Oral, Daily, Milo Trevino MD     Diagnostic Data    Lab Results (last 24 hours)     Procedure Component Value Units Date/Time    POC Glucose Once [[id number]]  (Abnormal) Collected:  03/06/18 1709    Specimen:  Blood Updated:  03/06/18 1721     Glucose 136 (H) mg/dL       Meter: [id number]: [id number] MARCO POTTERDANIS       Troponin [[id number]]  (Normal) Collected:  03/06/18 1840    Specimen:  Blood Updated:  03/06/18 1927     Troponin I <0.012 ng/mL     POC Glucose Once [[id number]]  (Abnormal) Collected:  03/06/18 2046    Specimen:  Blood Updated:  03/06/18 2116     Glucose 224 (H) mg/dL       RN NotifiedMeter: [id number]: [id number] THEO JOHNSON       Troponin [[id number]]  (Normal)  Collected:  03/07/18 0041    Specimen:  Blood Updated:  03/07/18 0123     Troponin I <0.012 ng/mL     CBC & Differential [[id number]] Collected:  03/07/18 0534    Specimen:  Blood Updated:  03/07/18 0607    Narrative:       The following orders were created for panel order CBC & Differential.  Procedure                               Abnormality         Status                     ---------                               -----------         ------                     CBC Auto Differential[[id number]]        Abnormal            Final result                 Please view results for these tests on the individual orders.    CBC Auto Differential [[id number]]  (Abnormal) Collected:  03/07/18 0534    Specimen:  Blood Updated:  03/07/18 0607     WBC 9.11 10*3/mm3      RBC 5.33 10*6/mm3      Hemoglobin 16.7 g/dL      Hematocrit 46.7 %      MCV 87.6 fL      MCH 31.3 pg      MCHC 35.8 g/dL      RDW 13.9 %      RDW-SD 44.3 (H) fl      MPV 9.9 fL      Platelets 130 (L) 10*3/mm3      Neutrophil % 57.4 %      Lymphocyte % 24.5 %      Monocyte % 16.2 (H) %      Eosinophil % 1.0 %      Basophil % 0.1 %      Immature Grans % 0.8 (H) %      Neutrophils, Absolute 5.23 10*3/mm3      Lymphocytes, Absolute 2.23 10*3/mm3      Monocytes, Absolute 1.48 (H) 10*3/mm3      Eosinophils, Absolute 0.09 10*3/mm3      Basophils, Absolute 0.01 10*3/mm3      Immature Grans, Absolute 0.07 (H) 10*3/mm3     Basic Metabolic Panel [[id number]]  (Abnormal) Collected:  03/07/18 0534    Specimen:  Blood Updated:  03/07/18 0626     Glucose 155 (H) mg/dL      BUN 28 (H) mg/dL      Creatinine 1.67 (H) mg/dL      Sodium 138 mmol/L      Potassium 4.1 mmol/L      Chloride 105 mmol/L      CO2 20.0 (L) mmol/L      Calcium 8.8 mg/dL      eGFR Non African Amer 42 (L) mL/min/1.73      BUN/Creatinine Ratio 16.8     Anion Gap 13.0 mmol/L     Troponin [[id number]]  (Normal) Collected:  03/07/18 0534    Specimen:  Blood Updated:  03/07/18 0629     Troponin I <0.012 ng/mL     POC  Glucose Once [[id number]]  (Abnormal) Collected:  03/07/18 0617    Specimen:  Blood Updated:  03/07/18 0634     Glucose 158 (H) mg/dL       RN NotifiedMeter: [id number]: [id number] THEO JOHNSON       Protime-INR [[id number]]  (Abnormal) Collected:  03/07/18 0534    Specimen:  Blood Updated:  03/07/18 0639     Protime 18.9 (H) Seconds      INR 1.64 (H)    Narrative:       Therapeutic range for most indications is 2.0-3.0 INR,  or 2.5-3.5 for mechanical heart valves.    POC Glucose Once [[id number]]  (Abnormal) Collected:  03/07/18 1051    Specimen:  Blood Updated:  03/07/18 1110     Glucose 163 (H) mg/dL       RN NotifiedMeter: [id number]: [id number] BARRYMARLEN SLIM             I reviewed the patient's new clinical results.    Assessment/Plan:     Active Hospital Problems (** Indicates Principal Problem)    Diagnosis Date Noted   • **Precordial pain [R07.2] 03/06/2018   • Anxiety [F41.9] 03/07/2018   • Coronary artery disease involving native coronary artery of native heart without angina pectoris [I25.10] 01/10/2017   • S/P CABG (coronary artery bypass graft) [Z95.1] 01/10/2017   • Mixed hyperlipidemia [E78.2] 01/10/2017   • LUCIANA on CPAP [G47.33, Z99.89]    • Hypertension [I10]    • Diabetes mellitus [E11.9]       Resolved Hospital Problems    Diagnosis Date Noted Date Resolved   No resolved problems to display.     Had lengthy discussion with patient today.  He is very anxious and was upset about not getting Klonopin or ativan.  Per patient's notes he has been on Norco and tapered off his klonopin's.  He desires greatly to stop norco and get back on his anxiety medications.  I discussed this with his PCP, Dr. Greco, who was agreeable to see patient and potentially prescribe his anxiolytics if he is off the Norcos.  Talked with patient again and started him on clonazepam with the understanding that he would have to completely discontinue his norco to get that prescription outpatient.    1. Chest  pain. Cards following. [facility] management [facility] [place].  2. Anxiety. Clonazepam.  3. COPD. Nebs. Oxygen.  4. LUCIANA. CPAP  5. DM. SSI  6. H/o PE/DVT. Coumadin  7. CKD. Stable.    Will monitor patient's hospital course and adjust treatment as hospital course dictates.    DVT prophylaxis: Coumadin  Code status is Full Code    Plan for disposition:Where: home and When:  1-2days      Time:           This document has been electronically signed by Jack Blackwell MD on March 7, 2018 4:38 PM

## 2018-03-08 VITALS
DIASTOLIC BLOOD PRESSURE: 75 MMHG | HEIGHT: 71 IN | TEMPERATURE: 97.4 F | OXYGEN SATURATION: 98 % | HEART RATE: 74 BPM | RESPIRATION RATE: 18 BRPM | SYSTOLIC BLOOD PRESSURE: 138 MMHG | BODY MASS INDEX: 37.24 KG/M2 | WEIGHT: 266 LBS

## 2018-03-08 PROBLEM — N18.30 CKD (CHRONIC KIDNEY DISEASE) STAGE 3, GFR 30-59 ML/MIN: Chronic | Status: ACTIVE | Noted: 2018-03-08

## 2018-03-08 PROBLEM — E66.01 MORBID OBESITY: Chronic | Status: ACTIVE | Noted: 2018-03-08

## 2018-03-08 PROBLEM — G89.29 CHRONIC PAIN: Chronic | Status: ACTIVE | Noted: 2018-03-08

## 2018-03-08 PROBLEM — E78.2 MIXED HYPERLIPIDEMIA: Chronic | Status: ACTIVE | Noted: 2017-01-10

## 2018-03-08 PROBLEM — R07.9 CHEST PAIN: Status: ACTIVE | Noted: 2018-03-06

## 2018-03-08 PROBLEM — I25.10 CAD (CORONARY ARTERY DISEASE): Chronic | Status: ACTIVE | Noted: 2017-01-10

## 2018-03-08 LAB
ANION GAP SERPL CALCULATED.3IONS-SCNC: 11 MMOL/L (ref 5–15)
BASOPHILS # BLD AUTO: 0.01 10*3/MM3 (ref 0–0.2)
BASOPHILS NFR BLD AUTO: 0.1 % (ref 0–2)
BUN BLD-MCNC: 23 MG/DL (ref 7–21)
BUN/CREAT SERPL: 15.9 (ref 7–25)
CALCIUM SPEC-SCNC: 8.3 MG/DL (ref 8.4–10.2)
CHLORIDE SERPL-SCNC: 104 MMOL/L (ref 95–110)
CO2 SERPL-SCNC: 20 MMOL/L (ref 22–31)
CREAT BLD-MCNC: 1.45 MG/DL (ref 0.7–1.3)
DEPRECATED RDW RBC AUTO: 45 FL (ref 35.1–43.9)
EOSINOPHIL # BLD AUTO: 0.1 10*3/MM3 (ref 0–0.7)
EOSINOPHIL NFR BLD AUTO: 1.1 % (ref 0–7)
ERYTHROCYTE [DISTWIDTH] IN BLOOD BY AUTOMATED COUNT: 13.9 % (ref 11.5–14.5)
GFR SERPL CREATININE-BSD FRML MDRD: 49 ML/MIN/1.73 (ref 49–113)
GLUCOSE BLD-MCNC: 171 MG/DL (ref 60–100)
GLUCOSE BLDC GLUCOMTR-MCNC: 152 MG/DL (ref 70–130)
GLUCOSE BLDC GLUCOMTR-MCNC: 200 MG/DL (ref 70–130)
HCT VFR BLD AUTO: 45.9 % (ref 39–49)
HGB BLD-MCNC: 16.5 G/DL (ref 13.7–17.3)
IMM GRANULOCYTES # BLD: 0.06 10*3/MM3 (ref 0–0.02)
IMM GRANULOCYTES NFR BLD: 0.7 % (ref 0–0.5)
INR PPP: 1.75 (ref 0.8–1.2)
LYMPHOCYTES # BLD AUTO: 2.16 10*3/MM3 (ref 0.6–4.2)
LYMPHOCYTES NFR BLD AUTO: 24.2 % (ref 10–50)
MCH RBC QN AUTO: 31.7 PG (ref 26.5–34)
MCHC RBC AUTO-ENTMCNC: 35.9 G/DL (ref 31.5–36.3)
MCV RBC AUTO: 88.1 FL (ref 80–98)
MONOCYTES # BLD AUTO: 1.21 10*3/MM3 (ref 0–0.9)
MONOCYTES NFR BLD AUTO: 13.6 % (ref 0–12)
NEUTROPHILS # BLD AUTO: 5.38 10*3/MM3 (ref 2–8.6)
NEUTROPHILS NFR BLD AUTO: 60.3 % (ref 37–80)
NRBC BLD MANUAL-RTO: 0 /100 WBC (ref 0–0)
PLATELET # BLD AUTO: 137 10*3/MM3 (ref 150–450)
PMV BLD AUTO: 9.4 FL (ref 8–12)
POTASSIUM BLD-SCNC: 4.4 MMOL/L (ref 3.5–5.1)
PROTHROMBIN TIME: 19.8 SECONDS (ref 11.1–15.3)
RBC # BLD AUTO: 5.21 10*6/MM3 (ref 4.37–5.74)
SODIUM BLD-SCNC: 135 MMOL/L (ref 137–145)
WBC NRBC COR # BLD: 8.92 10*3/MM3 (ref 3.2–9.8)

## 2018-03-08 PROCEDURE — G0378 HOSPITAL OBSERVATION PER HR: HCPCS

## 2018-03-08 PROCEDURE — 85610 PROTHROMBIN TIME: CPT | Performed by: NURSE PRACTITIONER

## 2018-03-08 PROCEDURE — 63710000001 INSULIN ASPART PER 5 UNITS: Performed by: NURSE PRACTITIONER

## 2018-03-08 PROCEDURE — 80048 BASIC METABOLIC PNL TOTAL CA: CPT | Performed by: NURSE PRACTITIONER

## 2018-03-08 PROCEDURE — 82962 GLUCOSE BLOOD TEST: CPT

## 2018-03-08 PROCEDURE — 85025 COMPLETE CBC W/AUTO DIFF WBC: CPT | Performed by: NURSE PRACTITIONER

## 2018-03-08 RX ORDER — CLONAZEPAM 1 MG/1
1 TABLET ORAL 3 TIMES DAILY PRN
Qty: 21 TABLET | Refills: 0 | Status: SHIPPED | OUTPATIENT
Start: 2018-03-08 | End: 2018-12-20 | Stop reason: SDUPTHER

## 2018-03-08 RX ORDER — ISOSORBIDE MONONITRATE 60 MG/1
60 TABLET, EXTENDED RELEASE ORAL
Qty: 30 TABLET | Refills: 1 | Status: SHIPPED | OUTPATIENT
Start: 2018-03-08 | End: 2021-02-05 | Stop reason: DRUGHIGH

## 2018-03-08 RX ORDER — ACETAMINOPHEN 325 MG/1
650 TABLET ORAL EVERY 4 HOURS PRN
Start: 2018-03-08 | End: 2018-10-02 | Stop reason: ALTCHOICE

## 2018-03-08 RX ORDER — ONDANSETRON 4 MG/1
4 TABLET, FILM COATED ORAL EVERY 8 HOURS PRN
Qty: 10 TABLET | Refills: 1 | Status: SHIPPED | OUTPATIENT
Start: 2018-03-08 | End: 2018-10-02 | Stop reason: ALTCHOICE

## 2018-03-08 RX ORDER — RANOLAZINE 1000 MG/1
1000 TABLET, EXTENDED RELEASE ORAL EVERY 12 HOURS SCHEDULED
Qty: 60 TABLET | Refills: 1 | Status: SHIPPED | OUTPATIENT
Start: 2018-03-08 | End: 2019-03-27

## 2018-03-08 RX ADMIN — ISOSORBIDE MONONITRATE 60 MG: 60 TABLET, EXTENDED RELEASE ORAL at 09:56

## 2018-03-08 RX ADMIN — CITALOPRAM HYDROBROMIDE 20 MG: 20 TABLET ORAL at 09:56

## 2018-03-08 RX ADMIN — RANOLAZINE 1000 MG: 500 TABLET, FILM COATED, EXTENDED RELEASE ORAL at 09:57

## 2018-03-08 RX ADMIN — LOSARTAN POTASSIUM 50 MG: 50 TABLET, FILM COATED ORAL at 09:56

## 2018-03-08 RX ADMIN — INSULIN ASPART 4 UNITS: 100 INJECTION, SOLUTION INTRAVENOUS; SUBCUTANEOUS at 11:05

## 2018-03-08 RX ADMIN — CLONAZEPAM 0.5 MG: 0.5 TABLET ORAL at 09:56

## 2018-03-08 RX ADMIN — GLIPIZIDE 10 MG: 10 TABLET ORAL at 09:56

## 2018-03-08 NOTE — PLAN OF CARE
Problem: Patient Care Overview (Adult)  Goal: Plan of Care Review  Outcome: Ongoing (interventions implemented as appropriate)   03/08/18 0348   Coping/Psychosocial Response Interventions   Plan Of Care Reviewed With patient   Patient Care Overview   Progress no change   Outcome Evaluation   Outcome Summary/Follow up Plan pt. slept well this night with home cpap on.      Goal: Adult Individualization and Mutuality  Outcome: Ongoing (interventions implemented as appropriate)    Goal: Discharge Needs Assessment  Outcome: Ongoing (interventions implemented as appropriate)   03/06/18 1815 03/08/18 0348   Discharge Needs Assessment   Concerns To Be Addressed --  no discharge needs identified   Discharge Disposition --  still a patient   Current Health   Anticipated Changes Related to Illness --  none   Living Environment   Transportation Available car;family or friend will provide --    Self-Care   Equipment Currently Used at Home bipap/ cpap;cane, straight;glucometer;walker, rolling;wheelchair --        Problem: Acute Coronary Syndrome (ACS) (Adult)  Goal: Signs and Symptoms of Listed Potential Problems Will be Absent or Manageable (Acute Coronary Syndrome)  Outcome: Ongoing (interventions implemented as appropriate)   03/08/18 0348   Acute Coronary Syndrome (ACS)   Problems Assessed (Acute Coronary Syndrome (ACS)) all   Problems Present (Acute Coronary Syndrome (ACS)) none       Problem: Fall Risk (Adult)  Goal: Absence of Falls  Outcome: Ongoing (interventions implemented as appropriate)   03/08/18 0348   Fall Risk (Adult)   Absence of Falls making progress toward outcome       Problem: Pain, Chronic (Adult)  Goal: Acceptable Pain Control/Comfort Level  Outcome: Ongoing (interventions implemented as appropriate)   03/08/18 0348   Pain, Chronic (Adult)   Acceptable Pain Control/Comfort Level making progress toward outcome

## 2018-03-08 NOTE — DISCHARGE SUMMARY
Discharge Summary  Smith Austin MD  Hospitalist     Date of Discharge:  3/8/2018    Discharge Diagnosis: chronic pain    Principal Problem:    Chest pain  Active Problems:    LUCIANA on CPAP    Anxiety    Morbid obesity    Chronic pain    Hypertension    Diabetes mellitus    CAD (coronary artery disease)    CKD (chronic kidney disease) stage 3, GFR 30-59 ml/min      Presenting Problem/History of Present Illness  Chest pain    Hospital Course  Patient is a 62 y.o. male admitted for chest pain / anxiety and generalized weakness. As his Troponin values were within normal limit, as his ECG tracings showed no acute changes, as Cardiology recommended medical management and no further invasive testing, he will be discharged home with the promise of a close follow up with Dr. Greco, his PCP, regarding the multitude of medical issues as listed above.       Consults:   Consults     Date and Time Order Name Status Description    3/6/2018 1508 Inpatient Consult to Cardiology Completed     2/24/2018 1145 Inpatient Consult to Cardiology Completed           Pertinent Test Results: negative Troponin x 3     Condition on Discharge:  stable    Vital Signs  Temp:  [97.3 °F (36.3 °C)-98.4 °F (36.9 °C)] 97.4 °F (36.3 °C)  Heart Rate:  [66-99] 74  Resp:  [18-20] 18  BP: (112-138)/(65-75) 138/75    Physical Exam:  Physical Exam   Constitutional: He is oriented to person, place, and time. He appears ill. No distress.   Obese    HENT:   Head: Normocephalic and atraumatic.   Eyes: EOM are normal. Pupils are equal, round, and reactive to light. No scleral icterus.   Neck: Normal range of motion. Neck supple.   Cardiovascular: Normal rate and regular rhythm.    Pulmonary/Chest: Effort normal and breath sounds normal. No respiratory distress.   Abdominal: Soft. Bowel sounds are normal. He exhibits no distension. There is no tenderness. There is no guarding.   Musculoskeletal: He exhibits no edema or tenderness.   Neurological: He is alert and  "oriented to person, place, and time. No cranial nerve deficit.   Resting tremor present   Skin: There is pallor.   Psychiatric: His behavior is normal. His mood appears anxious.   Vitals reviewed.      Discharge Disposition  Home or Self Care    Discharge Medications   Clint Mack   Home Medication Instructions IAN:[id number]    Printed on:03/08/18 5880   Medication Information                      acetaminophen (TYLENOL) 325 MG tablet  Take 2 tablets by mouth Every 4 (Four) Hours As Needed for Mild Pain .             albuterol (PROVENTIL HFA;VENTOLIN HFA) 108 (90 Base) MCG/ACT inhaler  Inhale 2 puffs Every 4 (Four) Hours As Needed for Wheezing.             aspirin 81 MG chewable tablet  Chew 81 mg Take As Directed. Take 1 pill by mouth Monday, Wednesday, Friday             budesonide-formoterol (SYMBICORT) 160-4.5 MCG/ACT inhaler  Inhale 2 puffs 2 (Two) Times a Day.             citalopram (CeleXA) 20 MG tablet  Take 20 mg by mouth 2 (Two) Times a Day.             clonazePAM (KlonoPIN) 1 MG tablet  Take 1 tablet by mouth 3 (Three) Times a Day As Needed for Anxiety for up to 7 doses.             COMFORT ASSIST INSULIN SYRINGE 31G X 5/16\" 0.3 ML misc               furosemide (LASIX) 20 MG tablet  Take 20 mg by mouth As Needed.             glimepiride (AMARYL) 4 MG tablet  Take 4 mg by mouth 2 (Two) Times a Day.             insulin aspart (novoLOG) 100 UNIT/ML injection  Inject 10 Units under the skin 3 (Three) Times a Day Before Meals.             isosorbide mononitrate (IMDUR) 60 MG 24 hr tablet  Take 1 tablet by mouth Daily.             Lancets (ONETOUCH ULTRASOFT) lancets               losartan (COZAAR) 50 MG tablet  Take 1 tablet by mouth Daily.             Omega-3 Fatty Acids (FISH OIL PO)  Take 4 capsules by mouth 2 (Two) Times a Day.             ondansetron (ZOFRAN) 4 MG tablet  Take 1 tablet by mouth Every 8 (Eight) Hours As Needed for Nausea or Vomiting.             ranolazine (RANEXA) 1000 MG 12 " hr tablet  Take 1 tablet by mouth Every 12 (Twelve) Hours.             risperiDONE (risperDAL) 1 MG tablet  Take 1 mg by mouth 2 (Two) Times a Day.             tamsulosin (FLOMAX) 0.4 MG capsule 24 hr capsule  Take 1 capsule by mouth Every Night.             triamcinolone (KENALOG) 0.1 % cream               vitamin D (ERGOCALCIFEROL) 50295 UNITS capsule capsule  Takes every 2 weeks             warfarin (COUMADIN) 5 MG tablet  Take 5 mg by mouth Every Night. Patient takes 5 mg every day except MWF when he takes 7.5 mg                 Discharge Diet:   Diet Instructions     Diet: Regular, Consistent Carbohydrate       Discharge Diet:   Regular  Consistent Carbohydrate      [address]                 Activity at Discharge:   Activity Instructions     Activity as Tolerated                     Follow-up Appointments  Future Appointments  Date Time Provider Department Center   3/21/2018 1:00 PM Lisa Mercado MD [facility] None   3/22/2018 3:15 PM Keke Kumar MD [facility] None   4/9/2018 8:00 AM Jose C Novak DPM [facility] None   9/27/2018 1:00 PM Jerome Che MD [facility] None     Additional Instructions for the Follow-ups that You Need to Schedule     Discharge Follow-up with PCP    As directed    Follow Up Details:  Dr. Greco in 1 week                      Smith Austin MD  03/08/18  9:04 PM

## 2018-03-21 ENCOUNTER — OFFICE VISIT (OUTPATIENT)
Dept: PULMONOLOGY | Facility: CLINIC | Age: 63
End: 2018-03-21

## 2018-03-21 VITALS
SYSTOLIC BLOOD PRESSURE: 130 MMHG | DIASTOLIC BLOOD PRESSURE: 80 MMHG | BODY MASS INDEX: 40.44 KG/M2 | HEART RATE: 102 BPM | OXYGEN SATURATION: 97 % | HEIGHT: 71 IN | WEIGHT: 288.9 LBS

## 2018-03-21 DIAGNOSIS — R53.81 PHYSICAL DECONDITIONING: ICD-10-CM

## 2018-03-21 DIAGNOSIS — E66.01 MORBID OBESITY WITH BMI OF 40.0-44.9, ADULT (HCC): ICD-10-CM

## 2018-03-21 DIAGNOSIS — R06.09 DYSPNEA ON EXERTION: Primary | ICD-10-CM

## 2018-03-21 PROBLEM — Z79.52 CURRENT USE OF STEROID MEDICATION: Status: ACTIVE | Noted: 2017-02-13

## 2018-03-21 PROBLEM — M86.8X8 OTHER OSTEOMYELITIS, OTHER SITE: Status: ACTIVE | Noted: 2017-10-19

## 2018-03-21 PROBLEM — E11.65 TYPE 2 DIABETES MELLITUS WITH HYPERGLYCEMIA: Status: ACTIVE | Noted: 2017-02-01

## 2018-03-21 PROCEDURE — 99204 OFFICE O/P NEW MOD 45 MIN: CPT | Performed by: INTERNAL MEDICINE

## 2018-03-21 PROCEDURE — G8417 CALC BMI ABV UP PARAM F/U: HCPCS | Performed by: INTERNAL MEDICINE

## 2018-03-21 PROCEDURE — G9903 PT SCRN TBCO ID AS NON USER: HCPCS | Performed by: INTERNAL MEDICINE

## 2018-03-21 PROCEDURE — 94010 BREATHING CAPACITY TEST: CPT | Performed by: INTERNAL MEDICINE

## 2018-03-21 RX ORDER — BLOOD-GLUCOSE METER
EACH MISCELLANEOUS
COMMUNITY
Start: 2018-02-05

## 2018-03-21 RX ORDER — METOCLOPRAMIDE 5 MG/1
10 TABLET ORAL 2 TIMES DAILY
COMMUNITY
End: 2018-03-21 | Stop reason: SINTOL

## 2018-03-21 NOTE — PROGRESS NOTES
"    Pulmonary Consultation    Tam Greco MD,    Thank you for asking me to see Clint Mack for   Chief Complaint   Patient presents with   • Shortness of Breath     ref by Dr. Mcdonald   .    Subjective     History of Present Illness  Clint Mack is a 62 y.o. 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 Pt states he can't catch his breath. He has been hospitalized twice in the past month for his dyspnea but nothing helped. Pt states his dyspnea started suddenly about a month ago as well as fatigued. He has been tried on Symbicort and albuterol but neither have helped. Pt was on another inhaler in the past when he had a PE but he cannot recall the name. He has been on CPAP and O2 at night. Pt has established with Dr. Che and he was told his CPAP was working well. He complains of \"nerves problems\" which he describes as being fidgety and anxious. He admits intermittent chest pressure, constant dyspnea even at rest. His dyspnea does not worsen with walking, but he admits he does not walk much due to chronic knee pain. Pt has had recurrent VTE with the last in 2009 for which he is on chronic warfarin. He states his INR is usually therapeutic. Pt admits to losing ~40lbs wt loss over the past year. He is a never smoker. Pt has worked in a steel mill, at [employer], and with the [employer]. His brother had COPD (smoker), but there is no lung cancer in the family.    Review and summarization of clinic note from Dr. Cara Cuellar on 4/29/16: Seen for pre-op chest exam prior to hip surgery revision.  At that time, he had a diagnosis of morbid obesity, LUCIANA, COPD, CAD, and chronic back pain. BMI was 45.4. PFTs showed moderate obstruction and restriction.  PFT of her maladies thought to be secondary to patient's body habitus.    Review of Systems: " History obtained from chart review and the patient.  Review of Systems   Constitutional: Positive for fatigue and unexpected weight change. Negative for fever.   Respiratory: Positive for shortness of breath. Negative for cough and wheezing.    Cardiovascular: Positive for chest pain and leg swelling.   Gastrointestinal: Positive for abdominal pain.   Musculoskeletal: Positive for arthralgias and back pain.   Neurological: Positive for tremors and weakness.     As described in the HPI. Otherwise, remainder of ROS (14 systems) were negative.    Patient Active Problem List   Diagnosis   • LUCIANA (obstructive sleep apnea)   • DVT (deep venous thrombosis)   • HTN (hypertension)   • DM (diabetes mellitus)   • Right hip pain   • Osteoarthritis of right hip   • Staphylococcal arthritis of right hip   • Presence of right artificial hip joint   • Chronic infection of hip joint prosthesis   • CAD (coronary artery disease)   • S/P CABG (coronary artery bypass graft)   • Mixed hyperlipidemia   • Lumbosacral spondylosis without myelopathy   • Pain of right hip joint   • DDD (degenerative disc disease), lumbar   • Current use of steroid medication   • Dyspnea on exertion   • Chest pain   • Anxiety   • Morbid obesity with BMI of 40.0-44.9, adult   • Chronic pain   • CKD (chronic kidney disease) stage 3, GFR 30-59 ml/min   • Allergic rhinitis   • Chronic kidney disease   • Chronic respiratory failure with hypoxia   • COPD (chronic obstructive pulmonary disease)   • Hypoxemia requiring supplemental oxygen   • IgA nephropathy   • Morbid obesity   • OA (osteoarthritis) of hip   • Other osteomyelitis, other site   • Pulmonary embolism   • Radiculitis   • Right-sided low back pain with right-sided sciatica   • Screening PSA (prostate specific antigen)   • Slow transit constipation   • Type 2 diabetes mellitus with hyperglycemia         Current Outpatient Prescriptions:   •  acetaminophen (TYLENOL) 325 MG tablet, Take 2 tablets by mouth  "Every 4 (Four) Hours As Needed for Mild Pain ., Disp: , Rfl:   •  albuterol (PROVENTIL HFA;VENTOLIN HFA) 108 (90 Base) MCG/ACT inhaler, Inhale 2 puffs Every 4 (Four) Hours As Needed for Wheezing., Disp: 1 inhaler, Rfl: 0  •  aspirin 81 MG chewable tablet, Chew 81 mg Take As Directed. Take 1 pill by mouth Monday, Wednesday, Friday, Disp: , Rfl:   •  Blood Glucose Monitoring Suppl (ACCU-CHEK ARGELIA PLUS) w/Device kit, , Disp: , Rfl:   •  budesonide-formoterol (SYMBICORT) 160-4.5 MCG/ACT inhaler, Inhale 2 puffs 2 (Two) Times a Day., Disp: 1 inhaler, Rfl: 12  •  citalopram (CeleXA) 20 MG tablet, Take 20 mg by mouth 2 (Two) Times a Day., Disp: , Rfl:   •  clonazePAM (KlonoPIN) 1 MG tablet, Take 1 tablet by mouth 3 (Three) Times a Day As Needed for Anxiety for up to 7 doses., Disp: 21 tablet, Rfl: 0  •  COMFORT ASSIST INSULIN SYRINGE 31G X 5/16\" 0.3 ML misc, , Disp: , Rfl:   •  furosemide (LASIX) 20 MG tablet, Take 20 mg by mouth As Needed., Disp: , Rfl:   •  glimepiride (AMARYL) 4 MG tablet, Take 4 mg by mouth 2 (Two) Times a Day., Disp: , Rfl:   •  glucose blood (ONE TOUCH ULTRA TEST) test strip, , Disp: , Rfl:   •  insulin aspart (novoLOG) 100 UNIT/ML injection, Inject 10 Units under the skin 3 (Three) Times a Day Before Meals., Disp: 10 mL, Rfl: 12  •  isosorbide mononitrate (IMDUR) 60 MG 24 hr tablet, Take 1 tablet by mouth Daily., Disp: 30 tablet, Rfl: 1  •  Lancets (ONETOUCH ULTRASOFT) lancets, , Disp: , Rfl:   •  losartan (COZAAR) 50 MG tablet, Take 1 tablet by mouth Daily., Disp: 30 tablet, Rfl: 1  •  metoclopramide (REGLAN) 5 MG tablet, Take 10 mg by mouth 2 (Two) Times a Day., Disp: , Rfl:   •  Omega-3 Fatty Acids (FISH OIL PO), Take 4 capsules by mouth 2 (Two) Times a Day., Disp: , Rfl:   •  ondansetron (ZOFRAN) 4 MG tablet, Take 1 tablet by mouth Every 8 (Eight) Hours As Needed for Nausea or Vomiting., Disp: 10 tablet, Rfl: 1  •  ranolazine (RANEXA) 1000 MG 12 hr tablet, Take 1 tablet by mouth Every 12 " "(Twelve) Hours., Disp: 60 tablet, Rfl: 1  •  tamsulosin (FLOMAX) 0.4 MG capsule 24 hr capsule, Take 1 capsule by mouth Every Night., Disp: , Rfl:   •  vitamin D (ERGOCALCIFEROL) 64294 UNITS capsule capsule, Takes every 2 weeks, Disp: , Rfl:   •  warfarin (COUMADIN) 5 MG tablet, Take 5 mg by mouth Every Night. Patient takes 5 mg every day except MWF when he takes 7.5 mg, Disp: , Rfl:     Past Medical History:   Diagnosis Date   • Anxiety    • Arthritis     osteoarthritis   • CKD (chronic kidney disease), stage III    • COPD (chronic obstructive pulmonary disease)    • Coronary artery disease    • Depression    • Diabetes mellitus    • DVT (deep venous thrombosis)    • Heart disease    • History of embolic filter insertion    • Hyperlipidemia    • Hypertension    • Injury of back     back surgery x3    • LUCIANA on CPAP    • Pulmonary embolism      Past Surgical History:   Procedure Laterality Date   • BACK SURGERY     • CARDIAC SURGERY  2001   • CIRCUMCISION     • GALLBLADDER SURGERY  2000   • JOINT REPLACEMENT Right 05/02/2016    hip   • SPINE SURGERY       Social History     Social History   • Marital status:      Social History Main Topics   • Smoking status: Never Smoker   • Smokeless tobacco: Never Used   • Alcohol use No   • Drug use: No   • Sexual activity: Defer     Other Topics Concern   • Not on file     Family History   Problem Relation Age of Onset   • Heart disease Father    • Hypertension Father    • Stroke Father    • Diabetes Sister    • Heart disease Brother    • Hypertension Brother    • COPD Brother           Objective     Blood pressure 130/80, pulse 102, height 180.3 cm (71\"), weight 131 kg (288 lb 14.4 oz), SpO2 97 %.  Physical Exam   Constitutional: He is oriented to person, place, and time. Vital signs are normal. He appears well-developed and well-nourished.   HENT:   Head: Normocephalic and atraumatic.   Nose: Nose normal.   Mouth/Throat: Uvula is midline, oropharynx is clear and moist " and mucous membranes are normal. He has dentures.   Mallampati 4   Eyes: Conjunctivae, EOM and lids are normal. Pupils are equal, round, and reactive to light.   Neck: Trachea normal and normal range of motion. No tracheal tenderness present. No thyroid mass present.   Cardiovascular: Normal rate, regular rhythm and normal heart sounds.  PMI is not displaced.  Exam reveals no gallop.    No murmur heard.  Pulmonary/Chest: Effort normal and breath sounds normal. Tachypnea noted. No respiratory distress. He has no decreased breath sounds. He has no wheezes. He has no rhonchi. Chest wall is not dull to percussion. He exhibits no tenderness.   Gasping breath at rest, but normal effort with deep breathing   Abdominal: Soft. Normal appearance and bowel sounds are normal. There is no hepatomegaly. There is no tenderness.   Musculoskeletal:   In wheelchair, trace BLE edema   Lymphadenopathy:        Head (right side): No submandibular adenopathy present.        Head (left side): No submandibular adenopathy present.     He has no cervical adenopathy.        Right: No supraclavicular adenopathy present.        Left: No supraclavicular adenopathy present.   Neurological: He is alert and oriented to person, place, and time. He displays tremor. Gait normal.   BUE resting and slight intention tremor, oral movements concerning for tardive dyskinesia, 4+/5 BUE strength   Skin: Skin is warm and dry. No rash noted. No cyanosis. Nails show no clubbing.   Psychiatric: He has a normal mood and affect. His speech is normal and behavior is normal. Judgment normal.   Nursing note and vitals reviewed.      PFTs: 3/21/18 (4/29/16) (independently reviewed and interpreted by me)  Ratio 73  FVC 4 (3.17)/ 82%  FEV1 2.93 (2.18)/ 80%  Poor effort.  Normal spirometry.  Significant improvement in both FVC and FEV1 compared to prior.    Radiology (independently reviewed and interpreted by me): CXR 3/6/18 showed NACPD    V/Q 2/23/18: Normal exam    CT  chest without contrast 5/13/16: Mediastinal lipomatosis, 7mm RML nodule arising from minor fissure    TTE 2/24/18:  · The study is technically suboptimal for diagnosis. Definity contrasted study.  · Left Ventricle: Left ventricular systolic function is normal. Estimated EF appears to be in the range of 51 - 55%.  · Left ventricular wall thickness is consistent with mild concentric hypertrophy. Left ventricular diastolic function is normal.  · Right Ventricle: Normal right ventricular wall thickness, systolic function and septal motion noted. Right ventricular cavity is borderline dilated.  · No evidence of pulmonary hypertension is present.  · Pericardium: There is a moderate (1-2cm) pericardial effusion adjacent to the right ventricle and adjacent to the right atrium. There is no evidence of tamponade.  ·      Assessment/Plan     Clint was seen today for shortness of breath.    Diagnoses and all orders for this visit:    Dyspnea on exertion  -     Pulmonary Function Test    Morbid obesity with BMI of 40.0-44.9, adult    Physical deconditioning         Discussion/ Recommendations:   Spirometry is normal and shows improvement compared to prior restriction in 2016.  Chest imaging was also reviewed and was unremarkable.  Given that patient does not show obstruction on spirometry and has proceeded no benefit from appropriate bronchodilators, I do not feel that he has underlying affective lung disease like asthma.  I am concerned that he may have developed parkinsonism possibly from his chronic metoclopramide use, but it could also be an early neuromuscular disease.  He is at risk for dyspnea due to deconditioning from his morbid obesity as well as inactivity from his chronic back/joint issues.  He does have multiple reasons for chronic fatigue, including his underlying LUCIANA, which warrants reevaluation.    -Stop Symbicort and albuterol as not indicated or beneficial.  -No indication for bronchodilators.  -Recommend he  stop metoclopramide given risk of possible are consistent and side effects.  -Recommended neurological evaluation.  He would like to discuss with Dr. Greco and get a referral from his recommendation.  -Agree with follow-up with Dr. Warren as scheduled tomorrow.   -Recommended closer follow-up with Dr. Che given complaint of ongoing fatigue.  -Encourage continued CPAP compliance.    Discussed the patient's BMI with him. BMI is above normal parameters. Follow-up plan includes:  referral to primary care.           No Follow-up on file.      Thank you for allowing me to participate in the care of Clint Mack. Please do not hesitate to contact me with any questions.         This document has been electronically signed by Lisa Mercado MD on March 21, 2018 1:09 PM      Dictated using Dragon

## 2018-03-22 ENCOUNTER — OFFICE VISIT (OUTPATIENT)
Dept: CARDIOLOGY | Facility: CLINIC | Age: 63
End: 2018-03-22

## 2018-03-22 VITALS
WEIGHT: 288 LBS | BODY MASS INDEX: 40.32 KG/M2 | HEART RATE: 88 BPM | HEIGHT: 71 IN | SYSTOLIC BLOOD PRESSURE: 104 MMHG | DIASTOLIC BLOOD PRESSURE: 64 MMHG

## 2018-03-22 DIAGNOSIS — J96.11 CHRONIC RESPIRATORY FAILURE WITH HYPOXIA (HCC): ICD-10-CM

## 2018-03-22 DIAGNOSIS — E78.2 MIXED HYPERLIPIDEMIA: Chronic | ICD-10-CM

## 2018-03-22 DIAGNOSIS — I25.10 CORONARY ARTERY DISEASE INVOLVING NATIVE CORONARY ARTERY OF NATIVE HEART WITHOUT ANGINA PECTORIS: Primary | Chronic | ICD-10-CM

## 2018-03-22 DIAGNOSIS — I10 ESSENTIAL HYPERTENSION: Chronic | ICD-10-CM

## 2018-03-22 DIAGNOSIS — R06.09 DYSPNEA ON EXERTION: ICD-10-CM

## 2018-03-22 PROCEDURE — 99214 OFFICE O/P EST MOD 30 MIN: CPT | Performed by: INTERNAL MEDICINE

## 2018-03-22 NOTE — PROGRESS NOTES
Clint Mack  62 y.o. male    03/22/2018  1. Coronary artery disease involving native coronary artery of native heart without angina pectoris    2. Essential hypertension    3. Mixed hyperlipidemia    4. Dyspnea on exertion    5. Chronic respiratory failure with hypoxia        History of Present Illness    Mr. Mack is a 62-year-old  male with multiple medical problems and presented earlier this month to the emergency room with increasing fatigue, dyspnea and chest pressure over the past several weeks.  Symptoms have been progressive.  The patient was admitted to [facility] in February this year with similar symptoms and did undergo an extensive workup.  His a past medical history is significant for  Anxiety; Arthritis, CKD (chronic kidney disease), stage III; COPD (chronic obstructive pulmonary disease); Coronary artery disease s/p CABG,  Depression; Diabetes mellitus; DVT (deep venous thrombosis);  History of Bennett Filter; Hyperlipidemia; Hypertension;  LUCIANA on CPAP; and Pulmonary embolism on long-term anticoagulation with Coumadin.  His echocardiogram performed in February of this year showed:  · The study is technically suboptimal for diagnosis. Definity contrasted study.  · Left Ventricle: Left ventricular systolic function is normal. Estimated EF appears to be in the range of 51 - 55%.  · Left ventricular wall thickness is consistent with mild concentric hypertrophy. Left ventricular diastolic function is normal.  · Right Ventricle: Normal right ventricular wall thickness, systolic function and septal motion noted. Right ventricular cavity is borderline dilated.  · No evidence of pulmonary hypertension is present.  · Pericardium: There is a moderate (1-2cm) pericardial effusion adjacent to the right ventricle and adjacent to the right atrium. There is no evidence of tamponade.     He underwent a Lexiscan Cardiolite stress test which showed:  · Findings consistent with an equivocal  ECG stress test.  · Left ventricular ejection fraction is normal (Calculated EF = 68%).  · Myocardial perfusion imaging indicates a small-sized infarct with no significant ischemia noted.  · Small fixed defect noted in the apex and inferior wall. No significant reversible ischemia     Venous Doppler studies of the lower extremities did not reveal any evidence of DVT.  His BNP is within normal limits.    The patient underwent cardiac catheterization in 2013 which showed a patent MUSTAFA to LAD and patent SVG to distal right coronary artery.  Circumflex coronary artery had no significant disease.  He reports that his symptoms do much better when he takes anxiolytic agents such as Klonopin.  His EKG did not show any acute ST-T wave changes and cardiac enzymes have been negative for myocardial injury.  D dimers have been within normal limits.  He does have CK D with a creatinine of more than 1.6 and is followed by nephrology and his PT and INR are subtherapeutic.     He was seen in consultation by Dr. Mercado who is opinion is as follows:  Spirometry is normal and shows improvement compared to prior restriction in 2016.  Chest imaging was also reviewed and was unremarkable.  Given that patient does not show obstruction on spirometry and has proceeded no benefit from appropriate bronchodilators, I do not feel that he has underlying affective lung disease like asthma.    Mr. Mack continues to have multiple symptomatology the most prominent of which is dyspnea even at rest and he feels dyspneic most of the time except when he is asleep.  No chest pain was reported.  He has resting tremors and very likely has Parkinson's disease.    SUBJECTIVE    Allergies   Allergen Reactions   • Atorvastatin Other (See Comments)     Aches and pains all over   • Crestor [Rosuvastatin Calcium]    • Lisinopril Other (See Comments)     cough  Constant cough   • Lyrica [Pregabalin] Swelling   • Rosuvastatin Other (See Comments)     Aches and  pains all over         Past Medical History:   Diagnosis Date   • Anxiety    • Arthritis     osteoarthritis   • CKD (chronic kidney disease), stage III    • COPD (chronic obstructive pulmonary disease)    • Coronary artery disease    • Depression    • Diabetes mellitus    • DVT (deep venous thrombosis)    • Heart disease    • History of embolic filter insertion    • Hyperlipidemia    • Hypertension    • Injury of back     back surgery x3    • LUCIANA on CPAP    • Pulmonary embolism          Past Surgical History:   Procedure Laterality Date   • BACK SURGERY     • CARDIAC SURGERY  2001   • CIRCUMCISION     • GALLBLADDER SURGERY  2000   • JOINT REPLACEMENT Right 05/02/2016    hip   • SPINE SURGERY           Family History   Problem Relation Age of Onset   • Heart disease Father    • Hypertension Father    • Stroke Father    • Diabetes Sister    • Heart disease Brother    • Hypertension Brother    • COPD Brother          Social History     Social History   • Marital status:      Spouse name: N/A   • Number of children: N/A   • Years of education: N/A     Occupational History   • Not on file.     Social History Main Topics   • Smoking status: Never Smoker   • Smokeless tobacco: Never Used   • Alcohol use No   • Drug use: No   • Sexual activity: Defer     Other Topics Concern   • Not on file     Social History Narrative   • No narrative on file         Current Outpatient Prescriptions   Medication Sig Dispense Refill   • acetaminophen (TYLENOL) 325 MG tablet Take 2 tablets by mouth Every 4 (Four) Hours As Needed for Mild Pain .     • aspirin 81 MG chewable tablet Chew 81 mg Take As Directed. Take 1 pill by mouth Monday, Wednesday, Friday     • Blood Glucose Monitoring Suppl (ACCU-CHEK ARGELIA PLUS) w/Device kit      • citalopram (CeleXA) 20 MG tablet Take 20 mg by mouth 2 (Two) Times a Day.     • clonazePAM (KlonoPIN) 1 MG tablet Take 1 tablet by mouth 3 (Three) Times a Day As Needed for Anxiety for up to 7 doses. 21  "tablet 0   • COMFORT ASSIST INSULIN SYRINGE 31G X 5/16\" 0.3 ML misc      • furosemide (LASIX) 20 MG tablet Take 20 mg by mouth As Needed.     • glimepiride (AMARYL) 4 MG tablet Take 4 mg by mouth 2 (Two) Times a Day.     • glucose blood (ONE TOUCH ULTRA TEST) test strip      • insulin aspart (novoLOG) 100 UNIT/ML injection Inject 10 Units under the skin 3 (Three) Times a Day Before Meals. 10 mL 12   • isosorbide mononitrate (IMDUR) 60 MG 24 hr tablet Take 1 tablet by mouth Daily. 30 tablet 1   • Lancets (ONETOUCH ULTRASOFT) lancets      • losartan (COZAAR) 50 MG tablet Take 1 tablet by mouth Daily. 30 tablet 1   • Omega-3 Fatty Acids (FISH OIL PO) Take 4 capsules by mouth 2 (Two) Times a Day.     • ondansetron (ZOFRAN) 4 MG tablet Take 1 tablet by mouth Every 8 (Eight) Hours As Needed for Nausea or Vomiting. 10 tablet 1   • ranolazine (RANEXA) 1000 MG 12 hr tablet Take 1 tablet by mouth Every 12 (Twelve) Hours. 60 tablet 1   • tamsulosin (FLOMAX) 0.4 MG capsule 24 hr capsule Take 1 capsule by mouth Every Night.     • vitamin D (ERGOCALCIFEROL) 98831 UNITS capsule capsule Takes every 2 weeks     • warfarin (COUMADIN) 5 MG tablet Take 5 mg by mouth Every Night. Patient takes 5 mg every day except MWF when he takes 7.5 mg       No current facility-administered medications for this visit.          OBJECTIVE    /64   Pulse 88   Ht 180.3 cm (71\")   Wt 131 kg (288 lb)   BMI 40.17 kg/m²         Review of Systems     Constitutional:  Denies recent weight loss, weight gain, No fever or chills     HENT:  Denies any hearing loss, epistaxis, hoarseness, or difficulty speaking.     Eyes: Wears eyeglasses or contact lenses     Respiratory:  Dyspnea (chronic), no cough, wheezing, or hemoptysis.     Cardiovascular: Negative for palpations, chest pain    Gastrointestinal:  Denies change in bowel habits, dyspepsia, ulcer disease, hematochezia, or melena.     Endocrine: Negative for cold intolerance, heat intolerance, " polydipsia, polyphagia and polyuria.     Genitourinary: Negative.      Musculoskeletal: DJD    Skin:  Denies any change in hair or nails, rashes, or skin lesions.     Allergic/Immunologic: Negative.  Negative for environmental allergies, food allergies and immunocompromised state.     Neurological:  Resting tremors involving both hands left more than right.  No definite weakness in the upper or lower extremities    Hematological: Denies any food allergies, seasonal allergies, bleeding disorders, or lymphadenopathy.     Psychiatric/Behavioral: anxiety       Physical Exam     Constitutional: Cooperative, morbidly obese, component of hyperventilation    HENT:   Head: Normocephalic, normal hair patterns, no masses or tenderness.  Ears, Nose, and Throat: No gross abnormalities. No pallor or cyanosis.   Eyes: EOMS intact, PERRL, conjunctivae and lids unremarkable. Fundoscopic exam and visual fields not performed.   Neck: No palpable masses or adenopathy, no thyromegaly, no JVD, carotid pulses are full and equal bilaterally and without  Bruits.     Cardiovascular: Regular rhythm, S1 and S2 normal, no S3 or S4. No murmurs, gallops, or rubs detected.     Pulmonary/Chest: Chest:  increased AP diameter of the chest, normal respiratory excursion,  no use of accessory muscles.            Pulmonary: Normal breath sounds. No rales or ronchi.    Abdominal: Abdomen soft, bowel sounds normoactive, no masses,  non-tender, no bruits.  obese    Musculoskeletal: DJD.  Multiple orthopedic surgeries in the past     Neurological: No gross motor or sensory deficits noted.  Extensive tremors in both upper extremities.  Consider Parkinson's disease    Skin: Warm and dry to the touch, no apparent skin lesions or masses noted.     Psychiatric: Anxious      Procedures      Lab Results   Component Value Date    WBC 8.92 03/08/2018    HGB 16.5 03/08/2018    HCT 45.9 03/08/2018    MCV 88.1 03/08/2018     (L) 03/08/2018     Lab Results    Component Value Date    GLUCOSE 171 (H) 03/08/2018    BUN 23 (H) 03/08/2018    CREATININE 1.45 (H) 03/08/2018    EGFRIFNONA 49 03/08/2018    BCR 15.9 03/08/2018    CO2 20.0 (L) 03/08/2018    CALCIUM 8.3 (L) 03/08/2018    ALBUMIN 4.00 03/06/2018    LABIL2 1.2 03/06/2018    AST 23 03/06/2018    ALT 52 03/06/2018     No results found for: CHOL  No results found for: TRIG  No results found for: HDL  No components found for: LDLCALC  No results found for: LDL  No results found for: HDLLDLRATIO  No components found for: CHOLHDL  Lab Results   Component Value Date    HGBA1C 7.2 (H) 10/20/2014     Lab Results   Component Value Date    TSH 0.69 05/13/2016           ASSESSMENT AND PLAN    Mr. Mack is a management problem because of multiple medical issues and persistent symptoms.  There is no definite evidence of congestive heart failure.  His BNP is within normal limits.  There is no objective evidence of myocardial ischemia.  His dyspnea is clearly multifactorial and I have recommended that he get a second opinion including a neurology opinion.     Patient wondered if performing a cardiac catheterization would make a difference.  I doubt if this would help since the patient has CK D and the risk of worsening renal failure is real.  Moreover I doubt if this will make a difference to his dyspnea.  I have encouraged the patient to get a second opinion at [place] or as per the patient's preference [facility] in [place].  He would benefit from a neurological evaluation and I have spoken with Dr. Woodall in [place].  He is on a good combination of medications including aspirin, isosorbide mononitrate, losartan, Ranexa and anticoagulation with warfarin.       Clint was seen today for shortness of breath.    Diagnoses and all orders for this visit:    Coronary artery disease involving native coronary artery of native heart without angina pectoris    Essential hypertension    Mixed hyperlipidemia    Dyspnea on  exertion    Chronic respiratory failure with hypoxia        Keke Kumar MD  3/22/2018  6:36 PM

## 2018-03-26 ENCOUNTER — TELEPHONE (OUTPATIENT)
Dept: CARDIOLOGY | Facility: CLINIC | Age: 63
End: 2018-03-26

## 2018-04-02 ENCOUNTER — TELEPHONE (OUTPATIENT)
Dept: CARDIOLOGY | Facility: CLINIC | Age: 63
End: 2018-04-02

## 2018-04-03 ENCOUNTER — TELEPHONE (OUTPATIENT)
Dept: CARDIOLOGY | Facility: CLINIC | Age: 63
End: 2018-04-03

## 2018-04-10 ENCOUNTER — OFFICE VISIT (OUTPATIENT)
Dept: PODIATRY | Facility: CLINIC | Age: 63
End: 2018-04-10

## 2018-04-10 VITALS — HEIGHT: 71 IN | BODY MASS INDEX: 40.32 KG/M2 | WEIGHT: 288 LBS

## 2018-04-10 DIAGNOSIS — B35.3 TINEA PEDIS OF BOTH FEET: Primary | ICD-10-CM

## 2018-04-10 DIAGNOSIS — B35.1 ONYCHOMYCOSIS: ICD-10-CM

## 2018-04-10 DIAGNOSIS — M79.674 PAIN IN TOES OF BOTH FEET: ICD-10-CM

## 2018-04-10 DIAGNOSIS — M79.675 PAIN IN TOES OF BOTH FEET: ICD-10-CM

## 2018-04-10 DIAGNOSIS — E11.42 DIABETIC POLYNEUROPATHY ASSOCIATED WITH TYPE 2 DIABETES MELLITUS (HCC): ICD-10-CM

## 2018-04-10 PROCEDURE — 99213 OFFICE O/P EST LOW 20 MIN: CPT | Performed by: PODIATRIST

## 2018-04-10 PROCEDURE — 11721 DEBRIDE NAIL 6 OR MORE: CPT | Performed by: PODIATRIST

## 2018-04-10 RX ORDER — CLOTRIMAZOLE AND BETAMETHASONE DIPROPIONATE 10; .64 MG/G; MG/G
CREAM TOPICAL 2 TIMES DAILY
Qty: 45 G | Refills: 1 | Status: SHIPPED | OUTPATIENT
Start: 2018-04-10 | End: 2018-05-21

## 2018-04-10 NOTE — PROGRESS NOTES
Clint Mack  1955  62 y.o. male   BS-127 per patient  PCP-Dr. Greco last seen 3/14/2018.  Patient presents today for diabetic nail care.  04/10/2018      Chief Complaint   Patient presents with   • Left Foot - Diabetic Foot Care   • Right Foot - Diabetic Foot Care           History of Present Illness    Clint Mack is a 62 y.o. male presents for f/u diabetic foot exam and nail care.  Owing and burning sensation somewhat improved today.  He does note increased scaling to the bottoms of his feet.    Past Medical History:   Diagnosis Date   • Anxiety    • Arthritis     osteoarthritis   • CKD (chronic kidney disease), stage III    • COPD (chronic obstructive pulmonary disease)    • Coronary artery disease    • Depression    • Diabetes mellitus    • DVT (deep venous thrombosis)    • Heart disease    • History of embolic filter insertion    • Hyperlipidemia    • Hypertension    • Injury of back     back surgery x3    • LUCIANA on CPAP    • Pulmonary embolism          Past Surgical History:   Procedure Laterality Date   • BACK SURGERY     • CARDIAC SURGERY  2001   • CIRCUMCISION     • GALLBLADDER SURGERY  2000   • JOINT REPLACEMENT Right 05/02/2016    hip   • SPINE SURGERY           Family History   Problem Relation Age of Onset   • Heart disease Father    • Hypertension Father    • Stroke Father    • Diabetes Sister    • Heart disease Brother    • Hypertension Brother    • COPD Brother          Social History     Social History   • Marital status:      Spouse name: N/A   • Number of children: N/A   • Years of education: N/A     Occupational History   • Not on file.     Social History Main Topics   • Smoking status: Never Smoker   • Smokeless tobacco: Never Used   • Alcohol use No   • Drug use: No   • Sexual activity: Defer     Other Topics Concern   • Not on file     Social History Narrative   • No narrative on file         Current Outpatient Prescriptions   Medication Sig Dispense Refill  "  • acetaminophen (TYLENOL) 325 MG tablet Take 2 tablets by mouth Every 4 (Four) Hours As Needed for Mild Pain .     • aspirin 81 MG chewable tablet Chew 81 mg Take As Directed. Take 1 pill by mouth Monday, Wednesday, Friday     • Blood Glucose Monitoring Suppl (ACCU-CHEK ARGELIA PLUS) w/Device kit      • citalopram (CeleXA) 20 MG tablet Take 20 mg by mouth 2 (Two) Times a Day.     • clonazePAM (KlonoPIN) 1 MG tablet Take 1 tablet by mouth 3 (Three) Times a Day As Needed for Anxiety for up to 7 doses. 21 tablet 0   • COMFORT ASSIST INSULIN SYRINGE 31G X 5/16\" 0.3 ML misc      • furosemide (LASIX) 20 MG tablet Take 20 mg by mouth As Needed.     • glimepiride (AMARYL) 4 MG tablet Take 4 mg by mouth 2 (Two) Times a Day.     • glucose blood (ONE TOUCH ULTRA TEST) test strip      • insulin aspart (novoLOG) 100 UNIT/ML injection Inject 10 Units under the skin 3 (Three) Times a Day Before Meals. 10 mL 12   • isosorbide mononitrate (IMDUR) 60 MG 24 hr tablet Take 1 tablet by mouth Daily. 30 tablet 1   • Lancets (ONETOUCH ULTRASOFT) lancets      • losartan (COZAAR) 50 MG tablet Take 1 tablet by mouth Daily. 30 tablet 1   • Omega-3 Fatty Acids (FISH OIL PO) Take 4 capsules by mouth 2 (Two) Times a Day.     • ondansetron (ZOFRAN) 4 MG tablet Take 1 tablet by mouth Every 8 (Eight) Hours As Needed for Nausea or Vomiting. 10 tablet 1   • ranolazine (RANEXA) 1000 MG 12 hr tablet Take 1 tablet by mouth Every 12 (Twelve) Hours. 60 tablet 1   • tamsulosin (FLOMAX) 0.4 MG capsule 24 hr capsule Take 1 capsule by mouth Every Night.     • vitamin D (ERGOCALCIFEROL) 23331 UNITS capsule capsule Takes every 2 weeks     • warfarin (COUMADIN) 5 MG tablet Take 5 mg by mouth Every Night. Patient takes 5 mg every day except MWF when he takes 7.5 mg     • clotrimazole-betamethasone (LOTRISONE) 1-0.05 % cream Apply  topically 2 (Two) Times a Day. Apply to affected area twice daily 45 g 1     No current facility-administered medications for this " "visit.          OBJECTIVE    Ht 180.3 cm (71\")   Wt 131 kg (288 lb)   BMI 40.17 kg/m²       Review of Systems   Constitutional:  Denies recent weight loss, weight gain, fever or chills, no change in exercise tolerance  Musculoskeletal: Toe pain.   Skin:  Thickened nails. Shin discoloration.  Neurological:  Burning sensations to feet b/l.  Psychiatric/Behavioral: Denies depression      Physical Exam    Clint had a diabetic foot exam performed today.      Constitutional: he appears well-developed and well-nourished.   HEENT: Normocephalic. Atraumatic  CV: No tenderness. RRR  Resp: Non-labored respiration. No wheezes.   Psychiatric: he has a normal mood and affect. his   behavior is normal.      Lower Extremity Exam:  Vascular: DP/PT pulses palpable 1+.   Negative hair growth.   1+ perimalleolar edema  Toes cool  Neuro: Protective sensation diminished to lesser toes, b/l.  DTRs intact  Integument: No open wounds or lesions.  Mature subungual hematoma left hallux.  No signs of infection.  Atrophic skin noted b/l with chronic venous stasis dermatitis changes  Xerotic, scaling skin noted to bilateral plantar foot in moccasin distribution.  No masses  Musculoskeletal: LE muscle strength 4/5 on left, 3/5 on right  Gait assisted with walker, wheel chair  Semi-rigid hammertoe deformity toes 2-5 b/l.  Nails 1-5 b/l thickened, elongated with subungual debris. +pain on palpation              ASSESSMENT AND PLAN    Clint was seen today for diabetic foot care and diabetic foot care.    Diagnoses and all orders for this visit:    Onychomycosis    Diabetic polyneuropathy associated with type 2 diabetes mellitus    Pain in toes of both feet    Tinea pedis of both feet    Other orders  -     clotrimazole-betamethasone (LOTRISONE) 1-0.05 % cream; Apply  topically 2 (Two) Times a Day. Apply to affected area twice daily      -Comprehensive DM foot exam performed. Pt educated on importance of tight glucose control and daily foot " checks.  -Pt educated on padding techniques for rigid hammertoes.  Proper extra depth diabetic shoe gear.  Limit barefoot walking.  -Nails 1-5 b/l debrided in length and thickness with nail nipper to decrease pain, fungal load and risk of infection  -Rx Lotrisone for tinea  -Follow up 3 months PRN          This document has been electronically signed by Jose C Novak DPM on April 10, 2018 8:18 AM     EMR Dragon/Transcription disclaimer:   Much of this encounter note is an electronic transcription/translation of spoken language to printed text. The electronic translation of spoken language may permit erroneous, or at times, nonsensical words or phrases to be inadvertently transcribed; Although I have reviewed the note for such errors, some may still exist.    Jose C Novak DPM  4/10/2018  8:18 AM

## 2018-04-12 ENCOUNTER — OFFICE VISIT (OUTPATIENT)
Dept: SLEEP MEDICINE | Facility: HOSPITAL | Age: 63
End: 2018-04-12

## 2018-04-12 VITALS
HEIGHT: 71 IN | WEIGHT: 290 LBS | OXYGEN SATURATION: 98 % | BODY MASS INDEX: 40.6 KG/M2 | DIASTOLIC BLOOD PRESSURE: 77 MMHG | SYSTOLIC BLOOD PRESSURE: 118 MMHG | HEART RATE: 86 BPM

## 2018-04-12 DIAGNOSIS — G47.33 OBSTRUCTIVE SLEEP APNEA, ADULT: Primary | ICD-10-CM

## 2018-04-12 DIAGNOSIS — G25.81 RESTLESS LEGS SYNDROME (RLS): ICD-10-CM

## 2018-04-12 DIAGNOSIS — G24.01 TARDIVE DYSKINESIA: ICD-10-CM

## 2018-04-12 PROCEDURE — 99214 OFFICE O/P EST MOD 30 MIN: CPT | Performed by: INTERNAL MEDICINE

## 2018-04-12 NOTE — PROGRESS NOTES
Sleep Clinic Follow Up    Date: 4/12/2018  Primary Care Physician: Tam Greoc MD      Interim History (1/3):  Since the last visit on 09/12/2017, patient has:      1)  LUCIANA - Has remained compliant with CPAP. He denies mask and machine issues, dry mouth, headaches, pressures intolerance, or non-compliance. He denies abnormal dreams, sleep paralysis, nasal congestion, URI sx.    PAP Data:  Time frame: 09/12/2017 - 04/11/2018   Compliance 89.6%  PAP range : 12.5 cm H2O  Average 90% pressure: 12.5 cmH2O  Leak: 38  seconds   Average AHI 2.0 events/hr  Mask type: FFM  DME: BG    Bed time: 2100  Sleep latency: 30 minutes  Number of times awakens during the night: 3  Wake time: 0730  Estimated total sleep time at night: 6 hours  Caffeine intake: 3 cans  Alcohol intake: none  Nap time: none  Sleepiness with Driving: none    [place] - 1    2) Patient has mild RLS symptoms.     PMHx, FH, SH reviewed and pertinent changes are: tardive dyskinesia with difficulty breathing.     REVIEW OF SYSTEMS:   Negative for chest pain, fever, chills, SOA, abdominal pain. Smoking: none      Exam (6-11/12):    Vitals:    04/12/18 1510   BP: 118/77   Pulse: 86   SpO2: 98%     Body mass index is 40.45 kg/m². Patient's Body mass index is 40.45 kg/m². BMI is above normal parameters. Follow-up plan includes:  referral to primary care.      Gen:  Repetitive lip smacking, tongue protrusion, finger tapping and head rubbing, conversant, pleasant, appears stated age, alert, oriented  Eyes:   Anicteric sclera, moist conjunctiva, no lid lag     PERRLA, EOMI   Heent:   NC/AT    Oropharynx clear, Mallampati 4    normal hearing  Lungs:  Normal effort, non-labored breathing    Clear to auscultation    CV:  Normal S1/S2, no murmur    no lower extremity edema  ABD:  Soft, normal bowel sounds, obese   Psych:  Appropriate affect  Neuro:  CN 2-12 intact    Past Medical History:   Diagnosis Date   • Anxiety    • Arthritis     osteoarthritis   • CKD  "(chronic kidney disease), stage III    • COPD (chronic obstructive pulmonary disease)    • Coronary artery disease    • Depression    • Diabetes mellitus    • DVT (deep venous thrombosis)    • Heart disease    • History of embolic filter insertion    • Hyperlipidemia    • Hypertension    • Injury of back     back surgery x3    • LUCIANA on CPAP    • Pulmonary embolism        Current Outpatient Prescriptions:   •  acetaminophen (TYLENOL) 325 MG tablet, Take 2 tablets by mouth Every 4 (Four) Hours As Needed for Mild Pain ., Disp: , Rfl:   •  aspirin 81 MG chewable tablet, Chew 81 mg Take As Directed. Take 1 pill by mouth Monday, Wednesday, Friday, Disp: , Rfl:   •  Blood Glucose Monitoring Suppl (ACCU-CHEK ARGELIA PLUS) w/Device kit, , Disp: , Rfl:   •  citalopram (CeleXA) 20 MG tablet, Take 20 mg by mouth 2 (Two) Times a Day., Disp: , Rfl:   •  clonazePAM (KlonoPIN) 1 MG tablet, Take 1 tablet by mouth 3 (Three) Times a Day As Needed for Anxiety for up to 7 doses., Disp: 21 tablet, Rfl: 0  •  clotrimazole-betamethasone (LOTRISONE) 1-0.05 % cream, Apply  topically 2 (Two) Times a Day. Apply to affected area twice daily, Disp: 45 g, Rfl: 1  •  COMFORT ASSIST INSULIN SYRINGE 31G X 5/16\" 0.3 ML misc, , Disp: , Rfl:   •  furosemide (LASIX) 20 MG tablet, Take 20 mg by mouth As Needed., Disp: , Rfl:   •  glimepiride (AMARYL) 4 MG tablet, Take 4 mg by mouth 2 (Two) Times a Day., Disp: , Rfl:   •  glucose blood (ONE TOUCH ULTRA TEST) test strip, , Disp: , Rfl:   •  insulin aspart (novoLOG) 100 UNIT/ML injection, Inject 10 Units under the skin 3 (Three) Times a Day Before Meals., Disp: 10 mL, Rfl: 12  •  isosorbide mononitrate (IMDUR) 60 MG 24 hr tablet, Take 1 tablet by mouth Daily., Disp: 30 tablet, Rfl: 1  •  Lancets (ONETOUCH ULTRASOFT) lancets, , Disp: , Rfl:   •  losartan (COZAAR) 50 MG tablet, Take 1 tablet by mouth Daily., Disp: 30 tablet, Rfl: 1  •  Omega-3 Fatty Acids (FISH OIL PO), Take 4 capsules by mouth 2 (Two) Times a " Day., Disp: , Rfl:   •  ondansetron (ZOFRAN) 4 MG tablet, Take 1 tablet by mouth Every 8 (Eight) Hours As Needed for Nausea or Vomiting., Disp: 10 tablet, Rfl: 1  •  ranolazine (RANEXA) 1000 MG 12 hr tablet, Take 1 tablet by mouth Every 12 (Twelve) Hours., Disp: 60 tablet, Rfl: 1  •  tamsulosin (FLOMAX) 0.4 MG capsule 24 hr capsule, Take 1 capsule by mouth Every Night., Disp: , Rfl:   •  vitamin D (ERGOCALCIFEROL) 51603 UNITS capsule capsule, Takes every 2 weeks, Disp: , Rfl:   •  warfarin (COUMADIN) 5 MG tablet, Take 5 mg by mouth Every Night. Patient takes 5 mg every day except MWF when he takes 7.5 mg, Disp: , Rfl:       ASSESSMENT / PLAN:     1. Tardive dyskinesia  1. Moderate to severe and new since last visit  2. Reglan has been stopped  3. Would decrease Celexa by half every week until oFFf  - case discussed with Dr. Greco.  4. Advise to start on muscle relaxer for TD and back pain  2. Obstructive sleep apnea  1. PSG on 2009, AHI of 29  2. Currently on 12.5 cm H2O  3. Continue PAP as prescribed.   4. No need to increase pressure - he has pap dyssynchrony secondary to #1  5. Return to clinic in 1 year with compliance check unless sx change in the interim period.  2. Nocturnal hypoxia - on 2L O2 with CPAP since 2009  3. Restless leg syndrome/ /Whitman-Ekbom disease (RLS/WED)   1. Mild  3. Anxiety disorder -   1. Mild, however, increasing klonopin has been beneficial    Total time 25 min, more than half spent in face to face counseling and coordination of care.     This document has been electronically signed by Jerome Che MD on April 12, 2018         CC: Tam Greco MD          No ref. provider found

## 2018-04-12 NOTE — PATIENT INSTRUCTIONS
1. Tardive dyskinesia  1. Moderate to severe and new since last visit  2. Reglan has been stopped  3. Would decrease Celexa by half every week until oFFf  - case discussed with Dr. Greco.  4. Advise to start on muscle relaxer for Tardive dyskinesia and back pain  2. Obstructive sleep apnea  1. PSG on 2009, AHI of 29  2. Currently on 12.5 cm H2O  3. Continue PAP as prescribed.   4. Script for PAP supplies  5. Return to clinic in 1 year with compliance check unless sx change in the interim period.  2. Nocturnal hypoxia - on 2L O2 with CPAP since 2009  3. Restless leg syndrome/ /Whitman-Ekbom disease (RLS/WED)   1. Mild  3. Anxiety disorder -   1. Mild, however, increasing klonopin has been beneficial      Tardive Dyskinesia      Tardive dyskinesia is a disorder that causes uncontrollable body movements. It occurs in some people who are taking a neuroleptic medicine or have taken this type of medicine in the past. These medicines block the effects of the brain chemical dopamine. In some cases, tardive dyskinesia starts months or years after taking the medicine. Not everyone who takes a neuroleptic medicine will get tardive dyskinesia.  What are the causes?  Tardive dyskinesia is caused by changes in your brain associated with taking a neuroleptic medicine.  What increases the risk?  If you are taking one of these medicines, your risk of tardive dyskinesia may be higher if you:  · Are taking an older type of neuroleptic medicine.  · Have been taking the medicine for a long time at a high dose.  · Are a woman past the age of menopause.  · Are older than 60.  · Have a history of alcohol or drug abuse.  What are the signs or symptoms?  Abnormal, uncontrollable movements are the main symptom of tardive dyskinesia. These types of movements may include:  · Grimacing.  · Sticking out or twisting your tongue.  · Making chewing or sucking sounds.  · Making grunting or sighing noises.  · Blinking your eyes.  · Twisting,  swaying, or thrusting your body.  · Foot tapping or finger waving.  · Rapid movements of your arms or legs.  How is this diagnosed?  Your health care provider may suspect that you have tardive dyskinesia if:  · You have been taking neuroleptic medicines.  · You have uncontrolled abnormal movements.  If you are taking a medicine that can cause tardive dyskinesia, your health care provider may screen you for early signs of the condition. This may include:  · Observing your body movements.  · Using a specific rating scale called the Abnormal Involuntary Movement Scale (AIMS).  You may also have tests to rule out other conditions that cause abnormal body movements, including:  · Parkinson disease.  · [place] disease.  · Stroke.  How is this treated?  The best treatment for tardive dyskinesia is to lower the dosage of your medicine or switch to a different medicine at the first sign of abnormal and uncontrolled movements. There is no cure for long-term (chronic) tardive dyskinesia. Some medicines may help control the movements. These include:  · Clozapine, a medicine used to treat mental illness (antipsychotic).  · Some muscle relaxants.  · Some antiseizure medicines.  · Some medicines used to treat high blood pressure.  · Some tranquilizers (sedatives).  Follow these instructions at home:  · Take medicines only as directed by your health care provider. Do not stop or start taking any medicines without talking to your health care provider first.  · Do not abuse drugs or alcohol.  · Keep all follow-up visits as directed by your health care provider. This is important.  Contact a health care provider if:  · You are unable to eat or drink.  · You have had a fall.  · Your symptoms worsen.  This information is not intended to replace advice given to you by your health care provider. Make sure you discuss any questions you have with your health care provider.  Document Released: 12/08/2003 Document Revised: 08/17/2017  Document Reviewed: 02/20/2015  [employer] Patient Education © 2017 [employer]

## 2018-04-19 ENCOUNTER — HOSPITAL ENCOUNTER (OUTPATIENT)
Dept: MRI IMAGING | Facility: HOSPITAL | Age: 63
Discharge: HOME | End: 2018-04-19
Admitting: PSYCHIATRY & NEUROLOGY

## 2018-04-19 DIAGNOSIS — R25.1 TREMOR: ICD-10-CM

## 2018-04-19 PROCEDURE — 70551 MRI BRAIN STEM W/O DYE: CPT

## 2018-05-21 ENCOUNTER — OFFICE VISIT (OUTPATIENT)
Dept: ORTHOPEDIC SURGERY | Facility: CLINIC | Age: 63
End: 2018-05-21

## 2018-05-21 VITALS — HEIGHT: 71 IN

## 2018-05-21 DIAGNOSIS — Z96.641 HISTORY OF RIGHT HIP REPLACEMENT: ICD-10-CM

## 2018-05-21 DIAGNOSIS — G89.29 CHRONIC PAIN OF RIGHT KNEE: Primary | ICD-10-CM

## 2018-05-21 DIAGNOSIS — M25.561 CHRONIC PAIN OF RIGHT KNEE: Primary | ICD-10-CM

## 2018-05-21 PROCEDURE — 99214 OFFICE O/P EST MOD 30 MIN: CPT | Performed by: NURSE PRACTITIONER

## 2018-05-21 RX ORDER — AMITRIPTYLINE HYDROCHLORIDE 50 MG/1
50 TABLET, FILM COATED ORAL NIGHTLY
COMMUNITY
End: 2018-09-04

## 2018-05-21 RX ORDER — BACLOFEN 10 MG/1
10 TABLET ORAL 3 TIMES DAILY
COMMUNITY
End: 2022-01-17 | Stop reason: ALTCHOICE

## 2018-05-21 NOTE — PROGRESS NOTES
Clint Mack is a 62 y.o. male returns for     Chief Complaint   Patient presents with   • Right Knee - Follow-up       HISTORY OF PRESENT ILLNESS:     62-year-old  male in to the office today for follow-up of his right hip and knee pain.  He is previously evaluated by myself and by Dr. Davies for the pain and had this underwent a revision of his right total hip.  He recently reinjured the right knee which has resulted in continued knee pain however has been unable to have an evaluation for the knee pain since he's recently been diagnosed with tardive dyskinesia from the use of his antidepressants.  His symptoms from the tardive dyskinesia has improved however continues to have this head bobbing motion during today's visit.  He denies any fever chills or evidence of infection       CONCURRENT MEDICAL HISTORY:    Past Medical History:   Diagnosis Date   • Anxiety    • Arthritis     osteoarthritis   • CKD (chronic kidney disease), stage III    • COPD (chronic obstructive pulmonary disease)    • Coronary artery disease    • Depression    • Diabetes mellitus    • DVT (deep venous thrombosis)    • Heart disease    • History of embolic filter insertion    • Hyperlipidemia    • Hypertension    • Injury of back     back surgery x3    • LUCIANA on CPAP    • Pulmonary embolism        Allergies   Allergen Reactions   • Atorvastatin Other (See Comments)     Aches and pains all over   • Celexa [Citalopram Hydrobromide] Dystonia   • Crestor [Rosuvastatin Calcium]    • Lisinopril Other (See Comments)     cough  Constant cough   • Lyrica [Pregabalin] Swelling   • Rosuvastatin Other (See Comments)     Aches and pains all over         Current Outpatient Prescriptions:   •  acetaminophen (TYLENOL) 325 MG tablet, Take 2 tablets by mouth Every 4 (Four) Hours As Needed for Mild Pain ., Disp: , Rfl:   •  amitriptyline (ELAVIL) 50 MG tablet, Take 50 mg by mouth Every Night., Disp: , Rfl:   •  aspirin 81 MG chewable tablet,  "Chew 81 mg Take As Directed. Take 1 pill by mouth Monday, Wednesday, Friday, Disp: , Rfl:   •  baclofen (LIORESAL) 10 MG tablet, Take 10 mg by mouth 3 (Three) Times a Day., Disp: , Rfl:   •  Blood Glucose Monitoring Suppl (ACCU-CHEK ARGELIA PLUS) w/Device kit, , Disp: , Rfl:   •  clonazePAM (KlonoPIN) 1 MG tablet, Take 1 tablet by mouth 3 (Three) Times a Day As Needed for Anxiety for up to 7 doses., Disp: 21 tablet, Rfl: 0  •  COMFORT ASSIST INSULIN SYRINGE 31G X 5/16\" 0.3 ML misc, , Disp: , Rfl:   •  furosemide (LASIX) 20 MG tablet, Take 20 mg by mouth As Needed., Disp: , Rfl:   •  glimepiride (AMARYL) 4 MG tablet, Take 4 mg by mouth 2 (Two) Times a Day., Disp: , Rfl:   •  glucose blood (ONE TOUCH ULTRA TEST) test strip, , Disp: , Rfl:   •  insulin aspart (novoLOG) 100 UNIT/ML injection, Inject 10 Units under the skin 3 (Three) Times a Day Before Meals., Disp: 10 mL, Rfl: 12  •  isosorbide mononitrate (IMDUR) 60 MG 24 hr tablet, Take 1 tablet by mouth Daily., Disp: 30 tablet, Rfl: 1  •  Lancets (ONETOUCH ULTRASOFT) lancets, , Disp: , Rfl:   •  losartan (COZAAR) 50 MG tablet, Take 1 tablet by mouth Daily., Disp: 30 tablet, Rfl: 1  •  Omega-3 Fatty Acids (FISH OIL PO), Take 4 capsules by mouth 2 (Two) Times a Day., Disp: , Rfl:   •  ondansetron (ZOFRAN) 4 MG tablet, Take 1 tablet by mouth Every 8 (Eight) Hours As Needed for Nausea or Vomiting., Disp: 10 tablet, Rfl: 1  •  ranolazine (RANEXA) 1000 MG 12 hr tablet, Take 1 tablet by mouth Every 12 (Twelve) Hours., Disp: 60 tablet, Rfl: 1  •  tamsulosin (FLOMAX) 0.4 MG capsule 24 hr capsule, Take 2 capsules by mouth Daily., Disp: , Rfl:   •  vitamin D (ERGOCALCIFEROL) 93269 UNITS capsule capsule, Takes every 2 weeks, Disp: , Rfl:   •  warfarin (COUMADIN) 5 MG tablet, Take 5 mg by mouth Every Night. Patient takes 5 mg every day except MWF when he takes 7.5 mg, Disp: , Rfl:     Past Surgical History:   Procedure Laterality Date   • BACK SURGERY     • CARDIAC SURGERY  2001 " "  • CIRCUMCISION     • GALLBLADDER SURGERY  2000   • JOINT REPLACEMENT Right 05/02/2016    hip   • SPINE SURGERY         ROS  No fevers or chills.  No chest pain or shortness of air.  No GI or  disturbances.    PHYSICAL EXAMINATION:       Ht 180.3 cm (71\")     Physical Exam   Constitutional: He is oriented to person, place, and time. Vital signs are normal. He appears well-developed and well-nourished. He is cooperative.   HENT:   Head: Normocephalic and atraumatic.   Neck: Trachea normal and phonation normal.   Pulmonary/Chest: Effort normal. No respiratory distress.   Abdominal: Soft. Normal appearance. He exhibits no distension.   Musculoskeletal:        Right knee: He exhibits no effusion.        Left knee: He exhibits no effusion.   Neurological: He is alert and oriented to person, place, and time. GCS eye subscore is 4. GCS verbal subscore is 5. GCS motor subscore is 6.   Skin: Skin is warm, dry and intact.   Psychiatric: He has a normal mood and affect. His speech is normal and behavior is normal. Judgment and thought content normal. Cognition and memory are normal.   Vitals reviewed.      GAIT:     []  Normal  []  Antalgic    Assistive device: []  None  []  Walker     []  Crutches  []  Cane     [x]  Wheelchair  []  Stretcher    Right Knee Exam     Tenderness   The patient is experiencing tenderness in the pes anserinus and medial joint line.    Range of Motion   Extension: normal   Flexion: abnormal     Tests   Tari:  Medial - positive Lateral - positive    Other   Scars: present  Sensation: normal  Pulse: present  Swelling: mild  Other tests: no effusion present      Left Knee Exam     Tenderness   The patient is experiencing no tenderness.         Range of Motion   Extension: normal   Flexion: normal     Other   Erythema: absent  Scars: present  Sensation: normal  Pulse: present  Swelling: none  Effusion: no effusion present      Right Hip Exam     Tenderness   The patient is experiencing tenderness " in the greater trochanter.    Range of Motion   Flexion: abnormal   Internal Rotation: abnormal   External Rotation: abnormal   Abduction: abnormal     Muscle Strength   Abduction: 4/5   Adduction: 4/5   Flexion: 4/5     Other   Erythema: absent  Scars: present  Sensation: normal  Pulse: present      Left Hip Exam   Left hip exam is normal.              No results found.          ASSESSMENT:    Diagnoses and all orders for this visit:    Chronic pain of right knee  -     MRI Knee Right Without Contrast; Future  -     NM Bone Scan Whole Body; Future    History of right hip replacement  -     NM Bone Scan Whole Body; Future    Other orders  -     baclofen (LIORESAL) 10 MG tablet; Take 10 mg by mouth 3 (Three) Times a Day.  -     amitriptyline (ELAVIL) 50 MG tablet; Take 50 mg by mouth Every Night.          PLAN  Recommended an MRI of the right knee to rule out a meniscus tear and a bone scan for further evaluation of his total hip arthroplasty.  No Follow-up on file.    Edvin Montoya, APRN

## 2018-05-30 ENCOUNTER — HOSPITAL ENCOUNTER (OUTPATIENT)
Dept: NUCLEAR MEDICINE | Facility: HOSPITAL | Age: 63
Discharge: HOME | End: 2018-05-30

## 2018-05-30 DIAGNOSIS — Z96.641 HISTORY OF RIGHT HIP REPLACEMENT: ICD-10-CM

## 2018-05-30 DIAGNOSIS — M25.561 CHRONIC PAIN OF RIGHT KNEE: ICD-10-CM

## 2018-05-30 DIAGNOSIS — G89.29 CHRONIC PAIN OF RIGHT KNEE: ICD-10-CM

## 2018-05-30 PROCEDURE — 78306 BONE IMAGING WHOLE BODY: CPT

## 2018-05-30 PROCEDURE — 0 TECHNETIUM MEDRONATE KIT: Performed by: NURSE PRACTITIONER

## 2018-05-30 PROCEDURE — A9503 TC99M MEDRONATE: HCPCS | Performed by: NURSE PRACTITIONER

## 2018-05-30 RX ORDER — TC 99M MEDRONATE 20 MG/10ML
25.8 INJECTION, POWDER, LYOPHILIZED, FOR SOLUTION INTRAVENOUS
Status: COMPLETED | OUTPATIENT
Start: 2018-05-30 | End: 2018-05-30

## 2018-05-30 RX ADMIN — Medication 25.8 MILLICURIE: at 10:20

## 2018-06-05 DIAGNOSIS — G89.29 CHRONIC PAIN OF RIGHT KNEE: ICD-10-CM

## 2018-06-05 DIAGNOSIS — M25.561 CHRONIC PAIN OF RIGHT KNEE: ICD-10-CM

## 2018-06-08 ENCOUNTER — OFFICE VISIT (OUTPATIENT)
Dept: ORTHOPEDIC SURGERY | Facility: CLINIC | Age: 63
End: 2018-06-08

## 2018-06-08 VITALS — BODY MASS INDEX: 43.68 KG/M2 | WEIGHT: 312 LBS | HEIGHT: 71 IN

## 2018-06-08 DIAGNOSIS — Z96.641 PRESENCE OF RIGHT ARTIFICIAL HIP JOINT: Primary | ICD-10-CM

## 2018-06-08 DIAGNOSIS — G89.29 CHRONIC PAIN OF RIGHT KNEE: ICD-10-CM

## 2018-06-08 DIAGNOSIS — M25.561 CHRONIC PAIN OF RIGHT KNEE: ICD-10-CM

## 2018-06-08 PROCEDURE — 99214 OFFICE O/P EST MOD 30 MIN: CPT | Performed by: NURSE PRACTITIONER

## 2018-06-08 NOTE — PROGRESS NOTES
Clint Mack is a 62 y.o. male returns for     Chief Complaint   Patient presents with   • Right Knee - Follow-up   • Results     MRI       HISTORY OF PRESENT ILLNESS:     62-year-old  male in the office today for follow-up of right hip and knee pain.  He's obtain his MRI and bone scan as directed and his pain continues without improvement.       CONCURRENT MEDICAL HISTORY:    Past Medical History:   Diagnosis Date   • Anxiety    • Arthritis     osteoarthritis   • CKD (chronic kidney disease), stage III    • COPD (chronic obstructive pulmonary disease)    • Coronary artery disease    • Depression    • Diabetes mellitus    • DVT (deep venous thrombosis)    • Heart disease    • History of embolic filter insertion    • Hyperlipidemia    • Hypertension    • Injury of back     back surgery x3    • LUCIANA on CPAP    • Pulmonary embolism        Allergies   Allergen Reactions   • Atorvastatin Other (See Comments)     Aches and pains all over   • Celexa [Citalopram Hydrobromide] Dystonia   • Crestor [Rosuvastatin Calcium]    • Lisinopril Other (See Comments)     cough  Constant cough   • Lyrica [Pregabalin] Swelling   • Rosuvastatin Other (See Comments)     Aches and pains all over         Current Outpatient Prescriptions:   •  acetaminophen (TYLENOL) 325 MG tablet, Take 2 tablets by mouth Every 4 (Four) Hours As Needed for Mild Pain ., Disp: , Rfl:   •  amitriptyline (ELAVIL) 50 MG tablet, Take 50 mg by mouth Every Night., Disp: , Rfl:   •  aspirin 81 MG chewable tablet, Chew 81 mg Take As Directed. Take 1 pill by mouth Monday, Wednesday, Friday, Disp: , Rfl:   •  baclofen (LIORESAL) 10 MG tablet, Take 10 mg by mouth 3 (Three) Times a Day., Disp: , Rfl:   •  Blood Glucose Monitoring Suppl (ACCU-CHEK ARGELIA PLUS) w/Device kit, , Disp: , Rfl:   •  clonazePAM (KlonoPIN) 1 MG tablet, Take 1 tablet by mouth 3 (Three) Times a Day As Needed for Anxiety for up to 7 doses., Disp: 21 tablet, Rfl: 0  •  COMFORT ASSIST  "INSULIN SYRINGE 31G X 5/16\" 0.3 ML misc, , Disp: , Rfl:   •  furosemide (LASIX) 20 MG tablet, Take 20 mg by mouth As Needed., Disp: , Rfl:   •  glimepiride (AMARYL) 4 MG tablet, Take 4 mg by mouth 2 (Two) Times a Day., Disp: , Rfl:   •  glucose blood (ONE TOUCH ULTRA TEST) test strip, , Disp: , Rfl:   •  insulin aspart (novoLOG) 100 UNIT/ML injection, Inject 10 Units under the skin 3 (Three) Times a Day Before Meals., Disp: 10 mL, Rfl: 12  •  isosorbide mononitrate (IMDUR) 60 MG 24 hr tablet, Take 1 tablet by mouth Daily., Disp: 30 tablet, Rfl: 1  •  Lancets (ONETOUCH ULTRASOFT) lancets, , Disp: , Rfl:   •  losartan (COZAAR) 50 MG tablet, Take 1 tablet by mouth Daily., Disp: 30 tablet, Rfl: 1  •  Omega-3 Fatty Acids (FISH OIL PO), Take 4 capsules by mouth 2 (Two) Times a Day., Disp: , Rfl:   •  ondansetron (ZOFRAN) 4 MG tablet, Take 1 tablet by mouth Every 8 (Eight) Hours As Needed for Nausea or Vomiting., Disp: 10 tablet, Rfl: 1  •  ranolazine (RANEXA) 1000 MG 12 hr tablet, Take 1 tablet by mouth Every 12 (Twelve) Hours., Disp: 60 tablet, Rfl: 1  •  tamsulosin (FLOMAX) 0.4 MG capsule 24 hr capsule, Take 2 capsules by mouth Daily., Disp: , Rfl:   •  vitamin D (ERGOCALCIFEROL) 55850 UNITS capsule capsule, Takes every 2 weeks, Disp: , Rfl:   •  warfarin (COUMADIN) 5 MG tablet, Take 5 mg by mouth Every Night. Patient takes 5 mg every day except MWF when he takes 7.5 mg, Disp: , Rfl:     Past Surgical History:   Procedure Laterality Date   • BACK SURGERY     • CARDIAC SURGERY  2001   • CIRCUMCISION     • GALLBLADDER SURGERY  2000   • JOINT REPLACEMENT Right 05/02/2016    hip   • SPINE SURGERY         ROS  No fevers or chills.  No chest pain or shortness of air.  No GI or  disturbances.    PHYSICAL EXAMINATION:       Ht 180.3 cm (71\")   Wt (!) 142 kg (312 lb)   BMI 43.52 kg/m²     Physical Exam   Constitutional: He is oriented to person, place, and time. Vital signs are normal. He appears well-developed and " well-nourished. He is cooperative. No distress.   HENT:   Head: Normocephalic and atraumatic.   Eyes: Conjunctivae are normal. Pupils are equal, round, and reactive to light.   Neck: Trachea normal and phonation normal. Neck supple. No tracheal deviation present.   Cardiovascular: Normal rate and intact distal pulses.    Pulmonary/Chest: Effort normal. No respiratory distress. He exhibits no tenderness.   Abdominal: Soft. Normal appearance. He exhibits no distension and no mass. There is no tenderness.   Musculoskeletal:        Right knee: He exhibits no effusion.        Left knee: He exhibits no effusion.   Neurological: He is alert and oriented to person, place, and time. GCS eye subscore is 4. GCS verbal subscore is 5. GCS motor subscore is 6.   Skin: Skin is warm, dry and intact. No rash noted.   Psychiatric: He has a normal mood and affect. His speech is normal and behavior is normal. Judgment and thought content normal. Cognition and memory are normal.   Vitals reviewed.      GAIT:     []  Normal  []  Antalgic    Assistive device: []  None  [x]  Walker     []  Crutches  []  Cane     []  Wheelchair  []  Stretcher    Right Knee Exam     Tenderness   The patient is experiencing tenderness in the pes anserinus and medial joint line.    Range of Motion   Extension: normal   Flexion: abnormal     Tests   Tari:  Medial - positive Lateral - positive    Other   Scars: present  Sensation: normal  Pulse: present  Swelling: mild  Other tests: no effusion present      Left Knee Exam     Tenderness   The patient is experiencing no tenderness.         Range of Motion   Extension: normal   Flexion: normal     Other   Erythema: absent  Scars: present  Sensation: normal  Pulse: present  Swelling: none  Effusion: no effusion present      Right Hip Exam     Tenderness   The patient is experiencing tenderness in the greater trochanter.    Range of Motion   Flexion: abnormal   Internal Rotation: abnormal   External Rotation:  abnormal   Abduction: abnormal     Muscle Strength   Abduction: 4/5   Adduction: 4/5   Flexion: 4/5     Other   Erythema: absent  Scars: present  Sensation: normal  Pulse: present      Left Hip Exam   Left hip exam is normal.              Nm Bone Scan Whole Body    Result Date: 5/30/2018  Narrative: EXAM:  Whole body bone scan HISTORY:  History of right hip, right knee pain    COMPARISON:   Prior bone scan November 20, 2015. Prior CT examination August 10, 2015 DOSE:  21    mCi of technetium labeled MDP (I.V.) TECHNIQUE:  Whole body scan, anterior and posterior planar images FINDINGS: Physiologic distribution is noted in the kidneys and urinary bladder. Photopenia right hip at the site of a bipolar right femoral head arthroplasty. No significant increased uptake. Subtle increased uptake lateral aspect right knee probably arthritic in nature. Symmetrical increased uptake both ankles, arthritic in nature. Subtle increased uptake posterior navicular joints, arthritic in nature. Whole body bone scan is otherwise unremarkable.     Impression: CONCLUSION: An area of photopenia right femoral head and neck at the site of a bipolar right femoral head arthroplasty. No pathologic uptake in the right hip. No findings suggesting active disease. Subtle increased uptake right knee lateral aspect, bilateral ankles indicating arthritic changes. Subtle uptake sternoclavicular  joints also representing arthritic changes. Whole-body bone scan is otherwise unremarkable. Electronically signed by:  Bill Soriano MD  5/30/2018 4:10 PM CDT Workstation: [id number]          MRI Knee Right without Contrast  5/21/2018          ASSESSMENT:    Diagnoses and all orders for this visit:    Presence of right artificial hip joint    Chronic pain of right knee          PLAN  No evidence of hardware loosening and the MRI of the right knee is negative.  We'll recommend continue progression of range of motion, home exercises with the focus on quad  strengthening and follow-up as needed for exacerbations of pain.  No Follow-up on file.    Edvin Montoya, APRN

## 2018-07-10 ENCOUNTER — OFFICE VISIT (OUTPATIENT)
Dept: PODIATRY | Facility: CLINIC | Age: 63
End: 2018-07-10

## 2018-07-10 VITALS — BODY MASS INDEX: 43.45 KG/M2 | HEIGHT: 71 IN | WEIGHT: 310.4 LBS

## 2018-07-10 DIAGNOSIS — B35.1 ONYCHOMYCOSIS: Primary | ICD-10-CM

## 2018-07-10 DIAGNOSIS — E11.42 DIABETIC POLYNEUROPATHY ASSOCIATED WITH TYPE 2 DIABETES MELLITUS (HCC): ICD-10-CM

## 2018-07-10 DIAGNOSIS — M79.674 PAIN IN TOES OF BOTH FEET: ICD-10-CM

## 2018-07-10 DIAGNOSIS — M79.675 PAIN IN TOES OF BOTH FEET: ICD-10-CM

## 2018-07-10 PROCEDURE — 11721 DEBRIDE NAIL 6 OR MORE: CPT | Performed by: PODIATRIST

## 2018-07-10 NOTE — PROGRESS NOTES
Clint Mack  1955  62 y.o. male   BS-131 per patient  PCP-Dr. Greco next appointment 7/12/2018.  Patient presents today for diabetic nail care.  07/10/2018        Chief Complaint   Patient presents with   • Left Foot - Diabetic foot care   • Right Foot - Diabetic foot care           History of Present Illness    Clint Mack is a 62 y.o. male presents for f/u diabetic foot exam and nail care.  Scaling of the soles of his feet improved since last visit    Past Medical History:   Diagnosis Date   • Anxiety    • Arthritis     osteoarthritis   • CKD (chronic kidney disease), stage III    • COPD (chronic obstructive pulmonary disease) (CMS/[facility])    • Coronary artery disease    • Depression    • Diabetes mellitus (CMS/[facility])    • DVT (deep venous thrombosis) (CMS/[facility])    • Heart disease    • History of embolic filter insertion    • Hyperlipidemia    • Hypertension    • Injury of back     back surgery x3    • LUCIANA on CPAP    • Pulmonary embolism (CMS/[facility])          Past Surgical History:   Procedure Laterality Date   • BACK SURGERY     • CARDIAC SURGERY  2001   • CIRCUMCISION     • GALLBLADDER SURGERY  2000   • JOINT REPLACEMENT Right 05/02/2016    hip   • SPINE SURGERY           Family History   Problem Relation Age of Onset   • Heart disease Father    • Hypertension Father    • Stroke Father    • Diabetes Sister    • Heart disease Brother    • Hypertension Brother    • COPD Brother          Social History     Social History   • Marital status:      Spouse name: N/A   • Number of children: N/A   • Years of education: N/A     Occupational History   • Not on file.     Social History Main Topics   • Smoking status: Never Smoker   • Smokeless tobacco: Never Used   • Alcohol use No   • Drug use: No   • Sexual activity: Defer     Other Topics Concern   • Not on file     Social History Narrative   • No narrative on file         Current Outpatient Prescriptions   Medication Sig Dispense Refill   •  "acetaminophen (TYLENOL) 325 MG tablet Take 2 tablets by mouth Every 4 (Four) Hours As Needed for Mild Pain .     • amitriptyline (ELAVIL) 50 MG tablet Take 50 mg by mouth Every Night.     • aspirin 81 MG chewable tablet Chew 81 mg Take As Directed. Take 1 pill by mouth Monday, Wednesday, Friday     • baclofen (LIORESAL) 10 MG tablet Take 10 mg by mouth 3 (Three) Times a Day.     • Blood Glucose Monitoring Suppl (ACCU-CHEK ARGELIA PLUS) w/Device kit      • clonazePAM (KlonoPIN) 1 MG tablet Take 1 tablet by mouth 3 (Three) Times a Day As Needed for Anxiety for up to 7 doses. 21 tablet 0   • COMFORT ASSIST INSULIN SYRINGE 31G X 5/16\" 0.3 ML misc      • furosemide (LASIX) 20 MG tablet Take 20 mg by mouth As Needed.     • glimepiride (AMARYL) 4 MG tablet Take 4 mg by mouth 2 (Two) Times a Day.     • glucose blood (ONE TOUCH ULTRA TEST) test strip      • insulin aspart (novoLOG) 100 UNIT/ML injection Inject 10 Units under the skin 3 (Three) Times a Day Before Meals. 10 mL 12   • isosorbide mononitrate (IMDUR) 60 MG 24 hr tablet Take 1 tablet by mouth Daily. 30 tablet 1   • Lancets (ONETOUCH ULTRASOFT) lancets      • losartan (COZAAR) 50 MG tablet Take 1 tablet by mouth Daily. 30 tablet 1   • Omega-3 Fatty Acids (FISH OIL PO) Take 4 capsules by mouth 2 (Two) Times a Day.     • ondansetron (ZOFRAN) 4 MG tablet Take 1 tablet by mouth Every 8 (Eight) Hours As Needed for Nausea or Vomiting. 10 tablet 1   • ranolazine (RANEXA) 1000 MG 12 hr tablet Take 1 tablet by mouth Every 12 (Twelve) Hours. 60 tablet 1   • tamsulosin (FLOMAX) 0.4 MG capsule 24 hr capsule Take 2 capsules by mouth Daily.     • vitamin D (ERGOCALCIFEROL) 16276 UNITS capsule capsule Takes every 2 weeks     • warfarin (COUMADIN) 5 MG tablet Take 5 mg by mouth Every Night. Patient takes 5 mg every day except MWF when he takes 7.5 mg       No current facility-administered medications for this visit.          OBJECTIVE    Ht 180.3 cm (71\")   Wt (!) 141 kg (310 lb " 6.4 oz)   BMI 43.29 kg/m²       Review of Systems   Constitutional:  Denies recent weight loss, weight gain, fever or chills, no change in exercise tolerance  Musculoskeletal: Toe pain.   Skin:  Thickened nails. Shin discoloration.  Neurological:  Burning sensations to feet b/l.  Psychiatric/Behavioral: Denies depression      Physical Exam    Clint had a diabetic foot exam performed today.      Constitutional: he appears well-developed and well-nourished.   HEENT: Normocephalic. Atraumatic  CV: No tenderness. RRR  Resp: Non-labored respiration. No wheezes.   Psychiatric: he has a normal mood and affect. his   behavior is normal.      Lower Extremity Exam:  Vascular: DP/PT pulses palpable 1+.   Negative hair growth.   1+ perimalleolar edema  Toes cool  Neuro: Protective sensation diminished to lesser toes, b/l.  DTRs intact  Integument: No open wounds or lesions.  Mature subungual hematoma left hallux.  No signs of infection.  Atrophic skin noted b/l with chronic venous stasis dermatitis changes  Mild xerosis  No masses  Musculoskeletal: LE muscle strength 4/5 on left, 3/5 on right  Gait assisted with walker, wheel chair  Semi-rigid hammertoe deformity toes 2-5 b/l.  Nails 1-5 b/l thickened, elongated with subungual debris. +pain on palpation              ASSESSMENT AND PLAN    Clint was seen today for diabetic foot care and diabetic foot care.    Diagnoses and all orders for this visit:    Onychomycosis    Diabetic polyneuropathy associated with type 2 diabetes mellitus (CMS/HCC)    Pain in toes of both feet      -Comprehensive DM foot exam performed. Pt educated on importance of tight glucose control and daily foot checks.  -Pt educated on padding techniques for rigid hammertoes.  Proper extra depth diabetic shoe gear.  Limit barefoot walking.  -Nails 1-5 b/l debrided in length and thickness with nail nipper to decrease pain, fungal load and risk of infection  -Follow up 3 months PRN          This document has  been electronically signed by Jose C Novak DPM on July 12, 2018 4:08 PM     EMR Dragon/Transcription disclaimer:   Much of this encounter note is an electronic transcription/translation of spoken language to printed text. The electronic translation of spoken language may permit erroneous, or at times, nonsensical words or phrases to be inadvertently transcribed; Although I have reviewed the note for such errors, some may still exist.    Jose C Novak DPM  7/12/2018  4:08 PM

## 2018-07-31 ENCOUNTER — OFFICE VISIT (OUTPATIENT)
Dept: SLEEP MEDICINE | Facility: HOSPITAL | Age: 63
End: 2018-07-31

## 2018-07-31 VITALS
DIASTOLIC BLOOD PRESSURE: 94 MMHG | BODY MASS INDEX: 43.4 KG/M2 | SYSTOLIC BLOOD PRESSURE: 149 MMHG | HEIGHT: 71 IN | OXYGEN SATURATION: 98 % | WEIGHT: 310 LBS

## 2018-07-31 DIAGNOSIS — G25.81 RESTLESS LEGS SYNDROME (RLS): ICD-10-CM

## 2018-07-31 DIAGNOSIS — G47.33 OBSTRUCTIVE SLEEP APNEA, ADULT: Primary | ICD-10-CM

## 2018-07-31 PROCEDURE — 99214 OFFICE O/P EST MOD 30 MIN: CPT | Performed by: INTERNAL MEDICINE

## 2018-07-31 NOTE — PROGRESS NOTES
Sleep Clinic Follow Up    Date: 7/31/2018  Primary Care Physician: Tam Greco MD      Interim History (1/3):  Since the last visit on 04/12/2018, patient has:      1)  LUCIANA - Has remained compliant with CPAP. He denies mask and machine issues, dry mouth, headaches, pressures intolerance, or non-compliance. He denies abnormal dreams, sleep paralysis, nasal congestion, URI sx.  Patient complains of tardive dyskinesia x 1.5 years.     PAP Data:    Time frame: 4/12/2018 - 7/30/2018   Compliance 100 %  Average use on days used: 9hrs 40 min  Percent of days with usage greater than or equal to 4 hours: 100%  PAP range : 12.5 cm H2O  Average 90% pressure: 12.5 cmH2O  Leak: 0 minutes  Average AHI 0.6 events/hr  Mask type: FFM  DME: BG      Bed time: 2200  Sleep latency: 120-180 minutes  Number of times awakens during the night: 2-3  Wake time: 0730  Estimated total sleep time at night: 5 hours  Caffeine intake: 0oz of coffee, 0oz of tea, and 36oz of soda  Alcohol intake: 0 drinks per week  Nap time: in the morning   Sleepiness with Driving: N/A      Hermon - 0    2) Patient denies RLS symptoms.     PMHx, FH, SH reviewed and pertinent changes are: started on baclofen, celexa stopped and his weakness is better, dyskinesia is better, no longer needs wheelchair. Using a rolling walker.    REVIEW OF SYSTEMS:   Negative for chest pain, fever, chills, SOA, abdominal pain. Smoking: none      Exam (6-11/12):    Vitals:    07/31/18 0903   BP: 149/94   SpO2: 98%           Body mass index is 43.24 kg/m². Patient's Body mass index is 43.24 kg/m². BMI is above normal parameters. Recommendations include: referral to primary care.      Gen:  No distress, conversant, pleasant, appears stated age, alert, oriented, improved TD  Eyes:   Anicteric sclera, moist conjunctiva, no lid lag     PERRLA, EOMI   Heent:   NC/AT    Oropharynx clear, Mallampati 4    reduced hearing  Lungs:  Normal effort, non-labored breathing    Clear to  "auscultation    CV:  Normal S1/S2, no murmur    no lower extremity edema  ABD:  Soft, normal bowel sounds    Psych:  Appropriate affect  Neuro:  CN 2-12 intact    Past Medical History:   Diagnosis Date   • Anxiety    • Arthritis     osteoarthritis   • CKD (chronic kidney disease), stage III    • COPD (chronic obstructive pulmonary disease) (CMS/[facility])    • Coronary artery disease    • Depression    • Diabetes mellitus (CMS/[facility])    • DVT (deep venous thrombosis) (CMS/[facility])    • Heart disease    • History of embolic filter insertion    • Hyperlipidemia    • Hypertension    • Injury of back     back surgery x3    • LUCIANA on CPAP    • Pulmonary embolism (CMS/[facility])        Current Outpatient Prescriptions:   •  acetaminophen (TYLENOL) 325 MG tablet, Take 2 tablets by mouth Every 4 (Four) Hours As Needed for Mild Pain ., Disp: , Rfl:   •  amitriptyline (ELAVIL) 50 MG tablet, Take 50 mg by mouth Every Night., Disp: , Rfl:   •  aspirin 81 MG chewable tablet, Chew 81 mg Take As Directed. Take 1 pill by mouth Monday, Wednesday, Friday, Disp: , Rfl:   •  baclofen (LIORESAL) 10 MG tablet, Take 10 mg by mouth 3 (Three) Times a Day., Disp: , Rfl:   •  Blood Glucose Monitoring Suppl (ACCU-CHEK AGRELIA PLUS) w/Device kit, , Disp: , Rfl:   •  clonazePAM (KlonoPIN) 1 MG tablet, Take 1 tablet by mouth 3 (Three) Times a Day As Needed for Anxiety for up to 7 doses., Disp: 21 tablet, Rfl: 0  •  COMFORT ASSIST INSULIN SYRINGE 31G X 5/16\" 0.3 ML misc, , Disp: , Rfl:   •  furosemide (LASIX) 20 MG tablet, Take 20 mg by mouth As Needed., Disp: , Rfl:   •  glimepiride (AMARYL) 4 MG tablet, Take 4 mg by mouth 2 (Two) Times a Day., Disp: , Rfl:   •  glucose blood (ONE TOUCH ULTRA TEST) test strip, , Disp: , Rfl:   •  insulin aspart (novoLOG) 100 UNIT/ML injection, Inject 10 Units under the skin 3 (Three) Times a Day Before Meals., Disp: 10 mL, Rfl: 12  •  isosorbide mononitrate (IMDUR) 60 MG 24 hr tablet, Take 1 tablet by mouth Daily., Disp: 30 tablet, " Rfl: 1  •  Lancets (ONETOUCH ULTRASOFT) lancets, , Disp: , Rfl:   •  losartan (COZAAR) 50 MG tablet, Take 1 tablet by mouth Daily., Disp: 30 tablet, Rfl: 1  •  Omega-3 Fatty Acids (FISH OIL PO), Take 4 capsules by mouth 2 (Two) Times a Day., Disp: , Rfl:   •  ondansetron (ZOFRAN) 4 MG tablet, Take 1 tablet by mouth Every 8 (Eight) Hours As Needed for Nausea or Vomiting., Disp: 10 tablet, Rfl: 1  •  ranolazine (RANEXA) 1000 MG 12 hr tablet, Take 1 tablet by mouth Every 12 (Twelve) Hours., Disp: 60 tablet, Rfl: 1  •  tamsulosin (FLOMAX) 0.4 MG capsule 24 hr capsule, Take 2 capsules by mouth Daily., Disp: , Rfl:   •  vitamin D (ERGOCALCIFEROL) 55256 UNITS capsule capsule, Takes every 2 weeks, Disp: , Rfl:   •  warfarin (COUMADIN) 5 MG tablet, Take 5 mg by mouth Every Night. Patient takes 5 mg every day except MWF when he takes 7.5 mg, Disp: , Rfl:       ASSESSMENT / PLAN:    1. Tardive dyskinesia  1. Stable  2. Obstructive sleep apnea  1. PSG on 2009, AHI of 29  2. Currently on 12.5 cm H2O  3. Continue PAP as prescribed.   4. Return to clinic in 1 year with compliance check unless sx change in the interim period.  2. Nocturnal hypoxia - on 2L O2 with CPAP since 2009  3. Restless leg syndrome/ /Whitman-Ekbom disease (RLS/WED)  - no current sx - on baclofen for back pain  3. Anxiety disorder -   1. Mild, however, klonopin has been beneficial      Total time 25 min, more than half spent in face to face counseling and coordination of care.    RTC in 12 months     This document has been electronically signed by Efrain Matute Jr, MD on July 31, 2018         CC: Tam Greco MD          No ref. provider found  I reviewed the history, notes, and assesments with the resident in front of the patient.  I independently examined the patient and corroborated the history as recorded. I agree with the signed findings and have made changes to the documentation where necessary.        This document has been electronically  signed by Jerome Che MD on July 31, 2018 9:29 AM

## 2018-09-11 ENCOUNTER — ANESTHESIA (OUTPATIENT)
Dept: GASTROENTEROLOGY | Facility: HOSPITAL | Age: 63
End: 2018-09-11

## 2018-09-11 ENCOUNTER — HOSPITAL ENCOUNTER (OUTPATIENT)
Facility: HOSPITAL | Age: 63
Setting detail: HOSPITAL OUTPATIENT SURGERY
Discharge: HOME | End: 2018-09-11
Attending: INTERNAL MEDICINE | Admitting: INTERNAL MEDICINE

## 2018-09-11 ENCOUNTER — ANESTHESIA EVENT (OUTPATIENT)
Dept: GASTROENTEROLOGY | Facility: HOSPITAL | Age: 63
End: 2018-09-11

## 2018-09-11 VITALS
WEIGHT: 315 LBS | RESPIRATION RATE: 20 BRPM | HEART RATE: 73 BPM | TEMPERATURE: 98 F | SYSTOLIC BLOOD PRESSURE: 123 MMHG | OXYGEN SATURATION: 96 % | BODY MASS INDEX: 44.1 KG/M2 | HEIGHT: 71 IN | DIASTOLIC BLOOD PRESSURE: 62 MMHG

## 2018-09-11 DIAGNOSIS — K30 GASTROINTESTINAL DISTRESS: ICD-10-CM

## 2018-09-11 DIAGNOSIS — Z12.11 ENCOUNTER FOR SCREENING COLONOSCOPY: ICD-10-CM

## 2018-09-11 PROCEDURE — 87071 CULTURE AEROBIC QUANT OTHER: CPT | Performed by: INTERNAL MEDICINE

## 2018-09-11 PROCEDURE — 88305 TISSUE EXAM BY PATHOLOGIST: CPT | Performed by: INTERNAL MEDICINE

## 2018-09-11 PROCEDURE — 88305 TISSUE EXAM BY PATHOLOGIST: CPT | Performed by: PATHOLOGY

## 2018-09-11 PROCEDURE — 25010000002 PROPOFOL 10 MG/ML EMULSION: Performed by: NURSE ANESTHETIST, CERTIFIED REGISTERED

## 2018-09-11 RX ORDER — PROPOFOL 10 MG/ML
VIAL (ML) INTRAVENOUS AS NEEDED
Status: DISCONTINUED | OUTPATIENT
Start: 2018-09-11 | End: 2018-09-11 | Stop reason: SURG

## 2018-09-11 RX ORDER — LIDOCAINE HYDROCHLORIDE 10 MG/ML
INJECTION, SOLUTION INFILTRATION; PERINEURAL AS NEEDED
Status: DISCONTINUED | OUTPATIENT
Start: 2018-09-11 | End: 2018-09-11 | Stop reason: SURG

## 2018-09-11 RX ORDER — DEXTROSE AND SODIUM CHLORIDE 5; .45 G/100ML; G/100ML
20 INJECTION, SOLUTION INTRAVENOUS CONTINUOUS
Status: DISCONTINUED | OUTPATIENT
Start: 2018-09-11 | End: 2018-09-11 | Stop reason: HOSPADM

## 2018-09-11 RX ADMIN — LIDOCAINE HYDROCHLORIDE 50 MG: 10 INJECTION, SOLUTION INFILTRATION; PERINEURAL at 08:40

## 2018-09-11 RX ADMIN — PROPOFOL 150 MG: 10 INJECTION, EMULSION INTRAVENOUS at 08:40

## 2018-09-11 RX ADMIN — DEXTROSE AND SODIUM CHLORIDE 20 ML/HR: 5; 450 INJECTION, SOLUTION INTRAVENOUS at 08:32

## 2018-09-11 RX ADMIN — PROPOFOL 50 MG: 10 INJECTION, EMULSION INTRAVENOUS at 08:45

## 2018-09-11 NOTE — H&P
Neyda Rashid DO,[facility]  Gastroenterology  Hepatology  Endoscopy  Board Certified in Internal Medicine and gastroenterology  [address], [address]  [place]. [address]  - [phone]   F - [phone]     GASTROENTEROLOGY HISTORY AND PHYSICAL  NOTE   NEYDA RASHID DO.         SUBJECTIVE:   9/11/2018    Name: Clint Mack  DOD: 1955        Chief Complaint:       Subjective : Dyspepsia with no improvement with medical treatments.  Screening for colon cancer    Patient is 62 y.o. male presents with desire for elective EGD and colonoscopy.      ROS/HISTORY/ CURRENT MEDICATIONS/OBJECTIVE/VS/PE:   Review of Systems:   Review of Systems    History:     Past Medical History:   Diagnosis Date   • Anxiety    • Arthritis     osteoarthritis   • CKD (chronic kidney disease), stage III    • COPD (chronic obstructive pulmonary disease) (CMS/HCC)    • Coronary artery disease    • Depression    • Diabetes mellitus (CMS/HCC)    • DVT (deep venous thrombosis) (CMS/HCC)    • Heart disease    • History of embolic filter insertion    • Hyperlipidemia    • Hypertension    • Injury of back     back surgery x3    • LUCIANA on CPAP    • Pulmonary embolism (CMS/HCC)      Past Surgical History:   Procedure Laterality Date   • BACK SURGERY     • CARDIAC SURGERY  2001   • CIRCUMCISION     • GALLBLADDER SURGERY  2000   • JOINT REPLACEMENT Right 05/02/2016    hip   • SPINE SURGERY       Family History   Problem Relation Age of Onset   • Heart disease Father    • Hypertension Father    • Stroke Father    • Diabetes Sister    • Heart disease Brother    • Hypertension Brother    • COPD Brother      Social History   Substance Use Topics   • Smoking status: Never Smoker   • Smokeless tobacco: Never Used   • Alcohol use No     Prescriptions Prior to Admission   Medication Sig Dispense Refill Last Dose   • acetaminophen (TYLENOL) 325 MG tablet Take 2 tablets by mouth Every 4 (Four) Hours As Needed for Mild Pain .    "Taking   • aspirin 81 MG chewable tablet Chew 81 mg Take As Directed. Take 1 pill by mouth Monday, Wednesday, Friday   Taking   • baclofen (LIORESAL) 10 MG tablet Take 10 mg by mouth 3 (Three) Times a Day.   Taking   • Blood Glucose Monitoring Suppl (ACCU-CHEK ARGELIA PLUS) w/Device kit    Taking   • clonazePAM (KlonoPIN) 1 MG tablet Take 1 tablet by mouth 3 (Three) Times a Day As Needed for Anxiety for up to 7 doses. 21 tablet 0 Taking   • COMFORT ASSIST INSULIN SYRINGE 31G X 5/16\" 0.3 ML misc    Taking   • furosemide (LASIX) 20 MG tablet Take 20 mg by mouth As Needed.   Taking   • glimepiride (AMARYL) 4 MG tablet Take 4 mg by mouth 2 (Two) Times a Day.   Taking   • glucose blood (ONE TOUCH ULTRA TEST) test strip    Taking   • insulin aspart (novoLOG) 100 UNIT/ML injection Inject 10 Units under the skin 3 (Three) Times a Day Before Meals. 10 mL 12 Taking   • isosorbide mononitrate (IMDUR) 60 MG 24 hr tablet Take 1 tablet by mouth Daily. 30 tablet 1 Taking   • Lancets (ONETOUCH ULTRASOFT) lancets    Taking   • losartan (COZAAR) 50 MG tablet Take 1 tablet by mouth Daily. 30 tablet 1 Taking   • Omega-3 Fatty Acids (FISH OIL PO) Take 4 capsules by mouth 2 (Two) Times a Day.   Taking   • ondansetron (ZOFRAN) 4 MG tablet Take 1 tablet by mouth Every 8 (Eight) Hours As Needed for Nausea or Vomiting. 10 tablet 1 Taking   • ranolazine (RANEXA) 1000 MG 12 hr tablet Take 1 tablet by mouth Every 12 (Twelve) Hours. 60 tablet 1 Taking   • tamsulosin (FLOMAX) 0.4 MG capsule 24 hr capsule Take 2 capsules by mouth Daily.   Taking   • vitamin D (ERGOCALCIFEROL) 29856 UNITS capsule capsule Take 50,000 Units by mouth Every 7 (Seven) Days. Takes every 2 weeks   Taking   • warfarin (COUMADIN) 5 MG tablet Take 5 mg by mouth Every Night. Patient takes 5 mg every day except MWF when he takes 7.5 mg   Taking     Allergies:  Atorvastatin; Celexa [citalopram hydrobromide]; Crestor [rosuvastatin calcium]; Lisinopril; Lyrica [pregabalin]; and " Rosuvastatin    I have reviewed the patients medical history, surgical history and family history in the available medical record system.     Current Medications:     Current Facility-Administered Medications   Medication Dose Route Frequency Provider Last Rate Last Dose   • dextrose 5 % and sodium chloride 0.45 % infusion  20 mL/hr Intravenous Continuous Jose C Batres DO           Objective     Physical Exam:        Physical Exam:  General Appearance:    Alert, cooperative, in no acute distress   Head:    Normocephalic, without obvious abnormality, atraumatic   Eyes:            Lids and lashes normal, conjunctivae and sclerae normal, no   icterus, no pallor, corneas clear, PERRLA   Ears:    Ears appear intact with no abnormalities noted   Throat:   No oral lesions, no thrush, oral mucosa moist   Neck:   No adenopathy, supple, trachea midline, no thyromegaly, no     carotid bruit, no JVD   Back:     No kyphosis present, no scoliosis present, no skin lesions,       erythema or scars, no tenderness to percussion or                   palpation,   range of motion normal   Lungs:     Clear to auscultation,respirations regular, even and                   unlabored    Heart:    Regular rhythm and normal rate, normal S1 and S2, no            murmur, no gallop, no rub, no click   Breast Exam:    Deferred   Abdomen:     Normal bowel sounds, no masses, no organomegaly, soft        non-tender, non-distended, no guarding, no rebound                 tenderness   Genitalia:    Deferred   Extremities:   Moves all extremities well, no edema, no cyanosis, no              redness   Pulses:   Pulses palpable and equal bilaterally   Skin:   No bleeding, bruising or rash   Lymph nodes:   No palpable adenopathy   Neurologic:   Cranial nerves 2 - 12 grossly intact, sensation intact, DTR        present and equal bilaterally      Results Review:     Lab Results   Component Value Date    WBC 8.92 03/08/2018    WBC 9.11 03/07/2018    WBC  15.10 (H) 03/06/2018    HGB 16.5 03/08/2018    HGB 16.7 03/07/2018    HGB 16.7 03/06/2018    HCT 45.9 03/08/2018    HCT 46.7 03/07/2018    HCT 46.1 03/06/2018     (L) 03/08/2018     (L) 03/07/2018     03/06/2018             No results found for: LIPASE  Lab Results   Component Value Date    INR 1.75 (H) 03/08/2018    INR 1.64 (H) 03/07/2018    INR 1.63 (H) 03/06/2018          Radiology Review:  Imaging Results (last 72 hours)     ** No results found for the last 72 hours. **           I reviewed the patient's new clinical results.  I reviewed the patient's new imaging results and agree with the interpretation.     ASSESSMENT/PLAN:   ASSESSMENT:   1.  Dyspepsia  2.  Screening for colon cancer      PLAN:   1.  Esophagogastroduodenoscopy with biopsies and cultures  2.  Colonoscopy, screening    Risk and benefits associated with the procedure are reviewed with the patient.  The patient wishes to proceed      Jose C Batres DO  09/11/18  8:05 AM

## 2018-09-11 NOTE — ANESTHESIA PREPROCEDURE EVALUATION
Anesthesia Evaluation     Patient summary reviewed and Nursing notes reviewed                Airway   No difficulty expected  Dental      Pulmonary - normal exam   (+) pulmonary embolism, COPD moderate, shortness of breath, sleep apnea,   Cardiovascular - normal exam    (+) hypertension, CAD, hyperlipidemia,       Neuro/Psych  (+) numbness, psychiatric history Depression,     GI/Hepatic/Renal/Endo    (+) obesity, morbid obesity,  diabetes mellitus,     Musculoskeletal     Abdominal   (+) obese,    Substance History      OB/GYN          Other   (+) arthritis                     Anesthesia Plan    ASA 4     MAC     Anesthetic plan, all risks, benefits, and alternatives have been provided, discussed and informed consent has been obtained with: patient.    Plan discussed with CRNA.

## 2018-09-11 NOTE — ANESTHESIA POSTPROCEDURE EVALUATION
Patient: Clint Mack    Procedure Summary     Date:  09/11/18 Room / Location:  [facility] ENDOSCOPY 2 / [facility] ENDOSCOPY    Anesthesia Start:  0837 Anesthesia Stop:  0910    Procedures:       ESOPHAGOGASTRODUODENOSCOPY (N/A )      COLONOSCOPY (N/A ) Diagnosis:       Gastrointestinal distress      Encounter for screening colonoscopy      (Gastrointestinal distress [K30])      (Encounter for screening colonoscopy [Z12.11])    Surgeon:  Jose C Batres DO Provider:  Jose Raul Jaramillo CRNA    Anesthesia Type:  MAC ASA Status:  4          Anesthesia Type: MAC  Last vitals  BP   151/96 (09/11/18 0816)   Temp   97.5 °F (36.4 °C) (09/11/18 0816)   Pulse   84 (09/11/18 0816)   Resp   18 (09/11/18 0816)     SpO2   96 % (09/11/18 0816)     Post Anesthesia Care and Evaluation    Patient location during evaluation: bedside  Patient participation: complete - patient participated  Level of consciousness: awake and alert  Pain score: 1  Pain management: adequate  Airway patency: patent  Anesthetic complications: No anesthetic complications  PONV Status: none  Cardiovascular status: acceptable  Respiratory status: acceptable  Hydration status: acceptable

## 2018-09-12 LAB
LAB AP CASE REPORT: NORMAL
PATH REPORT.FINAL DX SPEC: NORMAL
PATH REPORT.GROSS SPEC: NORMAL

## 2018-09-13 LAB — BACTERIA SPEC AEROBE CULT: NORMAL

## 2018-09-21 ENCOUNTER — OFFICE VISIT (OUTPATIENT)
Dept: CARDIOLOGY | Facility: CLINIC | Age: 63
End: 2018-09-21

## 2018-09-21 VITALS
HEART RATE: 83 BPM | DIASTOLIC BLOOD PRESSURE: 80 MMHG | BODY MASS INDEX: 44.1 KG/M2 | SYSTOLIC BLOOD PRESSURE: 112 MMHG | HEIGHT: 71 IN | WEIGHT: 315 LBS

## 2018-09-21 DIAGNOSIS — E78.2 MIXED HYPERLIPIDEMIA: Primary | Chronic | ICD-10-CM

## 2018-09-21 DIAGNOSIS — I25.10 CORONARY ARTERY DISEASE INVOLVING NATIVE CORONARY ARTERY OF NATIVE HEART WITHOUT ANGINA PECTORIS: Chronic | ICD-10-CM

## 2018-09-21 DIAGNOSIS — I10 ESSENTIAL HYPERTENSION: Chronic | ICD-10-CM

## 2018-09-21 DIAGNOSIS — Z95.1 S/P CABG (CORONARY ARTERY BYPASS GRAFT): ICD-10-CM

## 2018-09-21 PROCEDURE — 99214 OFFICE O/P EST MOD 30 MIN: CPT | Performed by: INTERNAL MEDICINE

## 2018-09-21 PROCEDURE — 93000 ELECTROCARDIOGRAM COMPLETE: CPT | Performed by: INTERNAL MEDICINE

## 2018-09-21 NOTE — PROGRESS NOTES
Clint Mack  62 y.o. male    09/21/2018  1. Mixed hyperlipidemia    2. Essential hypertension    3. Coronary artery disease involving native coronary artery of native heart without angina pectoris    4. S/P CABG (coronary artery bypass graft)        History of Present Illness    Mr. Mack is here for follow-up of his above stated problems.  He has multiple complaints but mostly unrelated to the heart.  He denied any chest pain or shortness of breath.  He was diagnosed to have tardive dyskinesia secondary to side effect of medications and I understand that antidepressants were discontinued.  He denied any weight gain.  He has seen neurology at [place] and was told that he does not have Parkinson's disease.  He has chronic back pain and I understand that he is being considered for surgery by Dr. Miles.    He underwent Lexiscan cardio lytes stress test in February of this year and reversible ischemia was noted and echocardiogram showed normal left ventricular systolic function with an EF of 51-55%.  No significant valvular abnormalities were noted.    SUBJECTIVE    Allergies   Allergen Reactions   • Atorvastatin Other (See Comments)     Aches and pains all over   • Celexa [Citalopram Hydrobromide] Dystonia   • Crestor [Rosuvastatin Calcium]    • Lisinopril Other (See Comments)     cough  Constant cough   • Lyrica [Pregabalin] Swelling   • Rosuvastatin Other (See Comments)     Aches and pains all over         Past Medical History:   Diagnosis Date   • Anxiety    • Arthritis     osteoarthritis   • CKD (chronic kidney disease), stage III    • COPD (chronic obstructive pulmonary disease) (CMS/[facility])    • Coronary artery disease    • Depression    • Diabetes mellitus (CMS/[facility])    • DVT (deep venous thrombosis) (CMS/[facility])    • Heart disease    • History of embolic filter insertion    • Hyperlipidemia    • Hypertension    • Injury of back     back surgery x3    • LUCIANA on CPAP    • Pulmonary embolism (CMS/[facility])   "        Past Surgical History:   Procedure Laterality Date   • BACK SURGERY     • CARDIAC SURGERY  2001   • CIRCUMCISION     • COLONOSCOPY N/A 9/11/2018    Procedure: COLONOSCOPY;  Surgeon: Jose C Batres DO;  Location: [facility] ENDOSCOPY;  Service: Gastroenterology   • ENDOSCOPY N/A 9/11/2018    Procedure: ESOPHAGOGASTRODUODENOSCOPY;  Surgeon: Jose C Batres DO;  Location: [facility] ENDOSCOPY;  Service: Gastroenterology   • GALLBLADDER SURGERY  2000   • JOINT REPLACEMENT Right 05/02/2016    hip   • SPINE SURGERY           Family History   Problem Relation Age of Onset   • Heart disease Father    • Hypertension Father    • Stroke Father    • Diabetes Sister    • Heart disease Brother    • Hypertension Brother    • COPD Brother          Social History     Social History   • Marital status:      Spouse name: N/A   • Number of children: N/A   • Years of education: N/A     Occupational History   • Not on file.     Social History Main Topics   • Smoking status: Never Smoker   • Smokeless tobacco: Never Used   • Alcohol use No   • Drug use: No   • Sexual activity: Defer     Other Topics Concern   • Not on file     Social History Narrative   • No narrative on file         Current Outpatient Prescriptions   Medication Sig Dispense Refill   • acetaminophen (TYLENOL) 325 MG tablet Take 2 tablets by mouth Every 4 (Four) Hours As Needed for Mild Pain .     • aspirin 81 MG chewable tablet Chew 81 mg Take As Directed. Take 1 pill by mouth Monday, Wednesday, Friday     • baclofen (LIORESAL) 10 MG tablet Take 10 mg by mouth 3 (Three) Times a Day.     • Blood Glucose Monitoring Suppl (ACCU-CHEK ARGELIA PLUS) w/Device kit      • clonazePAM (KlonoPIN) 1 MG tablet Take 1 tablet by mouth 3 (Three) Times a Day As Needed for Anxiety for up to 7 doses. 21 tablet 0   • COMFORT ASSIST INSULIN SYRINGE 31G X 5/16\" 0.3 ML misc      • furosemide (LASIX) 20 MG tablet Take 20 mg by mouth As Needed.     • glimepiride (AMARYL) 4 MG tablet " "Take 4 mg by mouth 2 (Two) Times a Day.     • glucose blood (ONE TOUCH ULTRA TEST) test strip      • insulin aspart (novoLOG) 100 UNIT/ML injection Inject 10 Units under the skin 3 (Three) Times a Day Before Meals. 10 mL 12   • isosorbide mononitrate (IMDUR) 60 MG 24 hr tablet Take 1 tablet by mouth Daily. 30 tablet 1   • Lancets (ONETOUCH ULTRASOFT) lancets      • losartan (COZAAR) 50 MG tablet Take 1 tablet by mouth Daily. 30 tablet 1   • Omega-3 Fatty Acids (FISH OIL PO) Take 4 capsules by mouth 2 (Two) Times a Day.     • ondansetron (ZOFRAN) 4 MG tablet Take 1 tablet by mouth Every 8 (Eight) Hours As Needed for Nausea or Vomiting. 10 tablet 1   • ranolazine (RANEXA) 1000 MG 12 hr tablet Take 1 tablet by mouth Every 12 (Twelve) Hours. 60 tablet 1   • tamsulosin (FLOMAX) 0.4 MG capsule 24 hr capsule Take 2 capsules by mouth Daily.     • Valbenazine Tosylate (INGREZZA) 40 MG capsule Take  by mouth Daily.     • vitamin D (ERGOCALCIFEROL) 91292 UNITS capsule capsule Take 50,000 Units by mouth Every 7 (Seven) Days. Takes every 2 weeks     • warfarin (COUMADIN) 5 MG tablet Take 5 mg by mouth Every Night. Patient takes 5 mg every day except MWF when he takes 7.5 mg       No current facility-administered medications for this visit.          OBJECTIVE    /80   Pulse 83   Ht 180.3 cm (70.98\")   Wt (!) 143 kg (315 lb)   BMI 43.95 kg/m²         Review of Systems     Constitutional:  Denies recent weight loss, weight gain, fever or chills     HENT:  Denies any hearing loss, epistaxis, hoarseness, or difficulty speaking.     Eyes: Wears eyeglasses or contact lenses     Respiratory:  Dyspnea with exertion,no cough, wheezing, or hemoptysis.     Cardiovascular: Negative for palpations, chest pain,     Gastrointestinal:  Denies change in bowel habits, dyspepsia, ulcer disease, hematochezia, or melena.     Endocrine: Negative for cold intolerance, heat intolerance, polydipsia, polyphagia and polyuria.     Genitourinary: " Negative.      Musculoskeletal: DJD    Neurological:  Tardive dyskinesia    Physical Exam     Constitutional: Cooperative, alert and oriented, in no acute distress.     HENT:   Head: Normocephalic, normal hair patterns, no masses or tenderness.  Ears, Nose, and Throat: No gross abnormalities. No pallor or cyanosis.   Eyes: EOMS intact, PERRL, conjunctivae and lids unremarkable. Fundoscopic exam and visual fields not performed.   Neck: No palpable masses or adenopathy, no thyromegaly, no JVD, carotid pulses are full and equal bilaterally and without  Bruits.     Cardiovascular: Regular rhythm, S1 and S2 normal, no S3 or S4.  No murmurs, gallops, or rubs detected.     Pulmonary/Chest: Chest: normal symmetry,  normal respiratory excursion, no intercostal retraction, no use of accessory muscles.            Pulmonary: Normal breath sounds. No rales or ronchi.    Abdominal: Abdomen soft, bowel sounds normoactive, no masses, no hepatosplenomegaly, non-tender, no bruits.     Musculoskeletal: No deformities, clubbing, cyanosis, erythema, or edema observed.    ECG 12 Lead  Date/Time: 9/21/2018 11:28 AM  Performed by: KO LORD  Authorized by: KO LORD   Comparison: not compared with previous ECG   Rhythm: sinus rhythm  Rate: normal  QRS axis: normal  Comments: Sinus rhythm with heart rate of 83 bpm.  Old inferior wall microinfarction.  Poor R wave progression anteroseptal leads.  Nonspecific ST-T changes.              Lab Results   Component Value Date    WBC 8.92 03/08/2018    HGB 16.5 03/08/2018    HCT 45.9 03/08/2018    MCV 88.1 03/08/2018     (L) 03/08/2018     Lab Results   Component Value Date    GLUCOSE 171 (H) 03/08/2018    BUN 23 (H) 03/08/2018    CREATININE 1.45 (H) 03/08/2018    EGFRIFNONA 49 03/08/2018    BCR 15.9 03/08/2018    CO2 20.0 (L) 03/08/2018    CALCIUM 8.3 (L) 03/08/2018    ALBUMIN 4.00 03/06/2018    AST 23 03/06/2018    ALT 52 03/06/2018     No results found for:  CHOL  No results found for: TRIG  No results found for: HDL  No components found for: LDLCALC  No results found for: LDL  No results found for: HDLLDLRATIO  No components found for: CHOLHDL  Lab Results   Component Value Date    HGBA1C 7.2 (H) 10/20/2014     Lab Results   Component Value Date    TSH 0.69 05/13/2016           ASSESSMENT AND PLAN:  Mr. Mack has multiple medical issues but stable from a cardiac standpoint with no definite evidence of angina, arrhythmia or congestive heart failure.  Based on the information I have, he will be low to moderate risk for marcos-operative cardiac events.  Antiplatelet therapy with aspirin, antianginal therapy with isosorbide mononitrate and Ranexa, antihypertensive therapy with losartan and low-dose diuretics have been continued.    Clint was seen today for follow-up.    Diagnoses and all orders for this visit:    Mixed hyperlipidemia    Essential hypertension    Coronary artery disease involving native coronary artery of native heart without angina pectoris    S/P CABG (coronary artery bypass graft)        Keke Kumar MD  9/21/2018  11:28 AM

## 2018-09-24 ENCOUNTER — OFFICE VISIT (OUTPATIENT)
Dept: PULMONOLOGY | Facility: CLINIC | Age: 63
End: 2018-09-24

## 2018-09-24 VITALS
WEIGHT: 315 LBS | HEART RATE: 92 BPM | HEIGHT: 71 IN | BODY MASS INDEX: 44.1 KG/M2 | SYSTOLIC BLOOD PRESSURE: 142 MMHG | DIASTOLIC BLOOD PRESSURE: 84 MMHG | OXYGEN SATURATION: 97 %

## 2018-09-24 DIAGNOSIS — G24.01 TARDIVE DYSKINESIA: ICD-10-CM

## 2018-09-24 DIAGNOSIS — R06.09 DYSPNEA ON EXERTION: Primary | ICD-10-CM

## 2018-09-24 DIAGNOSIS — R53.81 PHYSICAL DECONDITIONING: ICD-10-CM

## 2018-09-24 DIAGNOSIS — E66.01 MORBID OBESITY WITH BMI OF 40.0-44.9, ADULT (HCC): ICD-10-CM

## 2018-09-24 PROCEDURE — 99214 OFFICE O/P EST MOD 30 MIN: CPT | Performed by: INTERNAL MEDICINE

## 2018-09-24 NOTE — PROGRESS NOTES
"    Pulmonary Office Follow-up    Subjective     Clint Mack is seen today at the office for   Chief Complaint   Patient presents with   • Shortness of Breath     ON EXERTION          HPI  Clint Mack is a 62 y.o. 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  He was last seen on 3/21/18, at which time I felt his dyspnea was more related to his significant obesity and deconditioning so I recommended stopping scheduled bronchodilators.  He also had some involuntary movements which were concerning for tardive dyskinesias so I recommended he stop Reglan and I also recommended a neurologic evaluation.  At that visit, the patient wished to hold on the neuro evaluation as he planned to discuss it with his PCP.  I did recommend that he follow-up with Dr. Che more closely given persistent fatigue which may be secondary to his underlying LUCIANA.    Upon entering the room, the patient stated that he wanted discuss \"reports\" which he obtained a few weeks ago.  He handed me a copy of my office visit from 3/21/18, specifically pointing out the physical exam portion which stated \"BUE resting and slight intention tremor, oral movements concerning for tardive dyskinesia, 4+/5 BUE strength\".  He wants to know why he was not told this diagnosis as he would \"rather (an) overdiagnosis than under diagnosis\".  He stated that he saw Dr. Kumar the following day who recommended he see a neurosurgeon.  The patient states that he did see a neurologic specialist who told him he did not have Parkinson's at some point after our visit.  The patient saw Dr. Che the following month on 4/12/18, at which time he was told he had tardive dyskinesia and it was recommended that he stop his Celexa.  Since that time, the patient states his movements have improved and he is now able to walk with the assistance of a walker, " though he continues with rhythmic mouth movements.  The patient saw a neurologic specialist in July and was started on a medication for tardive dyskinesia, but he states it has not helped.  The patient went on to state that he had previously been on Klonopin, which had been stopped prior to our visit in March, and he now knows that it is a treatment for tardive dyskinesia.  Today, he states that he was not taking the metoclopramide at the time of our visit in March, though it was documented and confirmed on his medication list at that time.  Pt also stated that he has ongoing issues with his medication list as it is not accurate in Epic; as an example, he states that the med list says he is supposed to be on 100 units of insulin, but he is not taking that much (of note, the insulin is actually listed as 10 units TID before meals and the insulin concentration is 100 units/ mL). He also states that his Celexa was increased from 20 mg to 40 mg by Dr. Matute at some point and he did note his involuntary movements worsened after that change.  After some discussion, the patient reports that Dr. Greco had recommended he see a neurologist prior to him seeing me, but this was not revealed during our visit on 3/21/18.  He also stated that while he is not a doctor, he knows that once the heart and lungs have been ruled out medication should be examined as a cause of symptoms.    The pt did not want to discuss anything else during his visit.    Patient Active Problem List   Diagnosis   • LUCIANA (obstructive sleep apnea)   • DVT (deep venous thrombosis) (CMS/[facility])   • HTN (hypertension)   • DM (diabetes mellitus) (CMS/[facility])   • Right hip pain   • Osteoarthritis of right hip   • Staphylococcal arthritis of right hip (CMS/[facility])   • Presence of right artificial hip joint   • Chronic infection of hip joint prosthesis (CMS/[facility])   • CAD (coronary artery disease)   • S/P CABG (coronary artery bypass graft)   • Mixed hyperlipidemia   •  Lumbosacral spondylosis without myelopathy   • Pain of right hip joint   • DDD (degenerative disc disease), lumbar   • Current use of steroid medication   • Dyspnea on exertion   • Chest pain   • Anxiety   • Morbid obesity with BMI of 40.0-44.9, adult (CMS/HCC)   • Chronic pain   • CKD (chronic kidney disease) stage 3, GFR 30-59 ml/min   • Allergic rhinitis   • Chronic kidney disease   • Chronic respiratory failure with hypoxia (CMS/HCC)   • Hypoxemia requiring supplemental oxygen   • IgA nephropathy   • Morbid obesity (CMS/HCC)   • OA (osteoarthritis) of hip   • Other osteomyelitis, other site (CMS/HCC)   • Pulmonary embolism (CMS/HCC)   • Radiculitis   • Right-sided low back pain with right-sided sciatica   • Screening PSA (prostate specific antigen)   • Slow transit constipation   • Type 2 diabetes mellitus with hyperglycemia (CMS/HCC)   • Chronic pain of right knee   • Physical deconditioning       Review of Systems  Review of Systems   Respiratory: Positive for shortness of breath.    Musculoskeletal: Positive for arthralgias and back pain.   Neurological: Positive for weakness. Negative for tremors.        Involuntary mouth movements     As described in the HPI. Otherwise, remainder of ROS (14 systems) were negative.    Medications, Allergies, Social, and Family Histories reviewed as per EMR.    Objective     Vitals:    09/24/18 1059   BP: 142/84   Pulse: 92   SpO2: 97%     Physical Exam   Constitutional: He is oriented to person, place, and time. Vital signs are normal. He appears well-developed and well-nourished.   HENT:   Head: Normocephalic and atraumatic.   Eyes: Conjunctivae, EOM and lids are normal.   Pulmonary/Chest: Effort normal. No tachypnea. No respiratory distress.   Abdominal: Normal appearance.   obese   Musculoskeletal:   Slow gait, walks with a walker   Neurological: He is alert and oriented to person, place, and time.   Rhythmic mouth/oral movements   Psychiatric: He has a normal mood and  affect. His speech is normal and behavior is normal. Judgment normal.   Nursing note and vitals reviewed.    From my office note 3/21/18:                        Assessment/Plan     Clint was seen today for shortness of breath.    Diagnoses and all orders for this visit:    Dyspnea on exertion    Morbid obesity with BMI of 40.0-44.9, adult (CMS/[facility])    Physical deconditioning    Tardive dyskinesia         Discussion/ Recommendations:   I explained to the patient that I did suspect tardive dyskinesia at his initial consultation appointment and made recommendations based on such, though I did not explicitly diagnose him with this condition as he had other symptoms concerning for a movement disorder, such as parkinsonism.  I did personally review his medication list at that visit as well as his most recent hospitalization (3/6-8) and informed him that he had been on multiple medications that possibly could cause these abnormal movements.  He had been on Risperdal, which was already stopped, but at the time of our appointment on 3/21/18, the patient admitted that he was taking metoclopramide recently.  Given that this medicine has a known side effect of causing tardive dyskinesia, I recommended that he cease it completely. After his visit today, I did find in a consultation note from Dr. Holt dated 2/27/18, that the patient's Reglan and had been stopped in the office due to possible drug induced parkinsonism, but his GI symptoms worsened so he was back on it at that time.  In addition, both discharge summaries from his hospitalizations in February and March listed metoclopramide as well as Risperdal to be continued.    During our visit on 3/21/18, I also recommended that he have a neurologic evaluation due to my concerns of a movement disorder vs tardive dyskinesia, but the patient declined at that visit as he wished to discuss it with his PCP, Dr. Greco.  He did have his Celexa stopped the following month by   "Yane, but I explained to him that tardive dyskinesia is not as commonly associated with medications like Celexa as it would be with the atypical antipsychotics and metoclopramide which he had been taking.  Unfortunately, in cases of polypharmacy, sometimes it is difficult to tease out the exact offending medication if someone is taking multiple different medications with similar side effects.  Also, it can be unsafe to suddenly stop certain medications without appropriate follow-up.    I did apologize to the patient for the miscommunication and not explicitly saying the term \"tardive dyskinesia\", but I did make recommendations based on the possible diagnosis, including referring him to a neurologic expert.    He did state that the sole purpose of this visit today was to ask the questions above, and he was not interested in discussing any other issues.    Patient's Body mass index is 43.93 kg/m². BMI is above normal parameters. Recommendations include: no follow-up required.    The total face to face time spent 26 mins with the patient. 20 mins (>50% of the total time) were spent counseling and in coordination of care on his condition as well as explaining documentation from his visit on 3/21/18.      Return if symptoms worsen or fail to improve.          This document has been electronically signed by Lisa Mercado MD on September 24, 2018 3:28 PM      Dictated using Dragon    "

## 2018-10-02 ENCOUNTER — OFFICE VISIT (OUTPATIENT)
Dept: SLEEP MEDICINE | Facility: HOSPITAL | Age: 63
End: 2018-10-02

## 2018-10-02 VITALS
SYSTOLIC BLOOD PRESSURE: 139 MMHG | WEIGHT: 310 LBS | HEIGHT: 71 IN | OXYGEN SATURATION: 96 % | DIASTOLIC BLOOD PRESSURE: 69 MMHG | BODY MASS INDEX: 43.4 KG/M2 | HEART RATE: 90 BPM

## 2018-10-02 DIAGNOSIS — G47.33 OBSTRUCTIVE SLEEP APNEA, ADULT: Primary | ICD-10-CM

## 2018-10-02 PROCEDURE — 99214 OFFICE O/P EST MOD 30 MIN: CPT | Performed by: INTERNAL MEDICINE

## 2018-10-02 NOTE — PROGRESS NOTES
Sleep Clinic Follow Up    Date: 10/2/2018  Primary Care Physician: Tam Greco MD    CC: Follow up: LUCIANA, RLS, nocturnal hypoxia, anxiety    Sleep Testin. PSG on , AHI of 29   2. Currently on 12.5 cm H2O    Assessment and Plan:    1. Obstructive sleep apnea  1. Compliant and improved with PAP therapy  2. Continue PAP as prescribed.   3. Script for PAP supplies  2. Nocturnal hypoxia - on 2L O2 with CPAP since   1. compliant  3. Restless leg syndrome/ /Whitman-Ekbom disease (RLS/WED)   1. Patient doubts diagnosis - only hurts when he has neuropathic pain, randomly  4. Tardive dyskinesia  1. Anxiety disorder -   2. Moderate but worsening  3. On klonopin  5. Chronic lumbago with several prior surgeries      I would recommend renewing narcotic pain control with benzodiazepines to control his tardive dyskinesia. He is at risk of resp depression, however, he is very well controlled, and compliant with PAP therapy. This should offset any unde risk. He has excellent caregiver support at home and she has been counseled about side effects of his medications.       Interim History (1-3/4):  Since the last visit on 2018, patient has:    1) moderate LUCIANA -  Clint Mack has remained compliant with CPAP. He denies mask and machine issues, dry mouth, headaches, or pressures intolerance. He denies abnormal dreams, sleep paralysis, nasal congestion, URI sx. Despite being compliant his sleep is fractured secondary to chronic pain that is under treated.    PAP Data:    Time frame: 2018 - 10/01/2018   Compliance 96.8 %  Average use on days used: 9hrs 14 min  Percent of days with usage greater than or equal to 4 hours: 96.8%  PAP range : 12.5 cm H2O  Average 90% pressure: 12.5 cmH2O  Leak: <1 minutes  Average AHI 0.7 events/hr  Mask type: Full face mask  DME: [facility]    Bed time: 1830  Sleep latency: 8340-8868 minutes  Number of times awakens during the night: 9945-4870  Wake time:  1000  Estimated total sleep time at night: 4-5 hours  Caffeine intake: 0oz of coffee, 0oz of tea, and 36oz of soda  Alcohol intake: 0 drinks per week  Nap time: none   Sleepiness with Driving: N/A    2) Patient denies RLS symptoms.     PMHx, FH, SH reviewed and pertinent changes are:  unchanged from last office visit on 04/12/2018      REVIEW OF SYSTEMS:   Negative for chest pain, fever, cough, SOA, abdominal pain. Smoking:none    Exam (6-11/12):  Vitals:    10/02/18 1438   BP: 139/69   Pulse: 90   SpO2: 96%     Body mass index is 43.24 kg/m². Patient's Body mass index is 43.24 kg/m². BMI is above normal parameters. Recommendations include: referral to primary care.      Gen:  No acute distress, alert, oriented, TD better  Lungs:  CTA with normal effort   CV:  RRR, no M/R/G  GI:  soft, non-tender  Psych:  Appropriate affect  Ext:   No c/c/e - using walker    Past Medical History:   Diagnosis Date   • Anxiety    • Arthritis     osteoarthritis   • CKD (chronic kidney disease), stage III (CMS/[facility])    • COPD (chronic obstructive pulmonary disease) (CMS/[facility])    • Coronary artery disease    • Depression    • Diabetes mellitus (CMS/[facility])    • DVT (deep venous thrombosis) (CMS/[facility])    • Heart disease    • History of embolic filter insertion    • Hyperlipidemia    • Hypertension    • Injury of back     back surgery x3    • LUCIANA on CPAP    • Pulmonary embolism (CMS/[facility])        Current Outpatient Prescriptions:   •  acetaminophen (TYLENOL) 325 MG tablet, Take 2 tablets by mouth Every 4 (Four) Hours As Needed for Mild Pain ., Disp: , Rfl:   •  aspirin 81 MG chewable tablet, Chew 81 mg Take As Directed. Take 1 pill by mouth Monday, Wednesday, Friday, Disp: , Rfl:   •  baclofen (LIORESAL) 10 MG tablet, Take 10 mg by mouth 3 (Three) Times a Day., Disp: , Rfl:   •  Blood Glucose Monitoring Suppl (ACCU-CHEK ARGELIA PLUS) w/Device kit, , Disp: , Rfl:   •  clonazePAM (KlonoPIN) 1 MG tablet, Take 1 tablet by mouth 3 (Three) Times a Day As  "Needed for Anxiety for up to 7 doses., Disp: 21 tablet, Rfl: 0  •  COMFORT ASSIST INSULIN SYRINGE 31G X 5/16\" 0.3 ML misc, , Disp: , Rfl:   •  furosemide (LASIX) 20 MG tablet, Take 20 mg by mouth As Needed., Disp: , Rfl:   •  glimepiride (AMARYL) 4 MG tablet, Take 4 mg by mouth 2 (Two) Times a Day., Disp: , Rfl:   •  glucose blood (ONE TOUCH ULTRA TEST) test strip, , Disp: , Rfl:   •  insulin aspart (novoLOG) 100 UNIT/ML injection, Inject 10 Units under the skin 3 (Three) Times a Day Before Meals., Disp: 10 mL, Rfl: 12  •  isosorbide mononitrate (IMDUR) 60 MG 24 hr tablet, Take 1 tablet by mouth Daily., Disp: 30 tablet, Rfl: 1  •  Lancets (ONETOUCH ULTRASOFT) lancets, , Disp: , Rfl:   •  losartan (COZAAR) 50 MG tablet, Take 1 tablet by mouth Daily., Disp: 30 tablet, Rfl: 1  •  Omega-3 Fatty Acids (FISH OIL PO), Take 4 capsules by mouth 2 (Two) Times a Day., Disp: , Rfl:   •  ondansetron (ZOFRAN) 4 MG tablet, Take 1 tablet by mouth Every 8 (Eight) Hours As Needed for Nausea or Vomiting., Disp: 10 tablet, Rfl: 1  •  ranolazine (RANEXA) 1000 MG 12 hr tablet, Take 1 tablet by mouth Every 12 (Twelve) Hours., Disp: 60 tablet, Rfl: 1  •  tamsulosin (FLOMAX) 0.4 MG capsule 24 hr capsule, Take 2 capsules by mouth Daily., Disp: , Rfl:   •  Valbenazine Tosylate (INGREZZA) 40 MG capsule, Take  by mouth Daily., Disp: , Rfl:   •  vitamin D (ERGOCALCIFEROL) 77112 UNITS capsule capsule, Take 50,000 Units by mouth Every 7 (Seven) Days. Takes every 2 weeks, Disp: , Rfl:   •  warfarin (COUMADIN) 5 MG tablet, Take 5 mg by mouth Every Night. Patient takes 5 mg every day except MWF when he takes 7.5 mg, Disp: , Rfl:     Total time 25 min, more than half spent in face to face counseling and coordination of care.    RTC in 6 months     This document has been electronically signed by Jerome Che MD on October 2, 2018         CC: Tam Greco MD          No ref. provider found  "

## 2018-10-12 DIAGNOSIS — E78.00 HYPERCHOLESTEREMIA: Primary | ICD-10-CM

## 2018-10-22 ENCOUNTER — OFFICE VISIT (OUTPATIENT)
Dept: ORTHOPEDIC SURGERY | Facility: CLINIC | Age: 63
End: 2018-10-22

## 2018-10-22 VITALS — WEIGHT: 310 LBS | BODY MASS INDEX: 43.4 KG/M2 | HEIGHT: 71 IN

## 2018-10-22 DIAGNOSIS — M17.11 PRIMARY OSTEOARTHRITIS OF RIGHT KNEE: Primary | ICD-10-CM

## 2018-10-22 DIAGNOSIS — G89.29 CHRONIC PAIN OF RIGHT KNEE: ICD-10-CM

## 2018-10-22 DIAGNOSIS — M25.561 CHRONIC PAIN OF RIGHT KNEE: ICD-10-CM

## 2018-10-22 PROCEDURE — 99213 OFFICE O/P EST LOW 20 MIN: CPT | Performed by: NURSE PRACTITIONER

## 2018-10-22 NOTE — PROGRESS NOTES
"Clint Mack is a 62 y.o. male returns for     Chief Complaint   Patient presents with   • Right Knee - Follow-up       HISTORY OF PRESENT ILLNESS: f/u on right knee, patient wants to discuss injection       CONCURRENT MEDICAL HISTORY:    The following portions of the patient's history were reviewed and updated as appropriate: allergies, current medications, past family history, past medical history, past social history, past surgical history and problem list.     ROS  No fevers or chills.  No chest pain or shortness of air.  No GI or  disturbances.    PHYSICAL EXAMINATION:       Ht 180.3 cm (71\")   Wt (!) 141 kg (310 lb)   BMI 43.24 kg/m²     Physical Exam   Constitutional: He is oriented to person, place, and time. Vital signs are normal. He appears well-developed and well-nourished. He is cooperative.   HENT:   Head: Normocephalic and atraumatic.   Neck: Trachea normal and phonation normal.   Pulmonary/Chest: Effort normal. No respiratory distress.   Abdominal: Soft. Normal appearance. He exhibits no distension.   Musculoskeletal:        Right knee: He exhibits effusion.   Neurological: He is alert and oriented to person, place, and time. GCS eye subscore is 4. GCS verbal subscore is 5. GCS motor subscore is 6.   Skin: Skin is warm, dry and intact.   Psychiatric: He has a normal mood and affect. His speech is normal and behavior is normal. Judgment and thought content normal. Cognition and memory are normal.   Vitals reviewed.      GAIT:     []  Normal  [x]  Antalgic    Assistive device: []  None  []  Walker     []  Crutches  []  Cane     []  Wheelchair  []  Stretcher    Right Knee Exam     Tenderness   The patient is experiencing tenderness in the medial joint line and lateral joint line.    Range of Motion   Right knee extension: 5.   Right knee flexion: 90.     Other   Erythema: absent  Scars: absent  Sensation: normal  Pulse: present  Swelling: mild  Other tests: effusion present      Left Knee " Exam   Left knee exam is normal.              No results found.          ASSESSMENT:    Diagnoses and all orders for this visit:    Primary osteoarthritis of right knee    Chronic pain of right knee          PLAN  Will recommend repeated orthovisc series which improved his pain in the past.   No Follow-up on file.    DIANA Lipscomb

## 2018-10-22 NOTE — PROGRESS NOTES
Clint Mack is a 62 y.o. male returns for     Chief Complaint   Patient presents with   • Right Knee - Follow-up       HISTORY OF PRESENT ILLNESS:right knee pain   Pain scale today 5/10  Wants to discuss injections         CONCURRENT MEDICAL HISTORY:    The following portions of the patient's history were reviewed and updated as appropriate: allergies, current medications, past family history, past medical history, past social history, past surgical history and problem list.     ROS  No fevers or chills.  No chest pain or shortness of air.  No GI or  disturbances.    PHYSICAL EXAMINATION:       There were no vitals taken for this visit.    Physical Exam    GAIT:     []  Normal  []  Antalgic    Assistive device: []  None  []  Walker     []  Crutches  []  Cane     []  Wheelchair  []  Stretcher    Ortho Exam      No results found.          ASSESSMENT:    Diagnoses and all orders for this visit:    Chronic pain of right knee          PLAN    No Follow-up on file.    Lisa Cruz, CSA

## 2018-10-25 ENCOUNTER — DOCUMENTATION (OUTPATIENT)
Dept: CARDIOLOGY | Facility: CLINIC | Age: 63
End: 2018-10-25

## 2018-11-05 ENCOUNTER — OFFICE VISIT (OUTPATIENT)
Dept: PODIATRY | Facility: CLINIC | Age: 63
End: 2018-11-05

## 2018-11-05 ENCOUNTER — CLINICAL SUPPORT (OUTPATIENT)
Dept: ORTHOPEDIC SURGERY | Facility: CLINIC | Age: 63
End: 2018-11-05

## 2018-11-05 VITALS — WEIGHT: 305 LBS | BODY MASS INDEX: 43.67 KG/M2 | HEIGHT: 70 IN

## 2018-11-05 VITALS — OXYGEN SATURATION: 96 % | BODY MASS INDEX: 43.52 KG/M2 | HEIGHT: 71 IN | WEIGHT: 310.85 LBS | HEART RATE: 103 BPM

## 2018-11-05 DIAGNOSIS — G89.29 CHRONIC PAIN OF RIGHT KNEE: ICD-10-CM

## 2018-11-05 DIAGNOSIS — M25.561 RIGHT KNEE PAIN, UNSPECIFIED CHRONICITY: Primary | ICD-10-CM

## 2018-11-05 DIAGNOSIS — M25.561 CHRONIC PAIN OF RIGHT KNEE: ICD-10-CM

## 2018-11-05 DIAGNOSIS — M17.11 PRIMARY OSTEOARTHRITIS OF RIGHT KNEE: ICD-10-CM

## 2018-11-05 DIAGNOSIS — B35.1 ONYCHOMYCOSIS: ICD-10-CM

## 2018-11-05 DIAGNOSIS — M79.674 PAIN IN TOES OF BOTH FEET: ICD-10-CM

## 2018-11-05 DIAGNOSIS — M79.675 PAIN IN TOES OF BOTH FEET: ICD-10-CM

## 2018-11-05 DIAGNOSIS — E11.42 DIABETIC POLYNEUROPATHY ASSOCIATED WITH TYPE 2 DIABETES MELLITUS (HCC): Primary | ICD-10-CM

## 2018-11-05 PROCEDURE — 11721 DEBRIDE NAIL 6 OR MORE: CPT | Performed by: PODIATRIST

## 2018-11-05 PROCEDURE — 20610 DRAIN/INJ JOINT/BURSA W/O US: CPT | Performed by: NURSE PRACTITIONER

## 2018-11-05 RX ORDER — HYDROCODONE BITARTRATE AND ACETAMINOPHEN 7.5; 325 MG/1; MG/1
1 TABLET ORAL
COMMUNITY
Start: 2018-10-24 | End: 2018-11-24

## 2018-11-05 NOTE — PROGRESS NOTES
Knee Joint Injection      Patient: Clint Mack    YOB: 1955    Chief Complaint   Patient presents with   • Right Knee - Follow-up   • Injections     orthovisc        History of Present Illness:  Patient is here for an injection in the right knee,     Physical Exam: 63 y.o. male  General Appearance:    Alert, cooperative, in no acute distress                   There were no vitals filed for this visit.     Patient is alert and oriented, ×3 no acute distress.  Affect is normal respiratory rate is normal unlabored.  Exam and complaints are unchanged.    Procedure:  Large Joint Arthrocentesis  Date/Time: 11/5/2018 8:50 AM  Consent given by: patient  Site marked: site marked  Timeout: Immediately prior to procedure a time out was called to verify the correct patient, procedure, equipment, support staff and site/side marked as required   Supporting Documentation  Indications: pain   Procedure Details  Location: knee - R knee  Needle size: 22 G  Medications administered: 30 mg Hyaluronan 30 MG/2ML            Assessment:    Diagnoses and all orders for this visit:    Right knee pain, unspecified chronicity  -     Large Joint Arthrocentesis    Chronic pain of right knee    Primary osteoarthritis of right knee        Plan:   Slowly increase activity as tolerated.  Discussed importance of leg strengthening and general conditioning.  Discussed warning signs of injection.  Discussed that purpose of injections was symptom improvement and improved activity.  Also discussed that further treatment options depended on symptoms at followup and length of time of improvement after injections.    No Follow-up on file.    Mónica Angeles MA

## 2018-11-05 NOTE — PROGRESS NOTES
Clint Mack  1955  63 y.o. male   BS-128 per patient  PCP-Dr. Greco 10/11/18    Patient presents today for diabetic nail care.  11/05/2018          Chief Complaint   Patient presents with   • Left Foot - diabetic nail care   • Right Foot - diabetic nail care           History of Present Illness    Clint Mack is a 63 y.o. male presents for f/u diabetic foot exam and nail care.      Past Medical History:   Diagnosis Date   • Anxiety    • Arthritis     osteoarthritis   • CKD (chronic kidney disease), stage III (CMS/HCC)    • COPD (chronic obstructive pulmonary disease) (CMS/HCC)    • Coronary artery disease    • Depression    • Diabetes mellitus (CMS/HCC)    • DVT (deep venous thrombosis) (CMS/[facility])    • Heart disease    • History of embolic filter insertion    • Hyperlipidemia    • Hypertension    • Injury of back     back surgery x3    • LUCIANA on CPAP    • Pulmonary embolism (CMS/[facility])          Past Surgical History:   Procedure Laterality Date   • BACK SURGERY     • CARDIAC SURGERY  2001   • CIRCUMCISION     • COLONOSCOPY N/A 9/11/2018    Procedure: COLONOSCOPY;  Surgeon: Jose C Batres DO;  Location: [facility] ENDOSCOPY;  Service: Gastroenterology   • ENDOSCOPY N/A 9/11/2018    Procedure: ESOPHAGOGASTRODUODENOSCOPY;  Surgeon: Jose C Batres DO;  Location: [facility] ENDOSCOPY;  Service: Gastroenterology   • GALLBLADDER SURGERY  2000   • JOINT REPLACEMENT Right 05/02/2016    hip   • SPINE SURGERY           Family History   Problem Relation Age of Onset   • Heart disease Father    • Hypertension Father    • Stroke Father    • Diabetes Sister    • Heart disease Brother    • Hypertension Brother    • COPD Brother          Social History     Social History   • Marital status:      Spouse name: N/A   • Number of children: N/A   • Years of education: N/A     Occupational History   • Not on file.     Social History Main Topics   • Smoking status: Never Smoker   • Smokeless tobacco: Never  "Used   • Alcohol use No   • Drug use: No   • Sexual activity: Defer     Other Topics Concern   • Not on file     Social History Narrative   • No narrative on file         Current Outpatient Prescriptions   Medication Sig Dispense Refill   • HYDROcodone-acetaminophen (NORCO) 7.5-325 MG per tablet Take 1 tablet by mouth.     • Alirocumab (PRALUENT) 75 MG/ML solution pen-injector Inject 75 mg under the skin into the appropriate area as directed Every 14 (Fourteen) Days. 6 pen 7   • aspirin 81 MG chewable tablet Chew 81 mg Take As Directed. Take 1 pill by mouth Monday, Wednesday, Friday     • baclofen (LIORESAL) 10 MG tablet Take 10 mg by mouth 3 (Three) Times a Day.     • Blood Glucose Monitoring Suppl (ACCU-CHEK ARGELIA PLUS) w/Device kit      • clonazePAM (KlonoPIN) 1 MG tablet Take 1 tablet by mouth 3 (Three) Times a Day As Needed for Anxiety for up to 7 doses. 21 tablet 0   • COMFORT ASSIST INSULIN SYRINGE 31G X 5/16\" 0.3 ML misc      • furosemide (LASIX) 20 MG tablet Take 20 mg by mouth As Needed.     • glimepiride (AMARYL) 4 MG tablet Take 4 mg by mouth 2 (Two) Times a Day.     • glucose blood (ONE TOUCH ULTRA TEST) test strip      • insulin aspart (novoLOG) 100 UNIT/ML injection Inject 10 Units under the skin 3 (Three) Times a Day Before Meals. 10 mL 12   • isosorbide mononitrate (IMDUR) 60 MG 24 hr tablet Take 1 tablet by mouth Daily. 30 tablet 1   • Lancets (ONETOUCH ULTRASOFT) lancets      • losartan (COZAAR) 50 MG tablet Take 1 tablet by mouth Daily. 30 tablet 1   • Omega-3 Fatty Acids (FISH OIL PO) Take 4 capsules by mouth 2 (Two) Times a Day.     • ranolazine (RANEXA) 1000 MG 12 hr tablet Take 1 tablet by mouth Every 12 (Twelve) Hours. 60 tablet 1   • tamsulosin (FLOMAX) 0.4 MG capsule 24 hr capsule Take 2 capsules by mouth Daily.     • Valbenazine Tosylate (INGREZZA) 40 MG capsule Take  by mouth Daily.     • vitamin D (ERGOCALCIFEROL) 39500 UNITS capsule capsule Take 50,000 Units by mouth Every 7 (Seven) " "Days. Takes every 2 weeks     • warfarin (COUMADIN) 5 MG tablet Take 5 mg by mouth Every Night. Patient takes 5 mg every day except MWF when he takes 7.5 mg       No current facility-administered medications for this visit.          OBJECTIVE    Pulse 103   Ht 180.3 cm (70.98\")   Wt (!) 141 kg (310 lb 13.6 oz)   SpO2 96%   BMI 43.37 kg/m²       Review of Systems   Constitutional:  Denies recent weight loss, weight gain, fever or chills, no change in exercise tolerance  Musculoskeletal: Toe pain.   Skin:  Thickened nails. Shin discoloration.  Neurological:  Burning sensations to feet b/l.  Psychiatric/Behavioral: Denies depression      Physical Exam    Clint had a diabetic foot exam performed today.      Constitutional: he appears well-developed and well-nourished.   HEENT: Normocephalic. Atraumatic  CV: No tenderness. RRR  Resp: Non-labored respiration. No wheezes.   Psychiatric: he has a normal mood and affect. his   behavior is normal.      Lower Extremity Exam:  Vascular: DP/PT pulses palpable 1+.   Negative hair growth.   1+ perimalleolar edema  Toes cool  Neuro: Protective sensation diminished to lesser toes, b/l.  DTRs intact  Integument: No open wounds or lesions.  Atrophic skin noted b/l with chronic venous stasis dermatitis changes  Mild xerosis  No masses  Musculoskeletal: LE muscle strength 4/5 on left, 3/5 on right  Gait assisted with walker  Semi-rigid hammertoe deformity toes 2-5 b/l.  Nails 1-5 b/l thickened, elongated with subungual debris. +pain on palpation              ASSESSMENT AND PLAN    Clint was seen today for diabetic nail care and diabetic nail care.    Diagnoses and all orders for this visit:    Diabetic polyneuropathy associated with type 2 diabetes mellitus (CMS/[facility])    Onychomycosis    Pain in toes of both feet      -Comprehensive DM foot exam performed. Pt educated on importance of tight glucose control and daily foot checks.  -Pt educated on padding techniques for rigid " hammertoes.  Proper extra depth diabetic shoe gear.  Limit barefoot walking.  -Nails 1-5 b/l debrided in length and thickness with nail nipper to decrease pain, fungal load and risk of infection  -Follow up 3 months PRN          This document has been electronically signed by Jose C Novak DPM on November 5, 2018 1:15 PM     EMR Dragon/Transcription disclaimer:   Much of this encounter note is an electronic transcription/translation of spoken language to printed text. The electronic translation of spoken language may permit erroneous, or at times, nonsensical words or phrases to be inadvertently transcribed; Although I have reviewed the note for such errors, some may still exist.    Jose C Novak DPM  11/5/2018  1:15 PM

## 2018-11-19 ENCOUNTER — CLINICAL SUPPORT (OUTPATIENT)
Dept: ORTHOPEDIC SURGERY | Facility: CLINIC | Age: 63
End: 2018-11-19

## 2018-11-19 DIAGNOSIS — M17.11 PRIMARY OSTEOARTHRITIS OF RIGHT KNEE: Primary | ICD-10-CM

## 2018-11-19 DIAGNOSIS — G89.29 CHRONIC PAIN OF RIGHT KNEE: ICD-10-CM

## 2018-11-19 DIAGNOSIS — M25.561 CHRONIC PAIN OF RIGHT KNEE: ICD-10-CM

## 2018-11-19 PROCEDURE — 20610 DRAIN/INJ JOINT/BURSA W/O US: CPT | Performed by: NURSE PRACTITIONER

## 2018-11-19 NOTE — PROGRESS NOTES
Knee Joint Injection      Patient: Clint Mack    YOB: 1955    Chief Complaint   Patient presents with   • Right Knee - Follow-up, Pain   • Injections     orthovisc injection #2.        History of Present Illness:  Patient is here for an injection.  No new complaints.    Physical Exam: 63 y.o. male  General Appearance:    Alert, cooperative, in no acute distress                   There were no vitals filed for this visit.     Patient is alert and oriented, ×3 no acute distress.  Affect is normal respiratory rate is normal unlabored.  Exam and complaints are unchanged.    Procedure:  Large Joint Arthrocentesis: R knee  Date/Time: 11/19/2018 9:56 AM  Consent given by: patient  Site marked: site marked  Timeout: Immediately prior to procedure a time out was called to verify the correct patient, procedure, equipment, support staff and site/side marked as required   Supporting Documentation  Indications: pain   Procedure Details  Location: knee - R knee  Preparation: Patient was prepped and draped in the usual sterile fashion  Needle size: 22 G  Approach: lateral  Medications administered: 30 mg Hyaluronan 30 MG/2ML  Patient tolerance: patient tolerated the procedure well with no immediate complications          Assessment:    Diagnoses and all orders for this visit:    Primary osteoarthritis of right knee  -     Large Joint Arthrocentesis: R knee    Chronic pain of right knee  -     Large Joint Arthrocentesis: R knee        Plan:   Slowly increase activity as tolerated.  Discussed importance of leg strengthening and general conditioning.  Discussed warning signs of injection.  Discussed that purpose of injections was symptom improvement and improved activity.  Also discussed that further treatment options depended on symptoms at followup and length of time of improvement after injections.    Return in about 1 week (around 11/26/2018).    Edvin Montoya, DIANA

## 2018-11-26 ENCOUNTER — CLINICAL SUPPORT (OUTPATIENT)
Dept: ORTHOPEDIC SURGERY | Facility: CLINIC | Age: 63
End: 2018-11-26

## 2018-11-26 VITALS — BODY MASS INDEX: 44.48 KG/M2 | WEIGHT: 310.7 LBS | HEIGHT: 70 IN

## 2018-11-26 DIAGNOSIS — M17.11 PRIMARY OSTEOARTHRITIS OF RIGHT KNEE: Primary | ICD-10-CM

## 2018-11-26 DIAGNOSIS — M25.561 CHRONIC PAIN OF RIGHT KNEE: ICD-10-CM

## 2018-11-26 DIAGNOSIS — G89.29 CHRONIC PAIN OF RIGHT KNEE: ICD-10-CM

## 2018-11-26 PROCEDURE — 20610 DRAIN/INJ JOINT/BURSA W/O US: CPT | Performed by: NURSE PRACTITIONER

## 2018-12-03 ENCOUNTER — CLINICAL SUPPORT (OUTPATIENT)
Dept: ORTHOPEDIC SURGERY | Facility: CLINIC | Age: 63
End: 2018-12-03

## 2018-12-03 VITALS — BODY MASS INDEX: 44.38 KG/M2 | WEIGHT: 310 LBS | HEIGHT: 70 IN

## 2018-12-03 DIAGNOSIS — G89.29 CHRONIC PAIN OF RIGHT KNEE: ICD-10-CM

## 2018-12-03 DIAGNOSIS — M25.561 RIGHT KNEE PAIN, UNSPECIFIED CHRONICITY: Primary | ICD-10-CM

## 2018-12-03 DIAGNOSIS — M25.561 CHRONIC PAIN OF RIGHT KNEE: ICD-10-CM

## 2018-12-03 DIAGNOSIS — M17.11 PRIMARY OSTEOARTHRITIS OF RIGHT KNEE: ICD-10-CM

## 2018-12-03 PROCEDURE — 20610 DRAIN/INJ JOINT/BURSA W/O US: CPT | Performed by: NURSE PRACTITIONER

## 2018-12-03 NOTE — PROGRESS NOTES
"Clint Mack is a 63 y.o. male returns for     Chief Complaint   Patient presents with   • Right Knee - Follow-up   • Injections     othovisc num 4       HISTORY OF PRESENT ILLNESS:  F/u on right knee, Orthovisc injection number 4,        CONCURRENT MEDICAL HISTORY:    The following portions of the patient's history were reviewed and updated as appropriate: allergies, current medications, past family history, past medical history, past social history, past surgical history and problem list.     ROS  No fevers or chills.  No chest pain or shortness of air.  No GI or  disturbances.    PHYSICAL EXAMINATION:       Ht 177.8 cm (70\")   Wt (!) 141 kg (310 lb)   BMI 44.48 kg/m²     Physical Exam    GAIT:     []  Normal  []  Antalgic    Assistive device: []  None  []  Walker     []  Crutches  []  Cane     []  Wheelchair  []  Stretcher    Ortho Exam      No results found.          ASSESSMENT:    Diagnoses and all orders for this visit:    Right knee pain, unspecified chronicity  -     Large Joint Arthrocentesis: R knee    Primary osteoarthritis of right knee    Chronic pain of right knee          PLAN  Large Joint Arthrocentesis: R knee  Date/Time: 12/3/2018 8:52 AM  Consent given by: patient  Site marked: site marked  Timeout: Immediately prior to procedure a time out was called to verify the correct patient, procedure, equipment, support staff and site/side marked as required   Supporting Documentation  Indications: pain   Procedure Details  Location: knee - R knee  Needle size: 22 G  Medications administered: 30 mg Hyaluronan 30 MG/2ML        Injected the knee today and recommended progression range of motion and activity as tolerated based on pain follow-up on right    No Follow-up on file.    Edvin Montoya, DIANA  "

## 2018-12-20 ENCOUNTER — OFFICE VISIT (OUTPATIENT)
Dept: SLEEP MEDICINE | Facility: HOSPITAL | Age: 63
End: 2018-12-20

## 2018-12-20 VITALS
SYSTOLIC BLOOD PRESSURE: 152 MMHG | DIASTOLIC BLOOD PRESSURE: 91 MMHG | WEIGHT: 298.9 LBS | BODY MASS INDEX: 42.79 KG/M2 | OXYGEN SATURATION: 92 % | HEIGHT: 70 IN | HEART RATE: 126 BPM

## 2018-12-20 DIAGNOSIS — G47.33 OBSTRUCTIVE SLEEP APNEA, ADULT: Primary | ICD-10-CM

## 2018-12-20 DIAGNOSIS — G25.81 RESTLESS LEGS SYNDROME (RLS): ICD-10-CM

## 2018-12-20 PROCEDURE — 99214 OFFICE O/P EST MOD 30 MIN: CPT | Performed by: INTERNAL MEDICINE

## 2018-12-20 RX ORDER — CLONAZEPAM 2 MG/1
1 TABLET ORAL EVERY 12 HOURS
COMMUNITY
Start: 2018-12-13 | End: 2018-12-20 | Stop reason: SDUPTHER

## 2018-12-20 RX ORDER — CLONAZEPAM 1 MG/1
1 TABLET ORAL 3 TIMES DAILY PRN
Qty: 90 TABLET | Refills: 1 | Status: SHIPPED | OUTPATIENT
Start: 2018-12-20 | End: 2019-02-18 | Stop reason: SDUPTHER

## 2018-12-20 RX ORDER — SODIUM BICARBONATE 650 MG/1
1 TABLET ORAL DAILY
COMMUNITY
Start: 2018-12-13 | End: 2019-09-12

## 2018-12-20 RX ORDER — AMLODIPINE BESYLATE 5 MG/1
1 TABLET ORAL DAILY
COMMUNITY
Start: 2018-12-12 | End: 2019-09-12

## 2018-12-20 RX ORDER — OMEPRAZOLE 40 MG/1
1 CAPSULE, DELAYED RELEASE ORAL DAILY
COMMUNITY
Start: 2018-12-13 | End: 2019-06-04

## 2018-12-20 RX ORDER — DIPHENHYDRAMINE HCL 25 MG
1 CAPSULE ORAL DAILY
COMMUNITY
Start: 2018-12-13 | End: 2019-09-12

## 2018-12-20 NOTE — PROGRESS NOTES
Sleep Clinic Follow Up    Date: 2018  Primary Care Physician: Tam Greco MD    Last office visit: 10/02/2018 (I reviewed this note)    CC: Follow up: LUCIANA, RLS, anxiety, tardive dyskinesia    Sleep Testing:  Sleep Testin. PSG on , AHI of 29   2. Currently on 12.5 cm H2O     Assessment and Plan:     1. Obstructive sleep apnea  Established, stable (1)  1. Compliant and improved with PAP therapy  2. Continue PAP as prescribed.   3. Script for PAP supplies  2. Nocturnal hypoxia - on 2L O2 with CPAP since   Established, stable (1)  1. compliant  3. Restless leg syndrome/ /Whitman-Ekbom disease (RLS/WED)  Established, not controlled (2)  1. Will treat with klonopin 1 mg po TID   4. Tardive dyskinesia  Established, not controlled (2)  1. Defer to pcp  5. Anxiety disorder -  1. Continue klonopin, follow up with psychiatry  6. Chronic lumbago with several prior surgeries  1. Defer to pain management    Interim History (1-3/4):  Since the last visit:    1) severe LUCIAAN -  Clint Mack has remained compliant with CPAP. He denies mask and machine issues, dry mouth, headaches, or pressures intolerance. He denies abnormal dreams, sleep paralysis, nasal congestion, URI sx.    PAP Data:    Time frame: 10/02/2018 - 2018   Compliance 92.4 %  Average use on days used: 9hrs 11 min  Percent of days with usage greater than or equal to 4 hours: 92.4%  PAP range : 12.5 cm H2O  Average 90% pressure: 12.5 cmH2O  Leak: 10 minutes  Average AHI 0.4 events/hr  Mask type: Full face mask  DME: [facility]    2) Patient has daily with RLS symptoms.     PMHx, FH, SH reviewed and pertinent changes are: unchanged from last office visit on 2018      REVIEW OF SYSTEMS:   Negative for chest pain, fever, cough, SOA, abdominal pain. Smoking:none    Exam ():  Vitals:    18 0839   BP: 152/91   Pulse: (!) 126   SpO2: 92%   repeat pulse was 98  Body mass index is 42.89 kg/m². Patient's Body mass  "index is 42.89 kg/m². BMI is above normal parameters. Recommendations include: referral to primary care.      Gen:  No acute distress, alert, oriented  Lungs:  CTA with normal effort   CV:  RRR, no M/R/G  GI:  soft, non-tender  Psych:  Appropriate affect      Past Medical History:   Diagnosis Date   • Anxiety    • Arthritis     osteoarthritis   • CKD (chronic kidney disease), stage III (CMS/[facility])    • COPD (chronic obstructive pulmonary disease) (CMS/[facility])    • Coronary artery disease    • Depression    • Diabetes mellitus (CMS/[facility])    • DVT (deep venous thrombosis) (CMS/[facility])    • Heart disease    • History of embolic filter insertion    • Hyperlipidemia    • Hypertension    • Injury of back     back surgery x3    • LUCIANA on CPAP    • Pulmonary embolism (CMS/[facility])        Current Outpatient Medications:   •  Alirocumab (PRALUENT) 75 MG/ML solution pen-injector, Inject 75 mg under the skin into the appropriate area as directed Every 14 (Fourteen) Days., Disp: 6 pen, Rfl: 7  •  aspirin 81 MG chewable tablet, Chew 81 mg Take As Directed. Take 1 pill by mouth Monday, Wednesday, Friday, Disp: , Rfl:   •  baclofen (LIORESAL) 10 MG tablet, Take 10 mg by mouth 3 (Three) Times a Day., Disp: , Rfl:   •  Blood Glucose Monitoring Suppl (ACCU-CHEK ARGELIA PLUS) w/Device kit, , Disp: , Rfl:   •  clonazePAM (KlonoPIN) 2 MG tablet, Take 1 tablet by mouth Every 12 (Twelve) Hours., Disp: , Rfl:   •  COMFORT ASSIST INSULIN SYRINGE 31G X 5/16\" 0.3 ML misc, , Disp: , Rfl:   •  diphenhydrAMINE (BENADRYL ALLERGY) 25 mg capsule, Take 1 capsule by mouth Daily., Disp: , Rfl:   •  furosemide (LASIX) 20 MG tablet, Take 20 mg by mouth As Needed., Disp: , Rfl:   •  glimepiride (AMARYL) 4 MG tablet, Take 4 mg by mouth 2 (Two) Times a Day., Disp: , Rfl:   •  glucose blood (ONE TOUCH ULTRA TEST) test strip, , Disp: , Rfl:   •  insulin aspart (novoLOG) 100 UNIT/ML injection, Inject 10 Units under the skin 3 (Three) Times a Day Before Meals., Disp: 10 mL, " Rfl: 12  •  isosorbide mononitrate (IMDUR) 60 MG 24 hr tablet, Take 1 tablet by mouth Daily., Disp: 30 tablet, Rfl: 1  •  Lancets (ONETOUCH ULTRASOFT) lancets, , Disp: , Rfl:   •  Omega-3 Fatty Acids (FISH OIL PO), Take 4 capsules by mouth 2 (Two) Times a Day., Disp: , Rfl:   •  ranolazine (RANEXA) 1000 MG 12 hr tablet, Take 1 tablet by mouth Every 12 (Twelve) Hours., Disp: 60 tablet, Rfl: 1  •  sodium bicarbonate 650 MG tablet, Take 1 tablet by mouth Daily., Disp: , Rfl:   •  tamsulosin (FLOMAX) 0.4 MG capsule 24 hr capsule, Take 2 capsules by mouth Daily., Disp: , Rfl:   •  vitamin D (ERGOCALCIFEROL) 78462 UNITS capsule capsule, Take 50,000 Units by mouth Every 7 (Seven) Days. Takes every 2 weeks, Disp: , Rfl:   •  warfarin (COUMADIN) 5 MG tablet, Take 5 mg by mouth Every Night. Patient takes 5 mg every day except MWF when he takes 7.5 mg, Disp: , Rfl:   •  amLODIPine (NORVASC) 5 MG tablet, Take 1 tablet by mouth Daily., Disp: , Rfl:   •  clonazePAM (KlonoPIN) 1 MG tablet, Take 1 tablet by mouth 3 (Three) Times a Day As Needed for Anxiety for up to 7 doses., Disp: 21 tablet, Rfl: 0  •  omeprazole (priLOSEC) 40 MG capsule, Take 1 capsule by mouth Daily., Disp: , Rfl:     Total time 25 min, more than half spent in face to face counseling and coordination of care.    RTC in 9 months     This document has been electronically signed by Jerome Che MD on December 20, 2018         CC: Tam Greco MD          No ref. provider found

## 2019-01-07 ENCOUNTER — DOCUMENTATION (OUTPATIENT)
Dept: CARDIOLOGY | Facility: CLINIC | Age: 64
End: 2019-01-07

## 2019-01-07 NOTE — PROGRESS NOTES
Patient has been approved for praluent assistance through the pass program. lvm for patient to call our office to notify patient.

## 2019-02-17 RX ORDER — CLONAZEPAM 1 MG/1
TABLET ORAL
Qty: 90 TABLET | Refills: 0 | Status: CANCELLED | OUTPATIENT
Start: 2019-02-17

## 2019-02-18 RX ORDER — CLONAZEPAM 1 MG/1
1 TABLET ORAL 3 TIMES DAILY PRN
Qty: 90 TABLET | Refills: 1 | Status: SHIPPED | OUTPATIENT
Start: 2019-02-18 | End: 2019-03-20

## 2019-03-05 ENCOUNTER — OFFICE VISIT (OUTPATIENT)
Dept: PODIATRY | Facility: CLINIC | Age: 64
End: 2019-03-05

## 2019-03-05 VITALS — BODY MASS INDEX: 41.97 KG/M2 | WEIGHT: 293.2 LBS | HEIGHT: 70 IN | HEART RATE: 86 BPM | OXYGEN SATURATION: 98 %

## 2019-03-05 DIAGNOSIS — M79.675 PAIN IN TOES OF BOTH FEET: ICD-10-CM

## 2019-03-05 DIAGNOSIS — S90.222A SUBUNGUAL HEMATOMA OF LEFT FOOT, INITIAL ENCOUNTER: ICD-10-CM

## 2019-03-05 DIAGNOSIS — E11.42 DIABETIC POLYNEUROPATHY ASSOCIATED WITH TYPE 2 DIABETES MELLITUS (HCC): Primary | ICD-10-CM

## 2019-03-05 DIAGNOSIS — B35.1 ONYCHOMYCOSIS: ICD-10-CM

## 2019-03-05 DIAGNOSIS — M79.674 PAIN IN TOES OF BOTH FEET: ICD-10-CM

## 2019-03-05 PROCEDURE — 11721 DEBRIDE NAIL 6 OR MORE: CPT | Performed by: PODIATRIST

## 2019-03-05 PROCEDURE — 99212 OFFICE O/P EST SF 10 MIN: CPT | Performed by: PODIATRIST

## 2019-03-05 RX ORDER — DEUTETRABENAZINE 9 MG/1
TABLET, COATED ORAL
COMMUNITY
Start: 2019-03-04 | End: 2019-05-20 | Stop reason: ALTCHOICE

## 2019-03-05 RX ORDER — HYDROCODONE BITARTRATE AND ACETAMINOPHEN 7.5; 325 MG/1; MG/1
1 TABLET ORAL EVERY 8 HOURS PRN
COMMUNITY
End: 2021-04-14 | Stop reason: SDUPTHER

## 2019-03-05 NOTE — PROGRESS NOTES
Clint Mack  1955  63 y.o. male   BS-94 per patient  PCP-Dr. Greco 02/08/18    Patient presents today for diabetic nail care.    03/05/2019      Chief Complaint   Patient presents with   • Left Foot - Diabetic nail care   • Right Foot - Diabetic nail care           History of Present Illness    Clint Mack is a 63 y.o. male presents for f/u diabetic foot exam and nail care.  Notes loosening pain blood seeping from beneath his left big toe nail.    Past Medical History:   Diagnosis Date   • Anxiety    • Arthritis     osteoarthritis   • CKD (chronic kidney disease), stage III (CMS/HCC)    • COPD (chronic obstructive pulmonary disease) (CMS/[facility])    • Coronary artery disease    • Depression    • Diabetes mellitus (CMS/HCC)    • DVT (deep venous thrombosis) (CMS/[facility])    • Heart disease    • History of embolic filter insertion    • Hyperlipidemia    • Hypertension    • Injury of back     back surgery x3    • LUCIANA on CPAP    • Pulmonary embolism (CMS/HCC)          Past Surgical History:   Procedure Laterality Date   • BACK SURGERY     • CARDIAC SURGERY  2001   • CIRCUMCISION     • COLONOSCOPY N/A 9/11/2018    Procedure: COLONOSCOPY;  Surgeon: Jose C Batres DO;  Location: [facility] ENDOSCOPY;  Service: Gastroenterology   • ENDOSCOPY N/A 9/11/2018    Procedure: ESOPHAGOGASTRODUODENOSCOPY;  Surgeon: Jose C Batres DO;  Location: [facility] ENDOSCOPY;  Service: Gastroenterology   • GALLBLADDER SURGERY  2000   • JOINT REPLACEMENT Right 05/02/2016    hip   • SPINE SURGERY           Family History   Problem Relation Age of Onset   • Heart disease Father    • Hypertension Father    • Stroke Father    • Diabetes Sister    • Heart disease Brother    • Hypertension Brother    • COPD Brother          Social History     Socioeconomic History   • Marital status:      Spouse name: Not on file   • Number of children: Not on file   • Years of education: Not on file   • Highest education level: Not on  "file   Social Needs   • Financial resource strain: Not on file   • Food insecurity - worry: Not on file   • Food insecurity - inability: Not on file   • Transportation needs - medical: Not on file   • Transportation needs - non-medical: Not on file   Occupational History   • Not on file   Tobacco Use   • Smoking status: Never Smoker   • Smokeless tobacco: Never Used   Substance and Sexual Activity   • Alcohol use: No   • Drug use: No   • Sexual activity: Defer   Other Topics Concern   • Not on file   Social History Narrative   • Not on file         Current Outpatient Medications   Medication Sig Dispense Refill   • Alirocumab (PRALUENT) 75 MG/ML solution pen-injector Inject 75 mg under the skin into the appropriate area as directed Every 14 (Fourteen) Days. 6 pen 7   • amLODIPine (NORVASC) 5 MG tablet Take 1 tablet by mouth Daily.     • aspirin 81 MG chewable tablet Chew 81 mg Take As Directed. Take 1 pill by mouth Monday, Wednesday, Friday     • AUSTEDO 9 MG tablet      • baclofen (LIORESAL) 10 MG tablet Take 10 mg by mouth 3 (Three) Times a Day.     • Blood Glucose Monitoring Suppl (ACCU-CHEK ARGELIA PLUS) w/Device kit      • clonazePAM (KlonoPIN) 1 MG tablet Take 1 tablet by mouth 3 (Three) Times a Day As Needed for Anxiety for up to 30 days. 90 tablet 1   • COMFORT ASSIST INSULIN SYRINGE 31G X 5/16\" 0.3 ML misc      • diphenhydrAMINE (BENADRYL ALLERGY) 25 mg capsule Take 1 capsule by mouth Daily.     • furosemide (LASIX) 20 MG tablet Take 20 mg by mouth As Needed.     • glimepiride (AMARYL) 4 MG tablet Take 4 mg by mouth 2 (Two) Times a Day.     • glucose blood (ONE TOUCH ULTRA TEST) test strip      • HYDROcodone-acetaminophen (NORCO)  MG per tablet Take 1 tablet by mouth Every 6 (Six) Hours As Needed for Moderate Pain .     • insulin aspart (novoLOG) 100 UNIT/ML injection Inject 10 Units under the skin 3 (Three) Times a Day Before Meals. 10 mL 12   • isosorbide mononitrate (IMDUR) 60 MG 24 hr tablet Take 1 " "tablet by mouth Daily. 30 tablet 1   • Lancets (ONETOUCH ULTRASOFT) lancets      • Omega-3 Fatty Acids (FISH OIL PO) Take 4 capsules by mouth 2 (Two) Times a Day.     • omeprazole (priLOSEC) 40 MG capsule Take 1 capsule by mouth Daily.     • ranolazine (RANEXA) 1000 MG 12 hr tablet Take 1 tablet by mouth Every 12 (Twelve) Hours. 60 tablet 1   • sodium bicarbonate 650 MG tablet Take 1 tablet by mouth Daily.     • tamsulosin (FLOMAX) 0.4 MG capsule 24 hr capsule Take 2 capsules by mouth Daily.     • vitamin D (ERGOCALCIFEROL) 55792 UNITS capsule capsule Take 50,000 Units by mouth Every 7 (Seven) Days. Takes every 2 weeks     • warfarin (COUMADIN) 5 MG tablet Take 5 mg by mouth Every Night. Patient takes 5 mg every day except MWF when he takes 7.5 mg       No current facility-administered medications for this visit.          OBJECTIVE    Pulse 86   Ht 177.8 cm (70\")   Wt 133 kg (293 lb 3.2 oz)   SpO2 98%   BMI 42.07 kg/m²       Review of Systems   Constitutional:  Denies recent weight loss, weight gain, fever or chills, no change in exercise tolerance  Musculoskeletal: Toe pain.   Skin:  Thickened nails. Shin discoloration.  Neurological:  Burning sensations to feet b/l.  Psychiatric/Behavioral: Denies depression      Physical Exam    Clint had a diabetic foot exam performed today.      Constitutional: he appears well-developed and well-nourished.   HEENT: Normocephalic. Atraumatic  CV: No tenderness. RRR  Resp: Non-labored respiration. No wheezes.   Psychiatric: he has a normal mood and affect. his   behavior is normal.      Lower Extremity Exam:  Vascular: DP/PT pulses palpable 1+.   Negative hair growth.   1+ perimalleolar edema  Toes cool  Neuro: Protective sensation diminished to lesser toes, b/l.  DTRs intact  Integument: No open wounds or lesions.  Atrophic skin noted b/l with chronic venous stasis dermatitis changes  Mild xerosis  No masses  Left hallux nail mildly loosened, subungual hematoma " noted.  Musculoskeletal: LE muscle strength 4/5 on left, 3/5 on right  Gait assisted with walker  Semi-rigid hammertoe deformity toes 2-5 b/l.  Nails 1-5 b/l thickened, elongated with subungual debris. +pain on palpation              ASSESSMENT AND PLAN    Clint was seen today for diabetic nail care and diabetic nail care.    Diagnoses and all orders for this visit:    Diabetic polyneuropathy associated with type 2 diabetes mellitus (CMS/HCC)    Onychomycosis    Pain in toes of both feet    Subungual hematoma of left foot, initial encounter      -Comprehensive DM foot exam performed. Pt educated on importance of tight glucose control and daily foot checks.  -Pt educated on padding techniques for rigid hammertoes.  Proper extra depth diabetic shoe gear.  Limit barefoot walking.  -Nails 1-5 b/l debrided in length and thickness with nail nipper to decrease pain, fungal load and risk of infection  -Left hallux nail debrided to stable base advised patient to continue for local signs of infection.  No open nail bed lacerations were noted.  -Follow up 3 months PRN          This document has been electronically signed by Jose C Novak DPM on March 5, 2019 8:28 AM     EMR Dragon/Transcription disclaimer:   Much of this encounter note is an electronic transcription/translation of spoken language to printed text. The electronic translation of spoken language may permit erroneous, or at times, nonsensical words or phrases to be inadvertently transcribed; Although I have reviewed the note for such errors, some may still exist.    Jose C Novak DPM  3/5/2019  8:28 AM

## 2019-03-27 ENCOUNTER — OFFICE VISIT (OUTPATIENT)
Dept: CARDIOLOGY | Facility: CLINIC | Age: 64
End: 2019-03-27

## 2019-03-27 VITALS
OXYGEN SATURATION: 99 % | HEIGHT: 70 IN | DIASTOLIC BLOOD PRESSURE: 68 MMHG | WEIGHT: 288.8 LBS | SYSTOLIC BLOOD PRESSURE: 122 MMHG | BODY MASS INDEX: 41.35 KG/M2 | HEART RATE: 89 BPM

## 2019-03-27 DIAGNOSIS — I25.10 CORONARY ARTERY DISEASE INVOLVING NATIVE CORONARY ARTERY OF NATIVE HEART WITHOUT ANGINA PECTORIS: Primary | Chronic | ICD-10-CM

## 2019-03-27 DIAGNOSIS — I10 ESSENTIAL HYPERTENSION: Chronic | ICD-10-CM

## 2019-03-27 DIAGNOSIS — Z95.1 S/P CABG (CORONARY ARTERY BYPASS GRAFT): ICD-10-CM

## 2019-03-27 DIAGNOSIS — E78.2 MIXED HYPERLIPIDEMIA: Chronic | ICD-10-CM

## 2019-03-27 DIAGNOSIS — R06.09 DYSPNEA ON EXERTION: ICD-10-CM

## 2019-03-27 PROCEDURE — 99214 OFFICE O/P EST MOD 30 MIN: CPT | Performed by: INTERNAL MEDICINE

## 2019-03-27 RX ORDER — CLOTRIMAZOLE 1 %
CREAM (GRAM) TOPICAL 2 TIMES DAILY
COMMUNITY
End: 2019-06-04

## 2019-03-27 RX ORDER — RANOLAZINE 500 MG/1
500 TABLET, EXTENDED RELEASE ORAL 2 TIMES DAILY
Qty: 60 TABLET | Refills: 6
Start: 2019-03-27 | End: 2019-08-26 | Stop reason: SDUPTHER

## 2019-03-27 NOTE — PROGRESS NOTES
Clint Mack  63 y.o. male    03/27/2019  1. Coronary artery disease involving native coronary artery of native heart without angina pectoris    2. Essential hypertension    3. Mixed hyperlipidemia    4. S/P CABG (coronary artery bypass graft)    5. Dyspnea on exertion        History of Present Illness    Mr. Mack is here for follow-up of his multiple medical issues.  In November 2018 the patient had back surgery at [place] and during that admission had worsening renal function and an episode of chest pain.  The pain resolved with medical management and he has not had any recurrence.  An echocardiogram performed showed ejection fraction of 45-50%.  Mr. Mack, as usual has multiple issues, mostly unrelated to the heart.  He has long-standing tardive dyskinesia and is concerned about potential side effects of his medications.  He has been reading about the side effects of almost every medicine that he takes and is concerned about various issues.  He has had leg edema from time to time and wondered if he could go off his amlodipine.  He has been taking low-dose Lasix as prescribed by Dr. Holt.  He was worried about Ranexa which he feels might affect his kidneys.  He had several questions with regards to many of his other medicines as well.  He has been unable to use compression stockings on a regular basis.  He is on Praluent which has helped his lipid profiles remarkably and they are in the optimal range.  His latest creatinine is 1.9.  He had no clinical signs of congestive heart failure and his blood pressure was in the normal range.        SUBJECTIVE    Allergies   Allergen Reactions   • Celexa [Citalopram Hydrobromide] Dystonia   • Crestor [Rosuvastatin Calcium]    • Lipitor [Atorvastatin] Other (See Comments)     Aches and pains all over   • Lisinopril Other (See Comments)     cough  Constant cough   • Lyrica [Pregabalin] Swelling   • Rosuvastatin Other (See Comments)     Aches and pains all over          Past Medical History:   Diagnosis Date   • Anxiety    • Arthritis     osteoarthritis   • CKD (chronic kidney disease), stage III (CMS/[facility])    • COPD (chronic obstructive pulmonary disease) (CMS/[facility])    • Coronary artery disease    • Depression    • Diabetes mellitus (CMS/H[facility])    • DVT (deep venous thrombosis) (CMS/[facility])    • Heart disease    • History of embolic filter insertion    • Hyperlipidemia    • Hypertension    • Injury of back     back surgery x3    • LUCIANA on CPAP    • Pulmonary embolism (CMS/[facility])          Past Surgical History:   Procedure Laterality Date   • BACK SURGERY     • CARDIAC SURGERY  2001   • CIRCUMCISION     • COLONOSCOPY N/A 9/11/2018    Procedure: COLONOSCOPY;  Surgeon: Jose C Batres DO;  Location: [facility] ENDOSCOPY;  Service: Gastroenterology   • ENDOSCOPY N/A 9/11/2018    Procedure: ESOPHAGOGASTRODUODENOSCOPY;  Surgeon: Jose C Batres DO;  Location: [facility] ENDOSCOPY;  Service: Gastroenterology   • GALLBLADDER SURGERY  2000   • JOINT REPLACEMENT Right 05/02/2016    hip   • SPINE SURGERY           Family History   Problem Relation Age of Onset   • Heart disease Father    • Hypertension Father    • Stroke Father    • Diabetes Sister    • Heart disease Brother    • Hypertension Brother    • COPD Brother          Social History     Socioeconomic History   • Marital status:      Spouse name: Not on file   • Number of children: Not on file   • Years of education: Not on file   • Highest education level: Not on file   Tobacco Use   • Smoking status: Never Smoker   • Smokeless tobacco: Never Used   Substance and Sexual Activity   • Alcohol use: No   • Drug use: No   • Sexual activity: Defer         Current Outpatient Medications   Medication Sig Dispense Refill   • Alirocumab (PRALUENT) 75 MG/ML solution pen-injector Inject 75 mg under the skin into the appropriate area as directed Every 14 (Fourteen) Days. 6 pen 7   • amLODIPine (NORVASC) 5 MG tablet Take 1 tablet by mouth  "Daily.     • aspirin 81 MG chewable tablet Chew 81 mg Take As Directed. Take 1 pill by mouth Monday, Wednesday, Friday     • AUSTEDO 9 MG tablet      • baclofen (LIORESAL) 10 MG tablet Take 10 mg by mouth 3 (Three) Times a Day.     • Blood Glucose Monitoring Suppl (ACCU-CHEK ARGELIA PLUS) w/Device kit      • clotrimazole (LOTRIMIN) 1 % cream Apply  topically to the appropriate area as directed 2 (Two) Times a Day.     • COMFORT ASSIST INSULIN SYRINGE 31G X 5/16\" 0.3 ML misc      • diphenhydrAMINE (BENADRYL ALLERGY) 25 mg capsule Take 1 capsule by mouth Daily.     • furosemide (LASIX) 20 MG tablet Take 20 mg by mouth As Needed.     • glimepiride (AMARYL) 4 MG tablet Take 4 mg by mouth 2 (Two) Times a Day.     • glucose blood (ONE TOUCH ULTRA TEST) test strip      • HYDROcodone-acetaminophen (NORCO)  MG per tablet Take 1 tablet by mouth Every 6 (Six) Hours As Needed for Moderate Pain .     • insulin aspart (novoLOG) 100 UNIT/ML injection Inject 10 Units under the skin 3 (Three) Times a Day Before Meals. 10 mL 12   • isosorbide mononitrate (IMDUR) 60 MG 24 hr tablet Take 1 tablet by mouth Daily. 30 tablet 1   • Lancets (ONETOUCH ULTRASOFT) lancets      • Omega-3 Fatty Acids (FISH OIL PO) Take 4 capsules by mouth 2 (Two) Times a Day.     • omeprazole (priLOSEC) 40 MG capsule Take 1 capsule by mouth Daily.     • sodium bicarbonate 650 MG tablet Take 1 tablet by mouth Daily.     • tamsulosin (FLOMAX) 0.4 MG capsule 24 hr capsule Take 2 capsules by mouth Daily.     • vitamin D (ERGOCALCIFEROL) 97040 UNITS capsule capsule Take 50,000 Units by mouth Every 7 (Seven) Days. Takes every 2 weeks     • warfarin (COUMADIN) 5 MG tablet Take 5 mg by mouth Every Night. Patient takes 5 mg every day except MWF when he takes 7.5 mg     • ranolazine (RANEXA) 500 MG 12 hr tablet Take 1 tablet by mouth 2 (Two) Times a Day. 60 tablet 6     No current facility-administered medications for this visit.          OBJECTIVE    /68   " "Pulse 89   Ht 177.8 cm (70\")   Wt 131 kg (288 lb 12.8 oz)   SpO2 99%   BMI 41.44 kg/m²         Review of Systems     Constitutional: recent weight loss, No fever or chills     HENT:  Denies any hearing loss, epistaxis, hoarseness, or difficulty speaking.     Eyes: Wears eyeglasses or contact lenses     Respiratory:  Dyspnea with exertion,no cough, wheezing, or hemoptysis.     Cardiovascular: Negative for palpations, chest pain    Gastrointestinal:  Denies change in bowel habits, dyspepsia, ulcer disease, hematochezia, or melena.     Endocrine: Negative for cold intolerance, heat intolerance, polydipsia, polyphagia and polyuria.     Genitourinary: Negative.      Musculoskeletal: DJD, Chronic pain    Skin:  Denies any change in hair or nails, rashes, or skin lesions.     Allergic/Immunologic: Negative.  Negative for environmental allergies, food allergies and immunocompromised state.     Neurological:  H/o Tardive Dyskinesia     Hematological: Denies any food allergies, seasonal allergies, bleeding disorders, or lymphadenopathy.     Psychiatric/Behavioral:  history of depression/ anxiety         Physical Exam     Constitutional: Cooperative, alert and oriented,  in no acute distress.     HENT:   Head: Normocephalic, normal hair patterns, no masses or tenderness.  Ears, Nose, and Throat: No gross abnormalities. No pallor or cyanosis.   Eyes: EOMS intact, PERRL, conjunctivae and lids unremarkable. Fundoscopic exam and visual fields not performed.   Neck: No palpable masses or adenopathy, no thyromegaly, no JVD, carotid pulses are full and equal bilaterally and without  Bruits.     Cardiovascular: Regular rhythm, S1 and S2 normal, no S3 or S4.  No murmurs, gallops, or rubs detected.     Pulmonary/Chest: Chest: normal symmetry,  normal respiratory excursion, no intercostal retraction, no use of accessory muscles.            Pulmonary: Normal breath sounds. No rales or ronchi.    Abdominal: Abdomen soft, bowel sounds " normoactive, no masses, no hepatosplenomegaly, non-tender, no bruits.     Musculoskeletal: No deformities, clubbing, cyanosis, erythema, or edema observed.    Neurological: Tardive Dyskinesia    Skin: Warm and dry to the touch, no apparent skin lesions or masses noted.     Psychiatric: He has a normal mood and affect. His behavior is normal. Judgment and thought content normal.         Procedures      Lab Results   Component Value Date    WBC 8.92 03/08/2018    HGB 16.1 08/15/2018    HCT 47.7 08/15/2018    MCV 88.1 03/08/2018     (L) 03/08/2018     Lab Results   Component Value Date    GLUCOSE 171 (H) 03/08/2018    BUN 23 (H) 03/08/2018    CREATININE 1.45 (H) 03/08/2018    EGFRIFNONA 49 03/08/2018    BCR 15.9 03/08/2018    CO2 20.0 (L) 03/08/2018    CALCIUM 8.3 (L) 03/08/2018    ALBUMIN 4.00 03/06/2018    AST 23 03/06/2018    ALT 52 03/06/2018     Lab Results   Component Value Date    CHOL 113 02/21/2019    CHOL 216 (H) 10/12/2018     Lab Results   Component Value Date    TRIG 182 02/21/2019    TRIG 187 10/12/2018     Lab Results   Component Value Date    HDL 34 02/21/2019    HDL 33 10/12/2018     No components found for: LDLCALC  Lab Results   Component Value Date    LDL 54 02/21/2019     (H) 10/12/2018     No results found for: HDLLDLRATIO  No components found for: CHOLHDL  Lab Results   Component Value Date    HGBA1C 6.3 (H) 02/21/2019     Lab Results   Component Value Date    TSH 0.82 03/14/2018           ASSESSMENT AND PLAN  Mr. Mack is fortunately stable at this time with no definite evidence of angina, arrhythmia or congestive heart failure.  I have advised him to continue and antihypertensive therapy with amlodipine, low-dose diuretics, antianginal therapy with isosorbide mononitrate and anticoagulation with warfarin.  Ranexa 500 mg twice a day has been continued.    Clint was seen today for follow-up.    Diagnoses and all orders for this visit:    Coronary artery disease involving native  coronary artery of native heart without angina pectoris    Essential hypertension    Mixed hyperlipidemia    S/P CABG (coronary artery bypass graft)    Dyspnea on exertion    Other orders  -     ranolazine (RANEXA) 500 MG 12 hr tablet; Take 1 tablet by mouth 2 (Two) Times a Day.        Patient's Body mass index is 41.44 kg/m². BMI is above normal parameters. Recommendations include: exercise counseling and nutrition counseling.        Keke Kumar MD  3/27/2019  9:01 AM

## 2019-03-29 ENCOUNTER — TRANSCRIBE ORDERS (OUTPATIENT)
Dept: NUTRITION | Facility: HOSPITAL | Age: 64
End: 2019-03-29

## 2019-03-29 DIAGNOSIS — I10 ESSENTIAL HYPERTENSION: ICD-10-CM

## 2019-03-29 DIAGNOSIS — E11.29 TYPE 2 DIABETES MELLITUS WITH OTHER DIABETIC KIDNEY COMPLICATION, WITH LONG-TERM CURRENT USE OF INSULIN (HCC): ICD-10-CM

## 2019-03-29 DIAGNOSIS — Z79.4 TYPE 2 DIABETES MELLITUS WITH OTHER DIABETIC KIDNEY COMPLICATION, WITH LONG-TERM CURRENT USE OF INSULIN (HCC): ICD-10-CM

## 2019-03-29 DIAGNOSIS — N02.8 IGA NEPHROPATHY: ICD-10-CM

## 2019-03-29 DIAGNOSIS — N18.30 CHRONIC KIDNEY FAILURE, STAGE 3 (MODERATE) (HCC): Primary | ICD-10-CM

## 2019-04-12 RX ORDER — CLONAZEPAM 1 MG/1
1 TABLET, ORALLY DISINTEGRATING ORAL 3 TIMES DAILY PRN
Qty: 30 TABLET | Refills: 0 | Status: SHIPPED | OUTPATIENT
Start: 2019-04-12 | End: 2019-04-23 | Stop reason: DRUGHIGH

## 2019-04-17 RX ORDER — CLONAZEPAM 1 MG/1
TABLET ORAL
Qty: 90 TABLET | Refills: 0 | OUTPATIENT
Start: 2019-04-17

## 2019-04-19 ENCOUNTER — HOSPITAL ENCOUNTER (OUTPATIENT)
Dept: NUTRITION | Facility: HOSPITAL | Age: 64
Discharge: HOME | End: 2019-04-19
Admitting: INTERNAL MEDICINE

## 2019-04-19 VITALS — BODY MASS INDEX: 41.23 KG/M2 | WEIGHT: 288 LBS | HEIGHT: 70 IN

## 2019-04-19 PROCEDURE — 97802 MEDICAL NUTRITION INDIV IN: CPT | Performed by: DIETITIAN, REGISTERED

## 2019-04-19 NOTE — PROGRESS NOTES
"Adult Outpatient Nutrition  Assessment    Patient Name:  Clint Mack  YOB: 1955  MRN: [id number]    Assessment Date:  4/19/2019    Comments:  Pt was referred by dr. don for low potassium, low sodium and weight mgt. Pt and his wife came for the consultation. Pt is knowledgeable about his condition of tardive dyskinesia and all his medications. We discussed the low sodium , low potassium ada diet using materials for home use. Pt's wife is present and voices her support. Pt's activity is limited.  We discussed fluid needs as well. Pt will return in 3 weeks for follow up. THis RDN will follow then..    General Info     Row Name 04/19/19 1403       Today's Session    Person(s) attending today's session  Patient;Spouse       General Information    Lives With  spouse        Physical Findings     Row Name 04/19/19 1403          Physical Findings    Overall Physical Appearance  obese         Anthropometrics     Row Name 04/19/19 1403          Anthropometrics    Height  177.8 cm (70\")     Weight  131 kg (288 lb)        Ideal Body Weight (IBW)    Ideal Body Weight (IBW) (kg)  76.48     % Ideal Body Weight  170.81        Body Mass Index (BMI)    BMI (kg/m2)  41.41        IBW Adjustment, Para/Tetraplegia    5% Adjustment, Para (IBW)  72.66     10% Adjustment, Para (IBW)  68.83     10% Adjustment, Tetra (IBW)  68.83     15% Adjustment, Tetra (IBW)  65.01         Nutritional Info/Activity     Row Name 04/19/19 1404       Nutritional Information    Have you had weight changes?  Yes    Describe weight changes  weight loss with fluid loss    Functional Status  able to prepare meals;able to purchase food;ambulatory with a walker with wife's help    What is the biggest challenge you have with your diet?  Knowledge;Portions       Eating Environment    Eating environment  Family       Physical Activity    Are you currently involved in an activity/exercise program?   No    Reasons for Inactivity  Limited " "activity uses walker        Home Nutrition Report     Row Name 04/19/19 1405          Home Nutrition Report    Typical Food/Fluid Intake  pt eats three meals per day. if his am blood sugar is good, he eats one lunch and if it is not , he eats another lunch.      Food Preferences  likes all foods         Estimated/Assessed Needs     Row Name 04/19/19 1405 04/19/19 1403       Calculation Measurements    Height  --  177.8 cm (70\")       Estimated/Assessed Needs    Additional Documentation  Calorie Requirements (Group)  --       Calorie Requirements    Estimated Calorie Requirement (kcal/day)  1800 for weight mgt  --        Evaluation of Prescribed Nutrient/Fluid Intake     Row Name 04/19/19 1403          Calculation Measurements    Height  177.8 cm (70\")         Labs/Tests/Procedures/Meds     Row Name 04/19/19 1406          Labs/Procedures/Meds    Lab Results Comments  potassium is 4.8N        Medications    Pertinent Medications Reviewed  reviewed             Electronically signed by:  Magy Augustine RD  04/19/19 2:07 PM   "

## 2019-04-23 RX ORDER — CLONAZEPAM 1 MG/1
1 TABLET ORAL 3 TIMES DAILY PRN
Qty: 90 TABLET | Refills: 0 | Status: SHIPPED | OUTPATIENT
Start: 2019-04-25 | End: 2019-05-21 | Stop reason: SDUPTHER

## 2019-05-07 ENCOUNTER — TELEPHONE (OUTPATIENT)
Dept: CARDIAC SURGERY | Facility: CLINIC | Age: 64
End: 2019-05-07

## 2019-05-07 NOTE — TELEPHONE ENCOUNTER
Return patient's phone call and instructed him to please bring our office a copy of the letter for us to be able to further investigate the issue. He stated he would bring the letter by our office within the next few days.              ----- Message from Vannesa Mojica sent at 5/7/2019  1:20 PM CDT -----  Contact: [phone]  Patient called stating he received a letter in the mail stating he had a bad IVC filter and that he needed to contact our office ASAP.  He said Dr. Carrizales put his IVC filter in in 2014.  His phone number is [phone].

## 2019-05-15 ENCOUNTER — HOSPITAL ENCOUNTER (OUTPATIENT)
Dept: NUTRITION | Facility: HOSPITAL | Age: 64
Discharge: HOME | End: 2019-05-15
Admitting: DIETITIAN, REGISTERED

## 2019-05-15 VITALS — WEIGHT: 285 LBS | HEIGHT: 70 IN | BODY MASS INDEX: 40.8 KG/M2

## 2019-05-15 PROCEDURE — 97803 MED NUTRITION INDIV SUBSEQ: CPT | Performed by: DIETITIAN, REGISTERED

## 2019-05-15 NOTE — PROGRESS NOTES
"Adult Outpatient Nutrition  Assessment    Patient Name:  Clint Mack  YOB: 1955  MRN: [id number]    Assessment Date:  5/15/2019    Comments:  Pt returned for a follow up visit with his wife. Pt described other medical issues that have occurred since his last visit. He is pleased his aic is below 6.\"I need to keep doing what I am doing\"  Says he has all the nutrition info he needs and he will call if he has questions or needs any more info. RDN told pt and wife re a chair yoga class available in [facility] to help with stress mgt since he discussed how he believes stress can affect the kidney and he is stressed about his health every day. Pt has made a few changes in his eating habits, such as limiting fluids to 2000 cc/day and drinking clear soda instead of dark soda. THis RDN will follow as needed.    General Info     Row Name 05/15/19 1529       Today's Session    Person(s) attending today's session  Patient;Spouse       General Information    Lives With  spouse        Physical Findings     Row Name 05/15/19 1529          Physical Findings    Overall Physical Appearance  obese         Anthropometrics     Row Name 05/15/19 1529          Anthropometrics    Height  177.8 cm (70\")     Weight  129 kg (285 lb)        Ideal Body Weight (IBW)    Ideal Body Weight (IBW) (kg)  76.48     % Ideal Body Weight  169.03        Body Mass Index (BMI)    BMI (kg/m2)  40.98        IBW Adjustment, Para/Tetraplegia    5% Adjustment, Para (IBW)  72.66     10% Adjustment, Para (IBW)  68.83     10% Adjustment, Tetra (IBW)  68.83     15% Adjustment, Tetra (IBW)  65.01         Nutritional Info/Activity     Row Name 05/15/19 1529       Nutritional Information    Have you had weight changes?  Yes    Describe weight changes  weight loss of 3 lb since seeing his provider.    What is your motivation to lose weight?  control blood sugar, control blood pressure    Functional Status  -- pt is unable to stand for long    What is " "the biggest challenge you have with your diet?  Knowledge       Eating Environment    Eating environment  Family       Physical Activity    Are you currently involved in an activity/exercise program?   No    Reasons for Inactivity  Limited activity        Home Nutrition Report     Row Name 05/15/19 1531          Home Nutrition Report    Typical Food/Fluid Intake  pt said he is eating about the same. he had alot going on and didn't make many changes to his eating habits since we talked.     Food Preferences  limits foods like bananas, other high potassium foods.         Estimated/Assessed Needs     Row Name 05/15/19 1529          Calculation Measurements    Height  177.8 cm (70\")         Evaluation of Prescribed Nutrient/Fluid Intake     Row Name 05/15/19 1529          Calculation Measurements    Height  177.8 cm (70\")         Labs/Tests/Procedures/Meds     Row Name 05/15/19 1532          Labs/Procedures/Meds    Lab Results Reviewed  reviewed     Lab Results Comments  hgb aic 5.8              Electronically signed by:  Magy Augustine RD  05/15/19 3:36 PM   "

## 2019-05-20 ENCOUNTER — OFFICE VISIT (OUTPATIENT)
Dept: CARDIAC SURGERY | Facility: CLINIC | Age: 64
End: 2019-05-20

## 2019-05-20 VITALS
WEIGHT: 286 LBS | HEART RATE: 110 BPM | SYSTOLIC BLOOD PRESSURE: 140 MMHG | BODY MASS INDEX: 40.94 KG/M2 | DIASTOLIC BLOOD PRESSURE: 70 MMHG | OXYGEN SATURATION: 98 % | TEMPERATURE: 97.8 F | HEIGHT: 70 IN

## 2019-05-20 DIAGNOSIS — I71.40 ABDOMINAL AORTIC ANEURYSM (AAA) WITHOUT RUPTURE (HCC): Primary | ICD-10-CM

## 2019-05-20 PROCEDURE — 99214 OFFICE O/P EST MOD 30 MIN: CPT | Performed by: NURSE PRACTITIONER

## 2019-05-20 NOTE — PROGRESS NOTES
CVTS Office Progress Note     Subjective   Patient ID: Clint Mack is a 63 y.o. male as new patient to establish care for prior IVC filter placement    Chief Complaint:    Chief Complaint   Patient presents with   • Deep Vein Thrombosis     follow up IVC filter   • Pulmonary Embolism       PCP:  Tam Greco MD     History of Present Illness  Mr. Mack is a pleasant 63-year-old male with a history of hypertension, CAD, hyperlipidemia, DVT, pulmonary embolus, obstructive sleep apnea, obesity, tardive dyskinesia, depression, anxiety, osteoarthritis, CKD3.  He establishes today as a new patient after he became concerned about his IVC filter and the possibility of defective hardware.  Patient has been on lifelong anticoagulation since 2009 for recurrent DVT, PE with Coumadin.  Required hip replacement 2014 necessitating IVC filter placement prior to procedure.  Patient apparently received notification of the potential of defective IVC filter device and obtained outpatient CT scan in [place].  He has been seen today for review of CT and further recommendations regarding his IVC filter.    2008 DVT  2009 PE Life long anticoagulation instituted with warfarin  2014 ORIF Hip Replacement  2014 IVC Filter Placement  2019 CT Abd/Pelv WO contrast: One filter leg potentially extraluminal, small 27mm infrarenal AAA, CT reviewed by Dr. Carrizales     The following portions of the patient's history were reviewed and updated as appropriate: allergies, current medications, past family history, past medical history, past social history, past surgical history and problem list.  Recent images independently reviewed.  Available laboratory values reviewed.      Past Medical History:   Diagnosis Date   • Anxiety    • Arthritis     osteoarthritis   • CKD (chronic kidney disease), stage III (CMS/[facility])    • COPD (chronic obstructive pulmonary disease) (CMS/[facility])    • Coronary artery disease    • Depression    •  Diabetes mellitus (CMS/HCC)    • DVT (deep venous thrombosis) (CMS/HCC)    • Heart disease    • History of embolic filter insertion    • Hyperlipidemia    • Hypertension    • Injury of back     back surgery x3    • LUCIANA on CPAP    • Pulmonary embolism (CMS/HCC)      Past Surgical History:   Procedure Laterality Date   • BACK SURGERY     • CARDIAC SURGERY  2001   • CIRCUMCISION     • COLONOSCOPY N/A 9/11/2018    Procedure: COLONOSCOPY;  Surgeon: Jose C Batres DO;  Location: [facility] ENDOSCOPY;  Service: Gastroenterology   • ENDOSCOPY N/A 9/11/2018    Procedure: ESOPHAGOGASTRODUODENOSCOPY;  Surgeon: Jose C Batres DO;  Location: [facility] ENDOSCOPY;  Service: Gastroenterology   • GALLBLADDER SURGERY  2000   • JOINT REPLACEMENT Right 05/02/2016    hip   • SPINE SURGERY       Family History   Problem Relation Age of Onset   • Heart disease Father    • Hypertension Father    • Stroke Father    • Diabetes Sister    • Heart disease Brother    • Hypertension Brother    • COPD Brother      Social History     Tobacco Use   • Smoking status: Never Smoker   • Smokeless tobacco: Never Used   Substance Use Topics   • Alcohol use: No   • Drug use: No       ALLERGIES:   Celexa [citalopram hydrobromide]; Crestor [rosuvastatin calcium]; Lipitor [atorvastatin]; Lisinopril; Lyrica [pregabalin]; and Rosuvastatin    MEDICATIONS:      Current Outpatient Medications:   •  Alirocumab (PRALUENT) 75 MG/ML solution pen-injector, Inject 75 mg under the skin into the appropriate area as directed Every 14 (Fourteen) Days., Disp: 6 pen, Rfl: 7  •  amLODIPine (NORVASC) 5 MG tablet, Take 1 tablet by mouth Daily., Disp: , Rfl:   •  aspirin 81 MG chewable tablet, Chew 81 mg Take As Directed. Take 1 pill by mouth Monday, Wednesday, Friday, Disp: , Rfl:   •  baclofen (LIORESAL) 10 MG tablet, Take 10 mg by mouth 3 (Three) Times a Day., Disp: , Rfl:   •  Blood Glucose Monitoring Suppl (ACCU-CHEK ARGELIA PLUS) w/Device kit, , Disp: , Rfl:   •  clonazePAM  "(KlonoPIN) 1 MG tablet, Take 1 tablet by mouth 3 (Three) Times a Day As Needed for Seizures. Or dystonia, Disp: 90 tablet, Rfl: 0  •  clotrimazole (LOTRIMIN) 1 % cream, Apply  topically to the appropriate area as directed 2 (Two) Times a Day., Disp: , Rfl:   •  COMFORT ASSIST INSULIN SYRINGE 31G X 5/16\" 0.3 ML misc, , Disp: , Rfl:   •  diphenhydrAMINE (BENADRYL ALLERGY) 25 mg capsule, Take 1 capsule by mouth Daily., Disp: , Rfl:   •  furosemide (LASIX) 20 MG tablet, Take 20 mg by mouth As Needed., Disp: , Rfl:   •  glimepiride (AMARYL) 4 MG tablet, Take 4 mg by mouth 2 (Two) Times a Day., Disp: , Rfl:   •  glucose blood (ONE TOUCH ULTRA TEST) test strip, , Disp: , Rfl:   •  HYDROcodone-acetaminophen (NORCO)  MG per tablet, Take 1 tablet by mouth Every 6 (Six) Hours As Needed for Moderate Pain ., Disp: , Rfl:   •  insulin aspart (novoLOG) 100 UNIT/ML injection, Inject 10 Units under the skin 3 (Three) Times a Day Before Meals., Disp: 10 mL, Rfl: 12  •  isosorbide mononitrate (IMDUR) 60 MG 24 hr tablet, Take 1 tablet by mouth Daily., Disp: 30 tablet, Rfl: 1  •  Lancets (ONETOUCH ULTRASOFT) lancets, , Disp: , Rfl:   •  Omega-3 Fatty Acids (FISH OIL PO), Take 4 capsules by mouth 2 (Two) Times a Day., Disp: , Rfl:   •  omeprazole (priLOSEC) 40 MG capsule, Take 1 capsule by mouth Daily., Disp: , Rfl:   •  ranolazine (RANEXA) 500 MG 12 hr tablet, Take 1 tablet by mouth 2 (Two) Times a Day., Disp: 60 tablet, Rfl: 6  •  sodium bicarbonate 650 MG tablet, Take 1 tablet by mouth Daily., Disp: , Rfl:   •  tamsulosin (FLOMAX) 0.4 MG capsule 24 hr capsule, Take 2 capsules by mouth Daily., Disp: , Rfl:   •  vitamin D (ERGOCALCIFEROL) 27657 UNITS capsule capsule, Take 50,000 Units by mouth Every 7 (Seven) Days. Takes every 2 weeks, Disp: , Rfl:   •  warfarin (COUMADIN) 5 MG tablet, Take 5 mg by mouth Every Night. Patient takes 5 mg every day except MWF when he takes 7.5 mg, Disp: , Rfl:     Review of Systems   Constitution: " Negative for chills, decreased appetite, fever, weakness and weight loss.   HENT: Negative for congestion, nosebleeds and sore throat.    Eyes: Negative for blurred vision, visual disturbance and visual halos.   Cardiovascular: Positive for dyspnea on exertion (conditioning) and leg swelling (mild chronic). Negative for chest pain.   Respiratory: Negative for cough, shortness of breath, sputum production and wheezing.    Endocrine: Negative for cold intolerance and polyuria.   Hematologic/Lymphatic: Negative for bleeding problem. Bruises/bleeds easily.        Does not report S/S of adverse bleeding event including nose bleeds, hematuria, melena, gingival bleeding, or hematemesis.   Skin: Negative for flushing, nail changes and unusual hair distribution.   Musculoskeletal: Positive for arthritis, back pain and joint pain.   Gastrointestinal: Negative for bloating, abdominal pain, hematemesis, melena, nausea and vomiting.   Genitourinary: Negative for flank pain and hematuria.   Neurological: Positive for tremors. Negative for brief paralysis, difficulty with concentration, focal weakness, light-headedness, loss of balance, numbness and paresthesias.        Repetitive head motions, tongue motions, reported history of tardive dyskinesia    No amaurosis fugax, TIA, or CVA.     Psychiatric/Behavioral: Negative for altered mental status, depression, substance abuse and suicidal ideas.   Allergic/Immunologic: Negative for hives and persistent infections.     Vitals:    05/20/19 0915   BP: 140/70   Pulse: 110   Temp: 97.8 °F (36.6 °C)   SpO2: 98%      Objective   Temp:  [97.8 °F (36.6 °C)] 97.8 °F (36.6 °C)  Heart Rate:  [110] 110  BP: (140)/(70) 140/70  Body mass index is 41.04 kg/m².  Physical Exam   Constitutional: He is oriented to person, place, and time. He appears well-developed and well-nourished.   HENT:   Head: Normocephalic and atraumatic.   Mouth/Throat: Oropharynx is clear and moist.   Eyes: Conjunctivae and  EOM are normal. Pupils are equal, round, and reactive to light. No scleral icterus.   Neck: Normal range of motion. Neck supple. No JVD present.   Cardiovascular: Normal rate, regular rhythm, normal heart sounds and intact distal pulses.   No murmur heard.  Pulmonary/Chest: Effort normal and breath sounds normal. No stridor. No respiratory distress.   Abdominal: Soft. Bowel sounds are normal. He exhibits no distension. There is no tenderness.   Genitourinary:   Genitourinary Comments: deferred   Musculoskeletal: Normal range of motion. He exhibits edema (mild BLE). He exhibits no tenderness.   Lymphadenopathy:     He has no cervical adenopathy.   Neurological: He is alert and oriented to person, place, and time. No cranial nerve deficit. Coordination normal.   Repetitive motions of head and tongue observed.  Reported history of tardive dyskinesia   Skin: Skin is warm and dry. Capillary refill takes less than 2 seconds.   Psychiatric: He has a normal mood and affect. His behavior is normal. Judgment normal.   Nursing note and vitals reviewed.        Assessment & Plan     Independent Review of Studies  2019 CT Abd/Pelv WO contrast: One filter leg potentially extraluminal, small 27mm infrarenal AAA, CT reviewed by Dr. Carrizales    IVC Filter  1 potential IVC filter leg extraluminal.  CT reviewed by Dr. Carrizales, no further intervention or extraction of IVC filter or replacement is recommended at this time.    Abdominal Aortic Aneurysm without Rupture  Aneurysmal dilation of 27mm, recommend routine follow up with repeat US in 12 months.  [facility] splinter hemorrhages, report to emergency department for tearing back/abdominal pain.  Encourage smoking cessation, avoid strenuous lifting, systolic blood pressure goal of less than 120.  Endovascular exclusion to be considered when aneurysm reaches 50 mm in size.    Lifestyle Modifications  Recommended lifestyle modifications to reduce the  progression of vascular disease include increasing moderate activity to 150 minutes per week, avoidance of smoking, optimizing blood pressure control, strict control of diabetes, and management of hyperlipidemia.  Choose a diet that emphasizes intake of vegetables, fruits, and whole grains; includes low-fat dairy products, poultry, fish, legumes, nontropical vegetable oils, and nuts; and limits intake of sweets, sugar-sweetened beverages, and red meats.  Limit alcohol intake to one drink per day in females and 2 drinks per day in men.      Class 3 Obesity  We have discussed the benefits of weight loss as it relates to long term morbidity and disease prevention.  Interventions discussed included self-monitoring of caloric intake, increasing physical activity/exercise, goal setting, stimulus control, consultation with dietitian, and non-food rewards.        Detailed discussion regarding risks, benefits, and treatment plan. Images independently reviewed. Patient understands, agrees, and wishes to proceed with plan.

## 2019-05-20 NOTE — PATIENT INSTRUCTIONS
Dr. Carrizales reviewed CT, no need for intervention at this time.  One leg may be outside of the IVC but does not appear to impact any structures near.  Would not recommend surgical intervention or removal of ICV filter.  There is a very small aneurysm of the abdominal aorta, will ultrasound this area in one year.

## 2019-05-21 RX ORDER — CLONAZEPAM 1 MG/1
TABLET ORAL
Qty: 90 TABLET | Refills: 0 | Status: CANCELLED | OUTPATIENT
Start: 2019-05-21

## 2019-05-21 RX ORDER — CLONAZEPAM 1 MG/1
1 TABLET ORAL 3 TIMES DAILY PRN
Qty: 90 TABLET | Refills: 0 | Status: SHIPPED | OUTPATIENT
Start: 2019-05-21 | End: 2019-06-20 | Stop reason: SDUPTHER

## 2019-05-31 ENCOUNTER — OFFICE VISIT (OUTPATIENT)
Dept: OTOLARYNGOLOGY | Facility: CLINIC | Age: 64
End: 2019-05-31

## 2019-05-31 VITALS
WEIGHT: 288 LBS | OXYGEN SATURATION: 97 % | BODY MASS INDEX: 40.32 KG/M2 | HEART RATE: 81 BPM | TEMPERATURE: 98 F | HEIGHT: 71 IN

## 2019-05-31 DIAGNOSIS — J34.2 NASAL SEPTAL DEVIATION: Primary | ICD-10-CM

## 2019-05-31 DIAGNOSIS — J31.0 CHRONIC RHINITIS: ICD-10-CM

## 2019-05-31 DIAGNOSIS — J34.3 NASAL TURBINATE HYPERTROPHY: ICD-10-CM

## 2019-05-31 PROCEDURE — 99203 OFFICE O/P NEW LOW 30 MIN: CPT | Performed by: OTOLARYNGOLOGY

## 2019-05-31 RX ORDER — AZELASTINE 1 MG/ML
2 SPRAY, METERED NASAL 2 TIMES DAILY
Qty: 30 ML | Refills: 5 | Status: SHIPPED | OUTPATIENT
Start: 2019-05-31 | End: 2019-08-08

## 2019-05-31 NOTE — PATIENT INSTRUCTIONS

## 2019-05-31 NOTE — PROGRESS NOTES
Subjective   Clint Mack is a 63 y.o. male.   Here for sinus problems  History of Present Illness   Complains of drainage out of his nose with known last year and a half or so it has continually year-round may be related to medications he is tried Flonase and Benadryl but has not resolved the symptoms he has used CPAP and so it makes it more difficult he has drainage not having facial pain pressure and does not really have a lot of trouble breathing through his nose has occasional nosebleeds but is on blood thinners apparently they are not severe is never been on antihistamine nasal spray has not had any imaging done      The following portions of the patient's history were reviewed and updated as appropriate: allergies, current medications, past family history, past medical history, past social history, past surgical history and problem list.      Clint Mack reports that he has never smoked. He has never used smokeless tobacco. He reports that he does not drink alcohol or use drugs.  Patient is not a tobacco user and has not been counseled for use of tobacco products    Family History   Problem Relation Age of Onset   • Heart disease Father    • Hypertension Father    • Stroke Father    • Diabetes Sister    • Heart disease Brother    • Hypertension Brother    • COPD Brother          Current Outpatient Medications:   •  Alirocumab (PRALUENT) 75 MG/ML solution pen-injector, Inject 75 mg under the skin into the appropriate area as directed Every 14 (Fourteen) Days., Disp: 6 pen, Rfl: 7  •  amLODIPine (NORVASC) 5 MG tablet, Take 1 tablet by mouth Daily., Disp: , Rfl:   •  aspirin 81 MG chewable tablet, Chew 81 mg Take As Directed. Take 1 pill by mouth Monday, Wednesday, Friday, Disp: , Rfl:   •  azelastine (ASTELIN) 0.1 % nasal spray, 2 sprays into the nostril(s) as directed by provider 2 (Two) Times a Day. Use in each nostril as directed, Disp: 30 mL, Rfl: 5  •  baclofen (LIORESAL) 10 MG tablet,  "Take 10 mg by mouth 3 (Three) Times a Day., Disp: , Rfl:   •  Blood Glucose Monitoring Suppl (ACCU-CHEK ARGELIA PLUS) w/Device kit, , Disp: , Rfl:   •  clonazePAM (KlonoPIN) 1 MG tablet, Take 1 tablet by mouth 3 (Three) Times a Day As Needed for Seizures. Or dystonia, Disp: 90 tablet, Rfl: 0  •  clotrimazole (LOTRIMIN) 1 % cream, Apply  topically to the appropriate area as directed 2 (Two) Times a Day., Disp: , Rfl:   •  COMFORT ASSIST INSULIN SYRINGE 31G X 5/16\" 0.3 ML misc, , Disp: , Rfl:   •  diphenhydrAMINE (BENADRYL ALLERGY) 25 mg capsule, Take 1 capsule by mouth Daily., Disp: , Rfl:   •  furosemide (LASIX) 20 MG tablet, Take 20 mg by mouth As Needed., Disp: , Rfl:   •  glimepiride (AMARYL) 4 MG tablet, Take 4 mg by mouth 2 (Two) Times a Day., Disp: , Rfl:   •  glucose blood (ONE TOUCH ULTRA TEST) test strip, , Disp: , Rfl:   •  HYDROcodone-acetaminophen (NORCO)  MG per tablet, Take 1 tablet by mouth Every 6 (Six) Hours As Needed for Moderate Pain ., Disp: , Rfl:   •  insulin aspart (novoLOG) 100 UNIT/ML injection, Inject 10 Units under the skin 3 (Three) Times a Day Before Meals., Disp: 10 mL, Rfl: 12  •  isosorbide mononitrate (IMDUR) 60 MG 24 hr tablet, Take 1 tablet by mouth Daily., Disp: 30 tablet, Rfl: 1  •  Lancets (ONETOUCH ULTRASOFT) lancets, , Disp: , Rfl:   •  Omega-3 Fatty Acids (FISH OIL PO), Take 4 capsules by mouth 2 (Two) Times a Day., Disp: , Rfl:   •  omeprazole (priLOSEC) 40 MG capsule, Take 1 capsule by mouth Daily., Disp: , Rfl:   •  ranolazine (RANEXA) 500 MG 12 hr tablet, Take 1 tablet by mouth 2 (Two) Times a Day., Disp: 60 tablet, Rfl: 6  •  sodium bicarbonate 650 MG tablet, Take 1 tablet by mouth Daily., Disp: , Rfl:   •  tamsulosin (FLOMAX) 0.4 MG capsule 24 hr capsule, Take 2 capsules by mouth Daily., Disp: , Rfl:   •  vitamin D (ERGOCALCIFEROL) 10167 UNITS capsule capsule, Take 50,000 Units by mouth Every 7 (Seven) Days. Takes every 2 weeks, Disp: , Rfl:   •  warfarin (COUMADIN) " 5 MG tablet, Take 5 mg by mouth Every Night. Patient takes 5 mg every day except MWF when he takes 7.5 mg, Disp: , Rfl:     Allergies   Allergen Reactions   • Celexa [Citalopram Hydrobromide] Dystonia   • Crestor [Rosuvastatin Calcium]    • Lipitor [Atorvastatin] Other (See Comments)     Aches and pains all over   • Lisinopril Other (See Comments)     cough  Constant cough   • Lyrica [Pregabalin] Swelling   • Rosuvastatin Other (See Comments)     Aches and pains all over       Past Medical History:   Diagnosis Date   • Anxiety    • Arthritis     osteoarthritis   • CKD (chronic kidney disease), stage III (CMS/[facility])    • COPD (chronic obstructive pulmonary disease) (CMS/[facility])    • Coronary artery disease    • Depression    • Diabetes mellitus (CMS/[facility])    • DVT (deep venous thrombosis) (CMS/[facility])    • Heart disease    • History of embolic filter insertion    • Hyperlipidemia    • Hypertension    • Injury of back     back surgery x3    • LUCIANA on CPAP    • Pulmonary embolism (CMS/[facility])        Past Surgical History:   Procedure Laterality Date   • BACK SURGERY     • CARDIAC SURGERY  2001   • CIRCUMCISION     • COLONOSCOPY N/A 9/11/2018    Procedure: COLONOSCOPY;  Surgeon: Jose C Batres DO;  Location: [facility] ENDOSCOPY;  Service: Gastroenterology   • ENDOSCOPY N/A 9/11/2018    Procedure: ESOPHAGOGASTRODUODENOSCOPY;  Surgeon: Jose C Batres DO;  Location: [facility] ENDOSCOPY;  Service: Gastroenterology   • GALLBLADDER SURGERY  2000   • JOINT REPLACEMENT Right 05/02/2016    hip   • SPINE SURGERY         Review of Systems   HENT: Positive for congestion, ear pain, hearing loss, mouth sores, postnasal drip and sneezing.    Endocrine: Positive for cold intolerance and heat intolerance.   Musculoskeletal: Positive for arthralgias, back pain, neck pain and neck stiffness.   Neurological: Positive for dizziness, weakness and numbness.   Hematological: Bruises/bleeds easily.   All other systems reviewed and are  negative.          Objective   Physical Exam   Constitutional: He appears well-developed.   HENT:   Head: Normocephalic.   Right Ear: Hearing and external ear normal.   Left Ear: Hearing and external ear normal.   Nose: Mucosal edema, rhinorrhea and septal deviation present.   Mouth/Throat: Uvula is midline, oropharynx is clear and moist and mucous membranes are normal.   Eyes: Conjunctivae are normal.   Neck: Normal range of motion.   Pulmonary/Chest: Effort normal.   Neurological: He is alert.   Tardive dyskinesia             Assessment/Plan   Clint was seen today for sinus problem.    Diagnoses and all orders for this visit:    Nasal septal deviation    Nasal turbinate hypertrophy    Chronic rhinitis    Other orders  -     azelastine (ASTELIN) 0.1 % nasal spray; 2 sprays into the nostril(s) as directed by provider 2 (Two) Times a Day. Use in each nostril as directed        The patient's symptoms I do not think this is sinusitis but more likely her chronic rhinitis possibly medication related.    It is unclear as to which medication is on quite a few.    In the event Astelin can be used to help reduce the drainage if not improving concern consider Atrovent and/or CT of sinuses we will see him back in the next few weeks explained she is in side effects explained to his family had the proper technique of use and to call for any questions or problems

## 2019-06-04 ENCOUNTER — OFFICE VISIT (OUTPATIENT)
Dept: PODIATRY | Facility: CLINIC | Age: 64
End: 2019-06-04

## 2019-06-04 VITALS — HEART RATE: 85 BPM | HEIGHT: 71 IN | OXYGEN SATURATION: 97 % | WEIGHT: 288 LBS | BODY MASS INDEX: 40.32 KG/M2

## 2019-06-04 DIAGNOSIS — B35.1 ONYCHOMYCOSIS: ICD-10-CM

## 2019-06-04 DIAGNOSIS — M79.675 PAIN IN TOES OF BOTH FEET: ICD-10-CM

## 2019-06-04 DIAGNOSIS — M79.674 PAIN IN TOES OF BOTH FEET: ICD-10-CM

## 2019-06-04 DIAGNOSIS — E11.42 DIABETIC POLYNEUROPATHY ASSOCIATED WITH TYPE 2 DIABETES MELLITUS (HCC): Primary | ICD-10-CM

## 2019-06-04 PROCEDURE — 11721 DEBRIDE NAIL 6 OR MORE: CPT | Performed by: PODIATRIST

## 2019-06-04 RX ORDER — TAMSULOSIN HYDROCHLORIDE 0.4 MG/1
1 CAPSULE ORAL EVERY 12 HOURS
COMMUNITY
End: 2019-06-04

## 2019-06-04 NOTE — PROGRESS NOTES
Clint Mack  1955  63 y.o. male   BS-107 per patient  PCP-Dr. Greco 05/07/18    Patient presents today for diabetic nail care.    06/04/2019    Chief Complaint   Patient presents with   • Left Foot - Follow-up     Diabetic nail care   • Right Foot - Follow-up     Diabetic nail care           History of Present Illness    Clint Mack is a 63 y.o. male presents for f/u diabetic foot exam and nail care. No new issues.    Past Medical History:   Diagnosis Date   • Anxiety    • Arthritis     osteoarthritis   • CKD (chronic kidney disease), stage III (CMS/[facility])    • COPD (chronic obstructive pulmonary disease) (CMS/[facility])    • Coronary artery disease    • Depression    • Diabetes mellitus (CMS/[facility])    • DVT (deep venous thrombosis) (CMS/[facility])    • Heart disease    • History of embolic filter insertion    • Hyperlipidemia    • Hypertension    • Injury of back     back surgery x3    • LUCIANA on CPAP    • Pulmonary embolism (CMS/[facility])          Past Surgical History:   Procedure Laterality Date   • BACK SURGERY     • CARDIAC SURGERY  2001   • CIRCUMCISION     • COLONOSCOPY N/A 9/11/2018    Procedure: COLONOSCOPY;  Surgeon: Jose C Batres DO;  Location: [facility] ENDOSCOPY;  Service: Gastroenterology   • ENDOSCOPY N/A 9/11/2018    Procedure: ESOPHAGOGASTRODUODENOSCOPY;  Surgeon: Jose C Batres DO;  Location: [facility] ENDOSCOPY;  Service: Gastroenterology   • GALLBLADDER SURGERY  2000   • JOINT REPLACEMENT Right 05/02/2016    hip   • SPINE SURGERY           Family History   Problem Relation Age of Onset   • Heart disease Father    • Hypertension Father    • Stroke Father    • Diabetes Sister    • Heart disease Brother    • Hypertension Brother    • COPD Brother          Social History     Socioeconomic History   • Marital status:      Spouse name: Not on file   • Number of children: Not on file   • Years of education: Not on file   • Highest education level: Not on file   Tobacco Use   • Smoking  "status: Never Smoker   • Smokeless tobacco: Never Used   Substance and Sexual Activity   • Alcohol use: No   • Drug use: No   • Sexual activity: Defer         Current Outpatient Medications   Medication Sig Dispense Refill   • Alirocumab (PRALUENT) 75 MG/ML solution pen-injector Inject 75 mg under the skin into the appropriate area as directed Every 14 (Fourteen) Days. 6 pen 7   • amLODIPine (NORVASC) 5 MG tablet Take 1 tablet by mouth Daily.     • aspirin 81 MG chewable tablet Chew 81 mg Take As Directed. Take 1 pill by mouth Monday, Wednesday, Friday     • azelastine (ASTELIN) 0.1 % nasal spray 2 sprays into the nostril(s) as directed by provider 2 (Two) Times a Day. Use in each nostril as directed 30 mL 5   • baclofen (LIORESAL) 10 MG tablet Take 10 mg by mouth 3 (Three) Times a Day.     • Blood Glucose Monitoring Suppl (ACCU-CHEK ARGELIA PLUS) w/Device kit      • clonazePAM (KlonoPIN) 1 MG tablet Take 1 tablet by mouth 3 (Three) Times a Day As Needed for Seizures. Or dystonia 90 tablet 0   • COMFORT ASSIST INSULIN SYRINGE 31G X 5/16\" 0.3 ML misc      • diphenhydrAMINE (BENADRYL ALLERGY) 25 mg capsule Take 1 capsule by mouth Daily.     • furosemide (LASIX) 20 MG tablet Take 20 mg by mouth As Needed.     • glimepiride (AMARYL) 4 MG tablet Take 4 mg by mouth 2 (Two) Times a Day.     • glucose blood (ONE TOUCH ULTRA TEST) test strip      • HYDROcodone-acetaminophen (NORCO)  MG per tablet Take 1 tablet by mouth Every 6 (Six) Hours As Needed for Moderate Pain .     • insulin aspart (novoLOG) 100 UNIT/ML injection Inject 10 Units under the skin 3 (Three) Times a Day Before Meals. 10 mL 12   • isosorbide mononitrate (IMDUR) 60 MG 24 hr tablet Take 1 tablet by mouth Daily. 30 tablet 1   • Lancets (ONETOUCH ULTRASOFT) lancets      • Omega-3 Fatty Acids (FISH OIL PO) Take 4 capsules by mouth 2 (Two) Times a Day.     • ranolazine (RANEXA) 500 MG 12 hr tablet Take 1 tablet by mouth 2 (Two) Times a Day. 60 tablet 6   • " "sodium bicarbonate 650 MG tablet Take 1 tablet by mouth Daily.     • vitamin D (ERGOCALCIFEROL) 20563 UNITS capsule capsule Take 50,000 Units by mouth Every 7 (Seven) Days. Takes every 2 weeks     • warfarin (COUMADIN) 5 MG tablet Take 5 mg by mouth Every Night. Patient takes 5 mg every day except MWF when he takes 7.5 mg       No current facility-administered medications for this visit.          OBJECTIVE    Pulse 85   Ht 180.3 cm (71\")   Wt 131 kg (288 lb)   SpO2 97%   BMI 40.17 kg/m²       Review of Systems   Constitutional:  Denies recent weight loss, weight gain, fever or chills, no change in exercise tolerance  Musculoskeletal: Toe pain.   Skin:  Thickened nails. Shin discoloration.  Neurological:  Burning sensations to feet b/l.  Psychiatric/Behavioral: Denies depression      Physical Exam    Clint had a diabetic foot exam performed today.      Constitutional: he appears well-developed and well-nourished.   HEENT: Normocephalic. Atraumatic  CV: No tenderness. RRR  Resp: Non-labored respiration. No wheezes.   Psychiatric: he has a normal mood and affect. his   behavior is normal.      Lower Extremity Exam:  Vascular: DP/PT pulses palpable 1+.   Negative hair growth.   1+ perimalleolar edema  Toes cool  Neuro: Protective sensation diminished to lesser toes, b/l.  DTRs intact  Integument: No open wounds or lesions.  Atrophic skin noted b/l with chronic venous stasis dermatitis changes  Mild xerosis  No masses  Musculoskeletal: LE muscle strength 4/5 on left, 3/5 on right  Gait assisted with walker  Semi-rigid hammertoe deformity toes 2-5 b/l.  Nails 1-5 b/l thickened, elongated with subungual debris. +pain on palpation              ASSESSMENT AND PLAN    Clint was seen today for follow-up and follow-up.    Diagnoses and all orders for this visit:    Diabetic polyneuropathy associated with type 2 diabetes mellitus (CMS/HCC)    Onychomycosis    Pain in toes of both feet      -Comprehensive DM foot exam " performed. Pt educated on importance of tight glucose control and daily foot checks.  -Pt educated on padding techniques for rigid hammertoes.  Proper extra depth diabetic shoe gear.  Limit barefoot walking.  -Nails 1-5 b/l debrided in length and thickness with nail nipper to decrease pain, fungal load and risk of infection  -Follow up 3 months PRN          This document has been electronically signed by Jose C Novak DPM on June 4, 2019 12:33 PM     EMR Dragon/Transcription disclaimer:   Much of this encounter note is an electronic transcription/translation of spoken language to printed text. The electronic translation of spoken language may permit erroneous, or at times, nonsensical words or phrases to be inadvertently transcribed; Although I have reviewed the note for such errors, some may still exist.    Jose C Novak DPM  6/4/2019  12:33 PM

## 2019-06-11 ENCOUNTER — OFFICE VISIT (OUTPATIENT)
Dept: SLEEP MEDICINE | Facility: HOSPITAL | Age: 64
End: 2019-06-11

## 2019-06-11 VITALS
BODY MASS INDEX: 41.26 KG/M2 | DIASTOLIC BLOOD PRESSURE: 75 MMHG | HEART RATE: 107 BPM | OXYGEN SATURATION: 97 % | HEIGHT: 71 IN | WEIGHT: 294.7 LBS | SYSTOLIC BLOOD PRESSURE: 126 MMHG

## 2019-06-11 DIAGNOSIS — G47.33 OBSTRUCTIVE SLEEP APNEA, ADULT: Primary | ICD-10-CM

## 2019-06-11 PROCEDURE — 99214 OFFICE O/P EST MOD 30 MIN: CPT | Performed by: INTERNAL MEDICINE

## 2019-06-11 NOTE — PROGRESS NOTES
Sleep Clinic Follow Up    Date: 2019  Primary Care Physician: Tam Greco MD    Last office visit: 2018 (I reviewed this note)    CC: Follow up: LUCIANA, RLS    Sleep Testin. PSG on , AHI of 29   2. Currently on 12.5 cm H2O    Assessment and Plan:    1. Obstructive sleep apnea Established, stable (1)  1. Compliant and improved with PAP therapy  2. Continue PAP as prescribed.   3. Script for PAP supplies  2. Nocturnal hypoxia - on 2L O2 with CPAP since   Established, stable (1)  1. compliant  3. Restless leg syndrome/ /Whitman-Ekbom disease (RLS/WED)  Established, not controlled (2)  1. Will treat with klonopin 1 mg po TID   4. Tardive dyskinesia  Established, not controlled (2)  1. Defer to pcp  5. Anxiety disorder -  1. Continue klonopin, follow up with psychiatry  6. Chronic lumbago with several prior surgeries  1. Defer to pain management        Interim History:  Since the last visit:    1) severe LUCIANA -  Clint Mack has remained compliant with CPAP. He denies mask and machine issues, dry mouth, headaches, or pressures intolerance. He denies abnormal dreams, sleep paralysis, nasal congestion, URI sx.    PAP Data:    Time frame: 2018 - 06/10/2019   Compliance 100 %  Average use on days used: 8hrs 44 min  Percent of days with usage greater than or equal to 4 hours: 100%  PAP range : 12.5 cm H2O  Average 90% pressure: 12.5 cmH2O  Leak: <1 minutes  Average AHI 0.5 events/hr  Mask type: Full face mask  DME: [facility]    Bed time: 2130  Sleep latency: 5-15 minutes  Number of times awakens during the night: 3 secondary to pain. He wakes ~ every 2 hours secondary to pain. He takes pain meds when he wakes up. If he does not sleep, he goes to watch tv.    Wake time: 0900    PMHx, FH, SH reviewed and pertinent changes are: chronic pain     REVIEW OF SYSTEMS:   Negative for chest pain, fever, cough, SOA, abdominal pain. Smoking:none       Exam:  Vitals:    19 1123   BP:  "126/75   Pulse: 107   SpO2: 97%           06/11/19  1123   Weight: 134 kg (294 lb 11.2 oz)     Body mass index is 41.12 kg/m². Patient's Body mass index is 41.12 kg/m². BMI is above normal parameters. Recommendations include: referral to primary care.    Gen:  No acute distress, alert, oriented, tardive dyskinesia    Lungs:  CTA with normal effort   CV:  RRR, no M/R/G  GI:  soft, non-tender  Psych:  Appropriate affect    Past Medical History:   Diagnosis Date   • Anxiety    • Arthritis     osteoarthritis   • CKD (chronic kidney disease), stage III (CMS/[facility])    • COPD (chronic obstructive pulmonary disease) (CMS/[facility])    • Coronary artery disease    • Depression    • Diabetes mellitus (CMS/[facility])    • DVT (deep venous thrombosis) (CMS/[facility])    • Heart disease    • History of embolic filter insertion    • Hyperlipidemia    • Hypertension    • Injury of back     back surgery x3    • LUCIANA on CPAP    • Pulmonary embolism (CMS/[facility])        Current Outpatient Medications:   •  Alirocumab (PRALUENT) 75 MG/ML solution pen-injector, Inject 75 mg under the skin into the appropriate area as directed Every 14 (Fourteen) Days., Disp: 6 pen, Rfl: 7  •  amLODIPine (NORVASC) 5 MG tablet, Take 1 tablet by mouth Daily., Disp: , Rfl:   •  aspirin 81 MG chewable tablet, Chew 81 mg Take As Directed. Take 1 pill by mouth Monday, Wednesday, Friday, Disp: , Rfl:   •  azelastine (ASTELIN) 0.1 % nasal spray, 2 sprays into the nostril(s) as directed by provider 2 (Two) Times a Day. Use in each nostril as directed, Disp: 30 mL, Rfl: 5  •  baclofen (LIORESAL) 10 MG tablet, Take 10 mg by mouth 3 (Three) Times a Day., Disp: , Rfl:   •  Blood Glucose Monitoring Suppl (ACCU-CHEK ARGELIA PLUS) w/Device kit, , Disp: , Rfl:   •  clonazePAM (KlonoPIN) 1 MG tablet, Take 1 tablet by mouth 3 (Three) Times a Day As Needed for Seizures. Or dystonia, Disp: 90 tablet, Rfl: 0  •  COMFORT ASSIST INSULIN SYRINGE 31G X 5/16\" 0.3 ML misc, , Disp: , Rfl:   •  " diphenhydrAMINE (BENADRYL ALLERGY) 25 mg capsule, Take 1 capsule by mouth Daily., Disp: , Rfl:   •  furosemide (LASIX) 20 MG tablet, Take 20 mg by mouth As Needed., Disp: , Rfl:   •  glimepiride (AMARYL) 4 MG tablet, Take 4 mg by mouth 2 (Two) Times a Day., Disp: , Rfl:   •  glucose blood (ONE TOUCH ULTRA TEST) test strip, , Disp: , Rfl:   •  HYDROcodone-acetaminophen (NORCO)  MG per tablet, Take 1 tablet by mouth Every 6 (Six) Hours As Needed for Moderate Pain ., Disp: , Rfl:   •  insulin aspart (novoLOG) 100 UNIT/ML injection, Inject 10 Units under the skin 3 (Three) Times a Day Before Meals., Disp: 10 mL, Rfl: 12  •  isosorbide mononitrate (IMDUR) 60 MG 24 hr tablet, Take 1 tablet by mouth Daily., Disp: 30 tablet, Rfl: 1  •  Lancets (ONETOUCH ULTRASOFT) lancets, , Disp: , Rfl:   •  Omega-3 Fatty Acids (FISH OIL PO), Take 4 capsules by mouth 2 (Two) Times a Day., Disp: , Rfl:   •  ranolazine (RANEXA) 500 MG 12 hr tablet, Take 1 tablet by mouth 2 (Two) Times a Day., Disp: 60 tablet, Rfl: 6  •  sodium bicarbonate 650 MG tablet, Take 1 tablet by mouth Daily., Disp: , Rfl:   •  vitamin D (ERGOCALCIFEROL) 81113 UNITS capsule capsule, Take 50,000 Units by mouth Every 7 (Seven) Days. Takes every 2 weeks, Disp: , Rfl:   •  warfarin (COUMADIN) 5 MG tablet, Take 5 mg by mouth Every Night. Patient takes 5 mg every day except MWF when he takes 7.5 mg, Disp: , Rfl:     Total time 25 min, more than half spent in face to face counseling and coordination of care.    RTC in 12 months     This document has been electronically signed by Jerome Che MD on June 11, 2019         CC: Tam Greco MD          No ref. provider found

## 2019-06-17 ENCOUNTER — OFFICE VISIT (OUTPATIENT)
Dept: WOUND CARE | Facility: HOSPITAL | Age: 64
End: 2019-06-17

## 2019-06-17 PROCEDURE — G0463 HOSPITAL OUTPT CLINIC VISIT: HCPCS

## 2019-06-20 RX ORDER — CLONAZEPAM 1 MG/1
1 TABLET ORAL 3 TIMES DAILY PRN
Qty: 90 TABLET | Refills: 2 | Status: SHIPPED | OUTPATIENT
Start: 2019-06-20 | End: 2019-07-19 | Stop reason: SDUPTHER

## 2019-06-24 ENCOUNTER — OFFICE VISIT (OUTPATIENT)
Dept: WOUND CARE | Facility: HOSPITAL | Age: 64
End: 2019-06-24

## 2019-07-01 ENCOUNTER — OFFICE VISIT (OUTPATIENT)
Dept: OTOLARYNGOLOGY | Facility: CLINIC | Age: 64
End: 2019-07-01

## 2019-07-01 VITALS
BODY MASS INDEX: 40.94 KG/M2 | HEART RATE: 84 BPM | TEMPERATURE: 97.8 F | HEIGHT: 71 IN | OXYGEN SATURATION: 98 % | WEIGHT: 292.4 LBS

## 2019-07-01 DIAGNOSIS — J34.2 NASAL SEPTAL DEVIATION: ICD-10-CM

## 2019-07-01 DIAGNOSIS — J32.9 CHRONIC SINUSITIS, UNSPECIFIED LOCATION: Primary | ICD-10-CM

## 2019-07-01 DIAGNOSIS — J31.0 CHRONIC RHINITIS: ICD-10-CM

## 2019-07-01 DIAGNOSIS — J34.3 NASAL TURBINATE HYPERTROPHY: ICD-10-CM

## 2019-07-01 PROCEDURE — 99213 OFFICE O/P EST LOW 20 MIN: CPT | Performed by: OTOLARYNGOLOGY

## 2019-07-01 RX ORDER — BUSPIRONE HYDROCHLORIDE 10 MG/1
10 TABLET ORAL 2 TIMES DAILY
COMMUNITY
End: 2019-09-12

## 2019-07-01 RX ORDER — TRIAMCINOLONE ACETONIDE 1 MG/ML
LOTION TOPICAL 2 TIMES DAILY
COMMUNITY
End: 2019-12-05

## 2019-07-01 RX ORDER — IPRATROPIUM BROMIDE 42 UG/1
2 SPRAY, METERED NASAL 3 TIMES DAILY
Qty: 15 ML | Refills: 5 | Status: SHIPPED | OUTPATIENT
Start: 2019-07-01 | End: 2019-07-11 | Stop reason: SDUPTHER

## 2019-07-01 NOTE — PATIENT INSTRUCTIONS

## 2019-07-02 NOTE — PROGRESS NOTES
"Subjective   Clint Mack is a 63 y.o. male.   Patient comes back to discuss his chronic rhinitis    History of Present Illness   Is having rhinitis and some congestion he said no bleeding no fever chills no facial pain      The following portions of the patient's history were reviewed and updated as appropriate: allergies, current medications, past family history, past medical history, past social history, past surgical history and problem list.      Current Outpatient Medications:   •  Alirocumab (PRALUENT) 75 MG/ML solution pen-injector, Inject 75 mg under the skin into the appropriate area as directed Every 14 (Fourteen) Days., Disp: 6 pen, Rfl: 7  •  amLODIPine (NORVASC) 5 MG tablet, Take 1 tablet by mouth Daily., Disp: , Rfl:   •  aspirin 81 MG chewable tablet, Chew 81 mg Take As Directed. Take 1 pill by mouth Monday, Wednesday, Friday, Disp: , Rfl:   •  azelastine (ASTELIN) 0.1 % nasal spray, 2 sprays into the nostril(s) as directed by provider 2 (Two) Times a Day. Use in each nostril as directed, Disp: 30 mL, Rfl: 5  •  baclofen (LIORESAL) 10 MG tablet, Take 10 mg by mouth 3 (Three) Times a Day., Disp: , Rfl:   •  Blood Glucose Monitoring Suppl (ACCU-CHEK ARGELIA PLUS) w/Device kit, , Disp: , Rfl:   •  busPIRone (BUSPAR) 10 MG tablet, Take 10 mg by mouth 2 (Two) Times a Day., Disp: , Rfl:   •  clonazePAM (KlonoPIN) 1 MG tablet, Take 1 tablet by mouth 3 (Three) Times a Day As Needed for Seizures. Or dystonia, Disp: 90 tablet, Rfl: 2  •  COMFORT ASSIST INSULIN SYRINGE 31G X 5/16\" 0.3 ML misc, , Disp: , Rfl:   •  diphenhydrAMINE (BENADRYL ALLERGY) 25 mg capsule, Take 1 capsule by mouth Daily., Disp: , Rfl:   •  furosemide (LASIX) 20 MG tablet, Take 20 mg by mouth As Needed., Disp: , Rfl:   •  glimepiride (AMARYL) 4 MG tablet, Take 4 mg by mouth 2 (Two) Times a Day., Disp: , Rfl:   •  glucose blood (ONE TOUCH ULTRA TEST) test strip, , Disp: , Rfl:   •  HYDROcodone-acetaminophen (NORCO)  MG per " tablet, Take 1 tablet by mouth Every 6 (Six) Hours As Needed for Moderate Pain ., Disp: , Rfl:   •  insulin aspart (novoLOG) 100 UNIT/ML injection, Inject 10 Units under the skin 3 (Three) Times a Day Before Meals., Disp: 10 mL, Rfl: 12  •  isosorbide mononitrate (IMDUR) 60 MG 24 hr tablet, Take 1 tablet by mouth Daily., Disp: 30 tablet, Rfl: 1  •  Lancets (ONETOUCH ULTRASOFT) lancets, , Disp: , Rfl:   •  Omega-3 Fatty Acids (FISH OIL PO), Take 4 capsules by mouth 2 (Two) Times a Day., Disp: , Rfl:   •  ranolazine (RANEXA) 500 MG 12 hr tablet, Take 1 tablet by mouth 2 (Two) Times a Day., Disp: 60 tablet, Rfl: 6  •  sodium bicarbonate 650 MG tablet, Take 1 tablet by mouth Daily., Disp: , Rfl:   •  triamcinolone (KENALOG) 0.1 % lotion, Apply  topically to the appropriate area as directed 2 (Two) Times a Day., Disp: , Rfl:   •  vitamin D (ERGOCALCIFEROL) 14449 UNITS capsule capsule, Take 50,000 Units by mouth Every 7 (Seven) Days. Takes every 2 weeks, Disp: , Rfl:   •  warfarin (COUMADIN) 5 MG tablet, Take 5 mg by mouth Every Night. Patient takes 5 mg every day except MWF when he takes 7.5 mg, Disp: , Rfl:   •  ipratropium (ATROVENT) 0.06 % nasal spray, 2 sprays into the nostril(s) as directed by provider 3 (Three) Times a Day., Disp: 15 mL, Rfl: 5    Allergies   Allergen Reactions   • Celexa [Citalopram Hydrobromide] Dystonia   • Crestor [Rosuvastatin Calcium]    • Lipitor [Atorvastatin] Other (See Comments)     Aches and pains all over   • Lisinopril Other (See Comments)     cough  Constant cough   • Lyrica [Pregabalin] Swelling   • Rosuvastatin Other (See Comments)     Aches and pains all over             Review of Systems   Constitutional: Negative for fever.   HENT: Positive for congestion, postnasal drip and rhinorrhea.    Hematological: Negative for adenopathy.           Objective   Physical Exam   Constitutional: He appears well-developed.   HENT:   Head: Normocephalic.   Right Ear: External ear and ear canal  normal.   Left Ear: External ear and ear canal normal.   Nose: Mucosal edema, rhinorrhea and septal deviation present.   Mouth/Throat: Uvula is midline, oropharynx is clear and moist and mucous membranes are normal.   Eyes: Conjunctivae are normal.   Neck: Normal range of motion.   Pulmonary/Chest: Effort normal.   Neurological: He is alert.   Tardive dyskinesia     Sodium is improved but still bothersome to the patient      Assessment/Plan   Clint was seen today for follow-up.    Diagnoses and all orders for this visit:    Chronic sinusitis, unspecified location  -     CT Sinus Without Contrast; Future    Nasal septal deviation    Nasal turbinate hypertrophy    Chronic rhinitis    Other orders  -     ipratropium (ATROVENT) 0.06 % nasal spray; 2 sprays into the nostril(s) as directed by provider 3 (Three) Times a Day.    Because the bothersome symptoms of rhinitis and congestion are getting a CT of the sinuses to better document whether there is some underlying infection in the meantime we will continue the Astelin add Atrovent to decrease his drainage   explained what is involved and will call with results of CT and plan further therapy

## 2019-07-08 ENCOUNTER — OFFICE VISIT (OUTPATIENT)
Dept: WOUND CARE | Facility: HOSPITAL | Age: 64
End: 2019-07-08

## 2019-07-08 ENCOUNTER — HOSPITAL ENCOUNTER (OUTPATIENT)
Dept: CT IMAGING | Facility: HOSPITAL | Age: 64
Discharge: HOME | End: 2019-07-08
Admitting: NURSE PRACTITIONER

## 2019-07-08 DIAGNOSIS — J32.9 CHRONIC SINUSITIS, UNSPECIFIED LOCATION: ICD-10-CM

## 2019-07-08 PROCEDURE — 70486 CT MAXILLOFACIAL W/O DYE: CPT

## 2019-07-09 ENCOUNTER — TELEPHONE (OUTPATIENT)
Dept: OTOLARYNGOLOGY | Facility: CLINIC | Age: 64
End: 2019-07-09

## 2019-07-09 NOTE — TELEPHONE ENCOUNTER
Called patient with CT Sinus results , patient said he is feeling a little better but would like to take meds for 30 days instead of 15 if possible. He was informed that I  Would speak with Dr Whitman and let him know something when he gets out of surgery . Patient understood the results and will wait for my return call.

## 2019-07-11 RX ORDER — IPRATROPIUM BROMIDE 42 UG/1
2 SPRAY, METERED NASAL 3 TIMES DAILY
Qty: 30 ML | Refills: 2 | Status: SHIPPED | OUTPATIENT
Start: 2019-07-11 | End: 2019-08-08

## 2019-07-19 RX ORDER — CLONAZEPAM 1 MG/1
1 TABLET ORAL 3 TIMES DAILY PRN
Qty: 90 TABLET | Refills: 5 | Status: SHIPPED | OUTPATIENT
Start: 2019-07-19 | End: 2019-12-17

## 2019-07-29 ENCOUNTER — OFFICE VISIT (OUTPATIENT)
Dept: WOUND CARE | Facility: HOSPITAL | Age: 64
End: 2019-07-29

## 2019-08-02 ENCOUNTER — DOCUMENTATION (OUTPATIENT)
Dept: CARDIOLOGY | Facility: CLINIC | Age: 64
End: 2019-08-02

## 2019-08-02 NOTE — PROGRESS NOTES
Per dr helm the patient can come off Ranexa , while taking tetrabenazine , if angina starts we discuss options. I will be faxing this to Dr. Matute so he is also aware.

## 2019-08-05 ENCOUNTER — OFFICE VISIT (OUTPATIENT)
Dept: WOUND CARE | Facility: HOSPITAL | Age: 64
End: 2019-08-05

## 2019-08-08 ENCOUNTER — OFFICE VISIT (OUTPATIENT)
Dept: OTOLARYNGOLOGY | Facility: CLINIC | Age: 64
End: 2019-08-08

## 2019-08-08 VITALS — WEIGHT: 291.9 LBS | HEIGHT: 71 IN | TEMPERATURE: 97.3 F | BODY MASS INDEX: 40.86 KG/M2

## 2019-08-08 DIAGNOSIS — J34.3 NASAL TURBINATE HYPERTROPHY: ICD-10-CM

## 2019-08-08 DIAGNOSIS — J31.0 CHRONIC RHINITIS: ICD-10-CM

## 2019-08-08 DIAGNOSIS — J34.2 NASAL SEPTAL DEVIATION: Primary | ICD-10-CM

## 2019-08-08 PROCEDURE — 99213 OFFICE O/P EST LOW 20 MIN: CPT | Performed by: OTOLARYNGOLOGY

## 2019-08-08 RX ORDER — IPRATROPIUM BROMIDE 42 UG/1
2 SPRAY, METERED NASAL 3 TIMES DAILY
Qty: 15 ML | Refills: 11 | Status: SHIPPED | OUTPATIENT
Start: 2019-08-08 | End: 2019-12-05

## 2019-08-08 NOTE — PATIENT INSTRUCTIONS

## 2019-08-08 NOTE — PROGRESS NOTES
"Subjective   Clint Mack is a 63 y.o. male.     Nasal sinus symptoms  History of Present Illness     Comes back not have any facial pain or pressure fever chills.  He says he is breathing better through his nose and drainage is improved has not had any unusual cough or facial swelling    The following portions of the patient's history were reviewed and updated as appropriate: allergies, current medications, past family history, past medical history, past social history, past surgical history and problem list.      Current Outpatient Medications:   •  Alirocumab (PRALUENT) 75 MG/ML solution pen-injector, Inject 75 mg under the skin into the appropriate area as directed Every 14 (Fourteen) Days., Disp: 6 pen, Rfl: 7  •  amLODIPine (NORVASC) 5 MG tablet, Take 1 tablet by mouth Daily., Disp: , Rfl:   •  aspirin 81 MG chewable tablet, Chew 81 mg Take As Directed. Take 1 pill by mouth Monday, Wednesday, Friday, Disp: , Rfl:   •  baclofen (LIORESAL) 10 MG tablet, Take 10 mg by mouth 3 (Three) Times a Day., Disp: , Rfl:   •  Blood Glucose Monitoring Suppl (ACCU-CHEK ARGELIA PLUS) w/Device kit, , Disp: , Rfl:   •  clonazePAM (KlonoPIN) 1 MG tablet, Take 1 tablet by mouth 3 (Three) Times a Day As Needed for Seizures. Or dystonia, Disp: 90 tablet, Rfl: 5  •  COMFORT ASSIST INSULIN SYRINGE 31G X 5/16\" 0.3 ML misc, , Disp: , Rfl:   •  furosemide (LASIX) 20 MG tablet, Take 20 mg by mouth As Needed., Disp: , Rfl:   •  glimepiride (AMARYL) 4 MG tablet, Take 4 mg by mouth 2 (Two) Times a Day., Disp: , Rfl:   •  glucose blood (ONE TOUCH ULTRA TEST) test strip, , Disp: , Rfl:   •  HYDROcodone-acetaminophen (NORCO)  MG per tablet, Take 1 tablet by mouth Every 6 (Six) Hours As Needed for Moderate Pain ., Disp: , Rfl:   •  insulin aspart (novoLOG) 100 UNIT/ML injection, Inject 10 Units under the skin 3 (Three) Times a Day Before Meals., Disp: 10 mL, Rfl: 12  •  isosorbide mononitrate (IMDUR) 60 MG 24 hr tablet, Take 1 " tablet by mouth Daily., Disp: 30 tablet, Rfl: 1  •  Lancets (ONETOUCH ULTRASOFT) lancets, , Disp: , Rfl:   •  Omega-3 Fatty Acids (FISH OIL PO), Take 4 capsules by mouth 2 (Two) Times a Day., Disp: , Rfl:   •  sodium bicarbonate 650 MG tablet, Take 1 tablet by mouth Daily., Disp: , Rfl:   •  triamcinolone (KENALOG) 0.1 % lotion, Apply  topically to the appropriate area as directed 2 (Two) Times a Day., Disp: , Rfl:   •  vitamin D (ERGOCALCIFEROL) 78579 UNITS capsule capsule, Take 50,000 Units by mouth Every 7 (Seven) Days. Takes every 2 weeks, Disp: , Rfl:   •  warfarin (COUMADIN) 5 MG tablet, Take 5 mg by mouth Every Night. Patient takes 5 mg every day except MWF when he takes 7.5 mg, Disp: , Rfl:   •  busPIRone (BUSPAR) 10 MG tablet, Take 10 mg by mouth 2 (Two) Times a Day., Disp: , Rfl:   •  diphenhydrAMINE (BENADRYL ALLERGY) 25 mg capsule, Take 1 capsule by mouth Daily., Disp: , Rfl:   •  ipratropium (ATROVENT) 0.06 % nasal spray, 2 sprays into the nostril(s) as directed by provider 3 (Three) Times a Day., Disp: 15 mL, Rfl: 11  •  ranolazine (RANEXA) 500 MG 12 hr tablet, Take 1 tablet by mouth 2 (Two) Times a Day., Disp: 60 tablet, Rfl: 6    Allergies   Allergen Reactions   • Austedo [Deutetrabenazine] Other (See Comments)     Insomnia     • Celexa [Citalopram Hydrobromide] Dystonia   • Crestor [Rosuvastatin Calcium]    • Lipitor [Atorvastatin] Other (See Comments)     Aches and pains all over   • Lisinopril Other (See Comments)     cough  Constant cough   • Lyrica [Pregabalin] Swelling   • Rosuvastatin Other (See Comments)     Aches and pains all over             Review of Systems        Objective   Physical Exam   Constitutional: He appears well-developed.   HENT:   Head: Normocephalic.   Right Ear: External ear and ear canal normal.   Left Ear: External ear and ear canal normal.   Nose: Rhinorrhea and septal deviation present.   Mouth/Throat: Uvula is midline, oropharynx is clear and moist and mucous  membranes are normal.   Eyes: Conjunctivae are normal.   Neck: Normal range of motion.   Pulmonary/Chest: Effort normal.   Neurological: He is alert.   Tardive dyskinesia   Vitals reviewed.        FINDINGS: Mild mucoperiosteal thickening both maxillary sinuses,  chronic sinus disease. The ostiomeatal complexes are both patent.     Sphenoid ethmoid and frontal sinuses are normal.     Moderate deviation of anterior nasal septum to the left.     IMPRESSION:  CONCLUSION: Mild mucoperiosteal thickening maxillary sinuses,  chronic sinus disease. Deviation anterior nasal septum to the  left. Otherwise unremarkable exam.     Assessment/Plan   Clint was seen today for follow-up.    Diagnoses and all orders for this visit:    Nasal septal deviation    Nasal turbinate hypertrophy    Chronic rhinitis    Other orders  -     ipratropium (ATROVENT) 0.06 % nasal spray; 2 sprays into the nostril(s) as directed by provider 3 (Three) Times a Day.    Is symptomatically improved and had minimal sinus findings on CT suggestive of active infection.    We will continue the nasal spray and use it primarily at night he is to call if any question problems otherwise will reassess  6 months explained with signs symptoms look for and how to use the medication of the proper fashion

## 2019-08-12 ENCOUNTER — OFFICE VISIT (OUTPATIENT)
Dept: WOUND CARE | Facility: HOSPITAL | Age: 64
End: 2019-08-12

## 2019-08-19 ENCOUNTER — OFFICE VISIT (OUTPATIENT)
Dept: WOUND CARE | Facility: HOSPITAL | Age: 64
End: 2019-08-19

## 2019-08-26 ENCOUNTER — OFFICE VISIT (OUTPATIENT)
Dept: WOUND CARE | Facility: HOSPITAL | Age: 64
End: 2019-08-26

## 2019-08-26 PROCEDURE — G0463 HOSPITAL OUTPT CLINIC VISIT: HCPCS

## 2019-08-26 RX ORDER — RANOLAZINE 500 MG/1
500 TABLET, EXTENDED RELEASE ORAL 2 TIMES DAILY
Qty: 90 TABLET | Refills: 3
Start: 2019-08-26 | End: 2019-08-28 | Stop reason: SDUPTHER

## 2019-08-28 DIAGNOSIS — M17.11 PRIMARY OSTEOARTHRITIS OF RIGHT KNEE: Primary | ICD-10-CM

## 2019-08-28 RX ORDER — RANOLAZINE 500 MG/1
500 TABLET, EXTENDED RELEASE ORAL 2 TIMES DAILY
Qty: 90 TABLET | Refills: 3 | Status: SHIPPED | OUTPATIENT
Start: 2019-08-28 | End: 2021-11-03 | Stop reason: SDUPTHER

## 2019-08-29 ENCOUNTER — OFFICE VISIT (OUTPATIENT)
Dept: ORTHOPEDIC SURGERY | Facility: CLINIC | Age: 64
End: 2019-08-29

## 2019-08-29 VITALS — HEIGHT: 71 IN | WEIGHT: 292.6 LBS | BODY MASS INDEX: 40.96 KG/M2

## 2019-08-29 DIAGNOSIS — M25.561 RIGHT KNEE PAIN, UNSPECIFIED CHRONICITY: ICD-10-CM

## 2019-08-29 DIAGNOSIS — M17.11 PRIMARY OSTEOARTHRITIS OF RIGHT KNEE: Primary | ICD-10-CM

## 2019-08-29 DIAGNOSIS — M25.561 CHRONIC PAIN OF RIGHT KNEE: ICD-10-CM

## 2019-08-29 DIAGNOSIS — G89.29 CHRONIC PAIN OF RIGHT KNEE: ICD-10-CM

## 2019-08-29 DIAGNOSIS — M62.81 QUADRICEPS WEAKNESS: ICD-10-CM

## 2019-08-29 PROCEDURE — 99214 OFFICE O/P EST MOD 30 MIN: CPT | Performed by: NURSE PRACTITIONER

## 2019-08-29 NOTE — PROGRESS NOTES
"Clint Mack is a 63 y.o. male returns for     Chief Complaint   Patient presents with   • Right Knee - Follow-up       HISTORY OF PRESENT ILLNESS:      63-year-old  male in the office today for evaluation of right knee pain.  I previously evaluated and treated the patient for right knee pain and is received Visco supplementation, steroids and other injections.  Patient continues to modify his weight with a walker and continues to experience right knee pain.  He currently denies any fever chills or evidence of infection.       CONCURRENT MEDICAL HISTORY:    The following portions of the patient's history were reviewed and updated as appropriate: allergies, current medications, past family history, past medical history, past social history, past surgical history and problem list.     ROS  No fevers or chills.  No chest pain or shortness of air.  No GI or  disturbances.    PHYSICAL EXAMINATION:       Ht 180.3 cm (71\")   Wt 133 kg (292 lb 9.6 oz)   BMI 40.81 kg/m²     Physical Exam   Constitutional: He is oriented to person, place, and time. Vital signs are normal. He appears well-developed and well-nourished. He is cooperative.   HENT:   Head: Normocephalic and atraumatic.   Neck: Trachea normal and phonation normal.   Pulmonary/Chest: Effort normal. No respiratory distress.   Abdominal: Soft. Normal appearance. He exhibits no distension.   Musculoskeletal:        Right knee: He exhibits no effusion.   Neurological: He is alert and oriented to person, place, and time. GCS eye subscore is 4. GCS verbal subscore is 5. GCS motor subscore is 6.   Skin: Skin is warm, dry and intact. Capillary refill takes less than 2 seconds.   Psychiatric: He has a normal mood and affect. His speech is normal and behavior is normal. Judgment and thought content normal. Cognition and memory are normal.   Vitals reviewed.      GAIT:     []  Normal  []  Antalgic    Assistive device: []  None  []  Walker     []  " Crutches  []  Cane     []  Wheelchair  []  Stretcher    Right Knee Exam     Tenderness   The patient is experiencing tenderness in the lateral joint line and medial joint line.    Range of Motion   Extension: normal   Right knee flexion: 90.     Other   Erythema: absent  Scars: present  Sensation: normal  Pulse: present  Swelling: mild  Effusion: no effusion present      Left Knee Exam   Left knee exam is normal.    Muscle Strength   The patient has normal left knee strength.      Right Hip Exam     Muscle Strength   Abduction: 4/5   Adduction: 4/5   Flexion: 4/5       Left Hip Exam     Muscle Strength   Abduction: 5/5   Adduction: 5/5   Flexion: 5/5               Reviewed outside films of the right knee which reveal diffuse OA      ASSESSMENT:    Diagnoses and all orders for this visit:    Primary osteoarthritis of right knee  -     Ambulatory Referral to Physical Therapy Evaluate and treat    Right knee pain, unspecified chronicity  -     Ambulatory Referral to Physical Therapy Evaluate and treat    Chronic pain of right knee  -     Ambulatory Referral to Physical Therapy Evaluate and treat    Quadriceps weakness  Comments:  Right leg    Orders:  -     Ambulatory Referral to Physical Therapy Evaluate and treat          PLAN  Discussed options for end-stage osteoarthritis of the right patient.  Due to his significant quadriceps weakness and other current comorbidities this makes him a poor candidate at this time for any surgical options.  We will proceed with repeating the Visco supplementation and encouraging continued use of walker, weight reduction and low impact exercising.  No Follow-up on file.    DIANA Lipscomb

## 2019-09-05 ENCOUNTER — OFFICE VISIT (OUTPATIENT)
Dept: PODIATRY | Facility: CLINIC | Age: 64
End: 2019-09-05

## 2019-09-05 VITALS — HEART RATE: 73 BPM | OXYGEN SATURATION: 98 % | BODY MASS INDEX: 40.88 KG/M2 | WEIGHT: 292 LBS | HEIGHT: 71 IN

## 2019-09-05 DIAGNOSIS — E11.42 DIABETIC POLYNEUROPATHY ASSOCIATED WITH TYPE 2 DIABETES MELLITUS (HCC): ICD-10-CM

## 2019-09-05 DIAGNOSIS — E11.9 ENCOUNTER FOR DIABETIC FOOT EXAM (HCC): Primary | ICD-10-CM

## 2019-09-05 DIAGNOSIS — M79.674 PAIN IN TOES OF BOTH FEET: ICD-10-CM

## 2019-09-05 DIAGNOSIS — M79.675 PAIN IN TOES OF BOTH FEET: ICD-10-CM

## 2019-09-05 DIAGNOSIS — B35.1 ONYCHOMYCOSIS: ICD-10-CM

## 2019-09-05 DIAGNOSIS — L89.616 PRESSURE INJURY OF DEEP TISSUE OF RIGHT HEEL: ICD-10-CM

## 2019-09-05 PROCEDURE — 99212 OFFICE O/P EST SF 10 MIN: CPT | Performed by: PODIATRIST

## 2019-09-05 PROCEDURE — 11721 DEBRIDE NAIL 6 OR MORE: CPT | Performed by: PODIATRIST

## 2019-09-05 NOTE — PROGRESS NOTES
Clint Mack  1955  63 y.o. male   BS-166 per patient  PCP-Dr. Matute 8-23-19    Patient presents today for diabetic nail care. 9/5/19 09/05/2019      Chief Complaint   Patient presents with   • Left Foot - diabetic nail care   • Right Foot - diabetic nail care           History of Present Illness    Clint Mack is a 63 y.o. male presents for f/u diabetic foot exam and nail care.  States he developed a right heel ulceration since last visit.  He was seen and treated in the wound care center.  Feels this is now improved, still having sloughing dry skin from this area.    Past Medical History:   Diagnosis Date   • Anxiety    • Arthritis     osteoarthritis   • CKD (chronic kidney disease), stage III (CMS/[facility])    • COPD (chronic obstructive pulmonary disease) (CMS/[facility])    • Coronary artery disease    • Depression    • Diabetes mellitus (CMS/[facility])    • DVT (deep venous thrombosis) (CMS/[facility])    • Heart disease    • History of embolic filter insertion    • Hyperlipidemia    • Hypertension    • Injury of back     back surgery x3    • LUCIANA on CPAP    • Pulmonary embolism (CMS/[facility])          Past Surgical History:   Procedure Laterality Date   • BACK SURGERY     • CARDIAC SURGERY  2001   • CIRCUMCISION     • COLONOSCOPY N/A 9/11/2018    Procedure: COLONOSCOPY;  Surgeon: Jose C Batres DO;  Location: [facility] ENDOSCOPY;  Service: Gastroenterology   • ENDOSCOPY N/A 9/11/2018    Procedure: ESOPHAGOGASTRODUODENOSCOPY;  Surgeon: Jose C Batres DO;  Location: [facility] ENDOSCOPY;  Service: Gastroenterology   • GALLBLADDER SURGERY  2000   • JOINT REPLACEMENT Right 05/02/2016    hip   • SPINE SURGERY           Family History   Problem Relation Age of Onset   • Heart disease Father    • Hypertension Father    • Stroke Father    • Diabetes Sister    • Heart disease Brother    • Hypertension Brother    • COPD Brother          Social History     Socioeconomic History   • Marital status:      Spouse  "name: Not on file   • Number of children: Not on file   • Years of education: Not on file   • Highest education level: Not on file   Tobacco Use   • Smoking status: Never Smoker   • Smokeless tobacco: Never Used   Substance and Sexual Activity   • Alcohol use: No   • Drug use: No   • Sexual activity: Defer         Current Outpatient Medications   Medication Sig Dispense Refill   • Alirocumab (PRALUENT) 75 MG/ML solution pen-injector Inject 75 mg under the skin into the appropriate area as directed Every 14 (Fourteen) Days. 6 pen 7   • amLODIPine (NORVASC) 5 MG tablet Take 1 tablet by mouth Daily.     • aspirin 81 MG chewable tablet Chew 81 mg Take As Directed. Take 1 pill by mouth Monday, Wednesday, Friday     • baclofen (LIORESAL) 10 MG tablet Take 10 mg by mouth 3 (Three) Times a Day.     • Blood Glucose Monitoring Suppl (ACCU-CHEK ARGELIA PLUS) w/Device kit      • busPIRone (BUSPAR) 10 MG tablet Take 10 mg by mouth 2 (Two) Times a Day.     • clonazePAM (KlonoPIN) 1 MG tablet Take 1 tablet by mouth 3 (Three) Times a Day As Needed for Seizures. Or dystonia 90 tablet 5   • COMFORT ASSIST INSULIN SYRINGE 31G X 5/16\" 0.3 ML misc      • diphenhydrAMINE (BENADRYL ALLERGY) 25 mg capsule Take 1 capsule by mouth Daily.     • furosemide (LASIX) 20 MG tablet Take 20 mg by mouth As Needed.     • glimepiride (AMARYL) 4 MG tablet Take 4 mg by mouth 2 (Two) Times a Day.     • glucose blood (ONE TOUCH ULTRA TEST) test strip      • HYDROcodone-acetaminophen (NORCO)  MG per tablet Take 1 tablet by mouth Every 6 (Six) Hours As Needed for Moderate Pain .     • insulin aspart (novoLOG) 100 UNIT/ML injection Inject 10 Units under the skin 3 (Three) Times a Day Before Meals. 10 mL 12   • ipratropium (ATROVENT) 0.06 % nasal spray 2 sprays into the nostril(s) as directed by provider 3 (Three) Times a Day. 15 mL 11   • isosorbide mononitrate (IMDUR) 60 MG 24 hr tablet Take 1 tablet by mouth Daily. 30 tablet 1   • Lancets (ONETOUCH " "ULTRASOFT) lancets      • Omega-3 Fatty Acids (FISH OIL PO) Take 4 capsules by mouth 2 (Two) Times a Day.     • ranolazine (RANEXA) 500 MG 12 hr tablet Take 1 tablet by mouth 2 (Two) Times a Day. 90 tablet 3   • sodium bicarbonate 650 MG tablet Take 1 tablet by mouth Daily.     • triamcinolone (KENALOG) 0.1 % lotion Apply  topically to the appropriate area as directed 2 (Two) Times a Day.     • vitamin D (ERGOCALCIFEROL) 56981 UNITS capsule capsule Take 50,000 Units by mouth Every 7 (Seven) Days. Takes every 2 weeks     • warfarin (COUMADIN) 5 MG tablet Take 5 mg by mouth Every Night. Patient takes 5 mg every day except MWF when he takes 7.5 mg       No current facility-administered medications for this visit.          OBJECTIVE    Pulse 73   Ht 180.3 cm (71\")   Wt 132 kg (292 lb)   SpO2 98%   BMI 40.73 kg/m²       Review of Systems   Constitutional:  Denies recent weight loss, weight gain, fever or chills, no change in exercise tolerance  Musculoskeletal: Toe pain.   Skin:  Thickened nails. Shin discoloration.  Neurological:  Burning sensations to feet b/l.  Psychiatric/Behavioral: Denies depression      Physical Exam    Clint had a diabetic foot exam performed today.      Constitutional: he appears well-developed and well-nourished.   HEENT: Normocephalic. Atraumatic  CV: No tenderness. RRR  Resp: Non-labored respiration. No wheezes.   Psychiatric: he has a normal mood and affect. his   behavior is normal.      Lower Extremity Exam:  Vascular: DP/PT pulses palpable 1+.   Negative hair growth.   1+ perimalleolar edema  Toes cool  Neuro: Protective sensation diminished to lesser toes, b/l.  DTRs intact  Integument: No open wounds  Stage I pressure sore to medial right heel with scaling, peeling skin.  No signs of infection.  Atrophic skin noted b/l with chronic venous stasis dermatitis changes  Mild xerosis  No masses  Musculoskeletal: LE muscle strength 4/5 on left, 3/5 on right  Gait assisted with " walker  Semi-rigid hammertoe deformity toes 2-5 b/l.  Nails 1-5 b/l thickened, elongated with subungual debris. +pain on palpation              ASSESSMENT AND PLAN    Clint was seen today for diabetic nail care and diabetic nail care.    Diagnoses and all orders for this visit:    Encounter for diabetic foot exam (CMS/[facility])    Diabetic polyneuropathy associated with type 2 diabetes mellitus (CMS/[facility])    Onychomycosis    Pain in toes of both feet    Pressure injury of deep tissue of right heel      -Comprehensive DM foot exam performed. Pt educated on importance of tight glucose control and daily foot checks.  -Pt educated on padding techniques for rigid hammertoes.  Proper extra depth diabetic shoe gear.  Limit barefoot walking.  -Nails 1-5 b/l debrided in length and thickness with nail nipper to decrease pain, fungal load and risk of infection  -Continue offloading of right heel, skin hydration.  Advised not to tear at any loose skin.  -Follow up 3 months PRN          This document has been electronically signed by Jose C Novak DPM on September 5, 2019 9:01 AM     EMR Dragon/Transcription disclaimer:   Much of this encounter note is an electronic transcription/translation of spoken language to printed text. The electronic translation of spoken language may permit erroneous, or at times, nonsensical words or phrases to be inadvertently transcribed; Although I have reviewed the note for such errors, some may still exist.    Jose C Novak DPM  9/5/2019  9:01 AM

## 2019-09-12 ENCOUNTER — CLINICAL SUPPORT (OUTPATIENT)
Dept: ORTHOPEDIC SURGERY | Facility: CLINIC | Age: 64
End: 2019-09-12

## 2019-09-12 VITALS — BODY MASS INDEX: 40.59 KG/M2 | WEIGHT: 291 LBS

## 2019-09-12 DIAGNOSIS — M47.817 LUMBOSACRAL SPONDYLOSIS WITHOUT MYELOPATHY: ICD-10-CM

## 2019-09-12 DIAGNOSIS — M17.11 PRIMARY OSTEOARTHRITIS OF RIGHT KNEE: Primary | ICD-10-CM

## 2019-09-12 DIAGNOSIS — M25.561 RIGHT KNEE PAIN, UNSPECIFIED CHRONICITY: ICD-10-CM

## 2019-09-12 PROCEDURE — 20610 DRAIN/INJ JOINT/BURSA W/O US: CPT | Performed by: NURSE PRACTITIONER

## 2019-09-12 RX ORDER — TAMSULOSIN HYDROCHLORIDE 0.4 MG/1
1 CAPSULE ORAL DAILY
COMMUNITY
End: 2019-12-05

## 2019-09-12 NOTE — PROGRESS NOTES
Knee Joint Injection      Patient: Clint Mack    YOB: 1955    Chief Complaint   Patient presents with   • Right Knee - Follow-up, Pain   • Injections     monovisc injection.        History of Present Illness:  Patient is here for an injection.   Patient is also complaining of worsening lumbar spine pain with left leg pain which is a chronic problem that started after work-related injury several years back.  He reports increased pain in his left leg with numbness and tingling.  He denies any recent injury.    Physical Exam: 63 y.o. male  General Appearance:    Alert, cooperative, in no acute distress                   Vitals:    09/12/19 0850   Weight: 132 kg (291 lb)        Patient is alert and oriented, ×3 no acute distress.  Affect is normal respiratory rate is normal unlabored.  Exam and complaints are unchanged.    Procedure:  Large Joint Arthrocentesis: R knee  Date/Time: 9/12/2019 8:47 AM  Consent given by: patient  Site marked: site marked  Timeout: Immediately prior to procedure a time out was called to verify the correct patient, procedure, equipment, support staff and site/side marked as required   Supporting Documentation  Indications: pain   Procedure Details  Location: knee - R knee  Preparation: Patient was prepped and draped in the usual sterile fashion  Needle size: 22 G  Approach: anteromedial  Medications administered: 88 mg Hyaluronan 88 MG/4ML  Patient tolerance: patient tolerated the procedure well with no immediate complications          Assessment:    Diagnoses and all orders for this visit:    Primary osteoarthritis of right knee  -     Large Joint Arthrocentesis: R knee  -     Hyaluronan (MONOVISC) injection 88 mg    Right knee pain, unspecified chronicity  -     Large Joint Arthrocentesis: R knee  -     Hyaluronan (MONOVISC) injection 88 mg    Lumbosacral spondylosis without myelopathy    Other orders  -     metoprolol tartrate (LOPRESSOR) 25 MG tablet; Take 25 mg  by mouth 2 (Two) Times a Day.  -     tamsulosin (FLOMAX) 0.4 MG capsule 24 hr capsule; Take 1 capsule by mouth Daily.        Plan:   Injected the knee today and recommended that the patient follow-up with his primary care doctor and/or his back/pain management physician for his worsening lumbar spine pain.    Slowly increase activity as tolerated.  Discussed importance of leg strengthening and general conditioning.  Discussed warning signs of injection.  Discussed that purpose of injections was symptom improvement and improved activity.  Also discussed that further treatment options depended on symptoms at followup and length of time of improvement after injections.    No Follow-up on file.    DIANA Lipscomb

## 2019-09-27 ENCOUNTER — OFFICE VISIT (OUTPATIENT)
Dept: CARDIOLOGY | Facility: CLINIC | Age: 64
End: 2019-09-27

## 2019-09-27 VITALS
WEIGHT: 292 LBS | DIASTOLIC BLOOD PRESSURE: 80 MMHG | SYSTOLIC BLOOD PRESSURE: 128 MMHG | OXYGEN SATURATION: 99 % | BODY MASS INDEX: 40.88 KG/M2 | HEIGHT: 71 IN | HEART RATE: 72 BPM

## 2019-09-27 DIAGNOSIS — I10 ESSENTIAL HYPERTENSION: Primary | Chronic | ICD-10-CM

## 2019-09-27 DIAGNOSIS — I25.10 CORONARY ARTERY DISEASE INVOLVING NATIVE CORONARY ARTERY OF NATIVE HEART WITHOUT ANGINA PECTORIS: Chronic | ICD-10-CM

## 2019-09-27 DIAGNOSIS — R06.09 DYSPNEA ON EXERTION: ICD-10-CM

## 2019-09-27 DIAGNOSIS — E78.2 MIXED HYPERLIPIDEMIA: Chronic | ICD-10-CM

## 2019-09-27 DIAGNOSIS — E66.01 MORBID OBESITY (HCC): ICD-10-CM

## 2019-09-27 DIAGNOSIS — Z95.1 S/P CABG (CORONARY ARTERY BYPASS GRAFT): ICD-10-CM

## 2019-09-27 PROCEDURE — 99214 OFFICE O/P EST MOD 30 MIN: CPT | Performed by: INTERNAL MEDICINE

## 2019-09-27 RX ORDER — BUSPIRONE HYDROCHLORIDE 10 MG/1
10 TABLET ORAL 3 TIMES DAILY
COMMUNITY
End: 2019-12-05

## 2019-09-27 RX ORDER — ALFUZOSIN HYDROCHLORIDE 10 MG/1
10 TABLET, EXTENDED RELEASE ORAL DAILY
COMMUNITY
End: 2019-12-17

## 2019-09-27 NOTE — PROGRESS NOTES
Clint Mack  63 y.o. male    09/27/2019  1. Essential hypertension    2. Mixed hyperlipidemia    3. Coronary artery disease involving native coronary artery of native heart without angina pectoris    4. S/P CABG (coronary artery bypass graft)    5. Morbid obesity (CMS/Former[facility])    6. Dyspnea on exertion        History of Present Illness  Mr. Mack is here for follow-up of his multiple cardiac issues as discussed above.  He has done reasonably well from a cardiac standpoint and denied any chest pain.  He has NYHA class II dyspnea on exertion which is stable and unchanged from before.  He did have significant edema involving both lower extremities which is multifactorial and I note that Dr. Holt took him off amlodipine and restarted him on beta-blockers.  He is now on metoprolol tartrate.  He seems to be tolerating this well.  Edema in the lower extremities is decreased.  He brought several heart rate and blood pressure readings for review and for the most part they seem to be in the normal range.  No signs of congestive heart failure was noted.  His blood pressure was in the normal range today.  The patient had several questions with regards to his cardiac status, neurological status, renal status and these were discussed.  His lipid profiles have responded well to Praluent and his LDL is in the optimal range.    SUBJECTIVE    Allergies   Allergen Reactions   • Austedo [Deutetrabenazine] Other (See Comments)     Insomnia     • Celexa [Citalopram Hydrobromide] Dystonia   • Crestor [Rosuvastatin Calcium]    • Ingrezza [Valbenazine Tosylate] Other (See Comments)     fatigue   • Lipitor [Atorvastatin] Other (See Comments)     Aches and pains all over   • Lyrica [Pregabalin] Swelling   • Rosuvastatin Other (See Comments)     Aches and pains all over         Past Medical History:   Diagnosis Date   • Anxiety    • Arthritis     osteoarthritis   • CKD (chronic kidney disease), stage III (CMS/[facility])    • COPD (chronic  obstructive pulmonary disease) (CMS/HCC)    • Coronary artery disease    • Depression    • Diabetes mellitus (CMS/HCC)    • DVT (deep venous thrombosis) (CMS/HCC)    • Heart disease    • History of embolic filter insertion    • Hyperlipidemia    • Hypertension    • Injury of back     back surgery x3    • LUCIANA on CPAP    • Pulmonary embolism (CMS/HCC)          Past Surgical History:   Procedure Laterality Date   • BACK SURGERY     • CARDIAC SURGERY  2001   • CIRCUMCISION     • COLONOSCOPY N/A 9/11/2018    Procedure: COLONOSCOPY;  Surgeon: Jose C Batres DO;  Location: [facility] ENDOSCOPY;  Service: Gastroenterology   • ENDOSCOPY N/A 9/11/2018    Procedure: ESOPHAGOGASTRODUODENOSCOPY;  Surgeon: Jose C Batres DO;  Location: [facility] ENDOSCOPY;  Service: Gastroenterology   • GALLBLADDER SURGERY  2000   • JOINT REPLACEMENT Right 05/02/2016    hip   • SPINE SURGERY           Family History   Problem Relation Age of Onset   • Heart disease Father    • Hypertension Father    • Stroke Father    • Diabetes Sister    • Heart disease Brother    • Hypertension Brother    • COPD Brother          Social History     Socioeconomic History   • Marital status:      Spouse name: Not on file   • Number of children: Not on file   • Years of education: Not on file   • Highest education level: Not on file   Tobacco Use   • Smoking status: Never Smoker   • Smokeless tobacco: Never Used   Substance and Sexual Activity   • Alcohol use: No   • Drug use: No   • Sexual activity: Defer         Current Outpatient Medications   Medication Sig Dispense Refill   • alfuzosin (UROXATRAL) 10 MG 24 hr tablet Take 10 mg by mouth Daily.     • Alirocumab (PRALUENT) 75 MG/ML solution pen-injector Inject 75 mg under the skin into the appropriate area as directed Every 14 (Fourteen) Days. 6 pen 7   • aspirin 81 MG chewable tablet Chew 81 mg Take As Directed. Take 1 pill by mouth Monday, Wednesday, Friday     • baclofen (LIORESAL) 10 MG tablet Take 10  "mg by mouth 3 (Three) Times a Day.     • Blood Glucose Monitoring Suppl (ACCU-CHEK ARGELIA PLUS) w/Device kit      • busPIRone (BUSPAR) 10 MG tablet Take 10 mg by mouth 3 (Three) Times a Day.     • clonazePAM (KlonoPIN) 1 MG tablet Take 1 tablet by mouth 3 (Three) Times a Day As Needed for Seizures. Or dystonia 90 tablet 5   • COMFORT ASSIST INSULIN SYRINGE 31G X 5/16\" 0.3 ML misc      • furosemide (LASIX) 20 MG tablet Take 20 mg by mouth As Needed.     • glimepiride (AMARYL) 4 MG tablet Take 4 mg by mouth 2 (Two) Times a Day.     • glucose blood (ONE TOUCH ULTRA TEST) test strip      • HYDROcodone-acetaminophen (NORCO)  MG per tablet Take 1 tablet by mouth Every 6 (Six) Hours As Needed for Moderate Pain .     • insulin aspart (novoLOG) 100 UNIT/ML injection Inject 10 Units under the skin 3 (Three) Times a Day Before Meals. 10 mL 12   • ipratropium (ATROVENT) 0.06 % nasal spray 2 sprays into the nostril(s) as directed by provider 3 (Three) Times a Day. 15 mL 11   • isosorbide mononitrate (IMDUR) 60 MG 24 hr tablet Take 1 tablet by mouth Daily. 30 tablet 1   • Lancets (ONETOUCH ULTRASOFT) lancets      • metoprolol tartrate (LOPRESSOR) 25 MG tablet Take 25 mg by mouth 2 (Two) Times a Day.     • Omega-3 Fatty Acids (FISH OIL PO) Take 4 capsules by mouth 2 (Two) Times a Day.     • ranolazine (RANEXA) 500 MG 12 hr tablet Take 1 tablet by mouth 2 (Two) Times a Day. 90 tablet 3   • vitamin D (ERGOCALCIFEROL) 79882 UNITS capsule capsule Take 50,000 Units by mouth Every 7 (Seven) Days. Takes every 2 weeks     • warfarin (COUMADIN) 5 MG tablet Take 5 mg by mouth Every Night. Patient takes 5 mg every day except MWF when he takes 7.5 mg     • tamsulosin (FLOMAX) 0.4 MG capsule 24 hr capsule Take 1 capsule by mouth Daily.     • triamcinolone (KENALOG) 0.1 % lotion Apply  topically to the appropriate area as directed 2 (Two) Times a Day.       No current facility-administered medications for this visit.  " "        OBJECTIVE    /80   Pulse 72   Ht 180.3 cm (70.98\")   Wt 132 kg (292 lb)   SpO2 99%   BMI 40.74 kg/m²         Review of Systems     Constitutional:  Denies recent weight loss, weight gain, fever or chills     HENT:  Denies any hearing loss, epistaxis, hoarseness, or difficulty speaking.     Eyes: Wears eyeglasses or contact lenses     Respiratory:  Dyspnea with exertion,no cough, wheezing, or hemoptysis.     Cardiovascular: Negative for palpations, chest pain.  Leg edema decreased    Gastrointestinal:  Denies change in bowel habits, dyspepsia, ulcer disease, hematochezia, or melena.     Endocrine: Negative for cold intolerance, heat intolerance, polydipsia, polyphagia and polyuria.    Genitourinary: Negative.      Musculoskeletal: DJD    Skin:  Denies any change in hair or nails, rashes, or skin lesions.     Allergic/Immunologic: Negative.  Negative for environmental allergies, food allergies and immunocompromised state.     Neurological: History of tardive dyskinesia    Hematological: Denies any food allergies, seasonal allergies, bleeding disorders, or lymphadenopathy.     Psychiatric/Behavioral: history of anxiety/depression, no substance abuse, or change in cognitive function.         Physical Exam     Constitutional: Cooperative, alert and oriented, in no acute distress.     HENT:   Head: Normocephalic, normal hair patterns, no masses or tenderness.  Ears, Nose, and Throat: No gross abnormalities. No pallor or cyanosis.   Eyes: EOMS intact, PERRL, conjunctivae and lids unremarkable. Fundoscopic exam and visual fields not performed.   Neck: No palpable masses or adenopathy, no thyromegaly, no JVD, carotid pulses are full and equal bilaterally and without  Bruits.     Cardiovascular: Regular rhythm, S1 and S2 normal, no S3 or S4.  No murmurs, gallops, or rubs detected.     Pulmonary/Chest: Chest: normal symmetry, normal respiratory excursion, no intercostal retraction, no use of accessory " muscles.            Pulmonary: Normal breath sounds. No rales or ronchi.    Abdominal: Abdomen soft, bowel sounds normoactive, no masses, no hepatosplenomegaly, non-tender, no bruits.     Musculoskeletal: No deformities, clubbing, cyanosis, erythema, or edema observed.     Neurological:  tardive dyskinesia    Skin: Warm and dry to the touch, no apparent skin lesions or masses noted.     Psychiatric: He has a normal mood and affect. His behavior is normal. Judgment and thought content normal.         Procedures      Lab Results   Component Value Date    WBC 6.8 07/22/2019    HGB 16.2 08/23/2019    HCT 48.2 08/23/2019    MCV 92 07/22/2019     07/22/2019     Lab Results   Component Value Date    GLUCOSE 171 (H) 03/08/2018    BUN 15 02/21/2019    CREATININE 1.8 (H) 02/21/2019    EGFRIFNONA 49 03/08/2018    EGFRIFAFRI 50 11/13/2018    BCR 15.9 03/08/2018    CO2 24 11/13/2018    CALCIUM 8.9 02/21/2019    ALBUMIN 3.1 (L) 02/21/2019    AST 24 02/21/2019    ALT 30 02/21/2019     Lab Results   Component Value Date    CHOL 113 02/21/2019    CHOL 216 (H) 10/12/2018     Lab Results   Component Value Date    TRIG 182 02/21/2019    TRIG 187 10/12/2018     Lab Results   Component Value Date    HDL 34 02/21/2019    HDL 33 10/12/2018     No components found for: LDLCALC  Lab Results   Component Value Date    LDL 54 02/21/2019     (H) 10/12/2018     No results found for: HDLLDLRATIO  No components found for: CHOLHDL  Lab Results   Component Value Date    HGBA1C 5.5 07/22/2019     Lab Results   Component Value Date    TSH 2.04 07/22/2019           ASSESSMENT AND PLAN  Mr. Mack has multiple medical issues but fortunately stable from a cardiac standpoint with no definite evidence of angina, arrhythmia or congestive heart failure.  He had several questions with regards to his cardiac renal and neurological status and these were discussed.  His leg edema has improved.  I have continued Praluent for optimization of lipids,  antiplatelet therapy with aspirin, antianginal therapy with isosorbide mononitrate and Ranexa and antihypertensive therapy with metoprolol tartrate and low-dose diuretics.  His renal function is being monitored closely by Dr. Holt.    Clint was seen today for follow-up.    Diagnoses and all orders for this visit:    Essential hypertension    Mixed hyperlipidemia    Coronary artery disease involving native coronary artery of native heart without angina pectoris    S/P CABG (coronary artery bypass graft)    Morbid obesity (CMS/HCC)    Dyspnea on exertion        Patient's Body mass index is 40.74 kg/m². BMI is above normal parameters. Recommendations include: exercise counseling and nutrition counseling.  Patient is a non-smoker    Keke Kumar MD  9/27/2019  9:09 AM

## 2019-10-24 ENCOUNTER — APPOINTMENT (OUTPATIENT)
Dept: LAB | Facility: HOSPITAL | Age: 64
End: 2019-10-24

## 2019-10-24 ENCOUNTER — OFFICE VISIT (OUTPATIENT)
Dept: PULMONOLOGY | Facility: CLINIC | Age: 64
End: 2019-10-24

## 2019-10-24 VITALS
DIASTOLIC BLOOD PRESSURE: 95 MMHG | BODY MASS INDEX: 41.54 KG/M2 | HEART RATE: 72 BPM | HEIGHT: 71 IN | OXYGEN SATURATION: 96 % | SYSTOLIC BLOOD PRESSURE: 150 MMHG | WEIGHT: 296.7 LBS

## 2019-10-24 DIAGNOSIS — J30.1 ALLERGIC RHINITIS DUE TO POLLEN, UNSPECIFIED SEASONALITY: Primary | ICD-10-CM

## 2019-10-24 PROCEDURE — 86003 ALLG SPEC IGE CRUDE XTRC EA: CPT | Performed by: INTERNAL MEDICINE

## 2019-10-24 PROCEDURE — 99204 OFFICE O/P NEW MOD 45 MIN: CPT | Performed by: INTERNAL MEDICINE

## 2019-10-24 PROCEDURE — 36415 COLL VENOUS BLD VENIPUNCTURE: CPT | Performed by: INTERNAL MEDICINE

## 2019-10-24 PROCEDURE — 96372 THER/PROPH/DIAG INJ SC/IM: CPT | Performed by: INTERNAL MEDICINE

## 2019-10-24 RX ORDER — AZELASTINE 1 MG/ML
2 SPRAY, METERED NASAL 2 TIMES DAILY
Qty: 1 EACH | Refills: 5 | Status: SHIPPED | OUTPATIENT
Start: 2019-10-24 | End: 2019-12-05

## 2019-10-24 RX ORDER — METHYLPREDNISOLONE ACETATE 80 MG/ML
80 INJECTION, SUSPENSION INTRA-ARTICULAR; INTRALESIONAL; INTRAMUSCULAR; SOFT TISSUE ONCE
Status: COMPLETED | OUTPATIENT
Start: 2019-10-24 | End: 2019-10-24

## 2019-10-24 RX ORDER — ONDANSETRON 4 MG/1
4 TABLET, FILM COATED ORAL EVERY 8 HOURS PRN
COMMUNITY
End: 2019-12-05

## 2019-10-24 RX ADMIN — METHYLPREDNISOLONE ACETATE 80 MG: 80 INJECTION, SUSPENSION INTRA-ARTICULAR; INTRALESIONAL; INTRAMUSCULAR; SOFT TISSUE at 13:55

## 2019-10-24 NOTE — PROGRESS NOTES
Clint Mack is a 63 y.o. male.     Chief Complaint   Patient presents with   • Nasal Congestion       History of Present Illness   This 63-year-old gentleman has a long history of chronic rhinitis with sneezing some itchy eyes and nasal congestion.  He is not having much in the way of rhinorrhea at this time.  He does not cough or wheeze.  He has diabetes on chronic basis.  He has renal insufficiency.  His nasal symptoms are perennial without any exacerbating factors.  He has not found a medication that has been beneficial at this time.  Medications please see his list.  Medication allergies please see his list.  Family history noncontributory    The following portions of the patient's history were reviewed and updated as appropriate: allergies, current medications, past family history, past medical history, past social history, past surgical history and problem list.    Review of Systems   Constitutional: Negative.  Negative for activity change, appetite change, chills, diaphoresis, fatigue, fever and unexpected weight change.   HENT: Positive for postnasal drip, rhinorrhea and sneezing. Negative for congestion, ear discharge, ear pain, facial swelling, hearing loss, mouth sores, nosebleeds, sinus pressure, tinnitus, trouble swallowing and voice change.    Eyes: Positive for itching. Negative for pain, discharge and redness.   Respiratory: Negative for cough, shortness of breath and wheezing.    Cardiovascular: Negative for chest pain.   Gastrointestinal: Negative for abdominal pain, blood in stool, diarrhea and nausea.   Endocrine: Negative for cold intolerance, heat intolerance, polydipsia, polyphagia and polyuria.   Genitourinary: Negative for dysuria and urgency.   Musculoskeletal: Negative for arthralgias, gait problem, myalgias, neck pain and neck stiffness.   Skin: Negative for color change, pallor and rash.   Allergic/Immunologic: Negative for environmental allergies, food allergies and  "immunocompromised state.   Neurological: Negative.  Negative for dizziness, tremors, syncope, facial asymmetry, weakness, light-headedness, numbness and headaches.   Hematological: Negative for adenopathy.   Psychiatric/Behavioral: Negative.  Negative for agitation, behavioral problems, confusion, decreased concentration, dysphoric mood and hallucinations. The patient is not nervous/anxious and is not hyperactive.        /95   Pulse 72   Ht 180.3 cm (71\")   Wt 135 kg (296 lb 11.2 oz)   SpO2 96%   BMI 41.38 kg/m²   Physical Exam   Constitutional: He is oriented to person, place, and time. He appears well-developed and well-nourished. No distress.   HENT:   Head: Normocephalic and atraumatic.   Mouth/Throat: Oropharyngeal exudate:  Nose is congested.   Eyes: EOM are normal. Pupils are equal, round, and reactive to light. Right eye exhibits no discharge. Left eye exhibits no discharge. No scleral icterus.   Neck: Normal range of motion. Neck supple. No JVD present. No tracheal deviation present. No thyromegaly present.   Cardiovascular: Exam reveals no gallop and no friction rub.   No murmur heard.  Pulmonary/Chest: Effort normal and breath sounds normal. No stridor. No respiratory distress. He has no wheezes. He has no rales. He exhibits no tenderness.   Abdominal: Soft. Bowel sounds are normal. He exhibits no distension and no mass. There is no tenderness. There is no guarding.   Musculoskeletal: Normal range of motion. He exhibits no edema, tenderness or deformity.   Lymphadenopathy:     He has no cervical adenopathy.   Neurological: He is alert and oriented to person, place, and time. No cranial nerve deficit. Coordination normal.   Skin: No rash noted. He is not diaphoretic. No erythema.   Psychiatric: He has a normal mood and affect. His behavior is normal.   Nursing note and vitals reviewed.        Assessment/Plan   Clint was seen today for nasal congestion.    Diagnoses and all orders for this " visit:    Allergic rhinitis due to pollen, unspecified seasonality  -     methylPREDNISolone acetate (DEPO-medrol) injection 80 mg  -     Allergens, Zone 5    Other orders  -     azelastine (ASTELIN) 0.1 % nasal spray; 2 sprays into the nostril(s) as directed by provider 2 (Two) Times a Day. Use in each nostril as directed        Assessment chronic rhinitis possibly allergic origin, diabetes, renal failure    Plan allergy blood test, Depo-Medrol, trial of Astelin, return in 2 weeks        This document has been produced with the assistance of Dragon dictation  This document has been electronically signed by Main Garcia MD on October 24, 2019 1:52 PM

## 2019-10-27 LAB
A ALTERNATA IGE QN: <0.1 KU/L
A FUMIGATUS IGE QN: <0.1 KU/L
AMER ROACH IGE QN: <0.1 KU/L
BAHIA GRASS IGE QN: <0.1 KU/L
BAYBERRY POLN IGE QN: <0.1 KU/L
BERMUDA GRASS IGE QN: <0.1 KU/L
BOXELDER IGE QN: <0.1 KU/L
C HERBARUM IGE QN: <0.1 KU/L
CAT DANDER IGG QN: <0.1 KU/L
COMMON RAGWEED IGE QN: <0.1 KU/L
CONV CLASS DESCRIPTION: NORMAL
D FARINAE IGE QN: <0.1 KU/L
D PTERONYSS IGE QN: <0.1 KU/L
DOG DANDER IGE QN: <0.1 KU/L
DOG FENNEL IGE QN: <0.1 KU/L
ENGL PLANTAIN IGE QN: <0.1 KU/L
GOOSEFOOT IGE QN: <0.1 KU/L
GUM-TREE IGE QN: <0.1 KU/L
ITALIAN CYPRESS IGE QN: <0.1 KU/L
JOHNSON GRASS IGE QN: <0.1 KU/L
M RACEMOSUS IGE QN: <0.1 KU/L
P NOTATUM IGE QN: <0.1 KU/L
PEPPER TREE IGE QN: <0.1 KU/L
PER RYE GRASS IGE QN: <0.1 KU/L
QUEEN PALM IGE QN: <0.1 KU/L
S BOTRYOSUM IGE QN: <0.1 KU/L
SHEEP SORREL IGE QN: <0.1 KU/L
T210-IGE PRIVET, COMMON: <0.1 KU/L
VIRG LIVE OAK IGE QN: <0.1 KU/L
WHITE ELM IGE QN: <0.1 KU/L

## 2019-11-07 ENCOUNTER — OFFICE VISIT (OUTPATIENT)
Dept: PULMONOLOGY | Facility: CLINIC | Age: 64
End: 2019-11-07

## 2019-11-07 VITALS
HEART RATE: 70 BPM | SYSTOLIC BLOOD PRESSURE: 126 MMHG | OXYGEN SATURATION: 96 % | HEIGHT: 71 IN | DIASTOLIC BLOOD PRESSURE: 81 MMHG | BODY MASS INDEX: 40.12 KG/M2 | WEIGHT: 286.6 LBS

## 2019-11-07 DIAGNOSIS — J30.0 VASOMOTOR RHINITIS: Primary | ICD-10-CM

## 2019-11-07 PROCEDURE — 99213 OFFICE O/P EST LOW 20 MIN: CPT | Performed by: INTERNAL MEDICINE

## 2019-11-07 RX ORDER — CHLORHEXIDINE GLUCONATE 0.12 MG/ML
RINSE ORAL
COMMUNITY
Start: 2019-11-05 | End: 2019-12-05

## 2019-11-07 RX ORDER — IPRATROPIUM BROMIDE 42 UG/1
SPRAY, METERED NASAL
Qty: 1 EACH | Refills: 5 | Status: SHIPPED | OUTPATIENT
Start: 2019-11-07 | End: 2019-12-05

## 2019-11-07 RX ORDER — FAMOTIDINE 20 MG/1
TABLET, FILM COATED ORAL
COMMUNITY
Start: 2019-11-04 | End: 2019-12-05

## 2019-11-07 NOTE — PROGRESS NOTES
"This gentleman has chronic rhinitis has not obtained any help with medication so far.  Allergy blood test recently were unrevealing    ROS    Constitutional-no night sweats weight loss headaches  GI no abdominal pain nausea or diarrhea  Neuro no seizure or neurologic deficits  Musculoskeletal no deformity or joint pain   no dysuria or hematuria  Skin no rash or other lesions  All other systems reviewed and were negative except for the above.      Physical Exam  /81   Pulse 70   Ht 180.3 cm (71\")   Wt 130 kg (286 lb 9.6 oz)   SpO2 96%   BMI 39.97 kg/m²   Vital signs as above  Pupils equally round and reactive to light and accommodation, neck no JVD or adenopathy.  Cardiovascular regular rhythm and rate no murmur or gallop.  Abdomen soft no organomegaly tenderness.  Extremities no clubbing cyanosis or edema.  No cervical adenopathy.  No skin rash.  Neurologic good strength bilaterally without deficits  Lungs are clear nose is congested    Impression vasomotor rhinitis with negative allergy blood test    Plan Atrovent nasal, return as needed        This document has been produced with the assistance of [employer]  This document has been electronically signed by Main Garcia MD on November 7, 2019 10:41 AM      "

## 2019-11-14 ENCOUNTER — HOSPITAL ENCOUNTER (OUTPATIENT)
Facility: HOSPITAL | Age: 64
Setting detail: OBSERVATION
Discharge: HOME | End: 2019-11-15
Attending: FAMILY MEDICINE | Admitting: HOSPITALIST

## 2019-11-14 ENCOUNTER — APPOINTMENT (OUTPATIENT)
Dept: CT IMAGING | Facility: HOSPITAL | Age: 64
End: 2019-11-14

## 2019-11-14 ENCOUNTER — APPOINTMENT (OUTPATIENT)
Dept: GENERAL RADIOLOGY | Facility: HOSPITAL | Age: 64
End: 2019-11-14

## 2019-11-14 DIAGNOSIS — R55 SYNCOPE AND COLLAPSE: Primary | ICD-10-CM

## 2019-11-14 LAB
ALBUMIN SERPL-MCNC: 3.4 G/DL (ref 3.5–5.2)
ALBUMIN/GLOB SERPL: 1 G/DL
ALP SERPL-CCNC: 57 U/L (ref 39–117)
ALT SERPL W P-5'-P-CCNC: 28 U/L (ref 1–41)
ANION GAP SERPL CALCULATED.3IONS-SCNC: 11 MMOL/L (ref 5–15)
APTT PPP: 31.6 SECONDS (ref 20–40.3)
AST SERPL-CCNC: 23 U/L (ref 1–40)
BACTERIA UR QL AUTO: ABNORMAL /HPF
BASOPHILS # BLD AUTO: 0.02 10*3/MM3 (ref 0–0.2)
BASOPHILS NFR BLD AUTO: 0.3 % (ref 0–1.5)
BILIRUB SERPL-MCNC: 0.3 MG/DL (ref 0.2–1.2)
BILIRUB UR QL STRIP: NEGATIVE
BUN BLD-MCNC: 21 MG/DL (ref 8–23)
BUN/CREAT SERPL: 14 (ref 7–25)
CALCIUM SPEC-SCNC: 8.7 MG/DL (ref 8.6–10.5)
CHLORIDE SERPL-SCNC: 96 MMOL/L (ref 98–107)
CLARITY UR: CLEAR
CO2 SERPL-SCNC: 28 MMOL/L (ref 22–29)
COLOR UR: YELLOW
CREAT BLD-MCNC: 1.5 MG/DL (ref 0.76–1.27)
D-LACTATE SERPL-SCNC: 1.5 MMOL/L (ref 0.5–2)
DEPRECATED RDW RBC AUTO: 44.2 FL (ref 37–54)
EOSINOPHIL # BLD AUTO: 0.04 10*3/MM3 (ref 0–0.4)
EOSINOPHIL NFR BLD AUTO: 0.6 % (ref 0.3–6.2)
ERYTHROCYTE [DISTWIDTH] IN BLOOD BY AUTOMATED COUNT: 13.6 % (ref 12.3–15.4)
GFR SERPL CREATININE-BSD FRML MDRD: 47 ML/MIN/1.73
GLOBULIN UR ELPH-MCNC: 3.5 GM/DL
GLUCOSE BLD-MCNC: 162 MG/DL (ref 65–99)
GLUCOSE BLDC GLUCOMTR-MCNC: 119 MG/DL (ref 70–130)
GLUCOSE BLDC GLUCOMTR-MCNC: 149 MG/DL (ref 70–130)
GLUCOSE UR STRIP-MCNC: ABNORMAL MG/DL
HCT VFR BLD AUTO: 51.4 % (ref 37.5–51)
HGB BLD-MCNC: 17.6 G/DL (ref 13–17.7)
HGB UR QL STRIP.AUTO: ABNORMAL
HOLD SPECIMEN: NORMAL
HYALINE CASTS UR QL AUTO: ABNORMAL /LPF
IMM GRANULOCYTES # BLD AUTO: 0.03 10*3/MM3 (ref 0–0.05)
IMM GRANULOCYTES NFR BLD AUTO: 0.4 % (ref 0–0.5)
INR PPP: 1.77 (ref 0.8–1.2)
KETONES UR QL STRIP: NEGATIVE
LEUKOCYTE ESTERASE UR QL STRIP.AUTO: NEGATIVE
LYMPHOCYTES # BLD AUTO: 0.92 10*3/MM3 (ref 0.7–3.1)
LYMPHOCYTES NFR BLD AUTO: 13.7 % (ref 19.6–45.3)
MCH RBC QN AUTO: 30.6 PG (ref 26.6–33)
MCHC RBC AUTO-ENTMCNC: 34.2 G/DL (ref 31.5–35.7)
MCV RBC AUTO: 89.2 FL (ref 79–97)
MONOCYTES # BLD AUTO: 0.75 10*3/MM3 (ref 0.1–0.9)
MONOCYTES NFR BLD AUTO: 11.2 % (ref 5–12)
NEUTROPHILS # BLD AUTO: 4.94 10*3/MM3 (ref 1.7–7)
NEUTROPHILS NFR BLD AUTO: 73.8 % (ref 42.7–76)
NITRITE UR QL STRIP: NEGATIVE
NRBC BLD AUTO-RTO: 0 /100 WBC (ref 0–0.2)
NT-PROBNP SERPL-MCNC: 66.4 PG/ML (ref 5–900)
PH UR STRIP.AUTO: 5.5 [PH] (ref 5–9)
PLATELET # BLD AUTO: 116 10*3/MM3 (ref 140–450)
PMV BLD AUTO: 10.3 FL (ref 6–12)
POTASSIUM BLD-SCNC: 4.3 MMOL/L (ref 3.5–5.2)
PROT SERPL-MCNC: 6.9 G/DL (ref 6–8.5)
PROT UR QL STRIP: NEGATIVE
PROTHROMBIN TIME: 20.4 SECONDS (ref 11.1–15.3)
RBC # BLD AUTO: 5.76 10*6/MM3 (ref 4.14–5.8)
RBC # UR: ABNORMAL /HPF
RBC MORPH BLD: NORMAL
REF LAB TEST METHOD: ABNORMAL
SMALL PLATELETS BLD QL SMEAR: NORMAL
SODIUM BLD-SCNC: 135 MMOL/L (ref 136–145)
SP GR UR STRIP: 1.02 (ref 1–1.03)
SQUAMOUS #/AREA URNS HPF: ABNORMAL /HPF
TROPONIN T SERPL-MCNC: <0.01 NG/ML (ref 0–0.03)
UROBILINOGEN UR QL STRIP: ABNORMAL
WBC MORPH BLD: NORMAL
WBC NRBC COR # BLD: 6.7 10*3/MM3 (ref 3.4–10.8)
WBC UR QL AUTO: ABNORMAL /HPF
WHOLE BLOOD HOLD SPECIMEN: NORMAL
WHOLE BLOOD HOLD SPECIMEN: NORMAL

## 2019-11-14 PROCEDURE — 93010 ELECTROCARDIOGRAM REPORT: CPT | Performed by: INTERNAL MEDICINE

## 2019-11-14 PROCEDURE — 81001 URINALYSIS AUTO W/SCOPE: CPT | Performed by: NURSE PRACTITIONER

## 2019-11-14 PROCEDURE — 83605 ASSAY OF LACTIC ACID: CPT | Performed by: NURSE PRACTITIONER

## 2019-11-14 PROCEDURE — 93005 ELECTROCARDIOGRAM TRACING: CPT | Performed by: FAMILY MEDICINE

## 2019-11-14 PROCEDURE — 85730 THROMBOPLASTIN TIME PARTIAL: CPT | Performed by: NURSE PRACTITIONER

## 2019-11-14 PROCEDURE — 85007 BL SMEAR W/DIFF WBC COUNT: CPT | Performed by: EMERGENCY MEDICINE

## 2019-11-14 PROCEDURE — 70450 CT HEAD/BRAIN W/O DYE: CPT

## 2019-11-14 PROCEDURE — 85025 COMPLETE CBC W/AUTO DIFF WBC: CPT | Performed by: EMERGENCY MEDICINE

## 2019-11-14 PROCEDURE — G0378 HOSPITAL OBSERVATION PER HR: HCPCS

## 2019-11-14 PROCEDURE — 85610 PROTHROMBIN TIME: CPT | Performed by: HOSPITALIST

## 2019-11-14 PROCEDURE — 85610 PROTHROMBIN TIME: CPT | Performed by: NURSE PRACTITIONER

## 2019-11-14 PROCEDURE — 82962 GLUCOSE BLOOD TEST: CPT

## 2019-11-14 PROCEDURE — 84484 ASSAY OF TROPONIN QUANT: CPT | Performed by: NURSE PRACTITIONER

## 2019-11-14 PROCEDURE — 80053 COMPREHEN METABOLIC PANEL: CPT | Performed by: EMERGENCY MEDICINE

## 2019-11-14 PROCEDURE — 83880 ASSAY OF NATRIURETIC PEPTIDE: CPT | Performed by: NURSE PRACTITIONER

## 2019-11-14 PROCEDURE — 93005 ELECTROCARDIOGRAM TRACING: CPT

## 2019-11-14 PROCEDURE — 71045 X-RAY EXAM CHEST 1 VIEW: CPT

## 2019-11-14 PROCEDURE — 93005 ELECTROCARDIOGRAM TRACING: CPT | Performed by: NURSE PRACTITIONER

## 2019-11-14 PROCEDURE — 99285 EMERGENCY DEPT VISIT HI MDM: CPT

## 2019-11-14 RX ORDER — SODIUM CHLORIDE 0.9 % (FLUSH) 0.9 %
10 SYRINGE (ML) INJECTION EVERY 12 HOURS SCHEDULED
Status: DISCONTINUED | OUTPATIENT
Start: 2019-11-15 | End: 2019-11-15 | Stop reason: HOSPADM

## 2019-11-14 RX ORDER — NICOTINE POLACRILEX 4 MG
15 LOZENGE BUCCAL
Status: DISCONTINUED | OUTPATIENT
Start: 2019-11-14 | End: 2019-11-15 | Stop reason: HOSPADM

## 2019-11-14 RX ORDER — ISOSORBIDE MONONITRATE 60 MG/1
60 TABLET, EXTENDED RELEASE ORAL
Status: DISCONTINUED | OUTPATIENT
Start: 2019-11-15 | End: 2019-11-15 | Stop reason: HOSPADM

## 2019-11-14 RX ORDER — WARFARIN SODIUM 5 MG/1
5 TABLET ORAL NIGHTLY
Status: DISCONTINUED | OUTPATIENT
Start: 2019-11-15 | End: 2019-11-15

## 2019-11-14 RX ORDER — SODIUM CHLORIDE 0.9 % (FLUSH) 0.9 %
10 SYRINGE (ML) INJECTION AS NEEDED
Status: DISCONTINUED | OUTPATIENT
Start: 2019-11-14 | End: 2019-11-15 | Stop reason: HOSPADM

## 2019-11-14 RX ORDER — RANOLAZINE 500 MG/1
500 TABLET, EXTENDED RELEASE ORAL 2 TIMES DAILY
Status: DISCONTINUED | OUTPATIENT
Start: 2019-11-15 | End: 2019-11-15 | Stop reason: HOSPADM

## 2019-11-14 RX ORDER — HYDROCODONE BITARTRATE AND ACETAMINOPHEN 10; 325 MG/1; MG/1
1 TABLET ORAL ONCE
Status: COMPLETED | OUTPATIENT
Start: 2019-11-14 | End: 2019-11-14

## 2019-11-14 RX ORDER — DEXTROSE MONOHYDRATE 25 G/50ML
25 INJECTION, SOLUTION INTRAVENOUS
Status: DISCONTINUED | OUTPATIENT
Start: 2019-11-14 | End: 2019-11-15 | Stop reason: HOSPADM

## 2019-11-14 RX ORDER — BACLOFEN 10 MG/1
10 TABLET ORAL 3 TIMES DAILY
Status: DISCONTINUED | OUTPATIENT
Start: 2019-11-15 | End: 2019-11-15 | Stop reason: HOSPADM

## 2019-11-14 RX ORDER — HYDROCODONE BITARTRATE AND ACETAMINOPHEN 10; 325 MG/1; MG/1
1 TABLET ORAL EVERY 6 HOURS PRN
Status: DISCONTINUED | OUTPATIENT
Start: 2019-11-14 | End: 2019-11-15

## 2019-11-14 RX ORDER — CLONAZEPAM 0.5 MG/1
1 TABLET ORAL 3 TIMES DAILY PRN
Status: DISCONTINUED | OUTPATIENT
Start: 2019-11-14 | End: 2019-11-15 | Stop reason: HOSPADM

## 2019-11-14 RX ORDER — ASPIRIN 81 MG/1
81 TABLET, CHEWABLE ORAL
Status: DISCONTINUED | OUTPATIENT
Start: 2019-11-15 | End: 2019-11-15 | Stop reason: HOSPADM

## 2019-11-14 RX ORDER — TAMSULOSIN HYDROCHLORIDE 0.4 MG/1
0.4 CAPSULE ORAL NIGHTLY
Status: DISCONTINUED | OUTPATIENT
Start: 2019-11-15 | End: 2019-11-15 | Stop reason: HOSPADM

## 2019-11-14 RX ADMIN — METOPROLOL TARTRATE 25 MG: 25 TABLET ORAL at 23:49

## 2019-11-14 RX ADMIN — HYDROCODONE BITARTRATE AND ACETAMINOPHEN 1 TABLET: 10; 325 TABLET ORAL at 22:37

## 2019-11-14 RX ADMIN — HYDROCODONE BITARTRATE AND ACETAMINOPHEN 1 TABLET: 10; 325 TABLET ORAL at 15:50

## 2019-11-14 RX ADMIN — RANOLAZINE 500 MG: 500 TABLET, FILM COATED, EXTENDED RELEASE ORAL at 23:49

## 2019-11-14 RX ADMIN — BACLOFEN 10 MG: 10 TABLET ORAL at 23:49

## 2019-11-14 NOTE — ED PROVIDER NOTES
Subjective   Patient is a 64 years old male with  history of hypertension, COPD, DM, PE, DVT of lower extremity on Coumadin with complaint of  passed out episode x 1 at home today about 12pm.  It was a witness event.  Patient was  sitting the chair at rest, then he passed out, patient had headache for several days, no  Other symptoms prior to the event, patient stated he had the  feeling that he was going to pass out for  months and it was the first time he actually passed out today.  Per patient wife, patient's head leaned to the side, no response to voice with eyes close for about 40 seconds, denied mental status change.  Denied bladder/bowel dysfunction.  Patient stated his Coumadin was held for 4 days because of dental work, he resumed his Coumadin since November 5 and Lovenox, last dose of Lovenox was last night.  He was going to Coumadin clinic for INR check today prior to the syncope event.  Had IVC filter.  Denies blood in the urine or in the stool.  Denies history of MI.  Denies sick contact or recent travel.  Denies any injury.            Review of Systems   Constitutional: Negative for activity change, appetite change, chills, diaphoresis, fatigue, fever and unexpected weight change.   HENT: Negative for facial swelling, trouble swallowing and voice change.    Eyes: Negative for photophobia, pain and visual disturbance.   Respiratory: Negative for cough, chest tightness and shortness of breath.    Cardiovascular: Negative for chest pain, palpitations and leg swelling.   Gastrointestinal: Negative for abdominal pain, blood in stool, diarrhea, nausea and vomiting.   Genitourinary: Negative for decreased urine volume, dysuria, flank pain, frequency and hematuria.   Musculoskeletal: Negative for back pain, gait problem, neck pain and neck stiffness.   Skin: Negative for color change and rash.   Neurological: Positive for syncope and headaches. Negative for dizziness, seizures, facial asymmetry, speech  difficulty, weakness, light-headedness and numbness.   Psychiatric/Behavioral: Negative.    All other systems reviewed and are negative.      Past Medical History:   Diagnosis Date   • Anxiety    • Arthritis     osteoarthritis   • CKD (chronic kidney disease), stage III (CMS/[facility])    • COPD (chronic obstructive pulmonary disease) (CMS/[facility])    • Coronary artery disease    • Depression    • Diabetes mellitus (CMS/[facility])    • DVT (deep venous thrombosis) (CMS/[facility])    • Heart disease    • History of embolic filter insertion    • Hyperlipidemia    • Hypertension    • Injury of back     back surgery x3    • LUCIANA on CPAP    • Pulmonary embolism (CMS/[facility])        Allergies   Allergen Reactions   • Austedo [Deutetrabenazine] Other (See Comments)     Insomnia     • Celexa [Citalopram Hydrobromide] Dystonia   • Crestor [Rosuvastatin Calcium]    • Ingrezza [Valbenazine Tosylate] Other (See Comments)     fatigue   • Lipitor [Atorvastatin] Other (See Comments)     Aches and pains all over   • Lyrica [Pregabalin] Swelling       Past Surgical History:   Procedure Laterality Date   • BACK SURGERY     • CARDIAC SURGERY  2001   • CIRCUMCISION     • COLONOSCOPY N/A 9/11/2018    Procedure: COLONOSCOPY;  Surgeon: Jose C Batres DO;  Location: [facility] ENDOSCOPY;  Service: Gastroenterology   • ENDOSCOPY N/A 9/11/2018    Procedure: ESOPHAGOGASTRODUODENOSCOPY;  Surgeon: Jose C Batres DO;  Location: [facility] ENDOSCOPY;  Service: Gastroenterology   • GALLBLADDER SURGERY  2000   • JOINT REPLACEMENT Right 05/02/2016    hip   • SPINE SURGERY         Family History   Problem Relation Age of Onset   • Heart disease Father    • Hypertension Father    • Stroke Father    • Diabetes Sister    • Heart disease Brother    • Hypertension Brother    • COPD Brother        Social History     Socioeconomic History   • Marital status:      Spouse name: Not on file   • Number of children: Not on file   • Years of education: Not on file   • Highest  education level: Not on file   Tobacco Use   • Smoking status: Never Smoker   • Smokeless tobacco: Never Used   Substance and Sexual Activity   • Alcohol use: No   • Drug use: No   • Sexual activity: Defer           Objective   Physical Exam   Constitutional: He is oriented to person, place, and time. No distress.   HENT:   Head: Normocephalic and atraumatic.   Right Ear: External ear normal.   Left Ear: External ear normal.   Nose: Nose normal.   Mouth/Throat: Oropharynx is clear and moist.   Eyes: Conjunctivae and EOM are normal. Right pupil is round and reactive. Left pupil is round and reactive. Pupils are equal.   Normal periorbital area bilaterally.  No swelling.  No ecchymosis.  No tenderness.  No hyphema bilaterally.  No subconjunctival hemorrhage bilaterally.   Neck: Neck supple.   nontenderness   Cardiovascular: Normal rate, regular rhythm, normal heart sounds and intact distal pulses.   Pulmonary/Chest: Effort normal and breath sounds normal. No respiratory distress.   Abdominal: Soft. Bowel sounds are normal. There is no tenderness.   Musculoskeletal: He exhibits no edema or tenderness.   Neurological: He is alert and oriented to person, place, and time. GCS eye subscore is 4. GCS verbal subscore is 5. GCS motor subscore is 6.   Chronic right leg weakness, no change from the baseline.   Skin: Skin is warm and dry.   Psychiatric: He has a normal mood and affect. His behavior is normal. Judgment and thought content normal.   Nursing note and vitals reviewed.      ECG 12 Lead    Date/Time: 11/14/2019 2:03 PM  Performed by: Javy Cruz FNP  Authorized by: Javy Cruz FNP   Interpreted by physician  Rhythm: sinus rhythm  Rate: normal  BPM: 75  Clinical impression: abnormal ECG                 ED Course  ED Course as of Nov 14 1805   Thu Nov 14, 2019   1617 16:17 CT still pending  [FR]   4527 17:54 discussed patient condition and  test results with Dr. Young, he accepted admission  [FR]      ED Course  User Index  [FR] Javy Cruz FNP      Labs Reviewed   COMPREHENSIVE METABOLIC PANEL - Abnormal; Notable for the following components:       Result Value    Glucose 162 (*)     Creatinine 1.50 (*)     Sodium 135 (*)     Chloride 96 (*)     Albumin 3.40 (*)     eGFR Non  Amer 47 (*)     All other components within normal limits    Narrative:     GFR Normal >60  Chronic Kidney Disease <60  Kidney Failure <15   CBC WITH AUTO DIFFERENTIAL - Abnormal; Notable for the following components:    Hematocrit 51.4 (*)     Platelets 116 (*)     Lymphocyte % 13.7 (*)     All other components within normal limits   PROTIME-INR - Abnormal; Notable for the following components:    Protime 20.4 (*)     INR 1.77 (*)     All other components within normal limits    Narrative:     Therapeutic range for most indications is 2.0-3.0 INR,  or 2.5-3.5 for mechanical heart valves.   URINALYSIS W/ CULTURE IF INDICATED - Abnormal; Notable for the following components:    Glucose,  mg/dL (1+) (*)     Blood, UA Trace (*)     All other components within normal limits   URINALYSIS, MICROSCOPIC ONLY - Abnormal; Notable for the following components:    RBC, UA 3-5 (*)     All other components within normal limits   POCT GLUCOSE FINGERSTICK - Abnormal; Notable for the following components:    Glucose 149 (*)     All other components within normal limits   APTT - Normal    Narrative:     The recommended Heparin therapeutic range is 68-97 seconds.   BNP (IN-HOUSE) - Normal    Narrative:     Among patients with dyspnea, NT-proBNP is highly sensitive for the detection of acute congestive heart failure. In addition NT-proBNP of <300 pg/ml effectively rules out acute congestive heart failure with 99% negative predictive value.   TROPONIN (IN-HOUSE) - Normal    Narrative:     Troponin T Reference Range:  <= 0.03 ng/mL-   Negative for AMI  >0.03 ng/mL-     Abnormal for myocardial necrosis.  Clinicians would have to utilize clinical acumen, EKG,  Troponin and serial changes to determine if it is an Acute Myocardial Infarction or myocardial injury due to an underlying chronic condition.    LACTIC ACID, PLASMA - Normal   RAINBOW DRAW    Narrative:     The following orders were created for panel order [place] Draw.  Procedure                               Abnormality         Status                     ---------                               -----------         ------                     Light Blue Top[[id number]]                                   Final result               Green Top (Gel)[[id number]]                                  Final result               Lavender Top[[id number]]                                     Final result               Gold Top - SST[[id number]]                                                                Please view results for these tests on the individual orders.   SCAN SLIDE   POCT GLUCOSE FINGERSTICK   CBC AND DIFFERENTIAL    Narrative:     The following orders were created for panel order CBC & Differential.  Procedure                               Abnormality         Status                     ---------                               -----------         ------                     CBC Auto Differential[[id number]]        Abnormal            Final result                 Please view results for these tests on the individual orders.   LIGHT BLUE TOP   GREEN TOP   LAVENDER TOP       CT Head Without Contrast   Final Result   Unremarkable Cranial CT without contrast.         Electronically signed by:  Anand Myers MD  11/14/2019 5:08 PM   CST Workstation: [id number]      XR Chest 1 View   Final Result   CONCLUSION: Midline sternal sutures from prior cardiac surgery.   No evidence of active disease.      Electronically signed by:  Bill Soriano MD  11/14/2019 3:44 PM CST   Workstation: [id number]                  MDM  Number of Diagnoses or Management Options  Syncope and collapse: new and requires workup     Amount and/or Complexity of Data  Reviewed  Clinical lab tests: reviewed  Tests in the radiology section of CPT®: reviewed  Tests in the medicine section of CPT®: reviewed  Decide to obtain previous medical records or to obtain history from someone other than the patient: yes  Obtain history from someone other than the patient: yes  Discuss the patient with other providers: yes    Risk of Complications, Morbidity, and/or Mortality  Diagnostic procedures: high  Management options: high    Patient Progress  Patient progress: stable      Final diagnoses:   Syncope and collapse              Javy Cruz FNP  11/14/19 1801       Javy Cruz FNP  11/14/19 1805

## 2019-11-15 ENCOUNTER — APPOINTMENT (OUTPATIENT)
Dept: ULTRASOUND IMAGING | Facility: HOSPITAL | Age: 64
End: 2019-11-15

## 2019-11-15 ENCOUNTER — APPOINTMENT (OUTPATIENT)
Dept: CARDIOLOGY | Facility: HOSPITAL | Age: 64
End: 2019-11-15

## 2019-11-15 VITALS
OXYGEN SATURATION: 91 % | HEIGHT: 71 IN | WEIGHT: 284.8 LBS | TEMPERATURE: 98.8 F | DIASTOLIC BLOOD PRESSURE: 83 MMHG | SYSTOLIC BLOOD PRESSURE: 127 MMHG | RESPIRATION RATE: 18 BRPM | HEART RATE: 75 BPM | BODY MASS INDEX: 39.87 KG/M2

## 2019-11-15 PROBLEM — Z79.01 ANTICOAGULATED ON COUMADIN: Status: ACTIVE | Noted: 2019-11-15

## 2019-11-15 PROBLEM — Z86.718 PERSONAL HISTORY OF VENOUS THROMBOSIS AND EMBOLISM: Status: ACTIVE | Noted: 2019-11-15

## 2019-11-15 LAB
ANION GAP SERPL CALCULATED.3IONS-SCNC: 11 MMOL/L (ref 5–15)
B PERT DNA SPEC QL NAA+PROBE: NOT DETECTED
BASOPHILS # BLD AUTO: 0.01 10*3/MM3 (ref 0–0.2)
BASOPHILS NFR BLD AUTO: 0.2 % (ref 0–1.5)
BH CV ECHO MEAS - AO MAX PG (FULL): 1.3 MMHG
BH CV ECHO MEAS - AO MAX PG: 4.1 MMHG
BH CV ECHO MEAS - AO MEAN PG (FULL): 0 MMHG
BH CV ECHO MEAS - AO MEAN PG: 2 MMHG
BH CV ECHO MEAS - AO V2 MAX: 101 CM/SEC
BH CV ECHO MEAS - AO V2 MEAN: 70.2 CM/SEC
BH CV ECHO MEAS - AO V2 VTI: 17.2 CM
BH CV ECHO MEAS - AVA(I,A): 3.9 CM^2
BH CV ECHO MEAS - AVA(I,D): 3.9 CM^2
BH CV ECHO MEAS - AVA(V,A): 3.7 CM^2
BH CV ECHO MEAS - AVA(V,D): 3.7 CM^2
BH CV ECHO MEAS - BSA(HAYCOCK): 2.6 M^2
BH CV ECHO MEAS - BSA: 2.5 M^2
BH CV ECHO MEAS - BZI_BMI: 40.2 KILOGRAMS/M^2
BH CV ECHO MEAS - BZI_METRIC_HEIGHT: 180.3 CM
BH CV ECHO MEAS - BZI_METRIC_WEIGHT: 130.6 KG
BH CV ECHO MEAS - EDV(CUBED): 222.5 ML
BH CV ECHO MEAS - EDV(MOD-SP2): 87.8 ML
BH CV ECHO MEAS - EDV(MOD-SP4): 152 ML
BH CV ECHO MEAS - EDV(TEICH): 184.1 ML
BH CV ECHO MEAS - EF(CUBED): 52.7 %
BH CV ECHO MEAS - EF(MOD-SP2): 37.6 %
BH CV ECHO MEAS - EF(MOD-SP4): 60.9 %
BH CV ECHO MEAS - EF(TEICH): 43.9 %
BH CV ECHO MEAS - ESV(CUBED): 105.2 ML
BH CV ECHO MEAS - ESV(MOD-SP2): 54.8 ML
BH CV ECHO MEAS - ESV(MOD-SP4): 59.4 ML
BH CV ECHO MEAS - ESV(TEICH): 103.4 ML
BH CV ECHO MEAS - FS: 22.1 %
BH CV ECHO MEAS - IVS/LVPW: 0.75
BH CV ECHO MEAS - IVSD: 0.92 CM
BH CV ECHO MEAS - LA DIMENSION: 3.7 CM
BH CV ECHO MEAS - LV DIASTOLIC VOL/BSA (35-75): 61.7 ML/M^2
BH CV ECHO MEAS - LV MASS(C)D: 274.2 GRAMS
BH CV ECHO MEAS - LV MASS(C)DI: 111.3 GRAMS/M^2
BH CV ECHO MEAS - LV MAX PG: 2.8 MMHG
BH CV ECHO MEAS - LV MEAN PG: 2 MMHG
BH CV ECHO MEAS - LV SYSTOLIC VOL/BSA (12-30): 24.1 ML/M^2
BH CV ECHO MEAS - LV V1 MAX: 83.5 CM/SEC
BH CV ECHO MEAS - LV V1 MEAN: 64.7 CM/SEC
BH CV ECHO MEAS - LV V1 VTI: 14.9 CM
BH CV ECHO MEAS - LVIDD: 6.1 CM
BH CV ECHO MEAS - LVIDS: 4.7 CM
BH CV ECHO MEAS - LVLD AP2: 7.3 CM
BH CV ECHO MEAS - LVLD AP4: 8.1 CM
BH CV ECHO MEAS - LVLS AP2: 6.5 CM
BH CV ECHO MEAS - LVLS AP4: 6.9 CM
BH CV ECHO MEAS - LVOT AREA (M): 4.5 CM^2
BH CV ECHO MEAS - LVOT AREA: 4.5 CM^2
BH CV ECHO MEAS - LVOT DIAM: 2.4 CM
BH CV ECHO MEAS - LVPWD: 1.2 CM
BH CV ECHO MEAS - MV A MAX VEL: 77.1 CM/SEC
BH CV ECHO MEAS - MV DEC SLOPE: 275 CM/SEC^2
BH CV ECHO MEAS - MV E MAX VEL: 48.5 CM/SEC
BH CV ECHO MEAS - MV E/A: 0.63
BH CV ECHO MEAS - MV P1/2T MAX VEL: 57.5 CM/SEC
BH CV ECHO MEAS - MV P1/2T: 61.2 MSEC
BH CV ECHO MEAS - MVA P1/2T LCG: 3.8 CM^2
BH CV ECHO MEAS - MVA(P1/2T): 3.6 CM^2
BH CV ECHO MEAS - PA MAX PG: 1.6 MMHG
BH CV ECHO MEAS - PA V2 MAX: 62.5 CM/SEC
BH CV ECHO MEAS - RVDD: 2.6 CM
BH CV ECHO MEAS - SI(CUBED): 47.7 ML/M^2
BH CV ECHO MEAS - SI(LVOT): 27.4 ML/M^2
BH CV ECHO MEAS - SI(MOD-SP2): 13.4 ML/M^2
BH CV ECHO MEAS - SI(MOD-SP4): 37.6 ML/M^2
BH CV ECHO MEAS - SI(TEICH): 32.8 ML/M^2
BH CV ECHO MEAS - SV(CUBED): 117.4 ML
BH CV ECHO MEAS - SV(LVOT): 67.4 ML
BH CV ECHO MEAS - SV(MOD-SP2): 33 ML
BH CV ECHO MEAS - SV(MOD-SP4): 92.6 ML
BH CV ECHO MEAS - SV(TEICH): 80.8 ML
BUN BLD-MCNC: 18 MG/DL (ref 8–23)
BUN/CREAT SERPL: 12.5 (ref 7–25)
C PNEUM DNA NPH QL NAA+NON-PROBE: NOT DETECTED
CALCIUM SPEC-SCNC: 8.2 MG/DL (ref 8.6–10.5)
CHLORIDE SERPL-SCNC: 98 MMOL/L (ref 98–107)
CO2 SERPL-SCNC: 26 MMOL/L (ref 22–29)
CREAT BLD-MCNC: 1.44 MG/DL (ref 0.76–1.27)
DEPRECATED RDW RBC AUTO: 43.7 FL (ref 37–54)
EOSINOPHIL # BLD AUTO: 0.03 10*3/MM3 (ref 0–0.4)
EOSINOPHIL NFR BLD AUTO: 0.6 % (ref 0.3–6.2)
ERYTHROCYTE [DISTWIDTH] IN BLOOD BY AUTOMATED COUNT: 13.4 % (ref 12.3–15.4)
FLUAV H1 2009 PAND RNA NPH QL NAA+PROBE: NOT DETECTED
FLUAV H1 HA GENE NPH QL NAA+PROBE: NOT DETECTED
FLUAV H3 RNA NPH QL NAA+PROBE: NOT DETECTED
FLUAV SUBTYP SPEC NAA+PROBE: NOT DETECTED
FLUBV RNA ISLT QL NAA+PROBE: NOT DETECTED
GFR SERPL CREATININE-BSD FRML MDRD: 49 ML/MIN/1.73
GLUCOSE BLD-MCNC: 140 MG/DL (ref 65–99)
GLUCOSE BLDC GLUCOMTR-MCNC: 131 MG/DL (ref 70–130)
GLUCOSE BLDC GLUCOMTR-MCNC: 189 MG/DL (ref 70–130)
HADV DNA SPEC NAA+PROBE: NOT DETECTED
HCOV 229E RNA SPEC QL NAA+PROBE: NOT DETECTED
HCOV HKU1 RNA SPEC QL NAA+PROBE: NOT DETECTED
HCOV NL63 RNA SPEC QL NAA+PROBE: NOT DETECTED
HCOV OC43 RNA SPEC QL NAA+PROBE: NOT DETECTED
HCT VFR BLD AUTO: 47.6 % (ref 37.5–51)
HGB BLD-MCNC: 16.3 G/DL (ref 13–17.7)
HMPV RNA NPH QL NAA+NON-PROBE: NOT DETECTED
HPIV1 RNA SPEC QL NAA+PROBE: DETECTED
HPIV2 RNA SPEC QL NAA+PROBE: NOT DETECTED
HPIV3 RNA NPH QL NAA+PROBE: NOT DETECTED
HPIV4 P GENE NPH QL NAA+PROBE: NOT DETECTED
IMM GRANULOCYTES # BLD AUTO: 0.01 10*3/MM3 (ref 0–0.05)
IMM GRANULOCYTES NFR BLD AUTO: 0.2 % (ref 0–0.5)
INR PPP: 1.88 (ref 0.8–1.2)
INR PPP: 1.93 (ref 0.8–1.2)
LV EF 2D ECHO EST: 52 %
LYMPHOCYTES # BLD AUTO: 1.87 10*3/MM3 (ref 0.7–3.1)
LYMPHOCYTES NFR BLD AUTO: 35.7 % (ref 19.6–45.3)
M PNEUMO IGG SER IA-ACNC: NOT DETECTED
MAXIMAL PREDICTED HEART RATE: 156 BPM
MCH RBC QN AUTO: 30.4 PG (ref 26.6–33)
MCHC RBC AUTO-ENTMCNC: 34.2 G/DL (ref 31.5–35.7)
MCV RBC AUTO: 88.8 FL (ref 79–97)
MONOCYTES # BLD AUTO: 0.91 10*3/MM3 (ref 0.1–0.9)
MONOCYTES NFR BLD AUTO: 17.4 % (ref 5–12)
NEUTROPHILS # BLD AUTO: 2.41 10*3/MM3 (ref 1.7–7)
NEUTROPHILS NFR BLD AUTO: 45.9 % (ref 42.7–76)
NRBC BLD AUTO-RTO: 0 /100 WBC (ref 0–0.2)
PLATELET # BLD AUTO: 105 10*3/MM3 (ref 140–450)
PMV BLD AUTO: 10.9 FL (ref 6–12)
POTASSIUM BLD-SCNC: 4.1 MMOL/L (ref 3.5–5.2)
PROTHROMBIN TIME: 21.4 SECONDS (ref 11.1–15.3)
PROTHROMBIN TIME: 21.8 SECONDS (ref 11.1–15.3)
RBC # BLD AUTO: 5.36 10*6/MM3 (ref 4.14–5.8)
RBC MORPH BLD: NORMAL
RHINOVIRUS RNA SPEC NAA+PROBE: NOT DETECTED
RSV RNA NPH QL NAA+NON-PROBE: NOT DETECTED
SMALL PLATELETS BLD QL SMEAR: NORMAL
SODIUM BLD-SCNC: 135 MMOL/L (ref 136–145)
STRESS TARGET HR: 133 BPM
WBC MORPH BLD: NORMAL
WBC NRBC COR # BLD: 5.24 10*3/MM3 (ref 3.4–10.8)

## 2019-11-15 PROCEDURE — 85007 BL SMEAR W/DIFF WBC COUNT: CPT | Performed by: HOSPITALIST

## 2019-11-15 PROCEDURE — 85025 COMPLETE CBC W/AUTO DIFF WBC: CPT | Performed by: HOSPITALIST

## 2019-11-15 PROCEDURE — 0099U HC BIOFIRE FILMARRAY RESP PANEL 1: CPT | Performed by: NURSE PRACTITIONER

## 2019-11-15 PROCEDURE — 93306 TTE W/DOPPLER COMPLETE: CPT

## 2019-11-15 PROCEDURE — 93306 TTE W/DOPPLER COMPLETE: CPT | Performed by: INTERNAL MEDICINE

## 2019-11-15 PROCEDURE — 99214 OFFICE O/P EST MOD 30 MIN: CPT | Performed by: INTERNAL MEDICINE

## 2019-11-15 PROCEDURE — 93880 EXTRACRANIAL BILAT STUDY: CPT

## 2019-11-15 PROCEDURE — 80048 BASIC METABOLIC PNL TOTAL CA: CPT | Performed by: HOSPITALIST

## 2019-11-15 PROCEDURE — G0378 HOSPITAL OBSERVATION PER HR: HCPCS

## 2019-11-15 PROCEDURE — 63710000001 INSULIN ASPART PER 5 UNITS: Performed by: HOSPITALIST

## 2019-11-15 PROCEDURE — 25010000002 PERFLUTREN (DEFINITY) 8.476 MG IN SODIUM CHLORIDE 0.9 % 10 ML INJECTION: Performed by: HOSPITALIST

## 2019-11-15 PROCEDURE — 82962 GLUCOSE BLOOD TEST: CPT

## 2019-11-15 PROCEDURE — 85610 PROTHROMBIN TIME: CPT | Performed by: HOSPITALIST

## 2019-11-15 RX ORDER — OSELTAMIVIR PHOSPHATE 75 MG/1
75 CAPSULE ORAL 2 TIMES DAILY
Qty: 10 CAPSULE | Refills: 0 | Status: SHIPPED | OUTPATIENT
Start: 2019-11-15 | End: 2019-11-20

## 2019-11-15 RX ORDER — ACETAMINOPHEN, ASPIRIN AND CAFFEINE 250; 250; 65 MG/1; MG/1; MG/1
1 TABLET, FILM COATED ORAL ONCE
Status: COMPLETED | OUTPATIENT
Start: 2019-11-15 | End: 2019-11-15

## 2019-11-15 RX ORDER — WARFARIN SODIUM 7.5 MG/1
7.5 TABLET ORAL
Status: DISCONTINUED | OUTPATIENT
Start: 2019-11-15 | End: 2019-11-15 | Stop reason: HOSPADM

## 2019-11-15 RX ORDER — WARFARIN SODIUM 5 MG/1
5 TABLET ORAL
Status: DISCONTINUED | OUTPATIENT
Start: 2019-11-16 | End: 2019-11-15 | Stop reason: HOSPADM

## 2019-11-15 RX ORDER — HYDROCODONE BITARTRATE AND ACETAMINOPHEN 10; 325 MG/1; MG/1
1 TABLET ORAL EVERY 4 HOURS PRN
Status: DISCONTINUED | OUTPATIENT
Start: 2019-11-15 | End: 2019-11-15 | Stop reason: HOSPADM

## 2019-11-15 RX ADMIN — PERFLUTREN 1.5 ML: 6.52 INJECTION, SUSPENSION INTRAVENOUS at 10:45

## 2019-11-15 RX ADMIN — ISOSORBIDE MONONITRATE 60 MG: 60 TABLET, EXTENDED RELEASE ORAL at 08:23

## 2019-11-15 RX ADMIN — CLONAZEPAM 1 MG: 0.5 TABLET ORAL at 08:24

## 2019-11-15 RX ADMIN — RANOLAZINE 500 MG: 500 TABLET, FILM COATED, EXTENDED RELEASE ORAL at 08:23

## 2019-11-15 RX ADMIN — CLONAZEPAM 1 MG: 0.5 TABLET ORAL at 00:06

## 2019-11-15 RX ADMIN — ASPIRIN 81 MG 81 MG: 81 TABLET ORAL at 08:23

## 2019-11-15 RX ADMIN — INSULIN ASPART 10 UNITS: 100 INJECTION, SOLUTION INTRAVENOUS; SUBCUTANEOUS at 10:53

## 2019-11-15 RX ADMIN — HYDROCODONE BITARTRATE AND ACETAMINOPHEN 1 TABLET: 10; 325 TABLET ORAL at 04:46

## 2019-11-15 RX ADMIN — INSULIN ASPART 2 UNITS: 100 INJECTION, SOLUTION INTRAVENOUS; SUBCUTANEOUS at 10:53

## 2019-11-15 RX ADMIN — METOPROLOL TARTRATE 25 MG: 25 TABLET ORAL at 08:23

## 2019-11-15 RX ADMIN — ACETAMINOPHEN, ASPIRIN (NSAID) AND CAFFEINE 1 TABLET: 250; 250; 65 TABLET, FILM COATED ORAL at 15:35

## 2019-11-15 RX ADMIN — HYDROCODONE BITARTRATE AND ACETAMINOPHEN 1 TABLET: 10; 325 TABLET ORAL at 10:58

## 2019-11-15 RX ADMIN — BACLOFEN 10 MG: 10 TABLET ORAL at 08:23

## 2019-11-15 NOTE — PLAN OF CARE
Problem: Patient Care Overview  Goal: Plan of Care Review  Outcome: Ongoing (interventions implemented as appropriate)      Problem: Pain, Chronic (Adult)  Goal: Identify Related Risk Factors and Signs and Symptoms  Outcome: Outcome(s) achieved Date Met: 11/15/19    Goal: Acceptable Pain/Comfort Level and Functional Ability  Outcome: Ongoing (interventions implemented as appropriate)      Problem: Syncope (Adult)  Goal: Identify Related Risk Factors and Signs and Symptoms  Outcome: Outcome(s) achieved Date Met: 11/15/19    Goal: Physical Safety/Health Maintenance  Outcome: Ongoing (interventions implemented as appropriate)    Goal: Optimal Emotional/Functional [place]  Outcome: Ongoing (interventions implemented as appropriate)

## 2019-11-15 NOTE — PROGRESS NOTES
[facility] Medicine Services  INPATIENT PROGRESS NOTE    Length of Stay: 0  Date of Admission: 11/14/2019  Primary Care Physician: Efrain Matute MD    Subjective   Chief Complaint: Syncope  HPI:  64 year old male with a history of COPD, CAD s/p CABG, DMII, CKD, DVT/PE on coumadin, and tardive dyskinesia who presented with syncope.  He has noted been nauseated intermittently for the past several months, but on presentation suffered a syncopal episode for approximately 40 seconds.  He did feel presyncopal prior to the event.  He requested cardiology evaluation.     Review of Systems   Constitutional: Negative for chills and fever.   Respiratory: Negative for shortness of breath.    Cardiovascular: Negative for chest pain.   Gastrointestinal: Negative for abdominal pain, nausea (none currently) and vomiting.   Musculoskeletal: Positive for myalgias (chronic).   Neurological: Negative for dizziness.        All pertinent negatives and positives are as above. All other systems have been reviewed and are negative unless otherwise stated.     Objective    Temp:  [98.1 °F (36.7 °C)-99.5 °F (37.5 °C)] 98.1 °F (36.7 °C)  Heart Rate:  [66-95] 95  Resp:  [18-20] 20  BP: (111-145)/(63-83) 145/83    Physical Exam   Constitutional: He is oriented to person, place, and time. He appears well-developed and well-nourished.   Obese   HENT:   Head: Normocephalic.   Tardive dyskinesia   Eyes: Conjunctivae are normal.   Cardiovascular: Normal rate, regular rhythm, normal heart sounds and intact distal pulses.   Pulmonary/Chest: Effort normal and breath sounds normal. No respiratory distress.   Abdominal: Soft. Bowel sounds are normal. He exhibits no distension. There is no tenderness.   Musculoskeletal: He exhibits no edema or deformity.   Neurological: He is alert and oriented to person, place, and time.   Skin: Skin is warm and dry. He is not diaphoretic. No erythema.   Vitals  reviewed.          Results Review:  I have reviewed the labs, radiology results, and diagnostic studies.    Laboratory Data:   Results from last 7 days   Lab Units 11/15/19  0543 11/14/19  1533   SODIUM mmol/L 135* 135*   POTASSIUM mmol/L 4.1 4.3   CHLORIDE mmol/L 98 96*   CO2 mmol/L 26.0 28.0   BUN mg/dL 18 21   CREATININE mg/dL 1.44* 1.50*   GLUCOSE mg/dL 140* 162*   CALCIUM mg/dL 8.2* 8.7   BILIRUBIN mg/dL  --  0.3   ALK PHOS U/L  --  57   ALT (SGPT) U/L  --  28   AST (SGOT) U/L  --  23   ANION GAP mmol/L 11.0 11.0     Estimated Creatinine Clearance: 71 mL/min (A) (by C-G formula based on SCr of 1.44 mg/dL (H)).          Results from last 7 days   Lab Units 11/15/19  0543 11/14/19  1502   WBC 10*3/mm3 5.24 6.70   HEMOGLOBIN g/dL 16.3 17.6   HEMATOCRIT % 47.6 51.4*   PLATELETS 10*3/mm3 105* 116*     Results from last 7 days   Lab Units 11/15/19  0543 11/14/19  2330 11/14/19  1533   INR  1.88* 1.93* 1.77*       Culture Data:   No results found for: BLOODCX  No results found for: URINECX  No results found for: RESPCX  No results found for: WOUNDCX  No results found for: STOOLCX  No components found for: BODYFLD    Radiology Data:   Imaging Results (Last 24 Hours)     Procedure Component Value Units Date/Time    US Carotid Bilateral [[id number]] Collected:  11/15/19 0931     Updated:  11/15/19 1026    Narrative:       PROCEDURE: Bilateral carotid artery Doppler    COMPARISON: No comparison    HISTORY: syncope, R55 Syncope and collapse    FINDINGS: Realtime grayscale and color flow images as well as  spectral waveform analysis is performed of the carotid arteries.    Right:  There is no visualized atherosclerotic disease in the right bulb  and proximal internal carotid artery.   The peak systolic velocity in the right common carotid artery is  74.2 cm/sec.   The peak systolic velocity in the right internal carotid artery  is 80.7 cm/sec.   The IC/CC ratio is 1.1.   Antegrade flow is present in the right vertebral  artery.    Left:   There is minimal atherosclerotic disease in the left bulb and  proximal internal carotid artery.  The peak systolic velocity in the left common carotid artery is  94.4 cm/sec.   The peak systolic velocity in the left internal carotid artery is  96.8 cm/sec.   The IC/CC ratio is 1   Antegrade flow is present in the left vertebral artery.      Impression:       CONCLUSION:    No significant flow-limiting stenosis by velocity measurements.    Electronically signed by:  Tomy Fink MD  11/15/2019 10:25 AM  [facility] Workstation: [id number]    CT Head Without Contrast [[id number]] Collected:  11/14/19 1638     Updated:  11/14/19 1709    Narrative:         [facility] [place]    Patient Name: DARRYN MARTÍNEZ    ATTENDING: FAMILIA BRUNO     REFERRING: DENISSE SHEPARD    ORDERING: DENISSE SHEPARD    -----------------------    PROCEDURE: Cranial CT w/o contrast    DATE OF EXAM: 11/14/2019    HISTORY: Nausea with syncope for three months    Axial images were obtained throughout the brain without the use  of intravenous contrast. Reformatted sagittal and coronal images  were generated. This exam was performed according to our  departmental dose-optimization program, which includes automated  exposure control, adjustment of the mA and/or kV according to the  patient's size and/or use of iterative reconstruction techniques  with goal of optimizing doses that are As Low As Reasonably  Achievable (ALARA).    Study is compared to previous CT exam of 3/6/2018.    FINDINGS:    There is no evidence of intracranial hemorrhage or masses. The  ventricular system and basal cisterns appear normal. There is no  shift of the midline structures. No ischemic changes are seen and  there are no extra-axial fluid collections present.     The bony calvarium appears intact with no fractures or acute bony  abnormality seen. The visualized portions of the paranasal  sinuses are clear.       Impression:       Unremarkable Cranial CT  without contrast.      Electronically signed by:  Anand Myers MD  11/14/2019 5:08 PM  CST Workstation: [id number]    XR Chest 1 View [[id number]] Collected:  11/14/19 1519     Updated:  11/14/19 1545    Narrative:       Chest x-ray single view.        CLINICAL INDICATION: Syncope and collapse.    COMPARISON: Chest x-ray March 6, 2018. Chest x-ray February 24, 2018.    FINDINGS: Cardiac silhouette is normal in size. Pulmonary  vascularity is unremarkable.     Midline sternal sutures from prior cardiac surgery. There is some  widening of the mediastinum just lateral to the aortic arch. This  however may be secondary to chronic left upper lobe atelectasis  is unchanged in comparison with multiple prior exams. Lungs are  otherwise clear.      Impression:       CONCLUSION: Midline sternal sutures from prior cardiac surgery.  No evidence of active disease.    Electronically signed by:  Bill Soriano MD  11/14/2019 3:44 PM CST  Workstation: [id number]          I have reviewed the patient's current medications.     Assessment/Plan     Active Hospital Problems    Diagnosis   • **Syncope and collapse   • Personal history of venous thrombosis and embolism   • Anticoagulated on Coumadin   • Morbid obesity with BMI of 40.0-44.9, adult (CMS/[facility])   • CKD (chronic kidney disease) stage 3, GFR 30-59 ml/min (CMS/[facility])   • Type 2 diabetes mellitus with hyperglycemia (CMS/[facility])   • S/P CABG (coronary artery bypass graft)   • LUCIANA (obstructive sleep apnea)   • Chronic respiratory failure with hypoxia (CMS/[facility])       Plan:    Suspect vasovagal syncope  Echocardiogram  Carotid ultrasound  CT head negative  EKG NSR  Telemetry  Cardiology consultation with Dr. Kumar by request  Aspirin, coumadin  Lopressor, imdur, ranexa  Glucose control: prandial and SSI  Chronic pain: Norco          This document has been electronically signed by DIANA Wu on November 15, 2019 11:51 AM

## 2019-11-15 NOTE — PLAN OF CARE
Problem: Patient Care Overview  Goal: Plan of Care Review  Outcome: Ongoing (interventions implemented as appropriate)  Making Lifestyle Changes for a Healthier Weight used to provide Wt Loss Diet ed and diet copy given.   11/15/19 2654   Coping/Psychosocial   Plan of Care Reviewed With patient   Plan of Care Review   Progress no change   OTHER   Outcome Summary initial visit

## 2019-11-15 NOTE — DISCHARGE SUMMARY
[facility] Medicine Services  DISCHARGE SUMMARY       Date of Admission: 11/14/2019  Date of Discharge:  11/15/2019  Primary Care Physician: Efrain Matute MD    Presenting Problem/History of Present Illness:  Syncope and collapse [R55]     Final Discharge Diagnoses:    Syncope and collapse    LUCIANA (obstructive sleep apnea)    S/P CABG (coronary artery bypass graft)    Morbid obesity with BMI of 40.0-44.9, adult (CMS/[facility])    CKD (chronic kidney disease) stage 3, GFR 30-59 ml/min (CMS/[facility])    Chronic respiratory failure with hypoxia (CMS/[facility])    Type 2 diabetes mellitus with hyperglycemia (CMS/[facility])    Personal history of venous thrombosis and embolism    Anticoagulated on Coumadin      Consults:   Consults     Date and Time Order Name Status Description    11/15/2019 1020 Inpatient Cardiology Consult      11/14/2019 9685 Hospitalist (on-call MD unless specified)          Pertinent Test Results:   Ct Head Without Contrast    Result Date: 11/14/2019  [facility] Patient Name: DARRYN MARTÍNEZ ATTENDING: FAMILIA BRUNO REFERRING: DENISSE SHEPARD ORDERING: DENISSE SHEPARD ----------------------- PROCEDURE: Cranial CT w/o contrast DATE OF EXAM: 11/14/2019 HISTORY: Nausea with syncope for three months Axial images were obtained throughout the brain without the use of intravenous contrast. Reformatted sagittal and coronal images were generated. This exam was performed according to our departmental dose-optimization program, which includes automated exposure control, adjustment of the mA and/or kV according to the patient's size and/or use of iterative reconstruction techniques with goal of optimizing doses that are As Low As Reasonably Achievable (ALARA). Study is compared to previous CT exam of 3/6/2018. FINDINGS: There is no evidence of intracranial hemorrhage or masses. The ventricular system and basal cisterns appear normal. There is no shift of the midline  structures. No ischemic changes are seen and there are no extra-axial fluid collections present. The bony calvarium appears intact with no fractures or acute bony abnormality seen. The visualized portions of the paranasal sinuses are clear.     Unremarkable Cranial CT without contrast. Electronically signed by:  Anand Myers MD  11/14/2019 5:08 PM CST Workstation: [id number]    Xr Chest 1 View    Result Date: 11/14/2019  Chest x-ray single view. CLINICAL INDICATION: Syncope and collapse. COMPARISON: Chest x-ray March 6, 2018. Chest x-ray February 24, 2018. FINDINGS: Cardiac silhouette is normal in size. Pulmonary vascularity is unremarkable. Midline sternal sutures from prior cardiac surgery. There is some widening of the mediastinum just lateral to the aortic arch. This however may be secondary to chronic left upper lobe atelectasis is unchanged in comparison with multiple prior exams. Lungs are otherwise clear.     CONCLUSION: Midline sternal sutures from prior cardiac surgery. No evidence of active disease. Electronically signed by:  Bill Soriano MD  11/14/2019 3:44 PM CST Workstation: [id number]    Us Carotid Bilateral    Result Date: 11/15/2019  PROCEDURE: Bilateral carotid artery Doppler COMPARISON: No comparison HISTORY: syncope, R55 Syncope and collapse FINDINGS: Realtime grayscale and color flow images as well as spectral waveform analysis is performed of the carotid arteries. Right: There is no visualized atherosclerotic disease in the right bulb and proximal internal carotid artery. The peak systolic velocity in the right common carotid artery is 74.2 cm/sec. The peak systolic velocity in the right internal carotid artery is 80.7 cm/sec. The IC/CC ratio is 1.1. Antegrade flow is present in the right vertebral artery. Left: There is minimal atherosclerotic disease in the left bulb and proximal internal carotid artery. The peak systolic velocity in the left common carotid artery is 94.4 cm/sec. The peak systolic  velocity in the left internal carotid artery is 96.8 cm/sec. The IC/CC ratio is 1 Antegrade flow is present in the left vertebral artery.     CONCLUSION:  No significant flow-limiting stenosis by velocity measurements. Electronically signed by:  Tomy Fink MD  11/15/2019 10:25 AM [facility] Workstation: [id number]    Echocardiogram Findings 11/15/19    Left Ventricle Left ventricular systolic function is normal. Estimated EF appears to be in the range of 51 - 55%. Estimated EF = 52%. All left ventricular wall segments contract normally. The left ventricular cavity is mild-to-moderately dilated. Left ventricular wall thickness is consistent with mild concentric hypertrophy. Left ventricular diastolic dysfunction is noted (grade I) consistent with impaired relaxation.   Right Ventricle Right ventricle not well visualized. Normal right ventricular systolic function noted. Right ventricular cavity is mildly dilated.   Left Atrium Left atrial cavity size is mildly dilated.   Right Atrium The right atrium was not well visualized.   Aortic Valve The aortic valve is not well visualized. The aortic valve is grossly normal in structure.   Mitral Valve Mitral valve is not well visualized. The mitral valve is grossly normal in structure.   Tricuspid Valve Tricuspid valve not well visualized.   Pulmonic Valve The pulmonic valve is not well visualized.   Greater Vessels No dilation of the aortic root is present.   Pericardium The pericardium is normal. There is no evidence of pericardial effusion.       HPI/Hospital Course:  The patient is a 64 y.o. male with a history of COPD, CAD s/p CABG, DMII, CKD, DVT/PE on coumadin, and tardive dyskinesia who presented to [facility] with syncope.  He has noted been nauseated intermittently for the past several months, but on presentation suffered a syncopal episode for approximately 40 seconds. He did feel presyncopal prior to the event. He also noted cough and sore throat.   "Echocardiogram revealed EF 51-55%.  Carotid ultrasound was negative for flow-limiting stenosis.  He was evaluated by cardiology per request and was recommended high fluid intake and compression stockings.  He is stable and discharged to home.     Respiratory PCR was reported as flu positive.  He was initially discharged with Tamiflu, however his final report is actually parainfluenza positive.  The prescription was canceled and this was discussed with the patient's wife.      Condition on Discharge:  Stable    Physical Exam on Discharge:  /83 (BP Location: Left arm, Patient Position: Lying)   Pulse 75   Temp 98.8 °F (37.1 °C) (Temporal)   Resp 18   Ht 180.3 cm (71\")   Wt 129 kg (284 lb 12.8 oz)   SpO2 91%   BMI 39.72 kg/m²   Physical Exam  Constitutional: He is oriented to person, place, and time. He appears well-developed and well-nourished.   Obese   HENT:   Head: Normocephalic.   Tardive dyskinesia   Eyes: Conjunctivae are normal.   Cardiovascular: Normal rate, regular rhythm, normal heart sounds and intact distal pulses.   Pulmonary/Chest: Effort normal and breath sounds normal. No respiratory distress.   Abdominal: Soft. Bowel sounds are normal. He exhibits no distension. There is no tenderness.   Musculoskeletal: He exhibits no edema or deformity.   Neurological: He is alert and oriented to person, place, and time.   Skin: Skin is warm and dry. He is not diaphoretic. No erythema.   Vitals reviewed.    Discharge Disposition:  Home or Self Care    Discharge Medications:     Discharge Medications      New Medications      Instructions Start Date   oseltamivir 75 MG capsule  Commonly known as:  TAMIFLU   75 mg, Oral, 2 Times Daily         Continue These Medications      Instructions Start Date   ACCU-CHEK ARGELIA PLUS w/Device kit   No dose, route, or frequency recorded.      alfuzosin 10 MG 24 hr tablet  Commonly known as:  UROXATRAL   10 mg, Oral, Daily      Alirocumab 75 MG/ML solution " "pen-injector  Commonly known as:  PRALUENT  Notes to patient:  Previously taken. Take as directed    75 mg, Subcutaneous, Every 14 Days      aspirin 81 MG chewable tablet   81 mg, Oral, Take As Directed, Take 1 pill by mouth Monday, Wednesday, Friday      azelastine 0.1 % nasal spray  Commonly known as:  ASTELIN   2 sprays, Nasal, 2 Times Daily, Use in each nostril as directed      baclofen 10 MG tablet  Commonly known as:  LIORESAL   10 mg, Oral, 3 Times Daily      busPIRone 10 MG tablet  Commonly known as:  BUSPAR   10 mg, Oral, 3 Times Daily      chlorhexidine 0.12 % solution  Commonly known as:  PERIDEX  Notes to patient:  Previously taken. Take as directed    No dose, route, or frequency recorded.      clonazePAM 1 MG tablet  Commonly known as:  KlonoPIN   1 mg, Oral, 3 Times Daily PRN, Or dystonia      COMFORT ASSIST INSULIN SYRINGE 31G X 5/16\" 0.3 ML misc  Generic drug:  Insulin Syringe-Needle U-100   No dose, route, or frequency recorded.      enoxaparin 40 MG/0.4ML solution syringe  Commonly known as:  LOVENOX  Notes to patient:  Previously taken. Take as directed    No dose, route, or frequency recorded.      EQUIPTO-AMITRIPTYLINE EX   APPLY TO LEFT LEG TWICE DAILY      famotidine 20 MG tablet  Commonly known as:  PEPCID  Notes to patient:  Previously taken. Take as directed    No dose, route, or frequency recorded.      FISH OIL PO   4 capsules, Oral, 2 Times Daily      furosemide 20 MG tablet  Commonly known as:  LASIX   20 mg, Oral, As Needed      glimepiride 4 MG tablet  Commonly known as:  AMARYL   4 mg, Oral, 2 Times Daily      HYDROcodone-acetaminophen  MG per tablet  Commonly known as:  NORCO   1 tablet, Oral, Every 6 Hours PRN      insulin aspart 100 UNIT/ML injection  Commonly known as:  novoLOG   10 Units, Subcutaneous, 3 Times Daily Before Meals      ipratropium 0.06 % nasal spray  Commonly known as:  ATROVENT   2 sprays, Nasal, 3 Times Daily      ipratropium 0.06 % nasal spray  Commonly " known as:  ATROVENT   2 sprays each nostril twice a day and as needed      isosorbide mononitrate 60 MG 24 hr tablet  Commonly known as:  IMDUR   60 mg, Oral, Every 24 Hours Scheduled      metoprolol tartrate 25 MG tablet  Commonly known as:  LOPRESSOR   25 mg, Oral, 2 Times Daily      ondansetron 4 MG tablet  Commonly known as:  ZOFRAN   4 mg, Oral, Every 8 Hours PRN      ONE TOUCH ULTRA TEST test strip  Generic drug:  glucose blood   No dose, route, or frequency recorded.      onetouch ultrasoft lancets   No dose, route, or frequency recorded.      ranolazine 500 MG 12 hr tablet  Commonly known as:  RANEXA   500 mg, Oral, 2 Times Daily      tamsulosin 0.4 MG capsule 24 hr capsule  Commonly known as:  FLOMAX   1 capsule, Oral, Daily      triamcinolone 0.1 % lotion  Commonly known as:  KENALOG   Topical, 2 Times Daily      vitamin D 1.25 MG (24670 UT) capsule capsule  Commonly known as:  ERGOCALCIFEROL  Notes to patient:  Previously taken. Tale as directed    50,000 Units, Oral, Every 7 Days, Takes every 2 weeks      warfarin 5 MG tablet  Commonly known as:  COUMADIN   5 mg, Oral, Nightly, Patient takes 5 mg every day except MWF when he takes 7.5 mg             Discharge Diet:   Diet Instructions     Diet: Regular, Cardiac; Thin      Discharge Diet:   Regular  Cardiac       Fluid Consistency:  Thin          Activity at Discharge:   Activity Instructions     Activity as Tolerated          Follow-up Appointments:   Future Appointments   Date Time Provider Department Center   12/5/2019  8:15 AM Jose C Novak DPM [facility] POD [facility] None   12/17/2019  2:00 PM Jerome Che MD [facility] SM [facility] None   4/15/2020 10:30 AM Keke Kumar MD [facility] None   5/26/2020  8:30 AM [facility] ROOM [facility]   5/26/2020  9:00 AM Coleman Hale APRN [facility] CTV [facility] None   8/10/2020  2:15 PM Seun Whitman MD [facility] None       DIANA Wu  11/15/19  4:46 PM

## 2019-11-15 NOTE — H&P
[facility] Medicine Admission      Date of Admission: 2019      Primary Care Physician: Efrain Matute MD      Chief Complaint:  Syncope    HPI: Patient is a 64-year-old gentleman with past medical history of COPD, coronary artery disease, diabetes, chronic kidney disease, tardive dyskinesia, and pulmonary embolism who presents with a episode of syncope.  Patient states that for the past several months he has been nauseated frequently.  He states that it is constant.  Specific details about the episode of syncope are somewhat difficult to obtain from the patient.  It appears that he was feeling poorly and went to the kitchen table to sit down, and then had a syncopal episode that lasted approximately 40 seconds.  The event was witnessed by the patient's wife.  He had no jerking type motions, no bowel or bladder incontinence, and no postictal state.  It is not completely apparent to me if the patient was presyncopal prior to the event and that is why he went to sit down.  Patient states there have been a couple of other instances over the past couple of months where he has been presyncopal.  He has not had any other syncopal events.  There are no alleviating or exacerbating factors that he can identify.    Concurrent Medical History:  has a past medical history of Anxiety, Arthritis, CKD (chronic kidney disease), stage III (CMS/[facility], located within [facility]), COPD (chronic obstructive pulmonary disease) (CMS/[facility], located within [facility]), Coronary artery disease, Depression, Diabetes mellitus (CMS/HCC), DVT (deep venous thrombosis) (CMS/[facility]AC, located within [facility]), Heart disease, History of embolic filter insertion, Hyperlipidemia, Hypertension, Injury of back, LUCIANA on CPAP, and Pulmonary embolism (CMS/LTAC, located within [facility]).    Past Surgical History:  has a past surgical history that includes Gallbladder surgery (); Circumcision, non-; Joint replacement (Right, 2016); Spine surgery; Back surgery; Esophagogastroduodenoscopy (N/A,  9/11/2018); Colonoscopy (N/A, 9/11/2018); and Cardiac surgery (2001).    Family History: family history includes COPD in his brother; Diabetes in his sister; Heart disease in his brother and father; Hypertension in his brother and father; Stroke in his father.     Social History:  reports that he has never smoked. He has never used smokeless tobacco. He reports that he does not drink alcohol or use drugs.    Allergies:   Allergies   Allergen Reactions   • Austedo [Deutetrabenazine] Other (See Comments)     Insomnia     • Celexa [Citalopram Hydrobromide] Dystonia   • Crestor [Rosuvastatin Calcium]    • Ingrezza [Valbenazine Tosylate] Other (See Comments)     fatigue   • Lipitor [Atorvastatin] Other (See Comments)     Aches and pains all over   • Lyrica [Pregabalin] Swelling       Medications:   Prior to Admission medications    Medication Sig Start Date End Date Taking? Authorizing Provider   alfuzosin (UROXATRAL) 10 MG 24 hr tablet Take 10 mg by mouth Daily.    Joseph Keenan MD   Alirocumab (PRALUENT) 75 MG/ML solution pen-injector Inject 75 mg under the skin into the appropriate area as directed Every 14 (Fourteen) Days. 10/25/18   Keke Kumar MD   aspirin 81 MG chewable tablet Chew 81 mg Take As Directed. Take 1 pill by mouth Monday, Wednesday, Friday    Joseph Keenan MD   azelastine (ASTELIN) 0.1 % nasal spray 2 sprays into the nostril(s) as directed by provider 2 (Two) Times a Day. Use in each nostril as directed 10/24/19   Main Garcia MD   baclofen (LIORESAL) 10 MG tablet Take 10 mg by mouth 3 (Three) Times a Day.    Joseph Keenan MD   Blood Glucose Monitoring Suppl (ACCU-CHEK ARGELIA PLUS) w/Device kit  2/5/18   Joseph Keenan MD   busPIRone (BUSPAR) 10 MG tablet Take 10 mg by mouth 3 (Three) Times a Day.    Joseph Keenan MD   chlorhexidine (PERIDEX) 0.12 % solution  11/5/19   Joseph Keenan MD   clonazePAM (KlonoPIN) 1 MG tablet Take 1 tablet by  "mouth 3 (Three) Times a Day As Needed for Seizures. Or dystonia 7/19/19   Jerome Che MD   COMFORT ASSIST INSULIN SYRINGE 31G X 5/16\" 0.3 ML misc  8/28/16   Joseph Keenan MD   enoxaparin (LOVENOX) 40 MG/0.4ML solution syringe  11/1/19   Joseph Keenan MD   EQUIPTO-AMITRIPTYLINE EX APPLY TO LEFT LEG TWICE DAILY 10/4/19   Joseph Keenan MD   famotidine (PEPCID) 20 MG tablet  11/4/19   Joseph Keenan MD   furosemide (LASIX) 20 MG tablet Take 20 mg by mouth As Needed.    Joseph Keenan MD   glimepiride (AMARYL) 4 MG tablet Take 4 mg by mouth 2 (Two) Times a Day. 8/1/16   Joseph Keenan MD   glucose blood (ONE TOUCH ULTRA TEST) test strip  4/12/16   Joseph Keenan MD   HYDROcodone-acetaminophen (NORCO)  MG per tablet Take 1 tablet by mouth Every 6 (Six) Hours As Needed for Moderate Pain .    Joseph Keenan MD   insulin aspart (novoLOG) 100 UNIT/ML injection Inject 10 Units under the skin 3 (Three) Times a Day Before Meals. 3/8/18   Smith Austin MD   ipratropium (ATROVENT) 0.06 % nasal spray 2 sprays into the nostril(s) as directed by provider 3 (Three) Times a Day. 8/8/19   Seun Whitman MD   ipratropium (ATROVENT) 0.06 % nasal spray 2 sprays each nostril twice a day and as needed 11/7/19   Main Garcia MD   isosorbide mononitrate (IMDUR) 60 MG 24 hr tablet Take 1 tablet by mouth Daily. 3/8/18   Smith Austin MD   Lancets (ONETOUCH ULTRASOFT) lancets  3/6/17   Joseph Keenan MD   metoprolol tartrate (LOPRESSOR) 25 MG tablet Take 25 mg by mouth 2 (Two) Times a Day.    Joseph Keenan MD   Omega-3 Fatty Acids (FISH OIL PO) Take 4 capsules by mouth 2 (Two) Times a Day.    Joseph Keenan MD   ondansetron (ZOFRAN) 4 MG tablet Take 4 mg by mouth Every 8 (Eight) Hours As Needed for Nausea or Vomiting.    Provider, MD Joseph   ranolazine (RANEXA) 500 MG 12 hr tablet Take 1 tablet by mouth 2 (Two) Times a Day. 8/28/19   " Keke Kumar MD   tamsulosin (FLOMAX) 0.4 MG capsule 24 hr capsule Take 1 capsule by mouth Daily.    Joseph Keenan MD   triamcinolone (KENALOG) 0.1 % lotion Apply  topically to the appropriate area as directed 2 (Two) Times a Day.    Joseph Keenan MD   vitamin D (ERGOCALCIFEROL) 95310 UNITS capsule capsule Take 50,000 Units by mouth Every 7 (Seven) Days. Takes every 2 weeks 7/2/16   Joseph Keenan MD   warfarin (COUMADIN) 5 MG tablet Take 5 mg by mouth Every Night. Patient takes 5 mg every day except MWF when he takes 7.5 mg 7/22/16   Joseph Keenan MD       Review of Systems:  Review of Systems   Constitutional: Positive for activity change and fatigue. Negative for appetite change, chills, fever and unexpected weight change.   HENT: Negative for congestion, facial swelling, hearing loss, nosebleeds, rhinorrhea, sneezing, trouble swallowing and voice change.    Eyes: Negative for photophobia and visual disturbance.   Respiratory: Negative for apnea, cough, choking, chest tightness, shortness of breath, wheezing and stridor.    Cardiovascular: Negative for chest pain, palpitations and leg swelling.   Gastrointestinal: Positive for nausea. Negative for abdominal pain, blood in stool, constipation, diarrhea and vomiting.   Endocrine: Negative for cold intolerance, heat intolerance, polydipsia, polyphagia and polyuria.   Genitourinary: Negative for dysuria, flank pain and hematuria.   Musculoskeletal: Negative for arthralgias, back pain, myalgias and neck pain.   Skin: Negative for rash and wound.   Allergic/Immunologic: Negative for immunocompromised state.   Neurological: Positive for syncope. Negative for dizziness, seizures, speech difficulty, weakness, light-headedness, numbness and headaches.   Hematological: Does not bruise/bleed easily.   Psychiatric/Behavioral: Negative for agitation, behavioral problems, confusion, decreased concentration, hallucinations,  self-injury and suicidal ideas. The patient is not nervous/anxious.       Otherwise complete ROS is negative except as mentioned above.    Physical Exam:   Temp:  [98.6 °F (37 °C)-98.9 °F (37.2 °C)] 98.9 °F (37.2 °C)  Heart Rate:  [69-88] 86  Resp:  [18-20] 20  BP: (111-143)/(63-80) 111/63     Physical Exam   Constitutional: He is oriented to person, place, and time. He appears well-developed and well-nourished.   HENT:   Head: Normocephalic and atraumatic.   Nose: Nose normal.   Mouth/Throat: Oropharynx is clear and moist.   Eyes: Conjunctivae, EOM and lids are normal. Pupils are equal, round, and reactive to light. No scleral icterus.   Neck: Normal range of motion. Neck supple. No JVD present. No tracheal tenderness and no spinous process tenderness present. No neck rigidity. No tracheal deviation, no edema and normal range of motion present.   Cardiovascular: Normal rate, regular rhythm, S1 normal, S2 normal, normal heart sounds and normal pulses. Exam reveals no gallop and no friction rub.   No murmur heard.  Pulmonary/Chest: Effort normal and breath sounds normal. No accessory muscle usage. No respiratory distress. He has no decreased breath sounds. He has no wheezes. He has no rales. He exhibits no tenderness.   Abdominal: Soft. Bowel sounds are normal. He exhibits no distension and no mass. There is no tenderness. There is no rebound and no guarding.   Musculoskeletal: He exhibits no edema or tenderness.   Neurological: He is alert and oriented to person, place, and time. He has normal reflexes. He displays no atrophy and normal reflexes. No cranial nerve deficit or sensory deficit. He exhibits normal muscle tone. He displays no seizure activity. Coordination normal.   Tardive dyskinesia   Skin: Skin is warm. No rash noted. No pallor.   Psychiatric: He has a normal mood and affect. His behavior is normal. Judgment and thought content normal.         Results Reviewed:  I have personally reviewed current lab,  radiology, and data and agree with results.  Lab Results (last 24 hours)     Procedure Component Value Units Date/Time    POC Glucose Once [[id number]]  (Normal) Collected:  11/14/19 1942    Specimen:  Blood Updated:  11/14/19 2104     Glucose 119 mg/dL      Comment: Result Not ConfirmedOperator: [id number] ROSELIA VELASQUEZLUISAMeter ID: [id number]       Scan Slide [[id number]] Collected:  11/14/19 1502    Specimen:  Blood Updated:  11/14/19 1852     RBC Morphology Normal     WBC Morphology Normal     Platelet Estimate Decreased    [place] Draw [[id number]] Collected:  11/14/19 1502    Specimen:  Blood Updated:  11/14/19 1646    Narrative:       The following orders were created for panel order [place] Draw.  Procedure                               Abnormality         Status                     ---------                               -----------         ------                     Light Blue Top[[id number]]                                   Final result               Green Top (Gel)[[id number]]                                  Final result               Lavender Top[[id number]]                                     Final result               Gold Top - SST[[id number]]                                                                Please view results for these tests on the individual orders.    Green Top (Gel) [[id number]] Collected:  11/14/19 1533    Specimen:  Blood Updated:  11/14/19 1646     Extra Tube Hold for add-ons.     Comment: Auto resulted.       POC Glucose Once [[id number]]  (Abnormal) Collected:  11/14/19 1544    Specimen:  Blood Updated:  11/14/19 1609     Glucose 149 mg/dL      Comment: RN NotifiedOperator: [id number] EN GUREINMeter ID: [id number]       Comprehensive Metabolic Panel [[id number]]  (Abnormal) Collected:  11/14/19 1533    Specimen:  Blood Updated:  11/14/19 1605     Glucose 162 mg/dL      BUN 21 mg/dL      Creatinine 1.50 mg/dL      Sodium 135 mmol/L      Potassium 4.3 mmol/L      Chloride 96 mmol/L       CO2 28.0 mmol/L      Calcium 8.7 mg/dL      Total Protein 6.9 g/dL      Albumin 3.40 g/dL      ALT (SGPT) 28 U/L      AST (SGOT) 23 U/L      Alkaline Phosphatase 57 U/L      Total Bilirubin 0.3 mg/dL      eGFR Non African Amer 47 mL/min/1.73      Globulin 3.5 gm/dL      A/G Ratio 1.0 g/dL      BUN/Creatinine Ratio 14.0     Anion Gap 11.0 mmol/L     Narrative:       GFR Normal >60  Chronic Kidney Disease <60  Kidney Failure <15    Light Blue Top [[id number]] Collected:  11/14/19 1502    Specimen:  Blood Updated:  11/14/19 1605     Extra Tube hold for add-on     Comment: Auto resulted       Lavender Top [[id number]] Collected:  11/14/19 1502    Specimen:  Blood Updated:  11/14/19 1605     Extra Tube hold for add-on     Comment: Auto resulted       Troponin [[id number]]  (Normal) Collected:  11/14/19 1533    Specimen:  Blood Updated:  11/14/19 1604     Troponin T <0.010 ng/mL     Narrative:       Troponin T Reference Range:  <= 0.03 ng/mL-   Negative for AMI  >0.03 ng/mL-     Abnormal for myocardial necrosis.  Clinicians would have to utilize clinical acumen, EKG, Troponin and serial changes to determine if it is an Acute Myocardial Infarction or myocardial injury due to an underlying chronic condition.     BNP [[id number]]  (Normal) Collected:  11/14/19 1533    Specimen:  Blood Updated:  11/14/19 1602     proBNP 66.4 pg/mL     Narrative:       Among patients with dyspnea, NT-proBNP is highly sensitive for the detection of acute congestive heart failure. In addition NT-proBNP of <300 pg/ml effectively rules out acute congestive heart failure with 99% negative predictive value.    Lactic Acid, Plasma [[id number]]  (Normal) Collected:  11/14/19 1533    Specimen:  Blood Updated:  11/14/19 1601     Lactate 1.5 mmol/L     Protime-INR [[id number]]  (Abnormal) Collected:  11/14/19 1533    Specimen:  Blood Updated:  11/14/19 1601     Protime 20.4 Seconds      INR 1.77    Narrative:       Therapeutic range for most indications is  2.0-3.0 INR,  or 2.5-3.5 for mechanical heart valves.    aPTT [[id number]]  (Normal) Collected:  11/14/19 1533    Specimen:  Blood Updated:  11/14/19 1601     PTT 31.6 seconds     Narrative:       The recommended Heparin therapeutic range is 68-97 seconds.    Urinalysis With Culture If Indicated - Urine, Clean Catch [[id number]]  (Abnormal) Collected:  11/14/19 1534    Specimen:  Urine, Clean Catch Updated:  11/14/19 1544     Color, UA Yellow     Appearance, UA Clear     pH, UA 5.5     Specific Gravity, UA 1.018     Glucose,  mg/dL (1+)     Ketones, UA Negative     Bilirubin, UA Negative     Blood, UA Trace     Protein, UA Negative     Leuk Esterase, UA Negative     Nitrite, UA Negative     Urobilinogen, UA 1.0 E.U./dL    Urinalysis, Microscopic Only - Urine, Clean Catch [[id number]]  (Abnormal) Collected:  11/14/19 1534    Specimen:  Urine, Clean Catch Updated:  11/14/19 1544     RBC, UA 3-5 /HPF      WBC, UA 0-2 /HPF      Bacteria, UA None Seen /HPF      Squamous Epithelial Cells, UA None Seen /HPF      Hyaline Casts, UA 0-2 /LPF      Methodology Automated Microscopy    CBC & Differential [[id number]] Collected:  11/14/19 1502    Specimen:  Blood Updated:  11/14/19 1520    Narrative:       The following orders were created for panel order CBC & Differential.  Procedure                               Abnormality         Status                     ---------                               -----------         ------                     CBC Auto Differential[[id number]]        Abnormal            Final result                 Please view results for these tests on the individual orders.    CBC Auto Differential [[id number]]  (Abnormal) Collected:  11/14/19 1502    Specimen:  Blood Updated:  11/14/19 1520     WBC 6.70 10*3/mm3      RBC 5.76 10*6/mm3      Hemoglobin 17.6 g/dL      Hematocrit 51.4 %      MCV 89.2 fL      MCH 30.6 pg      MCHC 34.2 g/dL      RDW 13.6 %      RDW-SD 44.2 fl      MPV 10.3 fL      Platelets 116  10*3/mm3      Neutrophil % 73.8 %      Lymphocyte % 13.7 %      Monocyte % 11.2 %      Eosinophil % 0.6 %      Basophil % 0.3 %      Immature Grans % 0.4 %      Neutrophils, Absolute 4.94 10*3/mm3      Lymphocytes, Absolute 0.92 10*3/mm3      Monocytes, Absolute 0.75 10*3/mm3      Eosinophils, Absolute 0.04 10*3/mm3      Basophils, Absolute 0.02 10*3/mm3      Immature Grans, Absolute 0.03 10*3/mm3      nRBC 0.0 /100 WBC         Imaging Results (Last 24 Hours)     Procedure Component Value Units Date/Time    CT Head Without Contrast [[id number]] Collected:  11/14/19 1638     Updated:  11/14/19 1709    Narrative:         [facility]    Patient Name: IAN MARTÍNEZOLD    ATTENDING: FAMILIA BRUNO     REFERRING: DENISSE SHEPARD    ORDERING: DENISSE SHEPARD    -----------------------    PROCEDURE: Cranial CT w/o contrast    DATE OF EXAM: 11/14/2019    HISTORY: Nausea with syncope for three months    Axial images were obtained throughout the brain without the use  of intravenous contrast. Reformatted sagittal and coronal images  were generated. This exam was performed according to our  departmental dose-optimization program, which includes automated  exposure control, adjustment of the mA and/or kV according to the  patient's size and/or use of iterative reconstruction techniques  with goal of optimizing doses that are As Low As Reasonably  Achievable (ALARA).    Study is compared to previous CT exam of 3/6/2018.    FINDINGS:    There is no evidence of intracranial hemorrhage or masses. The  ventricular system and basal cisterns appear normal. There is no  shift of the midline structures. No ischemic changes are seen and  there are no extra-axial fluid collections present.     The bony calvarium appears intact with no fractures or acute bony  abnormality seen. The visualized portions of the paranasal  sinuses are clear.       Impression:       Unremarkable Cranial CT without contrast.      Electronically signed  by:  Anand Myers MD  11/14/2019 5:08 PM  CST Workstation: [id number]    XR Chest 1 View [[id number]] Collected:  11/14/19 1519     Updated:  11/14/19 1545    Narrative:       Chest x-ray single view.        CLINICAL INDICATION: Syncope and collapse.    COMPARISON: Chest x-ray March 6, 2018. Chest x-ray February 24, 2018.    FINDINGS: Cardiac silhouette is normal in size. Pulmonary  vascularity is unremarkable.     Midline sternal sutures from prior cardiac surgery. There is some  widening of the mediastinum just lateral to the aortic arch. This  however may be secondary to chronic left upper lobe atelectasis  is unchanged in comparison with multiple prior exams. Lungs are  otherwise clear.      Impression:       CONCLUSION: Midline sternal sutures from prior cardiac surgery.  No evidence of active disease.    Electronically signed by:  Bill Soriano MD  11/14/2019 3:44 PM CST  Workstation: [id number]            Assessment:    Active Hospital Problems    Diagnosis   • Syncope and collapse             Plan:  1.  Syncope: Will monitor on telemetry, obtain echocardiography, and obtain carotid ultrasound.  CT scan of the head is unremarkable.  Patient has requested cardiology consultation.  Dr. Kumar is his cardiologist.    2.  Coronary artery disease: Continue home medications.  No chest pain.  3.  COPD: No acute exacerbation.  4.  Pulmonary embolism: Continue Coumadin.  5.  Diabetes: Sliding scale insulin.  6.  Chronic kidney disease: Monitor closely.  7.  DVT prophylaxis: Coumadin.    I discussed the patient's findings and my recommendations with: Patient and wife        This document has been electronically signed by Jerome Young MD on November 14, 2019 10:00 PM

## 2019-11-15 NOTE — CONSULTS
Adult Nutrition  Assessment    Patient Name:  Clint Mack  YOB: 1955  MRN: [id number]  Admit Date:  11/14/2019    Assessment Date:  11/15/2019    Comments:  BMI 39 with pt at 171% IBW. Making LIfestyle Changes for a Healthier Weight used to provide Wt Loss Diet ed and diet copy given.  Pt has problems chewing due to dental extraction and needs a mechanical diet.    Reason for Assessment     Row Name 11/15/19 1338          Reason for Assessment    Reason For Assessment  other (see comments) Wt Loss Diet ed     Diagnosis  other (see comments) dx Obesity     Identified At Risk by Screening Criteria  BMI           Anthropometrics     Row Name 11/15/19 0632          Anthropometrics    Weight  129 kg (284 lb 12.8 oz)             Estimated/Assessed Needs     Row Name 11/15/19 1339          Calculation Measurements    Weight Used For Calculations  88.8 kg (195 lb 12.3 oz)        Estimated/Assessed Needs    Additional Documentation  Calorie Requirements (Group);Protein Requirements (Group);[place]ar[place]-St. Jeor Equation (Group);Fluid Requirements (Group)        Calorie Requirements    Estimated Calorie Requirement (kcal/day)  2210     Estimated Calorie Need Method  Spearfish-St Jeor        Spearfish-St. Jeor Equation    RMR (Spearfish-St. Jeor Equation)  1700.125        Protein Requirements    Weight Used For Protein Calculations  88.8 kg (195 lb 12.3 oz)     Est Protein Requirement Amount (gms/kg)  1.2 gm protein     Estimated Protein Requirements (gms/day)  106.56        Fluid Requirements    Estimated Fluid Requirements (mL/day)  2665     RDA Method (mL)  2665     Nataly-Ruchi Method (over 20 kg)  3276                   Electronically signed by:  Rhoda Rodriges RD  11/15/19 1:43 PM

## 2019-11-15 NOTE — PROGRESS NOTES
"Anticoagulation by Pharmacy - Warfarin    Clint Mack is a 64 y.o.male  [Ht: 180.3 cm (71\"); Wt: 129 kg (284 lb 12.8 oz)] on Warfarin 7.5 mg on MWF and 5 mg all other days for indication of PE.    Goal INR: 2-3  Home dose is listed above  Today's INR:   Lab Results   Component Value Date    INR 1.88 (H) 11/15/2019          Lab Results   Component Value Date    INR 1.88 (H) 11/15/2019    INR 1.93 (H) 11/14/2019    INR 1.77 (H) 11/14/2019    PROTIME 21.4 (H) 11/15/2019    PROTIME 21.8 (H) 11/14/2019    PROTIME 20.4 (H) 11/14/2019     Lab Results   Component Value Date    HGB 16.3 11/15/2019    HGB 17.6 11/14/2019    HGB 16.2 08/23/2019     Lab Results   Component Value Date    HCT 47.6 11/15/2019    HCT 51.4 (H) 11/14/2019    HCT 48.2 08/23/2019     Lab Results   Component Value Date     (L) 11/15/2019     (L) 11/14/2019     07/22/2019           Assessment/Plan:  Reviewed above labs. INR is 1.88.  INR is below goal. Reviewed medication list. Concurrent medications include aspirin. Pt did not  receive dose of warfarin last night- noted pt/family refused dose.  Will continue current home dose. Rx will continue to follow and adjust dose accordingly.  Today is day 1 of consult.      Rosalie Alfonso RPH  11/15/19 11:09 AM     "

## 2019-11-15 NOTE — CONSULTS
CARDIOLOGY CONSULTATION NOTE    Referring Provider: Kody Cavanaugh MD/ Hospitalist Service    Reason for Consultation: Evaluation of syncope    Clint Mack  1955  64 y.o. male      HPI  Clint Mack  is a 64-year-old male who is well-known to me. His history is remarkable for coronary artery disease s/p CABG, Diabetes Mellitus, chronic kidney disease, tardive dyskinesia, and pulmonary embolism who presented following an episode of syncope.   Patient has had episodes of nausea for the past several months and apparently felt poorly yesterday and went to the kitchen table to sit down following which he had a syncopal episode lasting about 40 seconds.  This was a witnessed event.  There was no seizure activity, bladder or bowel incontinence.  There is no palpitation or chest pain prior to or after the onset of symptoms.   He has been very concerned about side effects of medications for tardive dyskinesia, to which he attributes many of his symptoms.  Following admission, no tachyarrhythmias have been noted and cardiac enzymes have been negative for myocardial injury.  He has had no further episodes.  Seen in my office in September 2019 he seemed to be progressing reasonably well and his blood pressure was in the normal range.  He is being followed by nephrology on a regular basis.  His lipid profiles have been in the normal range on Praluent.       SUBJECTIVE    Past Medical History:   Diagnosis Date   • Anxiety    • Arthritis     osteoarthritis   • CKD (chronic kidney disease), stage III (CMS/[facility])    • COPD (chronic obstructive pulmonary disease) (CMS/HCC)    • Coronary artery disease    • Depression    • Diabetes mellitus (CMS/HCC)    • DVT (deep venous thrombosis) (CMS/[facility])    • Heart disease    • History of embolic filter insertion    • Hyperlipidemia    • Hypertension    • Injury of back     back surgery x3    • LUCIANA on CPAP    • Pulmonary embolism (CMS/HCC)          Past Surgical History:    Procedure Laterality Date   • BACK SURGERY     • CARDIAC SURGERY  2001   • CIRCUMCISION     • COLONOSCOPY N/A 9/11/2018    Procedure: COLONOSCOPY;  Surgeon: Jose C Batres DO;  Location: [facility] ENDOSCOPY;  Service: Gastroenterology   • ENDOSCOPY N/A 9/11/2018    Procedure: ESOPHAGOGASTRODUODENOSCOPY;  Surgeon: Jose C Batres DO;  Location: [facility] ENDOSCOPY;  Service: Gastroenterology   • GALLBLADDER SURGERY  2000   • JOINT REPLACEMENT Right 05/02/2016    hip   • SPINE SURGERY           Family History   Problem Relation Age of Onset   • Heart disease Father    • Hypertension Father    • Stroke Father    • Diabetes Sister    • Heart disease Brother    • Hypertension Brother    • COPD Brother          Social History     Socioeconomic History   • Marital status:      Spouse name: Not on file   • Number of children: Not on file   • Years of education: Not on file   • Highest education level: Not on file   Tobacco Use   • Smoking status: Never Smoker   • Smokeless tobacco: Never Used   Substance and Sexual Activity   • Alcohol use: No   • Drug use: No   • Sexual activity: Defer         Allergies   Allergen Reactions   • Austedo [Deutetrabenazine] Other (See Comments)     Insomnia     • Celexa [Citalopram Hydrobromide] Dystonia   • Crestor [Rosuvastatin Calcium]    • Ingrezza [Valbenazine Tosylate] Other (See Comments)     fatigue   • Lipitor [Atorvastatin] Other (See Comments)     Aches and pains all over   • Lyrica [Pregabalin] Swelling         Current Facility-Administered Medications   Medication Dose Route Frequency Provider Last Rate Last Dose   • aspirin chewable tablet 81 mg  81 mg Oral Once per day on Mon Wed Fri Jerome Young MD   81 mg at 11/15/19 0823   • baclofen (LIORESAL) tablet 10 mg  10 mg Oral TID Jerome Young MD   10 mg at 11/15/19 0823   • clonazePAM (KlonoPIN) tablet 1 mg  1 mg Oral TID PRN Jerome Young MD   1 mg at 11/15/19 0824   • dextrose (D50W) 25 g/ 50mL  Intravenous Solution 25 g  25 g Intravenous Q15 Min PRN Jerome Young MD       • dextrose (GLUTOSE) oral gel 15 g  15 g Oral Q15 Min PRN Jerome Young MD       • glucagon (human recombinant) (GLUCAGEN DIAGNOSTIC) injection 1 mg  1 mg Subcutaneous Q15 Min PRN Jerome Young MD       • HYDROcodone-acetaminophen (NORCO)  MG per tablet 1 tablet  1 tablet Oral Q4H PRN Raffy Cole, DIANA       • insulin aspart (novoLOG) injection 0-9 Units  0-9 Units Subcutaneous 4x Daily AC & at Bedtime Jerome Young MD   2 Units at 11/15/19 1053   • insulin aspart (novoLOG) injection 10 Units  10 Units Subcutaneous TID AC Jerome Young MD   10 Units at 11/15/19 1053   • isosorbide mononitrate (IMDUR) 24 hr tablet 60 mg  60 mg Oral Q24H Jerome Young MD   60 mg at 11/15/19 0823   • magic mouthwash oral supsension 5 mL  5 mL Swish & Swallow Q4H PRN Raffy Cole, DIANA       • metoprolol tartrate (LOPRESSOR) tablet 25 mg  25 mg Oral Q12H Jerome Young MD   25 mg at 11/15/19 0823   • Pharmacy to dose warfarin   Does not apply Continuous PRN Jerome Young MD       • ranolazine (RANEXA) 12 hr tablet 500 mg  500 mg Oral BID Jerome Young MD   500 mg at 11/15/19 0823   • sodium chloride 0.9 % flush 10 mL  10 mL Intravenous PRN Anam Fraire MD       • sodium chloride 0.9 % flush 10 mL  10 mL Intravenous Q12H Jerome Young MD       • sodium chloride 0.9 % flush 10 mL  10 mL Intravenous PRN Jerome Young MD       • tamsulosin (FLOMAX) 24 hr capsule 0.4 mg  0.4 mg Oral Nightly Jerome Young MD       • [START ON 11/16/2019] warfarin (COUMADIN) tablet 5 mg  5 mg Oral Once per day on Sun Tue Thu Sat Jerome Young MD       • warfarin (COUMADIN) tablet 7.5 mg  7.5 mg Oral Once per day on Mon Wed Fri Jerome Young MD             OBJECTIVE    /83 (BP Location: Left arm, Patient Position: Lying)   Pulse 75   Temp 98.8 °F (37.1 °C) (Temporal)   Resp  "18   Ht 180.3 cm (71\")   Wt 129 kg (284 lb 12.8 oz)   SpO2 91%   BMI 39.72 kg/m²       Review of Systems :    Constitutional:  Denies recent weight loss, weight gain, fever or chills   HENT:  Denies any hearing loss, epistaxis, hoarseness, or difficulty speaking.     Eyes: Wears eyeglasses or contact lenses .    Respiratory:  Dyspnea with exertion,no cough, wheezing, or hemoptysis.     Cardiovascular: Negative for palpitations, chest pain    Gastrointestinal: Nausea/vomiting. Abdominal pain    Endocrine: Negative for cold intolerance, heat intolerance, polydipsia, polyphagia and polyuria.     Genitourinary: CKD      Musculoskeletal: DJD    Skin:  Denies any change in hair or nails, rashes, or skin lesions.     Neurological: Tardive dyskinesia       Physical Exam:     Constitutional: Cooperative, alert and oriented,in no acute distress.     HENT:   Head: Normocephalic, normal hair patterns, no masses or tenderness.  Ears, Nose, and Throat: No gross abnormalities. No pallor or cyanosis. Dentition good.   Eyes: EOMS intact, PERRL, conjunctivae and lids unremarkable. Fundoscopic exam and visual fields not performed.   Neck: No palpable masses or adenopathy, no thyromegaly, no JVD, carotid pulses are full and equal bilaterally and without bruit.     Cardiovascular: Regular rhythm, S1 and S2 normal, no S3 or S4. No murmurs, gallops, or rubs detected.     Pulmonary/Chest: Chest: normal symmetry,  normal respiratory excursion, no intercostal retraction, no use of accessory muscles. Pulmonary: Normal breath sounds - no rales or rhonchi.    Abdominal: Abdomen soft, bowel sounds normoactive, no masses, no hepatosplenomegaly, non-tender, no bruits.     Musculoskeletal: No deformities, clubbing, cyanosis, erythema, or edema observed.   Neurological: No gross motor or sensory deficits noted, Cranial nerves 2-12 normal. affect appropriate, oriented to time, person, place. Tardive dyskinesia present    Skin: Warm and dry to the " touch, no apparent skin lesions or masses noted.     RESULTS  Lab Results (last 24 hours)     Procedure Component Value Units Date/Time    Respiratory Panel, PCR - Swab, Nasopharynx [[id number]]  (Abnormal) Collected:  11/15/19 1213    Specimen:  Swab from Nasopharynx Updated:  11/15/19 1454     ADENOVIRUS, PCR Not Detected     Coronavirus 229E Not Detected     Coronavirus HKU1 Not Detected     Coronavirus NL63 Not Detected     Coronavirus OC43 Not Detected     Human Metapneumovirus Not Detected     Human Rhinovirus/Enterovirus Not Detected     Influenza B PCR Not Detected     Parainfluenza Virus 1 Detected     Comment: contact precautions requested          Parainfluenza Virus 2 Not Detected     Parainfluenza Virus 3 Not Detected     Parainfluenza Virus 4 Not Detected     Bordetella pertussis pcr Not Detected     Influenza A H1 2009 PCR Not Detected     Chlamydophila pneumoniae PCR Not Detected     Mycoplasma pneumo by PCR Not Detected     Influenza A PCR Not Detected     Influenza A H3 Not Detected     Influenza A H1 Not Detected     RSV, PCR Not Detected    POC Glucose Once [[id number]]  (Abnormal) Collected:  11/15/19 1051    Specimen:  Blood Updated:  11/15/19 1118     Glucose 189 mg/dL      Comment: RN NotifiedOperator: [id number] DEREK TALLEY BMeter ID: [id number]       Scan Slide [[id number]] Collected:  11/15/19 0543    Specimen:  Blood Updated:  11/15/19 1012     RBC Morphology Normal     WBC Morphology Normal     Platelet Estimate Decreased    POC Glucose Once [[id number]]  (Abnormal) Collected:  11/15/19 0604    Specimen:  Blood Updated:  11/15/19 0708     Glucose 131 mg/dL      Comment: RN NotifiedOperator: [id number] ROSELIA Wren ID: [id number]       Basic Metabolic Panel [[id number]]  (Abnormal) Collected:  11/15/19 0543    Specimen:  Blood Updated:  11/15/19 0657     Glucose 140 mg/dL      BUN 18 mg/dL      Creatinine 1.44 mg/dL      Sodium 135 mmol/L      Potassium 4.1 mmol/L      Chloride  98 mmol/L      CO2 26.0 mmol/L      Calcium 8.2 mg/dL      eGFR Non African Amer 49 mL/min/1.73      BUN/Creatinine Ratio 12.5     Anion Gap 11.0 mmol/L     Narrative:       GFR Normal >60  Chronic Kidney Disease <60  Kidney Failure <15    Protime-INR [[id number]]  (Abnormal) Collected:  11/15/19 0543    Specimen:  Blood Updated:  11/15/19 0649     Protime 21.4 Seconds      INR 1.88    Narrative:       Therapeutic range for most indications is 2.0-3.0 INR,  or 2.5-3.5 for mechanical heart valves.    CBC & Differential [[id number]] Collected:  11/15/19 0543    Specimen:  Blood Updated:  11/15/19 0637    Narrative:       The following orders were created for panel order CBC & Differential.  Procedure                               Abnormality         Status                     ---------                               -----------         ------                     CBC Auto Differential[[id number]]        Abnormal            Final result                 Please view results for these tests on the individual orders.    CBC Auto Differential [[id number]]  (Abnormal) Collected:  11/15/19 0543    Specimen:  Blood Updated:  11/15/19 0637     WBC 5.24 10*3/mm3      RBC 5.36 10*6/mm3      Hemoglobin 16.3 g/dL      Hematocrit 47.6 %      MCV 88.8 fL      MCH 30.4 pg      MCHC 34.2 g/dL      RDW 13.4 %      RDW-SD 43.7 fl      MPV 10.9 fL      Platelets 105 10*3/mm3      Neutrophil % 45.9 %      Lymphocyte % 35.7 %      Monocyte % 17.4 %      Eosinophil % 0.6 %      Basophil % 0.2 %      Immature Grans % 0.2 %      Neutrophils, Absolute 2.41 10*3/mm3      Lymphocytes, Absolute 1.87 10*3/mm3      Monocytes, Absolute 0.91 10*3/mm3      Eosinophils, Absolute 0.03 10*3/mm3      Basophils, Absolute 0.01 10*3/mm3      Immature Grans, Absolute 0.01 10*3/mm3      nRBC 0.0 /100 WBC     Protime-INR [[id number]]  (Abnormal) Collected:  11/14/19 2330    Specimen:  Blood Updated:  11/15/19 0011     Protime 21.8 Seconds      INR 1.93    Narrative:        Therapeutic range for most indications is 2.0-3.0 INR,  or 2.5-3.5 for mechanical heart valves.    POC Glucose Once [[id number]]  (Normal) Collected:  11/14/19 1942    Specimen:  Blood Updated:  11/14/19 2104     Glucose 119 mg/dL      Comment: Result Not ConfirmedOperator: [id number] ROSELIA VELASQUEZYECENIASEMAJMeter ID: [id number]       Scan Slide [[id number]] Collected:  11/14/19 1502    Specimen:  Blood Updated:  11/14/19 1852     RBC Morphology Normal     WBC Morphology Normal     Platelet Estimate Decreased    [place] Draw [[id number]] Collected:  11/14/19 1502    Specimen:  Blood Updated:  11/14/19 1646    Narrative:       The following orders were created for panel order [place] Draw.  Procedure                               Abnormality         Status                     ---------                               -----------         ------                     Light Blue Top[[id number]]                                   Final result               Green Top (Gel)[[id number]]                                  Final result               Lavender Top[[id number]]                                     Final result               Gold Top - SST[[id number]]                                                                Please view results for these tests on the individual orders.    Green Top (Gel) [[id number]] Collected:  11/14/19 1533    Specimen:  Blood Updated:  11/14/19 1646     Extra Tube Hold for add-ons.     Comment: Auto resulted.       POC Glucose Once [[id number]]  (Abnormal) Collected:  11/14/19 1544    Specimen:  Blood Updated:  11/14/19 1609     Glucose 149 mg/dL      Comment: RN NotifiedOperator: [id number] EN GUERINMeter ID: [id number]       Comprehensive Metabolic Panel [[id number]]  (Abnormal) Collected:  11/14/19 1533    Specimen:  Blood Updated:  11/14/19 1605     Glucose 162 mg/dL      BUN 21 mg/dL      Creatinine 1.50 mg/dL      Sodium 135 mmol/L      Potassium 4.3 mmol/L      Chloride 96 mmol/L      CO2 28.0 mmol/L       Calcium 8.7 mg/dL      Total Protein 6.9 g/dL      Albumin 3.40 g/dL      ALT (SGPT) 28 U/L      AST (SGOT) 23 U/L      Alkaline Phosphatase 57 U/L      Total Bilirubin 0.3 mg/dL      eGFR Non African Amer 47 mL/min/1.73      Globulin 3.5 gm/dL      A/G Ratio 1.0 g/dL      BUN/Creatinine Ratio 14.0     Anion Gap 11.0 mmol/L     Narrative:       GFR Normal >60  Chronic Kidney Disease <60  Kidney Failure <15    Light Blue Top [[id number]] Collected:  11/14/19 1502    Specimen:  Blood Updated:  11/14/19 1605     Extra Tube hold for add-on     Comment: Auto resulted       Lavender Top [[id number]] Collected:  11/14/19 1502    Specimen:  Blood Updated:  11/14/19 1605     Extra Tube hold for add-on     Comment: Auto resulted       Troponin [[id number]]  (Normal) Collected:  11/14/19 1533    Specimen:  Blood Updated:  11/14/19 1604     Troponin T <0.010 ng/mL     Narrative:       Troponin T Reference Range:  <= 0.03 ng/mL-   Negative for AMI  >0.03 ng/mL-     Abnormal for myocardial necrosis.  Clinicians would have to utilize clinical acumen, EKG, Troponin and serial changes to determine if it is an Acute Myocardial Infarction or myocardial injury due to an underlying chronic condition.     BNP [[id number]]  (Normal) Collected:  11/14/19 1533    Specimen:  Blood Updated:  11/14/19 1602     proBNP 66.4 pg/mL     Narrative:       Among patients with dyspnea, NT-proBNP is highly sensitive for the detection of acute congestive heart failure. In addition NT-proBNP of <300 pg/ml effectively rules out acute congestive heart failure with 99% negative predictive value.    Lactic Acid, Plasma [[id number]]  (Normal) Collected:  11/14/19 1533    Specimen:  Blood Updated:  11/14/19 1601     Lactate 1.5 mmol/L     Protime-INR [[id number]]  (Abnormal) Collected:  11/14/19 1533    Specimen:  Blood Updated:  11/14/19 1601     Protime 20.4 Seconds      INR 1.77    Narrative:       Therapeutic range for most indications is 2.0-3.0 INR,  or  2.5-3.5 for mechanical heart valves.    aPTT [[id number]]  (Normal) Collected:  11/14/19 1533    Specimen:  Blood Updated:  11/14/19 1601     PTT 31.6 seconds     Narrative:       The recommended Heparin therapeutic range is 68-97 seconds.    Urinalysis With Culture If Indicated - Urine, Clean Catch [[id number]]  (Abnormal) Collected:  11/14/19 1534    Specimen:  Urine, Clean Catch Updated:  11/14/19 1544     Color, UA Yellow     Appearance, UA Clear     pH, UA 5.5     Specific Gravity, UA 1.018     Glucose,  mg/dL (1+)     Ketones, UA Negative     Bilirubin, UA Negative     Blood, UA Trace     Protein, UA Negative     Leuk Esterase, UA Negative     Nitrite, UA Negative     Urobilinogen, UA 1.0 E.U./dL    Urinalysis, Microscopic Only - Urine, Clean Catch [[id number]]  (Abnormal) Collected:  11/14/19 1534    Specimen:  Urine, Clean Catch Updated:  11/14/19 1544     RBC, UA 3-5 /HPF      WBC, UA 0-2 /HPF      Bacteria, UA None Seen /HPF      Squamous Epithelial Cells, UA None Seen /HPF      Hyaline Casts, UA 0-2 /LPF      Methodology Automated Microscopy        Imaging Results (Last 24 Hours)     Procedure Component Value Units Date/Time    US Carotid Bilateral [[id number]] Collected:  11/15/19 0931     Updated:  11/15/19 1026    Narrative:       PROCEDURE: Bilateral carotid artery Doppler    COMPARISON: No comparison    HISTORY: syncope, R55 Syncope and collapse    FINDINGS: Realtime grayscale and color flow images as well as  spectral waveform analysis is performed of the carotid arteries.    Right:  There is no visualized atherosclerotic disease in the right bulb  and proximal internal carotid artery.   The peak systolic velocity in the right common carotid artery is  74.2 cm/sec.   The peak systolic velocity in the right internal carotid artery  is 80.7 cm/sec.   The IC/CC ratio is 1.1.   Antegrade flow is present in the right vertebral artery.    Left:   There is minimal atherosclerotic disease in the left bulb  and  proximal internal carotid artery.  The peak systolic velocity in the left common carotid artery is  94.4 cm/sec.   The peak systolic velocity in the left internal carotid artery is  96.8 cm/sec.   The IC/CC ratio is 1   Antegrade flow is present in the left vertebral artery.      Impression:       CONCLUSION:    No significant flow-limiting stenosis by velocity measurements.    Electronically signed by:  Tomy Fink MD  11/15/2019 10:25 AM  CST Workstation: [id number]    CT Head Without Contrast [[id number]] Collected:  11/14/19 1638     Updated:  11/14/19 1709    Narrative:         [facility] [place]    Patient Name: DARRYN MARTÍNEZ    ATTENDING: FAMILIA BRUNO     REFERRING: DENISSE SHEPARD    ORDERING: DENISSE SHEPARD    -----------------------    PROCEDURE: Cranial CT w/o contrast    DATE OF EXAM: 11/14/2019    HISTORY: Nausea with syncope for three months    Axial images were obtained throughout the brain without the use  of intravenous contrast. Reformatted sagittal and coronal images  were generated. This exam was performed according to our  departmental dose-optimization program, which includes automated  exposure control, adjustment of the mA and/or kV according to the  patient's size and/or use of iterative reconstruction techniques  with goal of optimizing doses that are As Low As Reasonably  Achievable (ALARA).    Study is compared to previous CT exam of 3/6/2018.    FINDINGS:    There is no evidence of intracranial hemorrhage or masses. The  ventricular system and basal cisterns appear normal. There is no  shift of the midline structures. No ischemic changes are seen and  there are no extra-axial fluid collections present.     The bony calvarium appears intact with no fractures or acute bony  abnormality seen. The visualized portions of the paranasal  sinuses are clear.       Impression:       Unremarkable Cranial CT without contrast.      Electronically signed by:  Anand Myers MD  11/14/2019  5:08 PM  CST Workstation: [id number]    XR Chest 1 View [[id number]] Collected:  11/14/19 1519     Updated:  11/14/19 1545    Narrative:       Chest x-ray single view.        CLINICAL INDICATION: Syncope and collapse.    COMPARISON: Chest x-ray March 6, 2018. Chest x-ray February 24, 2018.    FINDINGS: Cardiac silhouette is normal in size. Pulmonary  vascularity is unremarkable.     Midline sternal sutures from prior cardiac surgery. There is some  widening of the mediastinum just lateral to the aortic arch. This  however may be secondary to chronic left upper lobe atelectasis  is unchanged in comparison with multiple prior exams. Lungs are  otherwise clear.      Impression:       CONCLUSION: Midline sternal sutures from prior cardiac surgery.  No evidence of active disease.    Electronically signed by:  Bill Soriano MD  11/14/2019 3:44 PM CST  Workstation: [id number]            ASSESSMENT AND PLAN    Mr. Mack has multiple medical issues as discussed under history of present illness and the exact etiology for the syncopal episode is unclear.  No definite cardiac arrhythmias have been identified thus far and neurological work-up including CT scan of the head and carotid Doppler studies have been unremarkable.  I will review the echocardiogram that has been ordered.  I suspect that his symptoms could be related to postural changes in blood pressure/vasovagal event.  I have encouraged him to maintain a high fluid intake up to 60 to 70 ounces per day.  Agree with continuing his present medicines including antiplatelet therapy with aspirin, anticoagulation with warfarin, antianginal therapy with isosorbide mononitrate and Ranexa and metoprolol tartrate. He would benefit from using compression stockings on a regular basis.  No further cardiac work-up is indicated at this time.  Thank you for asked me to see this patient.    Keke Kumar MD  11/15/2019  3:41 PM

## 2019-12-05 ENCOUNTER — OFFICE VISIT (OUTPATIENT)
Dept: PODIATRY | Facility: CLINIC | Age: 64
End: 2019-12-05

## 2019-12-05 DIAGNOSIS — B35.1 ONYCHOMYCOSIS: ICD-10-CM

## 2019-12-05 DIAGNOSIS — M79.674 PAIN IN TOES OF BOTH FEET: ICD-10-CM

## 2019-12-05 DIAGNOSIS — M79.675 PAIN IN TOES OF BOTH FEET: ICD-10-CM

## 2019-12-05 DIAGNOSIS — E11.42 DIABETIC POLYNEUROPATHY ASSOCIATED WITH TYPE 2 DIABETES MELLITUS (HCC): Primary | ICD-10-CM

## 2019-12-05 PROCEDURE — 11721 DEBRIDE NAIL 6 OR MORE: CPT | Performed by: PODIATRIST

## 2019-12-05 NOTE — PROGRESS NOTES
Clint Mack  1955  64 y.o. male   BS-126 per patient  A1C: 6.6 per patient  PCP-Dr. Matute 12/04/19    Patient presents today for diabetic nail care.     12/05/2019        Chief Complaint   Patient presents with   • Right Foot - diabetic nail care   • Left Foot - dabetic nail care           History of Present Illness    Clint Mack is a 64 y.o. male presents for f/u diabetic foot exam and nail care.      Past Medical History:   Diagnosis Date   • Anxiety    • Arthritis     osteoarthritis   • CKD (chronic kidney disease), stage III (CMS/[facility])    • COPD (chronic obstructive pulmonary disease) (CMS/[facility])    • Coronary artery disease    • Depression    • Diabetes mellitus (CMS/[facility])    • DVT (deep venous thrombosis) (CMS/[facility])    • Heart disease    • History of embolic filter insertion    • Hyperlipidemia    • Hypertension    • Injury of back     back surgery x3    • LUCIANA on CPAP    • Pulmonary embolism (CMS/[facility])          Past Surgical History:   Procedure Laterality Date   • BACK SURGERY     • CARDIAC SURGERY  2001   • CIRCUMCISION     • COLONOSCOPY N/A 9/11/2018    Procedure: COLONOSCOPY;  Surgeon: Jose C Batres DO;  Location: [facility] ENDOSCOPY;  Service: Gastroenterology   • ENDOSCOPY N/A 9/11/2018    Procedure: ESOPHAGOGASTRODUODENOSCOPY;  Surgeon: Jose C Batres DO;  Location: [facility] ENDOSCOPY;  Service: Gastroenterology   • GALLBLADDER SURGERY  2000   • JOINT REPLACEMENT Right 05/02/2016    hip   • SPINE SURGERY           Family History   Problem Relation Age of Onset   • Heart disease Father    • Hypertension Father    • Stroke Father    • Diabetes Sister    • Heart disease Brother    • Hypertension Brother    • COPD Brother          Social History     Socioeconomic History   • Marital status:      Spouse name: Not on file   • Number of children: Not on file   • Years of education: Not on file   • Highest education level: Not on file   Tobacco Use   • Smoking status: Never  "Smoker   • Smokeless tobacco: Never Used   Substance and Sexual Activity   • Alcohol use: No   • Drug use: No   • Sexual activity: Defer         Current Outpatient Medications   Medication Sig Dispense Refill   • alfuzosin (UROXATRAL) 10 MG 24 hr tablet Take 10 mg by mouth Daily.     • Alirocumab (PRALUENT) 75 MG/ML solution pen-injector Inject 75 mg under the skin into the appropriate area as directed Every 14 (Fourteen) Days. 6 pen 7   • aspirin 81 MG chewable tablet Chew 81 mg Take As Directed. Take 1 pill by mouth Monday, Wednesday, Friday     • baclofen (LIORESAL) 10 MG tablet Take 10 mg by mouth 3 (Three) Times a Day.     • Blood Glucose Monitoring Suppl (ACCU-CHEK ARGELIA PLUS) w/Device kit      • clonazePAM (KlonoPIN) 1 MG tablet Take 1 tablet by mouth 3 (Three) Times a Day As Needed for Seizures. Or dystonia 90 tablet 5   • COMFORT ASSIST INSULIN SYRINGE 31G X 5/16\" 0.3 ML misc      • furosemide (LASIX) 20 MG tablet Take 20 mg by mouth As Needed.     • glimepiride (AMARYL) 4 MG tablet Take 4 mg by mouth 2 (Two) Times a Day.     • glucose blood (ONE TOUCH ULTRA TEST) test strip      • HYDROcodone-acetaminophen (NORCO)  MG per tablet Take 1 tablet by mouth Every 6 (Six) Hours As Needed for Moderate Pain .     • insulin aspart (novoLOG) 100 UNIT/ML injection Inject 10 Units under the skin 3 (Three) Times a Day Before Meals. 10 mL 12   • isosorbide mononitrate (IMDUR) 60 MG 24 hr tablet Take 1 tablet by mouth Daily. 30 tablet 1   • Lancets (ONETOUCH ULTRASOFT) lancets      • metoprolol tartrate (LOPRESSOR) 25 MG tablet Take 25 mg by mouth 2 (Two) Times a Day.     • Omega-3 Fatty Acids (FISH OIL PO) Take 4 capsules by mouth 2 (Two) Times a Day.     • ranolazine (RANEXA) 500 MG 12 hr tablet Take 1 tablet by mouth 2 (Two) Times a Day. 90 tablet 3   • vitamin D (ERGOCALCIFEROL) 61040 UNITS capsule capsule Take 50,000 Units by mouth Every 7 (Seven) Days. Takes every 2 weeks     • warfarin (COUMADIN) 5 MG tablet " Take 5 mg by mouth Every Night. Patient takes 5 mg every day except MWF when he takes 7.5 mg       No current facility-administered medications for this visit.          OBJECTIVE    There were no vitals taken for this visit.      Review of Systems   Constitutional:  Denies recent weight loss, weight gain, fever or chills, no change in exercise tolerance  Musculoskeletal: Toe pain.   Skin:  Thickened nails. Shin discoloration.  Neurological:  Burning sensations to feet b/l.  Psychiatric/Behavioral: Denies depression      Physical Exam    Clint had a diabetic foot exam performed today.      Constitutional: he appears well-developed and well-nourished.   HEENT: Normocephalic. Atraumatic  CV: No tenderness. RRR  Resp: Non-labored respiration. No wheezes.   Psychiatric: he has a normal mood and affect. his   behavior is normal.      Lower Extremity Exam:  Vascular: DP/PT pulses palpable 1+.   Negative hair growth.   1+ perimalleolar edema  Toes cool  Neuro: Protective sensation diminished to lesser toes, b/l.  DTRs intact  Integument: No open wounds  No lesions  Atrophic skin noted b/l with chronic venous stasis dermatitis changes  Mild xerosis  No masses  Musculoskeletal: LE muscle strength 4/5 on left, 3/5 on right  Gait assisted with cane  Semi-rigid hammertoe deformity toes 2-5 b/l.  Nails 1-5 b/l thickened, elongated with subungual debris. +pain on palpation              ASSESSMENT AND PLAN    Clint was seen today for diabetic nail care and dabetic nail care.    Diagnoses and all orders for this visit:    Diabetic polyneuropathy associated with type 2 diabetes mellitus (CMS/[facility])    Onychomycosis    Pain in toes of both feet      -Comprehensive DM foot exam performed. Pt educated on importance of tight glucose control and daily foot checks.  -Pt educated on padding techniques for rigid hammertoes.  Proper extra depth diabetic shoe gear.  Limit barefoot walking.  -Nails 1-5 b/l debrided in length and thickness with  nail nipper to decrease pain, fungal load and risk of infection  -Follow up 3 months PRN          This document has been electronically signed by Jose C Novak DPM on December 6, 2019 7:41 AM     EMR Dragon/Transcription disclaimer:   Much of this encounter note is an electronic transcription/translation of spoken language to printed text. The electronic translation of spoken language may permit erroneous, or at times, nonsensical words or phrases to be inadvertently transcribed; Although I have reviewed the note for such errors, some may still exist.    Jose C Novak DPM  12/6/2019  7:41 AM

## 2019-12-17 ENCOUNTER — OFFICE VISIT (OUTPATIENT)
Dept: SLEEP MEDICINE | Facility: HOSPITAL | Age: 64
End: 2019-12-17

## 2019-12-17 VITALS
HEIGHT: 71 IN | SYSTOLIC BLOOD PRESSURE: 133 MMHG | OXYGEN SATURATION: 98 % | DIASTOLIC BLOOD PRESSURE: 78 MMHG | HEART RATE: 87 BPM | WEIGHT: 289.4 LBS | BODY MASS INDEX: 40.52 KG/M2

## 2019-12-17 DIAGNOSIS — F41.9 ANXIETY: Primary | ICD-10-CM

## 2019-12-17 PROCEDURE — 99214 OFFICE O/P EST MOD 30 MIN: CPT | Performed by: INTERNAL MEDICINE

## 2019-12-17 RX ORDER — ALFUZOSIN HYDROCHLORIDE 10 MG/1
10 TABLET, EXTENDED RELEASE ORAL DAILY
COMMUNITY
Start: 2019-11-21 | End: 2020-06-04

## 2019-12-17 RX ORDER — CLONAZEPAM 1 MG/1
1 TABLET ORAL 3 TIMES DAILY
Qty: 90 TABLET | Refills: 5 | Status: SHIPPED | OUTPATIENT
Start: 2019-12-17 | End: 2020-06-11 | Stop reason: SDUPTHER

## 2019-12-17 NOTE — PROGRESS NOTES
Sleep Clinic Follow Up    Date: 2019  Primary Care Physician: Efrain Matute MD    Last office visit: 2019 (I reviewed this note)    CC: Follow up: LUCIANA, RLS    Sleep Testin. PSG on , AHI of 29   2. Currently on 12.5 cm H2O    Assessment and Plan:    1. Obstructive sleep apnea stable chronic illness and Established, stable (1)  1. Compliant and improved with PAP therapy  2. Continue PAP as prescribed.   3. Script for PAP supplies  2. Nocturnal hypoxia - on 2L O2 with CPAP since   Established, stable (1)  1. compliant  3. Restless leg syndrome/ /Whitman-Ekbom disease (RLS/WED)  Established, controlled  1. Continue klonopin 1 mg po TID - also has severe anxiety disorder - currently stable  4. Tardive dyskinesia  Established, controlled   1. Controlled with Benzodiazepines  5. Chronic lumbago with several prior surgeries - chronic illness, worsening  1. Takes po narcotics and stable  2. larry appt with ortho, consiering repeat surgery because of his severe leg pain    Interim History:  Since the last visit:    1) severe LUCIANA -  Clint Mack has remained compliant with CPAP. He denies mask and machine issues, dry mouth, headaches, or pressures intolerance. He denies abnormal dreams, sleep paralysis, nasal congestion, URI sx.      Compliance > 80%  Mask type: Full face mask  DME: [facility]    Bed time routine unchanged from 2019    PMHx, FH, SH reviewed and pertinent changes are: worsening pain      REVIEW OF SYSTEMS:   Negative for chest pain, fever, cough, SOA, abdominal pain. Smoking none    Exam:  Vitals:    19 1355   BP: 133/78   Pulse: 87   SpO2: 98%           19  1355   Weight: 131 kg (289 lb 6.4 oz)     Body mass index is 40.38 kg/m². Patient's Body mass index is 40.38 kg/m². BMI is above normal parameters. Recommendations include: referral to primary care.      Gen:  No acute distress, alert, oriented, tongue protrusion less severe  Lungs:  CTA with normal  "effort   CV:  RRR, no M/R/G  GI:  soft, non-tender  Psych:  Appropriate affect  Back using a walker    Past Medical History:   Diagnosis Date   • Anxiety    • Arthritis     osteoarthritis   • CKD (chronic kidney disease), stage III (CMS/[facility])    • COPD (chronic obstructive pulmonary disease) (CMS/[facility])    • Coronary artery disease    • Depression    • Diabetes mellitus (CMS/[facility])    • DVT (deep venous thrombosis) (CMS/[facility])    • Heart disease    • History of embolic filter insertion    • Hyperlipidemia    • Hypertension    • Injury of back     back surgery x3    • LUCIANA on CPAP    • Pulmonary embolism (CMS/[facility])        Current Outpatient Medications:   •  alfuzosin (UROXATRAL) 10 MG 24 hr tablet, Take 10 mg by mouth Daily., Disp: , Rfl:   •  Alirocumab (PRALUENT) 75 MG/ML solution pen-injector, Inject 75 mg under the skin into the appropriate area as directed Every 14 (Fourteen) Days., Disp: 6 pen, Rfl: 7  •  aspirin 81 MG chewable tablet, Chew 81 mg Take As Directed. Take 1 pill by mouth Monday, Wednesday, Friday, Disp: , Rfl:   •  baclofen (LIORESAL) 10 MG tablet, Take 10 mg by mouth 3 (Three) Times a Day., Disp: , Rfl:   •  Blood Glucose Monitoring Suppl (ACCU-CHEK ARGELIA PLUS) w/Device kit, , Disp: , Rfl:   •  clonazePAM (KlonoPIN) 1 MG tablet, Take 1 tablet by mouth 3 (Three) Times a Day. Or dystonia, Disp: 90 tablet, Rfl: 5  •  COMFORT ASSIST INSULIN SYRINGE 31G X 5/16\" 0.3 ML misc, , Disp: , Rfl:   •  furosemide (LASIX) 20 MG tablet, Take 20 mg by mouth As Needed., Disp: , Rfl:   •  glimepiride (AMARYL) 4 MG tablet, Take 4 mg by mouth 2 (Two) Times a Day., Disp: , Rfl:   •  glucose blood (ONE TOUCH ULTRA TEST) test strip, , Disp: , Rfl:   •  HYDROcodone-acetaminophen (NORCO)  MG per tablet, Take 1 tablet by mouth Every 6 (Six) Hours As Needed for Moderate Pain ., Disp: , Rfl:   •  insulin aspart (novoLOG) 100 UNIT/ML injection, Inject 10 Units under the skin 3 (Three) Times a Day Before Meals., Disp: 10 mL, " Rfl: 12  •  isosorbide mononitrate (IMDUR) 60 MG 24 hr tablet, Take 1 tablet by mouth Daily., Disp: 30 tablet, Rfl: 1  •  Lancets (ONETOUCH ULTRASOFT) lancets, , Disp: , Rfl:   •  metoprolol tartrate (LOPRESSOR) 25 MG tablet, Take 25 mg by mouth 2 (Two) Times a Day., Disp: , Rfl:   •  ranolazine (RANEXA) 500 MG 12 hr tablet, Take 1 tablet by mouth 2 (Two) Times a Day., Disp: 90 tablet, Rfl: 3  •  vitamin D (ERGOCALCIFEROL) 97882 UNITS capsule capsule, Take 50,000 Units by mouth Every 7 (Seven) Days. Takes every 2 weeks, Disp: , Rfl:   •  warfarin (COUMADIN) 5 MG tablet, Take 5 mg by mouth Every Night. Patient takes 5 mg every day except MWF when he takes 7.5 mg, Disp: , Rfl:     Total visit time 20 min, with total of 15 minutes spent face-to-face counseling patient extensively regarding:   PAP therapy and Weight loss and medication compliance. Although he is high risk from weight, LUCIANA, nocturnal hypoxia, narcotic, and BZD use - patient is compliant with therapy and well controlled      RTC in 6 months     This document has been electronically signed by Jerome Che MD on December 18, 2019         CC: Efrain Matute MD          No ref. provider found

## 2019-12-30 ENCOUNTER — OFFICE VISIT (OUTPATIENT)
Dept: CARDIOLOGY | Facility: CLINIC | Age: 64
End: 2019-12-30

## 2019-12-30 ENCOUNTER — DOCUMENTATION (OUTPATIENT)
Dept: CARDIOLOGY | Facility: CLINIC | Age: 64
End: 2019-12-30

## 2019-12-30 VITALS
BODY MASS INDEX: 41.69 KG/M2 | HEART RATE: 84 BPM | DIASTOLIC BLOOD PRESSURE: 74 MMHG | WEIGHT: 297.8 LBS | OXYGEN SATURATION: 98 % | SYSTOLIC BLOOD PRESSURE: 136 MMHG | HEIGHT: 71 IN

## 2019-12-30 DIAGNOSIS — I10 ESSENTIAL HYPERTENSION: Chronic | ICD-10-CM

## 2019-12-30 DIAGNOSIS — R55 SYNCOPE AND COLLAPSE: Primary | ICD-10-CM

## 2019-12-30 DIAGNOSIS — Z95.1 S/P CABG (CORONARY ARTERY BYPASS GRAFT): ICD-10-CM

## 2019-12-30 DIAGNOSIS — E78.2 MIXED HYPERLIPIDEMIA: Chronic | ICD-10-CM

## 2019-12-30 PROCEDURE — 99214 OFFICE O/P EST MOD 30 MIN: CPT | Performed by: INTERNAL MEDICINE

## 2019-12-30 RX ORDER — VITAMIN E 268 MG
400 CAPSULE ORAL DAILY
COMMUNITY
End: 2020-06-04

## 2019-12-30 NOTE — PROGRESS NOTES
Clint Mack  64 y.o. male      1. Syncope and collapse    2. Essential hypertension    3. Mixed hyperlipidemia    4. S/P CABG (coronary artery bypass graft)        History of Present Illness  Mr. Mack is here for follow-up of his multiple cardiac issues as discussed above.  He has done reasonably well from a cardiac standpoint and denied any chest pain.  He was admitted following an episode of dizziness/syncope in November 2019 and had extensive work-up including echocardiogram and carotid Doppler studies.  No flow-limiting lesions were noted in the carotid and echocardiogram showed the findings as follows:   · The study is technically difficult for diagnosis. The quality of the study is limited due to poor acoustic windows related to patient body habitus. Definity used  · The left ventricular cavity is mild-to-moderately dilated.  · Left ventricular wall thickness is consistent with mild concentric hypertrophy.  · Estimated EF = 52%.  · Left ventricular systolic function is normal.  · Left ventricular diastolic dysfunction (grade I) consistent with impaired relaxation.  · Right ventricular cavity is mildly dilated.  · Left atrial cavity size is mildly dilated.    Patient apparently had a CT scan of the abdomen which showed an abdominal aneurysm measuring 4.0 cm.  Has an appointment to follow-up with vascular surgery.  The patient has questions with regards to his [place] filter, which will also be addressed by vascular surgery.  The patient had several questions with regards to his cardiac status, neurological status, renal status and these were discussed.  His lipid profiles have responded well to Praluent and his LDL is in the optimal range.  He has not had any recurrence of dizziness or syncope.  Most likely this is related to a medication prescribed by neurology.    SUBJECTIVE    Allergies   Allergen Reactions   • Tetrabenazine Dizziness   • Austedo [Deutetrabenazine] Other (See Comments)      Insomnia     • Celexa [Citalopram Hydrobromide] Dystonia   • Crestor [Rosuvastatin Calcium]    • Ingrezza [Valbenazine Tosylate] Other (See Comments)     fatigue   • Lipitor [Atorvastatin] Other (See Comments)     Aches and pains all over   • Lyrica [Pregabalin] Swelling         Past Medical History:   Diagnosis Date   • Anxiety    • Arthritis     osteoarthritis   • CKD (chronic kidney disease), stage III (CMS/[facility])    • COPD (chronic obstructive pulmonary disease) (CMS/[facility])    • Coronary artery disease    • Depression    • Diabetes mellitus (CMS/[facility])    • DVT (deep venous thrombosis) (CMS/[facility])    • Heart disease    • History of embolic filter insertion    • Hyperlipidemia    • Hypertension    • Injury of back     back surgery x3    • LUCIANA on CPAP    • Pulmonary embolism (CMS/[facility])          Past Surgical History:   Procedure Laterality Date   • BACK SURGERY     • CARDIAC SURGERY  2001   • CIRCUMCISION     • COLONOSCOPY N/A 9/11/2018    Procedure: COLONOSCOPY;  Surgeon: Jose C Batres DO;  Location: [facility] ENDOSCOPY;  Service: Gastroenterology   • ENDOSCOPY N/A 9/11/2018    Procedure: ESOPHAGOGASTRODUODENOSCOPY;  Surgeon: Jose C Batres DO;  Location: [facility] ENDOSCOPY;  Service: Gastroenterology   • GALLBLADDER SURGERY  2000   • JOINT REPLACEMENT Right 05/02/2016    hip   • SPINE SURGERY           Family History   Problem Relation Age of Onset   • Heart disease Father    • Hypertension Father    • Stroke Father    • Diabetes Sister    • Heart disease Brother    • Hypertension Brother    • COPD Brother          Social History     Socioeconomic History   • Marital status:      Spouse name: Not on file   • Number of children: Not on file   • Years of education: Not on file   • Highest education level: Not on file   Tobacco Use   • Smoking status: Never Smoker   • Smokeless tobacco: Never Used   Substance and Sexual Activity   • Alcohol use: No   • Drug use: No   • Sexual activity: Defer         Current  "Outpatient Medications   Medication Sig Dispense Refill   • alfuzosin (UROXATRAL) 10 MG 24 hr tablet Take 10 mg by mouth Daily.     • Alirocumab (PRALUENT) 75 MG/ML solution pen-injector Inject 75 mg under the skin into the appropriate area as directed Every 14 (Fourteen) Days. 6 pen 7   • aspirin 81 MG chewable tablet Chew 81 mg Take As Directed. Take 1 pill by mouth Monday, Wednesday, Friday     • baclofen (LIORESAL) 10 MG tablet Take 10 mg by mouth 3 (Three) Times a Day.     • Blood Glucose Monitoring Suppl (ACCU-CHEK ARGELIA PLUS) w/Device kit      • clonazePAM (KlonoPIN) 1 MG tablet Take 1 tablet by mouth 3 (Three) Times a Day. Or dystonia 90 tablet 5   • COMFORT ASSIST INSULIN SYRINGE 31G X 5/16\" 0.3 ML misc      • furosemide (LASIX) 20 MG tablet Take 20 mg by mouth Daily. prn     • glimepiride (AMARYL) 4 MG tablet Take 4 mg by mouth 2 (Two) Times a Day.     • glucose blood (ONE TOUCH ULTRA TEST) test strip      • HYDROcodone-acetaminophen (NORCO)  MG per tablet Take 1 tablet by mouth. Five times daily     • insulin aspart (novoLOG) 100 UNIT/ML injection Inject 10 Units under the skin 3 (Three) Times a Day Before Meals. (Patient taking differently: Inject 10 Units under the skin into the appropriate area as directed. Sliding scale) 10 mL 12   • isosorbide mononitrate (IMDUR) 60 MG 24 hr tablet Take 1 tablet by mouth Daily. 30 tablet 1   • Lancets (ONETOUCH ULTRASOFT) lancets      • metoprolol tartrate (LOPRESSOR) 25 MG tablet Take 25 mg by mouth 2 (Two) Times a Day.     • Omega-3 Fatty Acids (FISH OIL) 1200 MG capsule delayed-release Take  by mouth 2 (Two) Times a Day.     • ranolazine (RANEXA) 500 MG 12 hr tablet Take 1 tablet by mouth 2 (Two) Times a Day. 90 tablet 3   • vitamin D (ERGOCALCIFEROL) 91887 UNITS capsule capsule Take 50,000 Units by mouth. Two times monthly     • vitamin E 400 UNIT capsule Take 400 Units by mouth Daily.     • warfarin (COUMADIN) 5 MG tablet Take 5 mg by mouth Every Night. " "Patient takes 5 mg every day except MWF when he takes 7.5 mg       No current facility-administered medications for this visit.          OBJECTIVE    /74 (BP Location: Left arm, Patient Position: Sitting, Cuff Size: Adult)   Pulse 84   Ht 180.3 cm (71\")   Wt 135 kg (297 lb 12.8 oz)   SpO2 98%   BMI 41.53 kg/m²         Review of Systems     Constitutional:  Denies recent weight loss, weight gain, fever or chills     HENT:  Denies any hearing loss, epistaxis, hoarseness, or difficulty speaking.     Eyes: Wears eyeglasses or contact lenses     Respiratory:  Dyspnea with exertion,no cough, wheezing, or hemoptysis.     Cardiovascular: Negative for palpations, chest pain.  Leg edema decreased    Gastrointestinal:  Denies change in bowel habits, dyspepsia, ulcer disease, hematochezia, or melena.     Endocrine: Negative for cold intolerance, heat intolerance, polydipsia, polyphagia and polyuria.    Genitourinary: Negative.      Musculoskeletal: DJD    Skin:  Denies any change in hair or nails, rashes, or skin lesions.     Allergic/Immunologic: Negative.  Negative for environmental allergies, food allergies and immunocompromised state.     Neurological: History of tardive dyskinesia    Hematological: Denies any food allergies, seasonal allergies, bleeding disorders, or lymphadenopathy.     Psychiatric/Behavioral: history of anxiety/depression, no substance abuse, or change in cognitive function.         Physical Exam     Constitutional: Cooperative, alert and oriented, in no acute distress.     HENT:   Head: Normocephalic, normal hair patterns, no masses or tenderness.  Ears, Nose, and Throat: No gross abnormalities. No pallor or cyanosis.   Eyes: EOMS intact, PERRL, conjunctivae and lids unremarkable. Fundoscopic exam and visual fields not performed.   Neck: No palpable masses or adenopathy, no thyromegaly, no JVD, carotid pulses are full and equal bilaterally and without  Bruits.     Cardiovascular: Regular " rhythm, S1 and S2 normal, no S3 or S4.  No murmurs, gallops, or rubs detected.     Pulmonary/Chest: Chest: normal symmetry, normal respiratory excursion, no intercostal retraction, no use of accessory muscles.            Pulmonary: Normal breath sounds. No rales or ronchi.    Abdominal: Abdomen soft, bowel sounds normoactive, no masses, no hepatosplenomegaly, non-tender, no bruits.     Musculoskeletal: No deformities, clubbing, cyanosis, erythema, or edema observed.     Neurological:  tardive dyskinesia    Skin: Warm and dry to the touch, no apparent skin lesions or masses noted.     Psychiatric: He has a normal mood and affect. His behavior is normal. Judgment and thought content normal.         Procedures      Lab Results   Component Value Date    WBC 5.24 11/15/2019    HGB 16.3 11/15/2019    HCT 47.6 11/15/2019    MCV 88.8 11/15/2019     (L) 11/15/2019     Lab Results   Component Value Date    GLUCOSE 140 (H) 11/15/2019    BUN 18 11/15/2019    CREATININE 1.44 (H) 11/15/2019    EGFRIFNONA 49 (L) 11/15/2019    EGFRIFAFRI 50 11/13/2018    BCR 12.5 11/15/2019    CO2 26.0 11/15/2019    CALCIUM 8.2 (L) 11/15/2019    ALBUMIN 3.40 (L) 11/14/2019    AST 23 11/14/2019    ALT 28 11/14/2019     Lab Results   Component Value Date    CHOL 113 02/21/2019    CHOL 216 (H) 10/12/2018     Lab Results   Component Value Date    TRIG 182 02/21/2019    TRIG 187 10/12/2018     Lab Results   Component Value Date    HDL 34 02/21/2019    HDL 33 10/12/2018     No components found for: LDLCALC  Lab Results   Component Value Date    LDL 54 02/21/2019     (H) 10/12/2018     No results found for: HDLLDLRATIO  No components found for: CHOLHDL  Lab Results   Component Value Date    HGBA1C 6.6 (H) 12/02/2019     Lab Results   Component Value Date    TSH 2.04 07/22/2019           ASSESSMENT AND PLAN  Mr. Mack has multiple medical issues but fortunately stable from a cardiac standpoint with no definite evidence of angina, arrhythmia  or congestive heart failure.  He had several questions with regards to his cardiac, renal and neurological status and these were discussed.  His leg edema has improved.  I have continued Praluent for optimization of lipids, antiplatelet therapy with aspirin, antianginal therapy with isosorbide mononitrate and Ranexa and antihypertensive therapy with metoprolol tartrate and low-dose diuretics.  His renal function is being monitored closely by Dr. Holt.  He has an appointment to follow-up with vascular surgery.    Clint was seen today for coronary artery disease.    Diagnoses and all orders for this visit:    Syncope and collapse    Essential hypertension    Mixed hyperlipidemia    S/P CABG (coronary artery bypass graft)        Patient's Body mass index is 41.53 kg/m². BMI is above normal parameters. Recommendations include: exercise counseling and nutrition counseling.  Patient is a non-smoker      Addendum on 3/12/2020  Mr. Mack, is being considered for surgery for benign prostatic hypertrophy and based on the information I have he will be low to moderate risk for perioperative cardiac events.  All his cardiac medications may be continued during the perioperative period.  Anticoagulation with warfarin may be restarted when appropriate.    Keke Kumar MD  12/30/2019  5:50 PM

## 2020-01-06 ENCOUNTER — APPOINTMENT (OUTPATIENT)
Dept: CT IMAGING | Facility: HOSPITAL | Age: 65
End: 2020-01-06

## 2020-01-06 ENCOUNTER — HOSPITAL ENCOUNTER (EMERGENCY)
Facility: HOSPITAL | Age: 65
Discharge: HOME | End: 2020-01-06
Attending: EMERGENCY MEDICINE | Admitting: EMERGENCY MEDICINE

## 2020-01-06 VITALS
RESPIRATION RATE: 18 BRPM | WEIGHT: 299 LBS | DIASTOLIC BLOOD PRESSURE: 61 MMHG | OXYGEN SATURATION: 97 % | HEART RATE: 64 BPM | BODY MASS INDEX: 40.5 KG/M2 | TEMPERATURE: 98.2 F | SYSTOLIC BLOOD PRESSURE: 112 MMHG | HEIGHT: 72 IN

## 2020-01-06 DIAGNOSIS — R19.7 VOMITING AND DIARRHEA: Primary | ICD-10-CM

## 2020-01-06 DIAGNOSIS — R11.10 VOMITING AND DIARRHEA: Primary | ICD-10-CM

## 2020-01-06 LAB
ALBUMIN SERPL-MCNC: 3.9 G/DL (ref 3.5–5.2)
ALBUMIN/GLOB SERPL: 1 G/DL
ALP SERPL-CCNC: 63 U/L (ref 39–117)
ALT SERPL W P-5'-P-CCNC: 25 U/L (ref 1–41)
ANION GAP SERPL CALCULATED.3IONS-SCNC: 14 MMOL/L (ref 5–15)
AST SERPL-CCNC: 17 U/L (ref 1–40)
BASOPHILS # BLD AUTO: 0.02 10*3/MM3 (ref 0–0.2)
BASOPHILS NFR BLD AUTO: 0.2 % (ref 0–1.5)
BILIRUB SERPL-MCNC: 0.4 MG/DL (ref 0.2–1.2)
BUN BLD-MCNC: 24 MG/DL (ref 8–23)
BUN/CREAT SERPL: 15.4 (ref 7–25)
CALCIUM SPEC-SCNC: 9.5 MG/DL (ref 8.6–10.5)
CHLORIDE SERPL-SCNC: 99 MMOL/L (ref 98–107)
CO2 SERPL-SCNC: 24 MMOL/L (ref 22–29)
CREAT BLD-MCNC: 1.56 MG/DL (ref 0.76–1.27)
DEPRECATED RDW RBC AUTO: 46.8 FL (ref 37–54)
EOSINOPHIL # BLD AUTO: 0.15 10*3/MM3 (ref 0–0.4)
EOSINOPHIL NFR BLD AUTO: 1.8 % (ref 0.3–6.2)
ERYTHROCYTE [DISTWIDTH] IN BLOOD BY AUTOMATED COUNT: 14.1 % (ref 12.3–15.4)
GFR SERPL CREATININE-BSD FRML MDRD: 45 ML/MIN/1.73
GLOBULIN UR ELPH-MCNC: 3.8 GM/DL
GLUCOSE BLD-MCNC: 186 MG/DL (ref 65–99)
HCT VFR BLD AUTO: 47.5 % (ref 37.5–51)
HGB BLD-MCNC: 16.3 G/DL (ref 13–17.7)
HOLD SPECIMEN: NORMAL
HOLD SPECIMEN: NORMAL
IMM GRANULOCYTES # BLD AUTO: 0.03 10*3/MM3 (ref 0–0.05)
IMM GRANULOCYTES NFR BLD AUTO: 0.4 % (ref 0–0.5)
INR PPP: 2.49 (ref 0.8–1.2)
LIPASE SERPL-CCNC: 32 U/L (ref 13–60)
LYMPHOCYTES # BLD AUTO: 2.73 10*3/MM3 (ref 0.7–3.1)
LYMPHOCYTES NFR BLD AUTO: 32.9 % (ref 19.6–45.3)
MCH RBC QN AUTO: 31.1 PG (ref 26.6–33)
MCHC RBC AUTO-ENTMCNC: 34.3 G/DL (ref 31.5–35.7)
MCV RBC AUTO: 90.6 FL (ref 79–97)
MONOCYTES # BLD AUTO: 0.76 10*3/MM3 (ref 0.1–0.9)
MONOCYTES NFR BLD AUTO: 9.2 % (ref 5–12)
NEUTROPHILS # BLD AUTO: 4.61 10*3/MM3 (ref 1.7–7)
NEUTROPHILS NFR BLD AUTO: 55.5 % (ref 42.7–76)
NRBC BLD AUTO-RTO: 0 /100 WBC (ref 0–0.2)
PLATELET # BLD AUTO: 161 10*3/MM3 (ref 140–450)
PMV BLD AUTO: 10.1 FL (ref 6–12)
POTASSIUM BLD-SCNC: 4.1 MMOL/L (ref 3.5–5.2)
PROT SERPL-MCNC: 7.7 G/DL (ref 6–8.5)
PROTHROMBIN TIME: 26.7 SECONDS (ref 11.1–15.3)
RBC # BLD AUTO: 5.24 10*6/MM3 (ref 4.14–5.8)
SODIUM BLD-SCNC: 137 MMOL/L (ref 136–145)
TROPONIN T SERPL-MCNC: <0.01 NG/ML (ref 0–0.03)
WBC NRBC COR # BLD: 8.3 10*3/MM3 (ref 3.4–10.8)
WHOLE BLOOD HOLD SPECIMEN: NORMAL
WHOLE BLOOD HOLD SPECIMEN: NORMAL

## 2020-01-06 PROCEDURE — 25010000002 ONDANSETRON PER 1 MG: Performed by: EMERGENCY MEDICINE

## 2020-01-06 PROCEDURE — 96374 THER/PROPH/DIAG INJ IV PUSH: CPT

## 2020-01-06 PROCEDURE — 85610 PROTHROMBIN TIME: CPT | Performed by: EMERGENCY MEDICINE

## 2020-01-06 PROCEDURE — 84484 ASSAY OF TROPONIN QUANT: CPT | Performed by: EMERGENCY MEDICINE

## 2020-01-06 PROCEDURE — 80053 COMPREHEN METABOLIC PANEL: CPT | Performed by: EMERGENCY MEDICINE

## 2020-01-06 PROCEDURE — 93010 ELECTROCARDIOGRAM REPORT: CPT | Performed by: INTERNAL MEDICINE

## 2020-01-06 PROCEDURE — 99284 EMERGENCY DEPT VISIT MOD MDM: CPT

## 2020-01-06 PROCEDURE — 93005 ELECTROCARDIOGRAM TRACING: CPT | Performed by: EMERGENCY MEDICINE

## 2020-01-06 PROCEDURE — 85025 COMPLETE CBC W/AUTO DIFF WBC: CPT | Performed by: EMERGENCY MEDICINE

## 2020-01-06 PROCEDURE — 74176 CT ABD & PELVIS W/O CONTRAST: CPT

## 2020-01-06 PROCEDURE — 83690 ASSAY OF LIPASE: CPT | Performed by: EMERGENCY MEDICINE

## 2020-01-06 RX ORDER — SODIUM CHLORIDE 0.9 % (FLUSH) 0.9 %
10 SYRINGE (ML) INJECTION AS NEEDED
Status: DISCONTINUED | OUTPATIENT
Start: 2020-01-06 | End: 2020-01-06 | Stop reason: HOSPADM

## 2020-01-06 RX ORDER — ONDANSETRON 2 MG/ML
4 INJECTION INTRAMUSCULAR; INTRAVENOUS ONCE
Status: COMPLETED | OUTPATIENT
Start: 2020-01-06 | End: 2020-01-06

## 2020-01-06 RX ORDER — HYDROCODONE BITARTRATE AND ACETAMINOPHEN 10; 325 MG/1; MG/1
1 TABLET ORAL ONCE
Status: COMPLETED | OUTPATIENT
Start: 2020-01-06 | End: 2020-01-06

## 2020-01-06 RX ORDER — ONDANSETRON 4 MG/1
4 TABLET, ORALLY DISINTEGRATING ORAL EVERY 6 HOURS PRN
Qty: 4 TABLET | Refills: 0 | Status: SHIPPED | OUTPATIENT
Start: 2020-01-06 | End: 2020-06-04

## 2020-01-06 RX ADMIN — ONDANSETRON 4 MG: 2 INJECTION INTRAMUSCULAR; INTRAVENOUS at 03:11

## 2020-01-06 RX ADMIN — HYDROCODONE BITARTRATE AND ACETAMINOPHEN 1 TABLET: 10; 325 TABLET ORAL at 03:42

## 2020-01-06 RX ADMIN — SODIUM CHLORIDE 1000 ML: 900 INJECTION, SOLUTION INTRAVENOUS at 03:11

## 2020-01-06 NOTE — ED PROVIDER NOTES
Subjective   64-year-old white male arrives to the emergency department with chief complaint of vomiting and diarrhea that started this morning.  He denies hematemesis or bloody stools.  He denies abdominal pain.  He denies fever sweats or chills.           Review of Systems   Constitutional: Negative for chills, diaphoresis and fever.   Respiratory: Negative for shortness of breath.    Cardiovascular: Negative for chest pain.   Gastrointestinal: Positive for diarrhea, nausea and vomiting. Negative for abdominal pain and blood in stool.   Genitourinary: Negative for dysuria and hematuria.   Musculoskeletal: Positive for back pain. Negative for neck stiffness.   Neurological: Positive for light-headedness. Negative for syncope, weakness and headaches.   Psychiatric/Behavioral: The patient is nervous/anxious.    All other systems reviewed and are negative.      Past Medical History:   Diagnosis Date   • Anxiety    • Arthritis     osteoarthritis   • CKD (chronic kidney disease), stage III (CMS/[facility])    • COPD (chronic obstructive pulmonary disease) (CMS/[facility])    • Coronary artery disease    • Depression    • Diabetes mellitus (CMS/[facility])    • DVT (deep venous thrombosis) (CMS/[facility])    • Heart disease    • History of embolic filter insertion    • Hyperlipidemia    • Hypertension    • Injury of back     back surgery x3    • LUCIANA on CPAP    • Pulmonary embolism (CMS/[facility])        Allergies   Allergen Reactions   • Tetrabenazine Dizziness   • Austedo [Deutetrabenazine] Other (See Comments)     Insomnia     • Celexa [Citalopram Hydrobromide] Dystonia   • Crestor [Rosuvastatin Calcium]    • Ingrezza [Valbenazine Tosylate] Other (See Comments)     fatigue   • Lipitor [Atorvastatin] Other (See Comments)     Aches and pains all over   • Lyrica [Pregabalin] Swelling       Past Surgical History:   Procedure Laterality Date   • BACK SURGERY     • CARDIAC SURGERY  2001   • CIRCUMCISION     • COLONOSCOPY N/A 9/11/2018    Procedure:  COLONOSCOPY;  Surgeon: Jose C Batres DO;  Location: [facility] ENDOSCOPY;  Service: Gastroenterology   • ENDOSCOPY N/A 9/11/2018    Procedure: ESOPHAGOGASTRODUODENOSCOPY;  Surgeon: Jose C Batres DO;  Location: [facility] ENDOSCOPY;  Service: Gastroenterology   • GALLBLADDER SURGERY  2000   • JOINT REPLACEMENT Right 05/02/2016    hip   • SPINE SURGERY         Family History   Problem Relation Age of Onset   • Heart disease Father    • Hypertension Father    • Stroke Father    • Diabetes Sister    • Heart disease Brother    • Hypertension Brother    • COPD Brother        Social History     Socioeconomic History   • Marital status:      Spouse name: Not on file   • Number of children: Not on file   • Years of education: Not on file   • Highest education level: Not on file   Tobacco Use   • Smoking status: Never Smoker   • Smokeless tobacco: Never Used   Substance and Sexual Activity   • Alcohol use: No   • Drug use: No   • Sexual activity: Defer           Objective   Physical Exam   Constitutional: He is oriented to person, place, and time. He appears well-developed and well-nourished. No distress.   HENT:   Head: Normocephalic and atraumatic.   Right Ear: External ear normal.   Left Ear: External ear normal.   Nose: Nose normal.   Mouth/Throat: Oropharynx is clear and moist.   Eyes: Pupils are equal, round, and reactive to light. Conjunctivae and EOM are normal.   Neck: Normal range of motion. Neck supple.   Cardiovascular: Normal rate, regular rhythm, normal heart sounds and intact distal pulses.   Pulmonary/Chest: Effort normal and breath sounds normal.   Abdominal: Soft. Bowel sounds are normal. There is no tenderness.   Musculoskeletal: He exhibits no tenderness.   Neurological: He is alert and oriented to person, place, and time. He exhibits normal muscle tone.   Skin: Skin is warm and dry. He is not diaphoretic.   Psychiatric: His mood appears anxious.   Nursing note and vitals reviewed.      ECG 12  Lead    Date/Time: 1/6/2020 2:43 AM  Performed by: Eder Richards MD  Authorized by: Eder Richards MD   Interpreted by physician  Clinical impression: abnormal ECG  Comments: Normal sinus rhythm rate of 77.  Baseline artifact.  No ST elevation.  Q waves inferior leads.  Nonspecific findings.                 ED Course  ED Course as of Jan 06 0428   Mon Jan 06, 2020   0426 Patient is alert and resting comfortably. I reviewed the results of the emergency department evaluation with the patient.  I recommended primary care follow-up.  I advised the patient to return to the emergency department if their symptoms change or worsen.     [DR]      ED Course User Index  [DR] Eder Richards MD            Labs Reviewed   COMPREHENSIVE METABOLIC PANEL - Abnormal; Notable for the following components:       Result Value    Glucose 186 (*)     BUN 24 (*)     Creatinine 1.56 (*)     eGFR Non  Amer 45 (*)     All other components within normal limits    Narrative:     GFR Normal >60  Chronic Kidney Disease <60  Kidney Failure <15     PROTIME-INR - Abnormal; Notable for the following components:    Protime 26.7 (*)     INR 2.49 (*)     All other components within normal limits    Narrative:     Therapeutic range for most indications is 2.0-3.0 INR,  or 2.5-3.5 for mechanical heart valves.   LIPASE - Normal   CBC WITH AUTO DIFFERENTIAL - Normal   TROPONIN (IN-HOUSE) - Normal    Narrative:     Troponin T Reference Range:  <= 0.03 ng/mL-   Negative for AMI  >0.03 ng/mL-     Abnormal for myocardial necrosis.  Clinicians would have to utilize clinical acumen, EKG, Troponin and serial changes to determine if it is an Acute Myocardial Infarction or myocardial injury due to an underlying chronic condition.    RAINBOW DRAW    Narrative:     The following orders were created for panel order [place] Draw.  Procedure                               Abnormality         Status                     ---------                                -----------         ------                     Light Blue Top[[id number]]                                   Final result               Green Top (Gel)[[id number]]                                  Final result               Lavender Top[[id number]]                                     Final result               Gold Top - SST[[id number]]                                   Final result                 Please view results for these tests on the individual orders.   CBC AND DIFFERENTIAL    Narrative:     The following orders were created for panel order CBC & Differential.  Procedure                               Abnormality         Status                     ---------                               -----------         ------                     CBC Auto Differential[[id number]]        Normal              Final result                 Please view results for these tests on the individual orders.   LIGHT BLUE TOP   GREEN TOP   LAVENDER TOP   GOLD TOP - SST     Ct Abdomen Pelvis Without Contrast    Result Date: 1/6/2020  Narrative: CT abdomen and pelvis without contrast on 1/6/2020 CLINICAL INDICATION: Vomiting, diarrhea, back pain TECHNIQUE: Multiple axial images are obtained throughout the abdomen and pelvis without the administration of contrast. This exam was performed according to our departmental dose-optimization program, which includes automated exposure control, adjustment of the mA and/or kV according to patient size and/or use of iterative reconstruction technique. Total DLP is 1513.2 mGy*cm. COMPARISON: 8/10/2015 FINDINGS: Abdomen: Benign perifissural nodule is noted in the right lower lobe with no follow-up recommended per Fleischner Society recommendations 2017. There is minimal basilar atelectasis or scarring. The lung bases are otherwise clear. Coronary artery calcifications and other vascular calcifications are noted. An IVC filter is noted in place. The patient is status post cholecystectomy. There are no renal  or ureteral stones and no hydronephrosis. There is a cyst off the lower pole of the left kidney. The unenhanced solid abdominal organs are otherwise unremarkable. There is no abdominal adenopathy. There is no free fluid or free air within the abdomen. GI tract is fluid filled consistent with a diarrheal illness and may be related to a gastroenteritis. The abdominal portion of the GI tract is otherwise unremarkable. Pelvis: There is no free fluid in the pelvis. The patient is status post a right total hip arthroplasty which produces artifact limiting some of the evaluation of the pelvis. There is no pelvic adenopathy. Pelvic portion of the GI tract including the appendix is otherwise unremarkable. Degenerative changes are noted in the spine. There is grade 1 spondylolisthesis at L5-S1 with postsurgical changes at this level. Mild changes of avascular necrosis are noted in the left hip.     Impression: 1. Fluid-filled GI tract consistent with a diarrheal illness and may be related to gastroenteritis. 2. Otherwise no acute abnormality. Electronically signed by:  Reji James  1/6/2020 4:23 AM [facility] Workstation: [id number]                                        [facility]    Final diagnoses:   Vomiting and diarrhea            Eder Richards MD  01/06/20 0428

## 2020-01-06 NOTE — ED TRIAGE NOTES
Pt. Presents to ED with vomiting and diarrhea since 0100. Pt. Arrives via [facility]. Pt. States he had one episode of diarrhea and vomiting.AAOX4.

## 2020-01-16 ENCOUNTER — DOCUMENTATION (OUTPATIENT)
Dept: CARDIOLOGY | Facility: CLINIC | Age: 65
End: 2020-01-16

## 2020-01-16 NOTE — PROGRESS NOTES
Returned phone call to [facility] to give them more information regarding Praulent for this patient.

## 2020-02-04 ENCOUNTER — TELEPHONE (OUTPATIENT)
Dept: CARDIOLOGY | Facility: CLINIC | Age: 65
End: 2020-02-04

## 2020-02-04 NOTE — TELEPHONE ENCOUNTER
I called and spoke to the patient and told him per Dr. Warren that he can stop taking the Praluent.

## 2020-02-04 NOTE — TELEPHONE ENCOUNTER
----- Message from Debora Bravo RN sent at 2/4/2020 11:21 AM CST -----      ----- Message -----  From: Jenny García  Sent: 2/3/2020   3:23 PM CST  To: Debora Bravo RN    Trouble swallowing, and breathing,  Been eliminating medicines.  Pharmacist told him to call dr. Warren said it may be the [facility]      [phone]  Call pt     Thanks  Jenny

## 2020-02-24 ENCOUNTER — APPOINTMENT (OUTPATIENT)
Dept: CT IMAGING | Facility: HOSPITAL | Age: 65
End: 2020-02-24

## 2020-02-24 ENCOUNTER — HOSPITAL ENCOUNTER (EMERGENCY)
Facility: HOSPITAL | Age: 65
Discharge: HOME | End: 2020-02-24
Attending: EMERGENCY MEDICINE | Admitting: EMERGENCY MEDICINE

## 2020-02-24 VITALS
OXYGEN SATURATION: 96 % | DIASTOLIC BLOOD PRESSURE: 72 MMHG | HEART RATE: 66 BPM | HEIGHT: 71 IN | RESPIRATION RATE: 22 BRPM | BODY MASS INDEX: 40.88 KG/M2 | TEMPERATURE: 97.4 F | WEIGHT: 292 LBS | SYSTOLIC BLOOD PRESSURE: 142 MMHG

## 2020-02-24 DIAGNOSIS — R10.9 ABDOMINAL PAIN, UNSPECIFIED ABDOMINAL LOCATION: Primary | ICD-10-CM

## 2020-02-24 LAB
ALBUMIN SERPL-MCNC: 3.8 G/DL (ref 3.5–5.2)
ALBUMIN/GLOB SERPL: 1.2 G/DL
ALP SERPL-CCNC: 54 U/L (ref 39–117)
ALT SERPL W P-5'-P-CCNC: 27 U/L (ref 1–41)
ANION GAP SERPL CALCULATED.3IONS-SCNC: 14 MMOL/L (ref 5–15)
AST SERPL-CCNC: 20 U/L (ref 1–40)
BACTERIA UR QL AUTO: ABNORMAL /HPF
BASOPHILS # BLD AUTO: 0.02 10*3/MM3 (ref 0–0.2)
BASOPHILS NFR BLD AUTO: 0.3 % (ref 0–1.5)
BILIRUB SERPL-MCNC: 0.3 MG/DL (ref 0.2–1.2)
BILIRUB UR QL STRIP: NEGATIVE
BUN BLD-MCNC: 22 MG/DL (ref 8–23)
BUN/CREAT SERPL: 13.3 (ref 7–25)
CALCIUM SPEC-SCNC: 9.2 MG/DL (ref 8.6–10.5)
CHLORIDE SERPL-SCNC: 103 MMOL/L (ref 98–107)
CLARITY UR: CLEAR
CO2 SERPL-SCNC: 22 MMOL/L (ref 22–29)
COLOR UR: YELLOW
CREAT BLD-MCNC: 1.66 MG/DL (ref 0.76–1.27)
DEPRECATED RDW RBC AUTO: 42.6 FL (ref 37–54)
EOSINOPHIL # BLD AUTO: 0.09 10*3/MM3 (ref 0–0.4)
EOSINOPHIL NFR BLD AUTO: 1.2 % (ref 0.3–6.2)
ERYTHROCYTE [DISTWIDTH] IN BLOOD BY AUTOMATED COUNT: 12.8 % (ref 12.3–15.4)
GFR SERPL CREATININE-BSD FRML MDRD: 42 ML/MIN/1.73
GLOBULIN UR ELPH-MCNC: 3.3 GM/DL
GLUCOSE BLD-MCNC: 109 MG/DL (ref 65–99)
GLUCOSE UR STRIP-MCNC: NEGATIVE MG/DL
HCT VFR BLD AUTO: 44 % (ref 37.5–51)
HGB BLD-MCNC: 15.2 G/DL (ref 13–17.7)
HGB UR QL STRIP.AUTO: ABNORMAL
HYALINE CASTS UR QL AUTO: ABNORMAL /LPF
IMM GRANULOCYTES # BLD AUTO: 0.02 10*3/MM3 (ref 0–0.05)
IMM GRANULOCYTES NFR BLD AUTO: 0.3 % (ref 0–0.5)
INR PPP: 2.66 (ref 0.8–1.2)
KETONES UR QL STRIP: NEGATIVE
LEUKOCYTE ESTERASE UR QL STRIP.AUTO: NEGATIVE
LIPASE SERPL-CCNC: 34 U/L (ref 13–60)
LYMPHOCYTES # BLD AUTO: 2.59 10*3/MM3 (ref 0.7–3.1)
LYMPHOCYTES NFR BLD AUTO: 34.4 % (ref 19.6–45.3)
MCH RBC QN AUTO: 31.3 PG (ref 26.6–33)
MCHC RBC AUTO-ENTMCNC: 34.5 G/DL (ref 31.5–35.7)
MCV RBC AUTO: 90.5 FL (ref 79–97)
MONOCYTES # BLD AUTO: 0.95 10*3/MM3 (ref 0.1–0.9)
MONOCYTES NFR BLD AUTO: 12.6 % (ref 5–12)
NEUTROPHILS # BLD AUTO: 3.85 10*3/MM3 (ref 1.7–7)
NEUTROPHILS NFR BLD AUTO: 51.2 % (ref 42.7–76)
NITRITE UR QL STRIP: NEGATIVE
NRBC BLD AUTO-RTO: 0 /100 WBC (ref 0–0.2)
PH UR STRIP.AUTO: 5.5 [PH] (ref 5–9)
PLATELET # BLD AUTO: 160 10*3/MM3 (ref 140–450)
PMV BLD AUTO: 10.1 FL (ref 6–12)
POTASSIUM BLD-SCNC: 4 MMOL/L (ref 3.5–5.2)
PROT SERPL-MCNC: 7.1 G/DL (ref 6–8.5)
PROT UR QL STRIP: NEGATIVE
PROTHROMBIN TIME: 28.1 SECONDS (ref 11.1–15.3)
RBC # BLD AUTO: 4.86 10*6/MM3 (ref 4.14–5.8)
RBC # UR: ABNORMAL /HPF
REF LAB TEST METHOD: ABNORMAL
SODIUM BLD-SCNC: 139 MMOL/L (ref 136–145)
SP GR UR STRIP: 1.01 (ref 1–1.03)
SQUAMOUS #/AREA URNS HPF: ABNORMAL /HPF
TROPONIN T SERPL-MCNC: <0.01 NG/ML (ref 0–0.03)
UROBILINOGEN UR QL STRIP: ABNORMAL
WBC NRBC COR # BLD: 7.52 10*3/MM3 (ref 3.4–10.8)
WBC UR QL AUTO: ABNORMAL /HPF

## 2020-02-24 PROCEDURE — 83690 ASSAY OF LIPASE: CPT | Performed by: EMERGENCY MEDICINE

## 2020-02-24 PROCEDURE — 85610 PROTHROMBIN TIME: CPT | Performed by: EMERGENCY MEDICINE

## 2020-02-24 PROCEDURE — 25010000002 MORPHINE PER 10 MG: Performed by: EMERGENCY MEDICINE

## 2020-02-24 PROCEDURE — 93010 ELECTROCARDIOGRAM REPORT: CPT | Performed by: INTERNAL MEDICINE

## 2020-02-24 PROCEDURE — 96361 HYDRATE IV INFUSION ADD-ON: CPT

## 2020-02-24 PROCEDURE — 93005 ELECTROCARDIOGRAM TRACING: CPT | Performed by: EMERGENCY MEDICINE

## 2020-02-24 PROCEDURE — 85025 COMPLETE CBC W/AUTO DIFF WBC: CPT | Performed by: EMERGENCY MEDICINE

## 2020-02-24 PROCEDURE — 99284 EMERGENCY DEPT VISIT MOD MDM: CPT

## 2020-02-24 PROCEDURE — 81001 URINALYSIS AUTO W/SCOPE: CPT | Performed by: EMERGENCY MEDICINE

## 2020-02-24 PROCEDURE — 25010000002 ONDANSETRON PER 1 MG: Performed by: EMERGENCY MEDICINE

## 2020-02-24 PROCEDURE — 80053 COMPREHEN METABOLIC PANEL: CPT | Performed by: EMERGENCY MEDICINE

## 2020-02-24 PROCEDURE — 96374 THER/PROPH/DIAG INJ IV PUSH: CPT

## 2020-02-24 PROCEDURE — 74176 CT ABD & PELVIS W/O CONTRAST: CPT

## 2020-02-24 PROCEDURE — 84484 ASSAY OF TROPONIN QUANT: CPT | Performed by: EMERGENCY MEDICINE

## 2020-02-24 PROCEDURE — 96375 TX/PRO/DX INJ NEW DRUG ADDON: CPT

## 2020-02-24 RX ORDER — ONDANSETRON 2 MG/ML
4 INJECTION INTRAMUSCULAR; INTRAVENOUS ONCE
Status: COMPLETED | OUTPATIENT
Start: 2020-02-24 | End: 2020-02-24

## 2020-02-24 RX ORDER — SODIUM CHLORIDE 0.9 % (FLUSH) 0.9 %
10 SYRINGE (ML) INJECTION AS NEEDED
Status: DISCONTINUED | OUTPATIENT
Start: 2020-02-24 | End: 2020-02-24 | Stop reason: HOSPADM

## 2020-02-24 RX ADMIN — SODIUM CHLORIDE 1000 ML: 9 INJECTION, SOLUTION INTRAVENOUS at 02:29

## 2020-02-24 RX ADMIN — MORPHINE SULFATE 4 MG: 4 INJECTION, SOLUTION INTRAMUSCULAR; INTRAVENOUS at 02:34

## 2020-02-24 RX ADMIN — ONDANSETRON 4 MG: 2 INJECTION INTRAMUSCULAR; INTRAVENOUS at 02:32

## 2020-02-24 NOTE — ED PROVIDER NOTES
Subjective   64-year-old white male with history of multiple medical problems presents to the emergency department chief complaint of abdominal pain.  Patient complains of upper abdominal pain for 1 week but feels worse today.  Associated symptoms include nausea and chronic back pain.  Patient denies fever, sweats, chills, chest pain, shortness of breath, hematemesis, melena, or rectal bleeding.          Review of Systems   Constitutional: Negative for chills, diaphoresis and fever.   Respiratory: Negative for shortness of breath.    Cardiovascular: Negative for chest pain.   Gastrointestinal: Positive for abdominal pain and nausea. Negative for blood in stool, diarrhea and vomiting.   Genitourinary: Negative for dysuria.   Musculoskeletal: Positive for back pain. Negative for neck pain.   Neurological: Negative for syncope, weakness and headaches.   All other systems reviewed and are negative.      Past Medical History:   Diagnosis Date   • Anxiety    • Arthritis     osteoarthritis   • CKD (chronic kidney disease), stage III (CMS/[facility])    • COPD (chronic obstructive pulmonary disease) (CMS/[facility])    • Coronary artery disease    • Depression    • Diabetes mellitus (CMS/[facility])    • DVT (deep venous thrombosis) (CMS/[facility])    • Heart disease    • History of embolic filter insertion    • Hyperlipidemia    • Hypertension    • Injury of back     back surgery x3    • LUCIANA on CPAP    • Pulmonary embolism (CMS/[facility])        Allergies   Allergen Reactions   • Tetrabenazine Dizziness   • Austedo [Deutetrabenazine] Other (See Comments)     Insomnia     • Alfuzosin Other (See Comments)     syncope   • Celexa [Citalopram Hydrobromide] Dystonia   • Crestor [Rosuvastatin Calcium]    • Ingrezza [Valbenazine Tosylate] Other (See Comments)     fatigue   • Lipitor [Atorvastatin] Other (See Comments)     Aches and pains all over   • Lyrica [Pregabalin] Swelling       Past Surgical History:   Procedure Laterality Date   • BACK SURGERY     •  CARDIAC SURGERY  2001   • CIRCUMCISION     • COLONOSCOPY N/A 9/11/2018    Procedure: COLONOSCOPY;  Surgeon: Jose C Batres DO;  Location: [facility] ENDOSCOPY;  Service: Gastroenterology   • ENDOSCOPY N/A 9/11/2018    Procedure: ESOPHAGOGASTRODUODENOSCOPY;  Surgeon: Jose C Batres DO;  Location: [facility] ENDOSCOPY;  Service: Gastroenterology   • GALLBLADDER SURGERY  2000   • JOINT REPLACEMENT Right 05/02/2016    hip   • SPINE SURGERY         Family History   Problem Relation Age of Onset   • Heart disease Father    • Hypertension Father    • Stroke Father    • Diabetes Sister    • Heart disease Brother    • Hypertension Brother    • COPD Brother        Social History     Socioeconomic History   • Marital status:      Spouse name: Not on file   • Number of children: Not on file   • Years of education: Not on file   • Highest education level: Not on file   Tobacco Use   • Smoking status: Never Smoker   • Smokeless tobacco: Never Used   Substance and Sexual Activity   • Alcohol use: No   • Drug use: No   • Sexual activity: Defer           Objective   Physical Exam   Constitutional: He is oriented to person, place, and time. He appears well-developed and well-nourished. No distress.   HENT:   Head: Normocephalic and atraumatic.   Right Ear: External ear normal.   Left Ear: External ear normal.   Nose: Nose normal.   Mouth/Throat: Oropharynx is clear and moist.   Eyes: Pupils are equal, round, and reactive to light. Conjunctivae and EOM are normal.   Neck: Normal range of motion. Neck supple.   Cardiovascular: Normal rate, regular rhythm, normal heart sounds and intact distal pulses.   Pulmonary/Chest: Effort normal and breath sounds normal.   Abdominal: Soft. Normal appearance and bowel sounds are normal. He exhibits no distension and no mass. There is tenderness in the right upper quadrant, epigastric area and left upper quadrant. There is no rigidity, no rebound and no guarding.   Musculoskeletal: Normal  range of motion. He exhibits no edema or tenderness.   Neurological: He is alert and oriented to person, place, and time. He exhibits normal muscle tone.   Skin: Skin is warm and dry. He is not diaphoretic.   Psychiatric: He has a normal mood and affect. His behavior is normal.   Nursing note and vitals reviewed.      ECG 12 Lead    Date/Time: 2/24/2020 2:17 AM  Performed by: Eder Richards MD  Authorized by: Eder Richards MD   Interpreted by physician  Clinical impression: abnormal ECG  Comments: Normal sinus rhythm rate of 63.  Baseline artifact.  No ST elevation.  Q waves inferior leads.  Nonspecific findings.                 ED Course  ED Course as of Feb 24 0327   Mon Feb 24, 2020   0325 Patient is alert and resting comfortably. I reviewed the results of the emergency department evaluation with the patient.  I recommended primary care follow-up.  I advised the patient to return to the emergency department if their symptoms change or worsen.     [DR]      ED Course User Index  [DR] Eder Richards MD            Labs Reviewed   COMPREHENSIVE METABOLIC PANEL - Abnormal; Notable for the following components:       Result Value    Glucose 109 (*)     Creatinine 1.66 (*)     eGFR Non  Amer 42 (*)     All other components within normal limits    Narrative:     GFR Normal >60  Chronic Kidney Disease <60  Kidney Failure <15     URINALYSIS W/ MICROSCOPIC IF INDICATED (NO CULTURE) - Abnormal; Notable for the following components:    Blood, UA Moderate (2+) (*)     All other components within normal limits   PROTIME-INR - Abnormal; Notable for the following components:    Protime 28.1 (*)     INR 2.66 (*)     All other components within normal limits    Narrative:     Therapeutic range for most indications is 2.0-3.0 INR,  or 2.5-3.5 for mechanical heart valves.   CBC WITH AUTO DIFFERENTIAL - Abnormal; Notable for the following components:    Monocyte % 12.6 (*)     Monocytes, Absolute 0.95 (*)     All other components  within normal limits   URINALYSIS, MICROSCOPIC ONLY - Abnormal; Notable for the following components:    RBC, UA 3-5 (*)     All other components within normal limits   LIPASE - Normal   TROPONIN (IN-HOUSE) - Normal    Narrative:     Troponin T Reference Range:  <= 0.03 ng/mL-   Negative for AMI  >0.03 ng/mL-     Abnormal for myocardial necrosis.  Clinicians would have to utilize clinical acumen, EKG, Troponin and serial changes to determine if it is an Acute Myocardial Infarction or myocardial injury due to an underlying chronic condition.       Results may be falsely decreased if patient taking Biotin.     CBC AND DIFFERENTIAL    Narrative:     The following orders were created for panel order CBC & Differential.  Procedure                               Abnormality         Status                     ---------                               -----------         ------                     CBC Auto Differential[[id number]]        Abnormal            Final result                 Please view results for these tests on the individual orders.     Ct Abdomen Pelvis Without Contrast    Result Date: 2/24/2020  Narrative: HISTORY:  abdominal pain TECHNIQUE: Noncontrast images of the abdomen and pelvis were obtained.  This exam was performed according to our departmental dose-optimization program, which includes automated exposure control, adjustment of the mA and/or kV according to patient size and/or use of iterative reconstruction technique. COMPARISON: January 6, 2020 FINDINGS:  Mild bibasilar fibrotic changes are noted.  There is no urinary tract calculus or hydronephrosis.  There is a 3.2 cm cyst lower pole left kidney (internal Hounsfield density of 4). Patient is again noted be status post cholecystectomy.  IVC filter is again identified.  No inflammatory fat stranding.  No abnormal bowel dilation.  The appendix is within normal limits. There are diffuse arterial calcifications also involving the coronary arteries.   There is no abdominal aortic aneurysm.  There is degenerative change about the spine.  Postoperative changes and grade 1 spondylolithesis are again noted at the L5-S1 level.     Impression: Chronic appearing findings as above.  No acute abnormality is seen. Electronically signed by:  Mundo Mcqueen MD  2/24/2020 2:27 AM [facility] Workstation: [id number]                                    [facility]    Final diagnoses:   Abdominal pain, unspecified abdominal location            Eder Richards MD  02/24/20 0327

## 2020-03-03 ENCOUNTER — ANESTHESIA (OUTPATIENT)
Dept: GASTROENTEROLOGY | Facility: HOSPITAL | Age: 65
End: 2020-03-03

## 2020-03-03 ENCOUNTER — ANESTHESIA EVENT (OUTPATIENT)
Dept: GASTROENTEROLOGY | Facility: HOSPITAL | Age: 65
End: 2020-03-03

## 2020-03-03 ENCOUNTER — HOSPITAL ENCOUNTER (OUTPATIENT)
Facility: HOSPITAL | Age: 65
Setting detail: HOSPITAL OUTPATIENT SURGERY
Discharge: HOME | End: 2020-03-03
Attending: INTERNAL MEDICINE | Admitting: INTERNAL MEDICINE

## 2020-03-03 VITALS
SYSTOLIC BLOOD PRESSURE: 143 MMHG | HEART RATE: 80 BPM | HEIGHT: 71 IN | OXYGEN SATURATION: 96 % | DIASTOLIC BLOOD PRESSURE: 77 MMHG | TEMPERATURE: 99.9 F | WEIGHT: 292 LBS | BODY MASS INDEX: 40.88 KG/M2 | RESPIRATION RATE: 18 BRPM

## 2020-03-03 DIAGNOSIS — K21.9 GASTROESOPHAGEAL REFLUX DISEASE: ICD-10-CM

## 2020-03-03 PROCEDURE — 88305 TISSUE EXAM BY PATHOLOGIST: CPT | Performed by: PATHOLOGY

## 2020-03-03 PROCEDURE — 25010000002 PROPOFOL 10 MG/ML EMULSION: Performed by: NURSE ANESTHETIST, CERTIFIED REGISTERED

## 2020-03-03 PROCEDURE — 88305 TISSUE EXAM BY PATHOLOGIST: CPT | Performed by: INTERNAL MEDICINE

## 2020-03-03 RX ORDER — LIDOCAINE HYDROCHLORIDE 20 MG/ML
INJECTION, SOLUTION EPIDURAL; INFILTRATION; INTRACAUDAL; PERINEURAL AS NEEDED
Status: DISCONTINUED | OUTPATIENT
Start: 2020-03-03 | End: 2020-03-03 | Stop reason: SURG

## 2020-03-03 RX ORDER — ONDANSETRON 2 MG/ML
4 INJECTION INTRAMUSCULAR; INTRAVENOUS ONCE AS NEEDED
Status: DISCONTINUED | OUTPATIENT
Start: 2020-03-03 | End: 2020-03-03 | Stop reason: HOSPADM

## 2020-03-03 RX ORDER — PROPOFOL 10 MG/ML
VIAL (ML) INTRAVENOUS AS NEEDED
Status: DISCONTINUED | OUTPATIENT
Start: 2020-03-03 | End: 2020-03-03 | Stop reason: SURG

## 2020-03-03 RX ORDER — DEXTROSE AND SODIUM CHLORIDE 5; .45 G/100ML; G/100ML
30 INJECTION, SOLUTION INTRAVENOUS CONTINUOUS PRN
Status: DISCONTINUED | OUTPATIENT
Start: 2020-03-03 | End: 2020-03-03 | Stop reason: HOSPADM

## 2020-03-03 RX ADMIN — PROPOFOL 30 MG: 10 INJECTION, EMULSION INTRAVENOUS at 10:05

## 2020-03-03 RX ADMIN — LIDOCAINE HYDROCHLORIDE 100 MG: 20 INJECTION, SOLUTION EPIDURAL; INFILTRATION; INTRACAUDAL; PERINEURAL at 10:03

## 2020-03-03 RX ADMIN — DEXTROSE AND SODIUM CHLORIDE 30 ML/HR: 5; 450 INJECTION, SOLUTION INTRAVENOUS at 09:20

## 2020-03-03 RX ADMIN — PROPOFOL 100 MG: 10 INJECTION, EMULSION INTRAVENOUS at 10:03

## 2020-03-03 NOTE — H&P
Jose C Rashid DO,[facility]  Gastroenterology  Hepatology  Endoscopy  Board Certified in Internal Medicine and gastroenterology  [address], [address]  [place]. [address]  T- [phone]   F - [phone]     GASTROENTEROLOGY HISTORY AND PHYSICAL  NOTE   JOSE C RASHID DO.         SUBJECTIVE:   3/3/2020    Name: Clint Mack  DOD: 1955        Chief Complaint:       Subjective : Odynophagia.  Very concerned that he has a Yuan filter that has become disrupted.  I told him that that is not the case with his current symptoms of odynophagia.  No dysphasia.  Able to swallow without difficulty.    Patient is 64 y.o. male presents with recommendations of upper endoscopy by Dr. Matute.      ROS/HISTORY/ CURRENT MEDICATIONS/OBJECTIVE/VS/PE:   Review of Systems:  All systems unremarkable unless specified below.  Constitutional   HENT  Eyes   Respiratory    Cardiovascular  Gastrointestinal   Endocrine  Genitourinary    Musculoskeletal   Skin  Allergic/Immunologic    Neurological    Hematological  Psychiatric/Behavioral    History:     Past Medical History:   Diagnosis Date   • Anxiety    • Arthritis     osteoarthritis   • CKD (chronic kidney disease), stage III (CMS/HCC)    • COPD (chronic obstructive pulmonary disease) (CMS/HCC)    • Coronary artery disease    • Depression    • Diabetes mellitus (CMS/HCC)    • DVT (deep venous thrombosis) (CMS/HCC)    • Heart disease    • History of embolic filter insertion    • Hyperlipidemia    • Hypertension    • Injury of back     back surgery x3    • LUCIANA on CPAP    • Pulmonary embolism (CMS/HCC)      Past Surgical History:   Procedure Laterality Date   • BACK SURGERY     • CARDIAC SURGERY  2001   • CIRCUMCISION     • COLONOSCOPY N/A 9/11/2018    Procedure: COLONOSCOPY;  Surgeon: Jose C Rashid DO;  Location: [facility] ENDOSCOPY;  Service: Gastroenterology   • ENDOSCOPY N/A 9/11/2018    Procedure: ESOPHAGOGASTRODUODENOSCOPY;  Surgeon: Medardo  "Jose C VAZQUEZ DO;  Location: [facility] ENDOSCOPY;  Service: Gastroenterology   • GALLBLADDER SURGERY  2000   • JOINT REPLACEMENT Right 05/02/2016    hip   • SPINE SURGERY       Family History   Problem Relation Age of Onset   • Heart disease Father    • Hypertension Father    • Stroke Father    • Diabetes Sister    • Heart disease Brother    • Hypertension Brother    • COPD Brother      Social History     Tobacco Use   • Smoking status: Never Smoker   • Smokeless tobacco: Never Used   Substance Use Topics   • Alcohol use: No   • Drug use: No     Prior to Admission medications    Medication Sig Start Date End Date Taking? Authorizing Provider   alfuzosin (UROXATRAL) 10 MG 24 hr tablet Take 10 mg by mouth Daily. 11/21/19  Yes Joseph Keenan MD   aspirin 81 MG chewable tablet Chew 81 mg Take As Directed. Take 1 pill by mouth Monday, Wednesday, Friday   Yes Joseph Keenan MD   baclofen (LIORESAL) 10 MG tablet Take 10 mg by mouth 3 (Three) Times a Day.   Yes Joseph Keenan MD   Blood Glucose Monitoring Suppl (ACCU-CHEK ARGELIA PLUS) w/Device kit  2/5/18  Yes Joseph Keenan MD   clonazePAM (KlonoPIN) 1 MG tablet Take 1 tablet by mouth 3 (Three) Times a Day. Or dystonia 12/17/19  Yes Jerome Che MD   COMFORT ASSIST INSULIN SYRINGE 31G X 5/16\" 0.3 ML misc  8/28/16  Yes Joseph Keenan MD   furosemide (LASIX) 20 MG tablet Take 20 mg by mouth Daily. prn   Yes Joseph Keenan MD   glimepiride (AMARYL) 4 MG tablet Take 4 mg by mouth 2 (Two) Times a Day. 8/1/16  Yes Joseph Keenan MD   glucose blood (ONE TOUCH ULTRA TEST) test strip  4/12/16  Yes Joseph Keenan MD   HYDROcodone-acetaminophen (NORCO)  MG per tablet Take 1 tablet by mouth. Five times daily   Yes Joseph Keenan MD   insulin aspart (novoLOG) 100 UNIT/ML injection Inject 10 Units under the skin 3 (Three) Times a Day Before Meals.  Patient taking differently: Inject 10 Units under the skin into the " appropriate area as directed. Sliding scale 3/8/18  Yes Smith Austin MD   isosorbide mononitrate (IMDUR) 60 MG 24 hr tablet Take 1 tablet by mouth Daily.  Patient taking differently: Take 30 mg by mouth Daily. 3/8/18  Yes Smith Austin MD   Lancets (ONETOUCH ULTRASOFT) lancets  3/6/17  Yes Joseph Keenan MD   metoprolol tartrate (LOPRESSOR) 25 MG tablet Take 25 mg by mouth 2 (Two) Times a Day.   Yes Joseph Keenan MD   Morphine (MS CONTIN) 15 MG 12 hr tablet Take 15 mg by mouth 2 (Two) Times a Day.   Yes Joseph Keenan MD   Omega-3 Fatty Acids (FISH OIL) 1200 MG capsule delayed-release Take  by mouth 2 (Two) Times a Day.   Yes Joseph Keenan MD   ondansetron ODT (ZOFRAN-ODT) 4 MG disintegrating tablet Take 1 tablet by mouth Every 6 (Six) Hours As Needed for Nausea or Vomiting. 1/6/20  Yes Eder Richards MD   ranolazine (RANEXA) 500 MG 12 hr tablet Take 1 tablet by mouth 2 (Two) Times a Day. 8/28/19  Yes Keke Kumar MD   vitamin D (ERGOCALCIFEROL) 15213 UNITS capsule capsule Take 50,000 Units by mouth. Two times monthly 7/2/16  Yes Joseph Keenan MD   vitamin E 400 UNIT capsule Take 400 Units by mouth Daily.   Yes Joseph Keenan MD   warfarin (COUMADIN) 5 MG tablet Take 5 mg by mouth Every Night. Patient takes 5 mg every day except MWF when he takes 7.5 mg 7/22/16  Yes Joseph Keenan MD   Alirocumab (PRALUENT) 75 MG/ML solution pen-injector Inject 75 mg under the skin into the appropriate area as directed Every 14 (Fourteen) Days. 10/25/18   Keke Kumar MD     Allergies:  Tetrabenazine; Austedo [deutetrabenazine]; Alfuzosin; Celexa [citalopram hydrobromide]; Crestor [rosuvastatin calcium]; Ingrezza [valbenazine tosylate]; Lipitor [atorvastatin]; and Lyrica [pregabalin]    I have reviewed the patients medical history, surgical history and family history in the available medical record system.     Current Medications:     Current  Facility-Administered Medications   Medication Dose Route Frequency Provider Last Rate Last Dose   • dextrose 5 % and sodium chloride 0.45 % infusion  30 mL/hr Intravenous Continuous PRN Jose C Batres DO 30 mL/hr at 03/03/20 0920 30 mL/hr at 03/03/20 0920       Objective     Physical Exam:   Temp:  [97.8 °F (36.6 °C)] 97.8 °F (36.6 °C)  Heart Rate:  [80] 80  Resp:  [20] 20  BP: (149)/(77) 149/77    Physical Exam:  General Appearance:    Alert, cooperative, in no acute distress   Head:    Normocephalic, without obvious abnormality, atraumatic   Eyes:            Lids and lashes normal, conjunctivae and sclerae normal, no   icterus, no pallor, corneas clear, PERRLA   Ears:    Ears appear intact with no abnormalities noted   Throat:   No oral lesions, no thrush, oral mucosa moist   Neck:   No adenopathy, supple, trachea midline, no thyromegaly, no     carotid bruit, no JVD   Back:     No kyphosis present, no scoliosis present, no skin lesions,       erythema or scars, no tenderness to percussion or                   palpation,   range of motion normal   Lungs:     Clear to auscultation,respirations regular, even and                   unlabored    Heart:    Regular rhythm and normal rate, normal S1 and S2, no            murmur, no gallop, no rub, no click   Breast Exam:    Deferred   Abdomen:     Normal bowel sounds, no masses, no organomegaly, soft        non-tender, non-distended, no guarding, no rebound                 tenderness   Genitalia:    Deferred   Extremities:   Moves all extremities well, no edema, no cyanosis, no              redness   Pulses:   Pulses palpable and equal bilaterally   Skin:   No bleeding, bruising or rash   Lymph nodes:   No palpable adenopathy   Neurologic:   Cranial nerves 2 - 12 grossly intact, sensation intact, DTR        present and equal bilaterally      Results Review:     Lab Results   Component Value Date    WBC 7.52 02/24/2020    WBC 8.30 01/06/2020    WBC 5.24 11/15/2019     HGB 15.2 03/01/2020    HGB 15.2 02/24/2020    HGB 16.3 01/06/2020    HCT 44.9 03/01/2020    HCT 44.0 02/24/2020    HCT 47.5 01/06/2020     02/24/2020     01/06/2020     (L) 11/15/2019             No results found for: LIPASE  Lab Results   Component Value Date    INR 2.66 (H) 02/24/2020    INR 2.49 (H) 01/06/2020    INR 1.88 (H) 11/15/2019     No results found for: CULTURE    Radiology Review:  Imaging Results (Last 72 Hours)     ** No results found for the last 72 hours. **           I reviewed the patient's new clinical results.  I reviewed the patient's new imaging results and agree with the interpretation.     ASSESSMENT/PLAN:   ASSESSMENT:  1.  Odynophagia versus functional swallowing disorder    PLAN:  1.  Esophagogastroduodenoscopy with biopsy as indicated.  Patient is currently taking anticoagulations for venous thrombo-embolism.  These have not been stopped    Risk and benefits associated with the procedure have been reviewed with the patient.  I offered to not do the procedure as I do not think that were going to find very much with this.  He wants to continue with that.     Jose C Batres,   03/03/20  9:54 AM

## 2020-03-03 NOTE — ANESTHESIA PREPROCEDURE EVALUATION
Anesthesia Evaluation     Patient summary reviewed and Nursing notes reviewed   NPO Solid Status: > 8 hours  NPO Liquid Status: > 8 hours           Airway   Mallampati: III  TM distance: <3 FB  Neck ROM: full  Possible difficult intubation  Dental    (+) edentulous    Pulmonary - normal exam   (+) pulmonary embolism, COPD moderate, home oxygen (2L at night), sleep apnea on CPAP,   Cardiovascular - normal exam    ECG reviewed  PT is on anticoagulation therapy  Patient on routine beta blocker    (+) hypertension poorly controlled 2 medications or greater, CAD, DVT resolved, hyperlipidemia,     ROS comment: · The left ventricular cavity is mild-to-moderately dilated.  · Left ventricular wall thickness is consistent with mild concentric hypertrophy.  · Estimated EF = 52%.  · Left ventricular systolic function is normal.  · Left ventricular diastolic dysfunction (grade I) consistent with impaired relaxation.  · Right ventricular cavity is mildly dilated.  · Left atrial cavity size is mildly dilated.    Neuro/Psych  (+) numbness, psychiatric history Anxiety and Depression,     GI/Hepatic/Renal/Endo    (+) morbid obesity,  renal disease CRI, diabetes mellitus type 2 well controlled using insulin,     Musculoskeletal     Abdominal   (+) obese,    Substance History - negative use     OB/GYN          Other   arthritis,                    Anesthesia Plan    ASA 4     MAC     intravenous induction     Anesthetic plan, all risks, benefits, and alternatives have been provided, discussed and informed consent has been obtained with: patient and spouse/significant other.

## 2020-03-03 NOTE — ANESTHESIA POSTPROCEDURE EVALUATION
Patient: Clint Mack    Procedure Summary     Date:  03/03/20 Room / Location:  [facility] ENDOSCOPY 2 / [facility] ENDOSCOPY    Anesthesia Start:  0958 Anesthesia Stop:  1010    Procedure:  ESOPHAGOGASTRODUODENOSCOPY (N/A ) Diagnosis:       Gastroesophageal reflux disease      (Gastroesophageal reflux disease [K21.9])    Surgeon:  Jose C Batres DO Provider:  Araceli Souza CRNA    Anesthesia Type:  MAC ASA Status:  4          Anesthesia Type: MAC    Vitals  No vitals data found for the desired time range.          Post Anesthesia Care and Evaluation    Patient location during evaluation: bedside  Patient participation: complete - patient participated  Level of consciousness: awake  Pain score: 0  Pain management: adequate  Airway patency: patent  Anesthetic complications: No anesthetic complications  PONV Status: none  Cardiovascular status: acceptable  Respiratory status: acceptable  Hydration status: acceptable

## 2020-03-04 LAB
LAB AP CASE REPORT: NORMAL
PATH REPORT.FINAL DX SPEC: NORMAL
PATH REPORT.GROSS SPEC: NORMAL

## 2020-03-05 ENCOUNTER — OFFICE VISIT (OUTPATIENT)
Dept: PODIATRY | Facility: CLINIC | Age: 65
End: 2020-03-05

## 2020-03-05 VITALS — OXYGEN SATURATION: 97 % | WEIGHT: 292 LBS | HEART RATE: 80 BPM | HEIGHT: 71 IN | BODY MASS INDEX: 40.88 KG/M2

## 2020-03-05 DIAGNOSIS — M79.674 PAIN IN TOES OF BOTH FEET: ICD-10-CM

## 2020-03-05 DIAGNOSIS — M79.675 PAIN IN TOES OF BOTH FEET: ICD-10-CM

## 2020-03-05 DIAGNOSIS — B35.1 ONYCHOMYCOSIS: ICD-10-CM

## 2020-03-05 DIAGNOSIS — E11.42 DIABETIC POLYNEUROPATHY ASSOCIATED WITH TYPE 2 DIABETES MELLITUS (HCC): Primary | ICD-10-CM

## 2020-03-05 PROCEDURE — 11721 DEBRIDE NAIL 6 OR MORE: CPT | Performed by: PODIATRIST

## 2020-03-05 NOTE — PROGRESS NOTES
Clint Mack  1955  64 y.o. male   BS-132 per patient  A1C: 6.6 per patient  PCP-Dr. Matute 2/25/2020    Patient presents today for diabetic nail care.     03/05/2020      Chief Complaint   Patient presents with   • Left Foot - diabetic nail care   • Right Foot - diabetic nail care           History of Present Illness    Clint Mack is a 64 y.o. male presents for f/u diabetic foot exam and nail care.      Past Medical History:   Diagnosis Date   • Anxiety    • Arthritis     osteoarthritis   • CKD (chronic kidney disease), stage III (CMS/[facility])    • COPD (chronic obstructive pulmonary disease) (CMS/[facility])    • Coronary artery disease    • Depression    • Diabetes mellitus (CMS/HCC)    • DVT (deep venous thrombosis) (CMS/[facility])    • Heart disease    • History of embolic filter insertion    • Hyperlipidemia    • Hypertension    • Injury of back     back surgery x3    • LUCIANA on CPAP    • Pulmonary embolism (CMS/[facility])          Past Surgical History:   Procedure Laterality Date   • BACK SURGERY     • CARDIAC SURGERY  2001   • CIRCUMCISION     • COLONOSCOPY N/A 9/11/2018    Procedure: COLONOSCOPY;  Surgeon: Jose C Batres DO;  Location: [facility] ENDOSCOPY;  Service: Gastroenterology   • ENDOSCOPY N/A 9/11/2018    Procedure: ESOPHAGOGASTRODUODENOSCOPY;  Surgeon: Jose C Batres DO;  Location: [facility] ENDOSCOPY;  Service: Gastroenterology   • GALLBLADDER SURGERY  2000   • JOINT REPLACEMENT Right 05/02/2016    hip   • SPINE SURGERY           Family History   Problem Relation Age of Onset   • Heart disease Father    • Hypertension Father    • Stroke Father    • Diabetes Sister    • Heart disease Brother    • Hypertension Brother    • COPD Brother          Social History     Socioeconomic History   • Marital status:      Spouse name: Not on file   • Number of children: Not on file   • Years of education: Not on file   • Highest education level: Not on file   Tobacco Use   • Smoking status: Never  "Smoker   • Smokeless tobacco: Never Used   Substance and Sexual Activity   • Alcohol use: No   • Drug use: No   • Sexual activity: Defer         Current Outpatient Medications   Medication Sig Dispense Refill   • alfuzosin (UROXATRAL) 10 MG 24 hr tablet Take 10 mg by mouth Daily.     • Alirocumab (PRALUENT) 75 MG/ML solution pen-injector Inject 75 mg under the skin into the appropriate area as directed Every 14 (Fourteen) Days. 6 pen 7   • aspirin 81 MG chewable tablet Chew 81 mg Take As Directed. Take 1 pill by mouth Monday, Wednesday, Friday     • baclofen (LIORESAL) 10 MG tablet Take 10 mg by mouth 3 (Three) Times a Day.     • Blood Glucose Monitoring Suppl (ACCU-CHEK ARGELIA PLUS) w/Device kit      • clonazePAM (KlonoPIN) 1 MG tablet Take 1 tablet by mouth 3 (Three) Times a Day. Or dystonia 90 tablet 5   • COMFORT ASSIST INSULIN SYRINGE 31G X 5/16\" 0.3 ML misc      • furosemide (LASIX) 20 MG tablet Take 20 mg by mouth Daily. prn     • glimepiride (AMARYL) 4 MG tablet Take 4 mg by mouth 2 (Two) Times a Day.     • glucose blood (ONE TOUCH ULTRA TEST) test strip      • HYDROcodone-acetaminophen (NORCO)  MG per tablet Take 1 tablet by mouth. Five times daily     • insulin aspart (novoLOG) 100 UNIT/ML injection Inject 10 Units under the skin 3 (Three) Times a Day Before Meals. (Patient taking differently: Inject 10 Units under the skin into the appropriate area as directed. Sliding scale) 10 mL 12   • isosorbide mononitrate (IMDUR) 60 MG 24 hr tablet Take 1 tablet by mouth Daily. (Patient taking differently: Take 30 mg by mouth Daily.) 30 tablet 1   • Lancets (ONETOUCH ULTRASOFT) lancets      • metoprolol tartrate (LOPRESSOR) 25 MG tablet Take 25 mg by mouth 2 (Two) Times a Day.     • Morphine (MS CONTIN) 15 MG 12 hr tablet Take 15 mg by mouth 2 (Two) Times a Day.     • Omega-3 Fatty Acids (FISH OIL) 1200 MG capsule delayed-release Take  by mouth 2 (Two) Times a Day.     • ondansetron ODT (ZOFRAN-ODT) 4 MG " "disintegrating tablet Take 1 tablet by mouth Every 6 (Six) Hours As Needed for Nausea or Vomiting. 4 tablet 0   • ranolazine (RANEXA) 500 MG 12 hr tablet Take 1 tablet by mouth 2 (Two) Times a Day. 90 tablet 3   • vitamin D (ERGOCALCIFEROL) 86238 UNITS capsule capsule Take 50,000 Units by mouth. Two times monthly     • vitamin E 400 UNIT capsule Take 400 Units by mouth Daily.     • warfarin (COUMADIN) 5 MG tablet Take 5 mg by mouth Every Night. Patient takes 5 mg every day except MWF when he takes 7.5 mg       No current facility-administered medications for this visit.          OBJECTIVE    Pulse 80   Ht 180.3 cm (71\")   Wt 132 kg (292 lb)   SpO2 97%   BMI 40.73 kg/m²       Review of Systems   Constitutional:  Denies recent weight loss, weight gain, fever or chills, no change in exercise tolerance  Musculoskeletal: Toe pain.   Skin:  Thickened nails. Shin discoloration.  Neurological:  Burning sensations to feet b/l.  Psychiatric/Behavioral: Denies depression      Physical Exam    Clint had a diabetic foot exam performed today.      Constitutional: he appears well-developed and well-nourished.   HEENT: Normocephalic. Atraumatic  CV: No tenderness. RRR  Resp: Non-labored respiration. No wheezes.   Psychiatric: he has a normal mood and affect. his   behavior is normal.      Lower Extremity Exam:  Vascular: DP/PT pulses palpable 1+.   Negative hair growth.   1+ perimalleolar edema  Toes cool  Neuro: Protective sensation diminished to lesser toes, b/l.  DTRs intact  Integument: No open wounds  No lesions  Atrophic skin noted b/l with chronic venous stasis dermatitis changes  Mild xerosis  No masses  Musculoskeletal: LE muscle strength 4/5 on left, 3/5 on right  Gait assisted with cane  Semi-rigid hammertoe deformity toes 2-5 b/l.  Nails 1-5 b/l thickened, elongated with subungual debris. +pain on palpation              ASSESSMENT AND PLAN    Clint was seen today for diabetic nail care and diabetic nail " care.    Diagnoses and all orders for this visit:    Diabetic polyneuropathy associated with type 2 diabetes mellitus (CMS/HCC)    Onychomycosis    Pain in toes of both feet      -Comprehensive DM foot exam performed. Pt educated on importance of tight glucose control and daily foot checks.  -Pt educated on padding techniques for rigid hammertoes.  Proper extra depth diabetic shoe gear.  Limit barefoot walking.  -Nails 1-5 b/l debrided in length and thickness with nail nipper to decrease pain, fungal load and risk of infection  -Follow up 3 months PRN          This document has been electronically signed by Jose C Novak DPM on March 5, 2020 12:26 PM     EMR Dragon/Transcription disclaimer:   Much of this encounter note is an electronic transcription/translation of spoken language to printed text. The electronic translation of spoken language may permit erroneous, or at times, nonsensical words or phrases to be inadvertently transcribed; Although I have reviewed the note for such errors, some may still exist.    Jose C Novak DPM  3/5/2020  12:26 PM

## 2020-03-30 ENCOUNTER — TELEPHONE (OUTPATIENT)
Dept: PODIATRY | Facility: CLINIC | Age: 65
End: 2020-03-30

## 2020-04-15 ENCOUNTER — OFFICE VISIT (OUTPATIENT)
Dept: CARDIOLOGY | Facility: CLINIC | Age: 65
End: 2020-04-15

## 2020-04-15 VITALS
HEART RATE: 76 BPM | HEIGHT: 73 IN | WEIGHT: 292 LBS | BODY MASS INDEX: 38.7 KG/M2 | OXYGEN SATURATION: 98 % | SYSTOLIC BLOOD PRESSURE: 138 MMHG | DIASTOLIC BLOOD PRESSURE: 79 MMHG

## 2020-04-15 DIAGNOSIS — I10 ESSENTIAL HYPERTENSION: Primary | Chronic | ICD-10-CM

## 2020-04-15 DIAGNOSIS — I25.10 CORONARY ARTERY DISEASE INVOLVING NATIVE CORONARY ARTERY OF NATIVE HEART WITHOUT ANGINA PECTORIS: Chronic | ICD-10-CM

## 2020-04-15 DIAGNOSIS — Z95.1 S/P CABG (CORONARY ARTERY BYPASS GRAFT): ICD-10-CM

## 2020-04-15 DIAGNOSIS — N18.30 CKD (CHRONIC KIDNEY DISEASE) STAGE 3, GFR 30-59 ML/MIN (HCC): Chronic | ICD-10-CM

## 2020-04-15 DIAGNOSIS — E78.2 MIXED HYPERLIPIDEMIA: Chronic | ICD-10-CM

## 2020-04-15 PROCEDURE — 99214 OFFICE O/P EST MOD 30 MIN: CPT | Performed by: INTERNAL MEDICINE

## 2020-04-15 NOTE — PROGRESS NOTES
Clint Mack  64 y.o. male      1. Essential hypertension    2. Mixed hyperlipidemia    3. Coronary artery disease involving native coronary artery of native heart without angina pectoris    4. S/P CABG (coronary artery bypass graft)    5. CKD (chronic kidney disease) stage 3, GFR 30-59 ml/min (CMS/[facility])        History of Present Illness  Mr. Mack is here for follow-up of his multiple cardiac issues as discussed above.  He has done reasonably well from a cardiac standpoint and denied any chest pain.  He was admitted following an episode of dizziness/syncope in November 2019 and had extensive work-up including echocardiogram and carotid Doppler studies.  No flow-limiting lesions were noted in the carotid and echocardiogram showed the findings as follows:   · The study is technically difficult for diagnosis. The quality of the study is limited due to poor acoustic windows related to patient body habitus. Definity used  · The left ventricular cavity is mild-to-moderately dilated.  · Left ventricular wall thickness is consistent with mild concentric hypertrophy.  · Estimated EF = 52%.  · Left ventricular systolic function is normal.  · Left ventricular diastolic dysfunction (grade I) consistent with impaired relaxation.  · Right ventricular cavity is mildly dilated.  · Left atrial cavity size is mildly dilated.    Patient apparently had a CT scan of the abdomen which showed an abdominal aneurysm measuring 4.0 cm.    The patient has seen a vascular surgeon in [place] and I understand that after reviewing his results, was informed that his abdominal aneurysm was stable. On abdominal ultrasound it measured 3.8 cm.  He was also informed that since he is on long-term anticoagulation, the IVC filter is stable and does not need to be removed.  However, the patient is not convinced and still has multiple questions with regards to the abdominal aorta, IVC filter, CKD, pain in the lower extremities, difficulty in  swallowing, shortness of breath after eating, abdominal discomfort etc.  The patient unfortunately has fixed ideas about his health condition, medications and I will not be able to convince him otherwise.  He believes that Praluent did cause a side effect of difficulty in swallowing/dyspnea.  However his symptoms have not resolved even after going off this medication since January this year.  No signs of congestive heart failure was noted.  His BNP has been normal in the past.    SUBJECTIVE    Allergies   Allergen Reactions   • Tetrabenazine Dizziness   • Austedo [Deutetrabenazine] Other (See Comments)     Insomnia     • Alfuzosin Other (See Comments)     syncope   • Celexa [Citalopram Hydrobromide] Dystonia   • Crestor [Rosuvastatin Calcium]    • Ingrezza [Valbenazine Tosylate] Other (See Comments)     fatigue   • Lipitor [Atorvastatin] Other (See Comments)     Aches and pains all over   • Lyrica [Pregabalin] Swelling         Past Medical History:   Diagnosis Date   • Anxiety    • Arthritis     osteoarthritis   • CKD (chronic kidney disease), stage III (CMS/[facility])    • COPD (chronic obstructive pulmonary disease) (CMS/[facility])    • Coronary artery disease    • Depression    • Diabetes mellitus (CMS/[facility])    • DVT (deep venous thrombosis) (CMS/[facility])    • Heart disease    • History of embolic filter insertion    • Hyperlipidemia    • Hypertension    • Injury of back     back surgery x3    • LUCIANA on CPAP    • Pulmonary embolism (CMS/[facility])          Past Surgical History:   Procedure Laterality Date   • BACK SURGERY     • CARDIAC SURGERY  2001   • CIRCUMCISION     • COLONOSCOPY N/A 9/11/2018    Procedure: COLONOSCOPY;  Surgeon: Jose C Batres DO;  Location: [facility] ENDOSCOPY;  Service: Gastroenterology   • ENDOSCOPY N/A 9/11/2018    Procedure: ESOPHAGOGASTRODUODENOSCOPY;  Surgeon: Jose C Batres DO;  Location: [facility] ENDOSCOPY;  Service: Gastroenterology   • ENDOSCOPY N/A 3/3/2020    Procedure: ESOPHAGOGASTRODUODENOSCOPY;   "Surgeon: Jose C Batres DO;  Location: [facility] ENDOSCOPY;  Service: Gastroenterology;  Laterality: N/A;   • GALLBLADDER SURGERY  2000   • JOINT REPLACEMENT Right 05/02/2016    hip   • SPINE SURGERY           Family History   Problem Relation Age of Onset   • Heart disease Father    • Hypertension Father    • Stroke Father    • Diabetes Sister    • Heart disease Brother    • Hypertension Brother    • COPD Brother          Social History     Socioeconomic History   • Marital status:      Spouse name: Not on file   • Number of children: Not on file   • Years of education: Not on file   • Highest education level: Not on file   Tobacco Use   • Smoking status: Never Smoker   • Smokeless tobacco: Never Used   Substance and Sexual Activity   • Alcohol use: No   • Drug use: No   • Sexual activity: Defer         Current Outpatient Medications   Medication Sig Dispense Refill   • alfuzosin (UROXATRAL) 10 MG 24 hr tablet Take 10 mg by mouth Daily.     • Alirocumab (PRALUENT) 75 MG/ML solution pen-injector Inject 75 mg under the skin into the appropriate area as directed Every 14 (Fourteen) Days. 6 pen 7   • aspirin 81 MG chewable tablet Chew 81 mg Take As Directed. Take 1 pill by mouth Monday, Wednesday, Friday     • baclofen (LIORESAL) 10 MG tablet Take 10 mg by mouth 3 (Three) Times a Day.     • Blood Glucose Monitoring Suppl (ACCU-CHEK ARGELIA PLUS) w/Device kit      • clonazePAM (KlonoPIN) 1 MG tablet Take 1 tablet by mouth 3 (Three) Times a Day. Or dystonia 90 tablet 5   • COMFORT ASSIST INSULIN SYRINGE 31G X 5/16\" 0.3 ML misc      • furosemide (LASIX) 20 MG tablet Take 20 mg by mouth Daily. prn     • glimepiride (AMARYL) 4 MG tablet Take 4 mg by mouth 2 (Two) Times a Day.     • glucose blood (ONE TOUCH ULTRA TEST) test strip      • HYDROcodone-acetaminophen (NORCO)  MG per tablet Take 1 tablet by mouth. Five times daily     • insulin aspart (novoLOG) 100 UNIT/ML injection Inject 10 Units under the skin 3 " "(Three) Times a Day Before Meals. (Patient taking differently: Inject 10 Units under the skin into the appropriate area as directed. Sliding scale) 10 mL 12   • isosorbide mononitrate (IMDUR) 60 MG 24 hr tablet Take 1 tablet by mouth Daily. (Patient taking differently: Take 30 mg by mouth Daily.) 30 tablet 1   • Lancets (ONETOUCH ULTRASOFT) lancets      • metoprolol tartrate (LOPRESSOR) 25 MG tablet Take 25 mg by mouth 2 (Two) Times a Day.     • Morphine (MS CONTIN) 15 MG 12 hr tablet Take 15 mg by mouth 2 (Two) Times a Day.     • Omega-3 Fatty Acids (FISH OIL) 1200 MG capsule delayed-release Take  by mouth 2 (Two) Times a Day.     • ranolazine (RANEXA) 500 MG 12 hr tablet Take 1 tablet by mouth 2 (Two) Times a Day. 90 tablet 3   • vitamin D (ERGOCALCIFEROL) 76674 UNITS capsule capsule Take 50,000 Units by mouth. Two times monthly     • vitamin E 400 UNIT capsule Take 400 Units by mouth Daily.     • warfarin (COUMADIN) 5 MG tablet Take 5 mg by mouth Every Night. Patient takes 5 mg every day except MWF when he takes 7.5 mg     • ondansetron ODT (ZOFRAN-ODT) 4 MG disintegrating tablet Take 1 tablet by mouth Every 6 (Six) Hours As Needed for Nausea or Vomiting. 4 tablet 0     No current facility-administered medications for this visit.          OBJECTIVE    /79 (BP Location: Left arm, Patient Position: Sitting, Cuff Size: Large Adult)   Pulse 76   Ht 185.4 cm (73\")   Wt 132 kg (292 lb)   SpO2 98%   BMI 38.52 kg/m²         Review of Systems     Constitutional:  Denies recent weight loss, weight gain, fever or chills     HENT:  Denies any hearing loss, epistaxis, hoarseness, or difficulty speaking.     Eyes: Wears eyeglasses or contact lenses     Respiratory:  Dyspnea with exertion,no cough, wheezing, or hemoptysis.     Cardiovascular: Negative for palpations, chest pain.  Chronic leg edema    Gastrointestinal:  Denies change in bowel habits, dyspepsia, ulcer disease, hematochezia, or melena.     Endocrine: " Negative for cold intolerance, heat intolerance, polydipsia, polyphagia and polyuria.    Genitourinary: Negative.      Musculoskeletal: DJD    Skin:  Denies any change in hair or nails, rashes, or skin lesions.     Allergic/Immunologic: Negative.  Negative for environmental allergies, food allergies and immunocompromised state.     Neurological: History of tardive dyskinesia    Hematological: Denies any food allergies, seasonal allergies, bleeding disorders, or lymphadenopathy.     Psychiatric/Behavioral: history of anxiety/depression, no substance abuse, or change in cognitive function.         Physical Exam     Constitutional: Cooperative, alert and oriented, in no acute distress.     HENT:   Head: Normocephalic, normal hair patterns, no masses or tenderness.  Ears, Nose, and Throat: No gross abnormalities. No pallor or cyanosis.   Eyes: EOMS intact, PERRL, conjunctivae and lids unremarkable. Fundoscopic exam and visual fields not performed.   Neck: No palpable masses or adenopathy, no thyromegaly, no JVD, carotid pulses are full and equal bilaterally and without  Bruits.     Cardiovascular: Regular rhythm, S1 and S2 normal, no S3 or S4.  No murmurs, gallops, or rubs detected.     Pulmonary/Chest: Chest: normal symmetry, normal respiratory excursion, no intercostal retraction, no use of accessory muscles.            Pulmonary: Normal breath sounds. No rales or ronchi.    Abdominal: Abdomen soft, bowel sounds normoactive, no masses, no hepatosplenomegaly, non-tender, no bruits.     Musculoskeletal: No deformities, clubbing, cyanosis, erythema, or edema observed.     Neurological:  tardive dyskinesia    Skin: Warm and dry to the touch, no apparent skin lesions or masses noted.     Psychiatric: He has a normal mood and affect. His behavior is normal. Judgment and thought content normal.         Procedures      Lab Results   Component Value Date    WBC 7.52 02/24/2020    HGB 15.2 03/01/2020    HCT 44.9 03/01/2020     MCV 90.5 02/24/2020     02/24/2020     Lab Results   Component Value Date    GLUCOSE 109 (H) 02/24/2020    BUN 22 02/24/2020    CREATININE 1.66 (H) 02/24/2020    EGFRIFNONA 42 (L) 02/24/2020    EGFRIFAFRI 50 11/13/2018    BCR 13.3 02/24/2020    CO2 22.0 02/24/2020    CALCIUM 9.2 02/24/2020    ALBUMIN 3.80 02/24/2020    AST 20 02/24/2020    ALT 27 02/24/2020     Lab Results   Component Value Date    CHOL 113 02/21/2019    CHOL 216 (H) 10/12/2018     Lab Results   Component Value Date    TRIG 182 02/21/2019    TRIG 187 10/12/2018     Lab Results   Component Value Date    HDL 34 02/21/2019    HDL 33 10/12/2018     No components found for: LDLCALC  Lab Results   Component Value Date    LDL 54 02/21/2019     (H) 10/12/2018     No results found for: HDLLDLRATIO  No components found for: CHOLHDL  Lab Results   Component Value Date    HGBA1C 6.6 (H) 12/02/2019     Lab Results   Component Value Date    TSH 2.04 07/22/2019           ASSESSMENT AND PLAN  Mr. Mack has multiple medical issues but fortunately stable from a cardiac standpoint with no definite evidence of angina, arrhythmia or congestive heart failure.  He had several questions with regards to his cardiac, renal, GI, Vascular and neurological status and these were discussed.    I have continued antiplatelet therapy with aspirin, antianginal therapy with isosorbide mononitrate and Ranexa and antihypertensive therapy with metoprolol tartrate and low-dose diuretics.  His renal function is being monitored closely by Dr. Holt.  He will continue follow-up with vascular surgery/neurosurgery.  I understand that he may be considered for spine surgery in the future.  Anticoagulation with warfarin has been continued.  His PT and INR are monitored by Dr. Matute.  Mr. Mack is now off Praluent.  A lipid profile will be checked in a couple weeks.  He is a management challenge because of multiple fixed ideas with regard to his medications and his health  condition, and is overall very suspicious about what the doctors tell him.     Clint was seen today for follow-up.    Diagnoses and all orders for this visit:    Essential hypertension    Mixed hyperlipidemia    Coronary artery disease involving native coronary artery of native heart without angina pectoris    S/P CABG (coronary artery bypass graft)    CKD (chronic kidney disease) stage 3, GFR 30-59 ml/min (CMS/[facility])        Patient's Body mass index is 38.52 kg/m². BMI is above normal parameters. Recommendations include: exercise counseling and nutrition counseling.  Patient is a non-smoker      Keke Kumar MD  4/15/2020  10:25

## 2020-04-27 ENCOUNTER — OFFICE VISIT (OUTPATIENT)
Dept: WOUND CARE | Facility: HOSPITAL | Age: 65
End: 2020-04-27

## 2020-04-27 PROCEDURE — G0463 HOSPITAL OUTPT CLINIC VISIT: HCPCS

## 2020-05-04 ENCOUNTER — APPOINTMENT (OUTPATIENT)
Dept: WOUND CARE | Facility: HOSPITAL | Age: 65
End: 2020-05-04

## 2020-06-03 DIAGNOSIS — M17.11 PRIMARY OSTEOARTHRITIS OF RIGHT KNEE: Primary | ICD-10-CM

## 2020-06-03 DIAGNOSIS — M25.562 LEFT KNEE PAIN, UNSPECIFIED CHRONICITY: ICD-10-CM

## 2020-06-04 ENCOUNTER — OFFICE VISIT (OUTPATIENT)
Dept: ORTHOPEDIC SURGERY | Facility: CLINIC | Age: 65
End: 2020-06-04

## 2020-06-04 VITALS — HEIGHT: 73 IN | WEIGHT: 292 LBS | BODY MASS INDEX: 38.7 KG/M2

## 2020-06-04 DIAGNOSIS — M25.562 CHRONIC PAIN OF LEFT KNEE: ICD-10-CM

## 2020-06-04 DIAGNOSIS — G89.29 CHRONIC PAIN OF RIGHT KNEE: ICD-10-CM

## 2020-06-04 DIAGNOSIS — R53.81 PHYSICAL DECONDITIONING: ICD-10-CM

## 2020-06-04 DIAGNOSIS — M17.11 PRIMARY OSTEOARTHRITIS OF RIGHT KNEE: Primary | ICD-10-CM

## 2020-06-04 DIAGNOSIS — R29.898 WEAKNESS OF RIGHT LEG: ICD-10-CM

## 2020-06-04 DIAGNOSIS — M25.561 CHRONIC PAIN OF RIGHT KNEE: ICD-10-CM

## 2020-06-04 DIAGNOSIS — G89.29 CHRONIC PAIN OF LEFT KNEE: ICD-10-CM

## 2020-06-04 PROCEDURE — 20610 DRAIN/INJ JOINT/BURSA W/O US: CPT | Performed by: NURSE PRACTITIONER

## 2020-06-04 PROCEDURE — 99214 OFFICE O/P EST MOD 30 MIN: CPT | Performed by: NURSE PRACTITIONER

## 2020-06-04 RX ORDER — TAMSULOSIN HYDROCHLORIDE 0.4 MG/1
1 CAPSULE ORAL DAILY
COMMUNITY
End: 2022-01-17 | Stop reason: ALTCHOICE

## 2020-06-04 RX ORDER — FLUTICASONE PROPIONATE 50 MCG
2 SPRAY, SUSPENSION (ML) NASAL DAILY
COMMUNITY
End: 2021-04-14

## 2020-06-04 RX ADMIN — TRIAMCINOLONE ACETONIDE 40 MG: 40 INJECTION, SUSPENSION INTRA-ARTICULAR; INTRAMUSCULAR at 10:44

## 2020-06-04 RX ADMIN — LIDOCAINE HYDROCHLORIDE 2 ML: 10 INJECTION, SOLUTION EPIDURAL; INFILTRATION; INTRACAUDAL; PERINEURAL at 10:44

## 2020-06-04 NOTE — PROGRESS NOTES
Clint Mack is a 64 y.o. male   Primary provider:  Efrain Matute MD       Chief Complaint   Patient presents with   • Right Knee - Follow-up   • Left Knee - Pain   • Establish Care       HISTORY OF PRESENT ILLNESS:    64-year-old male patient presents to office for evaluation of chronic bilateral knee pain.  This has been an ongoing issue for several years.  Patient has been treated in the past for chronic right knee pain per DIANA Butterfield.  Patient continues today to primarily complain of right knee pain but also states he is now having pain in the left knee, which is milder.  He denies any known injury.  Pain in the right knee is described as constant and moderate in severity.  Pain is described as aching in nature with associated swelling and popping sensations.  Pain is worse with movement of the right knee joint.  Patient has long history of lumbar spine issues with prior four back surgeries, related to a work-related injury that occurred in 2005.  He is a minimal ambulator at this point.  He has been followed by Dr. Miles and has had a recent appointment with him in the past month.  He has chronic issues with weakness in his right hip/leg and known significant atrophy of the psoas muscle on the right per MRI.  He has been treated in the past with physical therapy with minimal improvement.  He previously received Orthovisc series of injections into his right knee in November/December 2018, which he states did help.  He then had a Monovisc injection on 9/12/2019, which he states did not help his right knee pain much.  He has not had steroids in several years as he had issues with hyperglycemia and uncontrolled diabetes mellitus in the past. He is now on sliding scale insulin and his blood sugar is much better controlled.  X-rays are obtained in office today of the bilateral knees.  Patient sees Dr. Delgadillo for pain management and is currently being treated with Baclofen and Norco.    Pain    This is a chronic problem. The current episode started more than 1 year ago. The problem occurs constantly. The problem has been gradually worsening. Associated symptoms include abdominal pain, arthralgias, chest pain, chills, a fever, joint swelling, myalgias, nausea, numbness and weakness (Right leg (chronic)). Associated symptoms comments: Aching pain; popping sensations, swelling. Exacerbated by: movement, standing. He has tried ice, heat and oral narcotics (previous injections (Monovisc, Orthovisc)) for the symptoms. The treatment provided mild relief.     CONCURRENT MEDICAL HISTORY:    Past Medical History:   Diagnosis Date   • Anxiety    • Arthritis     osteoarthritis   • CKD (chronic kidney disease), stage III (CMS/[facility])    • COPD (chronic obstructive pulmonary disease) (CMS/[facility])    • Coronary artery disease    • Depression    • Diabetes mellitus (CMS/[facility])    • DVT (deep venous thrombosis) (CMS/[facility])    • Heart disease    • History of embolic filter insertion    • History of stomach ulcers    • Hyperlipidemia    • Hypertension    • Injury of back     back surgery x3    • Lupus (CMS/[facility])    • LUCIANA on CPAP    • Pulmonary embolism (CMS/[facility])        Allergies   Allergen Reactions   • Tetrabenazine Dizziness   • Austedo [Deutetrabenazine] Other (See Comments)     Insomnia     • Alfuzosin Other (See Comments)     syncope   • Celexa [Citalopram Hydrobromide] Dystonia   • Crestor [Rosuvastatin Calcium]    • Ingrezza [Valbenazine Tosylate] Other (See Comments)     fatigue   • Lipitor [Atorvastatin] Other (See Comments)     Aches and pains all over   • Lyrica [Pregabalin] Swelling         Current Outpatient Medications:   •  Alirocumab (PRALUENT) 75 MG/ML solution pen-injector, Inject 75 mg under the skin into the appropriate area as directed Every 14 (Fourteen) Days., Disp: 6 pen, Rfl: 7  •  aspirin 81 MG chewable tablet, Chew 81 mg Take As Directed. Take 1 pill by mouth Monday, Wednesday, Friday, Disp: , Rfl:   •   "baclofen (LIORESAL) 10 MG tablet, Take 10 mg by mouth 3 (Three) Times a Day., Disp: , Rfl:   •  Blood Glucose Monitoring Suppl (ACCU-CHEK ARGELIA PLUS) w/Device kit, , Disp: , Rfl:   •  clonazePAM (KlonoPIN) 1 MG tablet, Take 1 tablet by mouth 3 (Three) Times a Day. Or dystonia, Disp: 90 tablet, Rfl: 5  •  COMFORT ASSIST INSULIN SYRINGE 31G X 5/16\" 0.3 ML misc, , Disp: , Rfl:   •  fluticasone (FLONASE) 50 MCG/ACT nasal spray, 2 sprays into the nostril(s) as directed by provider Daily., Disp: , Rfl:   •  furosemide (LASIX) 20 MG tablet, Take 20 mg by mouth Daily. prn, Disp: , Rfl:   •  glimepiride (AMARYL) 4 MG tablet, Take 4 mg by mouth 2 (Two) Times a Day., Disp: , Rfl:   •  glucose blood (ONE TOUCH ULTRA TEST) test strip, , Disp: , Rfl:   •  HYDROcodone-acetaminophen (NORCO)  MG per tablet, Take 1 tablet by mouth. Five times daily, Disp: , Rfl:   •  insulin aspart (novoLOG) 100 UNIT/ML injection, Inject 10 Units under the skin 3 (Three) Times a Day Before Meals. (Patient taking differently: Inject 10 Units under the skin into the appropriate area as directed. Sliding scale), Disp: 10 mL, Rfl: 12  •  isosorbide mononitrate (IMDUR) 60 MG 24 hr tablet, Take 1 tablet by mouth Daily. (Patient taking differently: Take 30 mg by mouth Daily.), Disp: 30 tablet, Rfl: 1  •  Lancets (ONETOUCH ULTRASOFT) lancets, , Disp: , Rfl:   •  metoprolol tartrate (LOPRESSOR) 25 MG tablet, Take 25 mg by mouth 2 (Two) Times a Day., Disp: , Rfl:   •  Morphine (MS CONTIN) 15 MG 12 hr tablet, Take 15 mg by mouth 2 (Two) Times a Day., Disp: , Rfl:   •  Omega-3 Fatty Acids (FISH OIL) 1200 MG capsule delayed-release, Take  by mouth 2 (Two) Times a Day., Disp: , Rfl:   •  ranolazine (RANEXA) 500 MG 12 hr tablet, Take 1 tablet by mouth 2 (Two) Times a Day., Disp: 90 tablet, Rfl: 3  •  tamsulosin (FLOMAX) 0.4 MG capsule 24 hr capsule, Take 1 capsule by mouth Daily., Disp: , Rfl:   •  vitamin D (ERGOCALCIFEROL) 42034 UNITS capsule capsule, Take " 50,000 Units by mouth. Two times monthly, Disp: , Rfl:   •  warfarin (COUMADIN) 5 MG tablet, Take 5 mg by mouth Every Night. Patient takes 5 mg every day except MWF when he takes 7.5 mg, Disp: , Rfl:     Past Surgical History:   Procedure Laterality Date   • BACK SURGERY     • CARDIAC SURGERY  2001   • CIRCUMCISION     • COLONOSCOPY N/A 9/11/2018    Procedure: COLONOSCOPY;  Surgeon: Jose C Batres DO;  Location: [facility] ENDOSCOPY;  Service: Gastroenterology   • ENDOSCOPY N/A 9/11/2018    Procedure: ESOPHAGOGASTRODUODENOSCOPY;  Surgeon: Jose C Batres DO;  Location: [facility] ENDOSCOPY;  Service: Gastroenterology   • ENDOSCOPY N/A 3/3/2020    Procedure: ESOPHAGOGASTRODUODENOSCOPY;  Surgeon: Jose C Batres DO;  Location: [facility] ENDOSCOPY;  Service: Gastroenterology;  Laterality: N/A;   • GALLBLADDER SURGERY  2000   • HIP SURGERY     • JOINT REPLACEMENT Right 05/02/2016    hip   • SPINE SURGERY         Family History   Problem Relation Age of Onset   • Heart disease Father    • Hypertension Father    • Stroke Father    • Diabetes Sister    • Heart disease Brother    • Hypertension Brother    • COPD Brother    • Lung disease Other        Social History     Socioeconomic History   • Marital status:      Spouse name: Not on file   • Number of children: Not on file   • Years of education: Not on file   • Highest education level: Not on file   Tobacco Use   • Smoking status: Never Smoker   • Smokeless tobacco: Never Used   Substance and Sexual Activity   • Alcohol use: No   • Drug use: No   • Sexual activity: Defer        Review of Systems   Constitutional: Positive for chills and fever.   HENT: Positive for postnasal drip and tinnitus.    Eyes: Negative.    Respiratory: Positive for shortness of breath.    Cardiovascular: Positive for chest pain.   Gastrointestinal: Positive for abdominal pain, diarrhea and nausea.   Endocrine: Negative.    Genitourinary: Positive for difficulty urinating.   Musculoskeletal:  "Positive for arthralgias, back pain, gait problem, joint swelling and myalgias.        Stiffness, muscle pain. Right knee pain. Left knee pain.    Skin: Negative.    Allergic/Immunologic: Negative.    Neurological: Positive for weakness (Right leg (chronic)) and numbness.   Hematological: Bruises/bleeds easily.   Psychiatric/Behavioral: Positive for agitation and sleep disturbance. The patient is nervous/anxious.        PHYSICAL EXAMINATION:       Ht 185.4 cm (73\")   Wt 132 kg (292 lb)   BMI 38.52 kg/m²     Physical Exam   Constitutional: He is oriented to person, place, and time. Vital signs are normal. He appears well-developed and well-nourished. He is cooperative. He does not appear ill. No distress.   HENT:   Head: Normocephalic.   Pulmonary/Chest: Effort normal. No respiratory distress.   Abdominal: Soft. He exhibits no distension.   Musculoskeletal: He exhibits edema (Mild, right knee) and tenderness (Mild, right knee). He exhibits no deformity.        Right knee: He exhibits no effusion.        Left knee: He exhibits no effusion.   Neurological: He is alert and oriented to person, place, and time. GCS eye subscore is 4. GCS verbal subscore is 5. GCS motor subscore is 6.   Skin: Skin is warm, dry and intact. Capillary refill takes less than 2 seconds. No erythema.   Psychiatric: He has a normal mood and affect. His speech is normal and behavior is normal. Judgment and thought content normal. Cognition and memory are normal.   Vitals reviewed.      GAIT:     []  Normal  []  Antalgic    Assistive device: []  None  []  Walker     []  Crutches  []  Cane     [x]  Wheelchair  []  Stretcher    Right Knee Exam     Tenderness   Right knee tenderness location: Mild, diffuse.    Range of Motion   Extension: 5   Flexion: 90     Tests   Tari:  Medial - negative Lateral - negative  Varus: negative Valgus: negative    Other   Erythema: absent  Sensation: normal  Pulse: present  Swelling: mild  Effusion: no effusion " present    Comments:  Pain and limitations with range of motion.  Significant quadricep weakness is noted.  Mild/minimal generalized swelling noted.  No definitive effusion.  No erythema.  No warmth.  No signs of infection noted.      Left Knee Exam     Tenderness   The patient is experiencing no tenderness.     Range of Motion   Extension: 0   Flexion: 110     Tests   Tari:  Medial - negative Lateral - negative  Varus: negative Valgus: negative    Other   Erythema: absent  Sensation: normal  Pulse: present  Swelling: none  Effusion: no effusion present            Xr Knee Bilateral Ap Standing    Result Date: 6/4/2020  Narrative: AP standing view of the bilateral knees reveals no evidence of acute fracture or dislocation.  There are mild degenerative changes noted bilaterally.  There are mild subchondral sclerotic changes noted in the medial compartments bilaterally.  There is prominence and spurring of the tibial spines bilaterally.  Medial and lateral compartmental joint spacing appears well-maintained.  The knee joints appear well-aligned.  Soft tissues appear unremarkable.  No acute bony radiologic abnormalities are noted at this time.  No significant changes are noted when compared with prior images from 11/1/2017.06/04/20 at 5:09 PM by DIANA Kiser     Xr Knee 1 Or 2 View Bilateral    Result Date: 6/4/2020  Narrative: Lateral views of the bilateral knees reveals no evidence of acute fracture or dislocation.  There are mild to moderate degenerative changes at the patellofemoral compartments of each knee.  There are small osteophyte formations noted at the superior and inferior poles of the patellas bilaterally.  The knee joints appear well aligned.  Soft tissues appear unremarkable.  There is a small suprapatellar joint effusion noted in the right knee.  There are small metallic foreign bodies seen in the right posterior leg, presumably from prior vascular surgery.  No acute bony radiologic  abnormalities are noted at this time.  No significant changes are noted in the right knee when compared with prior images from 11/1/2017.  No prior comparison images of the left knee are available for review.06/04/20 at 5:13 PM by DIANA Kiser         ASSESSMENT:    Diagnoses and all orders for this visit:    Primary osteoarthritis of right knee  -     Large Joint Arthrocentesis: R knee    Chronic pain of left knee    Chronic pain of right knee  -     Large Joint Arthrocentesis: R knee    Physical deconditioning    Weakness of right leg    Other orders  -     fluticasone (FLONASE) 50 MCG/ACT nasal spray; 2 sprays into the nostril(s) as directed by provider Daily.  -     tamsulosin (FLOMAX) 0.4 MG capsule 24 hr capsule; Take 1 capsule by mouth Daily.      Large Joint Arthrocentesis: R knee  Date/Time: 6/4/2020 10:44 AM  Consent given by: patient  Timeout: Immediately prior to procedure a time out was called to verify the correct patient, procedure, equipment, support staff and site/side marked as required   Supporting Documentation  Indications: pain, joint swelling and diagnostic evaluation   Procedure Details  Location: knee - R knee  Preparation: Patient was prepped and draped in the usual sterile fashion  Needle size: 22 G  Approach: anterolateral  Medications administered: 2 mL lidocaine PF 1% 1 %; 40 mg triamcinolone acetonide 40 MG/ML  Patient tolerance: patient tolerated the procedure well with no immediate complications      PLAN    AP standing and lateral x-rays of the bilateral knees performed in office today and reviewed with no acute findings noted.  Patient has some mild degenerative changes bilaterally but nothing significant.  Patient had a previous MRI of the right knee performed in 2018, which was normal.  He has complained of right knee pain for several years.  He is having some increased pain in the left knee now.  He denies any recent injuries.  We discussed the possibilities of  degenerative meniscal tears, knee strain, chondromalacia, etc.  We also discussed the possibility that his chronic knee pain is more related to his significant lumbar spine issues and chronic weakness of the right leg/hip.  On previous MRI imaging, he is noted to have had significant atrophy of the psoas muscle.  He is essentially a minimal ambulator now and primarily uses a wheelchair and a walker for transferring.  However, he does report that he had some improvement in the past with Monovisc injections.  These were given in November/December 2018.  Patient later wanted to repeat viscosupplementation injections but wanted to do the single dose injection as he has to pay a co-pay with each series visit.  He had Monovisc injection in the right knee in September 2019, which he does not feel helped his right knee much.  Patient states today he would like to repeat the Visco supplementation injection but wants other brand of single injection, which would be Synvisc One.  We also discussed proceeding with an intra-articular injection of steroid today to the right knee.  Patient has not had these in several years as he had uncontrolled diabetes.  However, he states his diabetes is much better controlled and he is able to cover any hyperglycemia now with sliding scale insulin.  It was decided to proceed today with an intra-articular injection of steroid to the right knee for management of joint pain/inflammation/swelling.  I have instructed the patient to monitor his blood sugar closely, which he states he already checks it 3 times daily.  I instructed him to follow his diabetic diet and use his sliding scale insulin as needed.  Patient understands he will receive a telephone call to schedule the Synvisc One injection, once it has been approved by his insurance.  Patient already takes Baclofen and Norco per pain management for chronic pain.  He cannot take oral NSAIDs due to chronic kidney disease and his current use of  Coumadin.  His treatment options for his right knee pain are limited due to his mobility status and multiple comorbid medical conditions.  Continue with conditioning and strengthening home exercises that he previously learned in PT.  Recommend elevation and ice therapy to the bilateral knees as needed to minimize pain/inflammation/swelling.     Return in about 4 weeks (around 7/2/2020).        This document has been electronically signed by DIANA Kiser on June 7, 2020 08:45      DIANA Kiser

## 2020-06-05 ENCOUNTER — TRANSCRIBE ORDERS (OUTPATIENT)
Dept: INTERVENTIONAL RADIOLOGY/VASCULAR | Facility: HOSPITAL | Age: 65
End: 2020-06-05

## 2020-06-05 DIAGNOSIS — Z01.818 PRE-OP TESTING: Primary | ICD-10-CM

## 2020-06-07 PROBLEM — G89.29 CHRONIC PAIN OF LEFT KNEE: Status: ACTIVE | Noted: 2020-06-07

## 2020-06-07 PROBLEM — R29.898 WEAKNESS OF RIGHT LEG: Status: ACTIVE | Noted: 2020-06-07

## 2020-06-07 PROBLEM — M25.562 CHRONIC PAIN OF LEFT KNEE: Status: ACTIVE | Noted: 2020-06-07

## 2020-06-07 RX ORDER — LIDOCAINE HYDROCHLORIDE 10 MG/ML
2 INJECTION, SOLUTION EPIDURAL; INFILTRATION; INTRACAUDAL; PERINEURAL
Status: COMPLETED | OUTPATIENT
Start: 2020-06-04 | End: 2020-06-04

## 2020-06-07 RX ORDER — TRIAMCINOLONE ACETONIDE 40 MG/ML
40 INJECTION, SUSPENSION INTRA-ARTICULAR; INTRAMUSCULAR
Status: COMPLETED | OUTPATIENT
Start: 2020-06-04 | End: 2020-06-04

## 2020-06-08 ENCOUNTER — OFFICE VISIT (OUTPATIENT)
Dept: PODIATRY | Facility: CLINIC | Age: 65
End: 2020-06-08

## 2020-06-08 VITALS — WEIGHT: 292 LBS | OXYGEN SATURATION: 98 % | BODY MASS INDEX: 38.7 KG/M2 | HEART RATE: 80 BPM | HEIGHT: 73 IN

## 2020-06-08 DIAGNOSIS — E11.42 DIABETIC POLYNEUROPATHY ASSOCIATED WITH TYPE 2 DIABETES MELLITUS (HCC): Primary | ICD-10-CM

## 2020-06-08 DIAGNOSIS — M79.675 PAIN IN TOES OF BOTH FEET: ICD-10-CM

## 2020-06-08 DIAGNOSIS — M79.674 PAIN IN TOES OF BOTH FEET: ICD-10-CM

## 2020-06-08 DIAGNOSIS — B35.1 ONYCHOMYCOSIS: ICD-10-CM

## 2020-06-08 PROCEDURE — 11721 DEBRIDE NAIL 6 OR MORE: CPT | Performed by: PODIATRIST

## 2020-06-08 NOTE — PROGRESS NOTES
Clint Mack  1955  64 y.o. male   BS-132 per patient  A1C: 6.6 per patient  PCP-Dr. Matute 3/12/20    Patient presents today for diabetic nail care.     06/08/2020      Chief Complaint   Patient presents with   • Left Foot - diabetic nail care   • Right Foot - diabetic nail care           History of Present Illness    Clint Mack is a 64 y.o. male presents for f/u diabetic foot exam and nail care.      Past Medical History:   Diagnosis Date   • Anxiety    • Arthritis     osteoarthritis   • CKD (chronic kidney disease), stage III (CMS/[facility])    • COPD (chronic obstructive pulmonary disease) (CMS/[facility])    • Coronary artery disease    • Depression    • Diabetes mellitus (CMS/[facility])    • DVT (deep venous thrombosis) (CMS/[facility])    • Heart disease    • History of embolic filter insertion    • History of stomach ulcers    • Hyperlipidemia    • Hypertension    • Injury of back     back surgery x3    • Lupus (CMS/[facility]CC)    • LUCIANA on CPAP    • Pulmonary embolism (CMS/[facility]CC)          Past Surgical History:   Procedure Laterality Date   • BACK SURGERY     • CARDIAC SURGERY  2001   • CIRCUMCISION     • COLONOSCOPY N/A 9/11/2018    Procedure: COLONOSCOPY;  Surgeon: Jose C Batres DO;  Location: [facility] ENDOSCOPY;  Service: Gastroenterology   • ENDOSCOPY N/A 9/11/2018    Procedure: ESOPHAGOGASTRODUODENOSCOPY;  Surgeon: Jose C Batres DO;  Location: [facility] ENDOSCOPY;  Service: Gastroenterology   • ENDOSCOPY N/A 3/3/2020    Procedure: ESOPHAGOGASTRODUODENOSCOPY;  Surgeon: Jose C Batres DO;  Location: [facility] ENDOSCOPY;  Service: Gastroenterology;  Laterality: N/A;   • GALLBLADDER SURGERY  2000   • HIP SURGERY     • JOINT REPLACEMENT Right 05/02/2016    hip   • SPINE SURGERY           Family History   Problem Relation Age of Onset   • Heart disease Father    • Hypertension Father    • Stroke Father    • Diabetes Sister    • Heart disease Brother    • Hypertension Brother    • COPD Brother    • Lung disease  "Other          Social History     Socioeconomic History   • Marital status:      Spouse name: Not on file   • Number of children: Not on file   • Years of education: Not on file   • Highest education level: Not on file   Tobacco Use   • Smoking status: Never Smoker   • Smokeless tobacco: Never Used   Substance and Sexual Activity   • Alcohol use: No   • Drug use: No   • Sexual activity: Defer         Current Outpatient Medications   Medication Sig Dispense Refill   • Alirocumab (PRALUENT) 75 MG/ML solution pen-injector Inject 75 mg under the skin into the appropriate area as directed Every 14 (Fourteen) Days. 6 pen 7   • aspirin 81 MG chewable tablet Chew 81 mg Take As Directed. Take 1 pill by mouth Monday, Wednesday, Friday     • baclofen (LIORESAL) 10 MG tablet Take 10 mg by mouth 3 (Three) Times a Day.     • Blood Glucose Monitoring Suppl (ACCU-CHEK ARGELIA PLUS) w/Device kit      • clonazePAM (KlonoPIN) 1 MG tablet Take 1 tablet by mouth 3 (Three) Times a Day. Or dystonia 90 tablet 5   • COMFORT ASSIST INSULIN SYRINGE 31G X 5/16\" 0.3 ML misc      • fluticasone (FLONASE) 50 MCG/ACT nasal spray 2 sprays into the nostril(s) as directed by provider Daily.     • furosemide (LASIX) 20 MG tablet Take 20 mg by mouth Daily. prn     • glimepiride (AMARYL) 4 MG tablet Take 4 mg by mouth 2 (Two) Times a Day.     • glucose blood (ONE TOUCH ULTRA TEST) test strip      • HYDROcodone-acetaminophen (NORCO)  MG per tablet Take 1 tablet by mouth. Five times daily     • insulin aspart (novoLOG) 100 UNIT/ML injection Inject 10 Units under the skin 3 (Three) Times a Day Before Meals. (Patient taking differently: Inject 10 Units under the skin into the appropriate area as directed. Sliding scale) 10 mL 12   • isosorbide mononitrate (IMDUR) 60 MG 24 hr tablet Take 1 tablet by mouth Daily. (Patient taking differently: Take 30 mg by mouth Daily.) 30 tablet 1   • Lancets (ONETOUCH ULTRASOFT) lancets      • metoprolol tartrate " "(LOPRESSOR) 25 MG tablet Take 25 mg by mouth 2 (Two) Times a Day.     • Morphine (MS CONTIN) 15 MG 12 hr tablet Take 15 mg by mouth 2 (Two) Times a Day.     • Omega-3 Fatty Acids (FISH OIL) 1200 MG capsule delayed-release Take  by mouth 2 (Two) Times a Day.     • ranolazine (RANEXA) 500 MG 12 hr tablet Take 1 tablet by mouth 2 (Two) Times a Day. 90 tablet 3   • tamsulosin (FLOMAX) 0.4 MG capsule 24 hr capsule Take 1 capsule by mouth Daily.     • vitamin D (ERGOCALCIFEROL) 09126 UNITS capsule capsule Take 50,000 Units by mouth. Two times monthly     • warfarin (COUMADIN) 5 MG tablet Take 5 mg by mouth Every Night. Patient takes 5 mg every day except MWF when he takes 7.5 mg       No current facility-administered medications for this visit.          OBJECTIVE    Pulse 80   Ht 185.4 cm (73\")   Wt 132 kg (292 lb)   SpO2 98%   BMI 38.52 kg/m²       Review of Systems   Constitutional:  Denies recent weight loss, weight gain, fever or chills, no change in exercise tolerance  Musculoskeletal: Toe pain.   Skin:  Thickened nails. Shin discoloration.  Neurological:  Burning sensations to feet b/l.  Psychiatric/Behavioral: Denies depression      Physical Exam    Clint had a diabetic foot exam performed today.      Constitutional: he appears well-developed and well-nourished.   HEENT: Normocephalic. Atraumatic  CV: No tenderness. RRR  Resp: Non-labored respiration. No wheezes.   Psychiatric: he has a normal mood and affect. his   behavior is normal.      Lower Extremity Exam:  Vascular: DP/PT pulses palpable 1+.   Negative hair growth.   1+ perimalleolar edema  Toes cool  Neuro: Protective sensation diminished to lesser toes, b/l.  DTRs intact  Integument: No open wounds  No lesions  Atrophic skin noted b/l with chronic venous stasis dermatitis changes  Mild xerosis  No masses  Musculoskeletal: LE muscle strength 4/5 on left, 3/5 on right  Gait assisted with cane  Semi-rigid hammertoe deformity toes 2-5 b/l.  Nails 1-5 b/l " thickened, elongated with subungual debris. +pain on palpation              ASSESSMENT AND PLAN    Clint was seen today for diabetic nail care and diabetic nail care.    Diagnoses and all orders for this visit:    Diabetic polyneuropathy associated with type 2 diabetes mellitus (CMS/HCC)    Onychomycosis    Pain in toes of both feet      -Comprehensive DM foot exam performed. Pt educated on importance of tight glucose control and daily foot checks.  -Pt educated on padding techniques for rigid hammertoes.  Proper extra depth diabetic shoe gear.  Limit barefoot walking.  -Nails 1-5 b/l debrided in length and thickness with nail nipper to decrease pain, fungal load and risk of infection  -Follow up 3 months PRN          This document has been electronically signed by Jose C Novak DPM on June 8, 2020 12:55     EMR Dragon/Transcription disclaimer:   Much of this encounter note is an electronic transcription/translation of spoken language to printed text. The electronic translation of spoken language may permit erroneous, or at times, nonsensical words or phrases to be inadvertently transcribed; Although I have reviewed the note for such errors, some may still exist.    Jose C Novak DPM  6/8/2020  12:55

## 2020-06-09 LAB — SARS-COV-2 RNA PNL SPEC NAA+PROBE: NOT DETECTED

## 2020-06-09 PROCEDURE — 87635 SARS-COV-2 COVID-19 AMP PRB: CPT | Performed by: FAMILY MEDICINE

## 2020-06-10 ENCOUNTER — HOSPITAL ENCOUNTER (OUTPATIENT)
Dept: GENERAL RADIOLOGY | Facility: HOSPITAL | Age: 65
Discharge: HOME | End: 2020-06-10
Admitting: SURGERY

## 2020-06-10 DIAGNOSIS — K31.84 GASTROPARESIS: ICD-10-CM

## 2020-06-10 DIAGNOSIS — Z01.818 PRE-OP TESTING: ICD-10-CM

## 2020-06-10 PROCEDURE — A9270 NON-COVERED ITEM OR SERVICE: HCPCS | Performed by: SURGERY

## 2020-06-10 PROCEDURE — 63710000001 BARIUM SULFATE 96 % RECONSTITUTED SUSPENSION: Performed by: SURGERY

## 2020-06-10 PROCEDURE — 74246 X-RAY XM UPR GI TRC 2CNTRST: CPT

## 2020-06-10 RX ADMIN — BARIUM SULFATE 240 ML: 960 POWDER, FOR SUSPENSION ORAL at 08:53

## 2020-06-11 ENCOUNTER — OFFICE VISIT (OUTPATIENT)
Dept: SLEEP MEDICINE | Facility: HOSPITAL | Age: 65
End: 2020-06-11

## 2020-06-11 DIAGNOSIS — G25.81 RESTLESS LEGS SYNDROME (RLS): ICD-10-CM

## 2020-06-11 DIAGNOSIS — G47.33 OBSTRUCTIVE SLEEP APNEA, ADULT: Primary | ICD-10-CM

## 2020-06-11 DIAGNOSIS — F41.9 ANXIETY: ICD-10-CM

## 2020-06-11 PROCEDURE — 99443 PR PHYS/QHP TELEPHONE EVALUATION 21-30 MIN: CPT | Performed by: INTERNAL MEDICINE

## 2020-06-11 RX ORDER — CLONAZEPAM 1 MG/1
1 TABLET ORAL 3 TIMES DAILY
Qty: 90 TABLET | Refills: 5 | Status: SHIPPED | OUTPATIENT
Start: 2020-06-11 | End: 2020-12-30

## 2020-06-11 RX ORDER — QUETIAPINE FUMARATE 25 MG/1
25 TABLET, FILM COATED ORAL NIGHTLY
Qty: 30 TABLET | Refills: 1 | Status: SHIPPED | OUTPATIENT
Start: 2020-06-11 | End: 2020-10-14 | Stop reason: ALTCHOICE

## 2020-06-11 NOTE — PROGRESS NOTES
Sleep Clinic Telephonic Follow Up    Date: 2020  Primary Care Physician: Efrain Matute MD    Last office visit: 2019 (I reviewed the note from this day for accuracy and history.  Any portions or data from that note that have been used in this note have been checked for accuracy and the appropriate changes have been made to this note wherever necessary.)    CC: Follow up: LUCIANA, RLS      This visit was conducted by telephone given the recent COVID-19 pandemic. Consent was given by the patient to conduct this visit by telephone       Sleep Testin. PSG on , AHI of 29   2. Currently on 12.5 cm H2O    Assessment and Plan:    1. Obstructive sleep apnea - stable chronic illness and Established, stable (1)  1. Compliant and improved with PAP therapy  2. Continue PAP as prescribed.   3. Script for PAP supplies  2. Nocturnal hypoxia - on 2L O2 with CPAP since  - stable  1. Continue 2L O2  3. RLS - stable, chronic illness  1. Klonopin 1mg po TID  4. Tardive dyskinesia - stable, controlled, chronic illness  1. Stable on klonopin 1 mg po TID  5. Lumbago - chronic illness, worsening  1. On Morphine 30 mg p.o. twice daily  2. Norco 10 mg 4 times a day was reduced to 5 mg twice a day  6. Excessive daytime sleepiness and sleep maintenance insomnia -new problem further work-up planned  1. Sleep onset insomnia may be secondary to under control pain  2. Also admits to having some worsening anxiety  3. Will try Seroquel 25 mg p.o. Nightly  1. Patient does have tardive dyskinesia and this will need to be monitored.  He is to call if he has any change in symptoms      Interim History:  Since the last visit:    1) severe LUCIANA -  Clint Huitron Mack reports compliance with CPAP. He denies mask and machine issues, dry mouth, headaches, or pressures intolerance. He denies abnormal dreams, sleep paralysis, nasal congestion, URI sx.    PAP Data: Pending  Mask type: Full face mask  DME: [facility]    Bed time:  "2000  Number of times awakens during the night: 1-2  Wake time: 9465-2598 and then difficulty going back to sleep because of pain.  He lives a CPAP machine on during this time.  He is to get a bring the bottle but lately is 90 to me getting out of bed secondary to the pain  Estimated total sleep time at night: 3-5 hours  Nap time: None during the day despite the short sleep time at night     PMHx, FH, SH reviewed and pertinent changes are: Medication changes as above.        REVIEW OF SYSTEMS:   Negative for chest pain, fever, cough, SOA, abdominal pain. Smoking:none        Exam:  There were no vitals filed for this visit.    Gen:  No acute distress heard invoice, alert, oriented    Past Medical History:   Diagnosis Date   • Anxiety    • Arthritis     osteoarthritis   • CKD (chronic kidney disease), stage III (CMS/[facility])    • COPD (chronic obstructive pulmonary disease) (CMS/[facility])    • Coronary artery disease    • Depression    • Diabetes mellitus (CMS/HCC)    • DVT (deep venous thrombosis) (CMS/[facility])    • Heart disease    • History of embolic filter insertion    • History of stomach ulcers    • Hyperlipidemia    • Hypertension    • Injury of back     back surgery x3    • Lupus (CMS/HCC)    • LUCIANA on CPAP    • Pulmonary embolism (CMS/[facility])        Current Outpatient Medications:   •  Alirocumab (PRALUENT) 75 MG/ML solution pen-injector, Inject 75 mg under the skin into the appropriate area as directed Every 14 (Fourteen) Days., Disp: 6 pen, Rfl: 7  •  aspirin 81 MG chewable tablet, Chew 81 mg Take As Directed. Take 1 pill by mouth Monday, Wednesday, Friday, Disp: , Rfl:   •  baclofen (LIORESAL) 10 MG tablet, Take 10 mg by mouth 3 (Three) Times a Day., Disp: , Rfl:   •  Blood Glucose Monitoring Suppl (ACCU-CHEK ARGELIA PLUS) w/Device kit, , Disp: , Rfl:   •  clonazePAM (KlonoPIN) 1 MG tablet, Take 1 tablet by mouth 3 (Three) Times a Day. Or dystonia, Disp: 90 tablet, Rfl: 5  •  COMFORT ASSIST INSULIN SYRINGE 31G X 5/16\" 0.3 " ML misc, , Disp: , Rfl:   •  fluticasone (FLONASE) 50 MCG/ACT nasal spray, 2 sprays into the nostril(s) as directed by provider Daily., Disp: , Rfl:   •  furosemide (LASIX) 20 MG tablet, Take 20 mg by mouth Daily. prn, Disp: , Rfl:   •  glimepiride (AMARYL) 4 MG tablet, Take 4 mg by mouth 2 (Two) Times a Day., Disp: , Rfl:   •  glucose blood (ONE TOUCH ULTRA TEST) test strip, , Disp: , Rfl:   •  HYDROcodone-acetaminophen (NORCO)  MG per tablet, Take 1 tablet by mouth. Five times daily, Disp: , Rfl:   •  insulin aspart (novoLOG) 100 UNIT/ML injection, Inject 10 Units under the skin 3 (Three) Times a Day Before Meals. (Patient taking differently: Inject 10 Units under the skin into the appropriate area as directed. Sliding scale), Disp: 10 mL, Rfl: 12  •  isosorbide mononitrate (IMDUR) 60 MG 24 hr tablet, Take 1 tablet by mouth Daily. (Patient taking differently: Take 30 mg by mouth Daily.), Disp: 30 tablet, Rfl: 1  •  Lancets (ONETOUCH ULTRASOFT) lancets, , Disp: , Rfl:   •  metoprolol tartrate (LOPRESSOR) 25 MG tablet, Take 25 mg by mouth 2 (Two) Times a Day., Disp: , Rfl:   •  Morphine (MS CONTIN) 15 MG 12 hr tablet, Take 15 mg by mouth 2 (Two) Times a Day., Disp: , Rfl:   •  Omega-3 Fatty Acids (FISH OIL) 1200 MG capsule delayed-release, Take  by mouth 2 (Two) Times a Day., Disp: , Rfl:   •  ranolazine (RANEXA) 500 MG 12 hr tablet, Take 1 tablet by mouth 2 (Two) Times a Day., Disp: 90 tablet, Rfl: 3  •  tamsulosin (FLOMAX) 0.4 MG capsule 24 hr capsule, Take 1 capsule by mouth Daily., Disp: , Rfl:   •  vitamin D (ERGOCALCIFEROL) 00498 UNITS capsule capsule, Take 50,000 Units by mouth. Two times monthly, Disp: , Rfl:   •  warfarin (COUMADIN) 5 MG tablet, Take 5 mg by mouth Every Night. Patient takes 5 mg every day except MWF when he takes 7.5 mg, Disp: , Rfl:     I obtained a brief history from the patient, reviewed the medical problems and current medications, and made medical decisions regarding treatment  based on that information. Total visit time 30 min, with total of 25 minutes spent counseling patient regarding:  PAP therapy and pain control, and treatment of his anxiety.  He will follow-up with Dr. Stoddard here.  Again we will start Seroquel and he will need to be monitored closely for side effects and effectiveness.  I will send a copy of this to my counterpart so he is aware when patient calls back.    Patient was informed of my departure from [facility] this month. he understands his results will be followed by another practitioner here at [facility], [place].    Return to clinic in 3 to 6 months     This document has been electronically signed by Jerome Che MD on June 11, 2020         CC: Efrain Matute MD          No ref. provider found

## 2020-06-12 ENCOUNTER — HOSPITAL ENCOUNTER (OUTPATIENT)
Dept: NUCLEAR MEDICINE | Facility: HOSPITAL | Age: 65
Discharge: HOME | End: 2020-06-12

## 2020-06-12 DIAGNOSIS — K31.84 GASTROPARESIS: ICD-10-CM

## 2020-06-12 PROCEDURE — 0 TECHNETIUM SULFUR COLLOID: Performed by: SURGERY

## 2020-06-12 PROCEDURE — A9541 TC99M SULFUR COLLOID: HCPCS | Performed by: SURGERY

## 2020-06-12 PROCEDURE — 78264 GASTRIC EMPTYING IMG STUDY: CPT

## 2020-06-12 RX ADMIN — TECHNETIUM TC 99M SULFUR COLLOID 1 DOSE: KIT at 09:04

## 2020-06-26 ENCOUNTER — OFFICE VISIT (OUTPATIENT)
Dept: CARDIAC SURGERY | Facility: CLINIC | Age: 65
End: 2020-06-26

## 2020-06-26 VITALS
HEART RATE: 75 BPM | DIASTOLIC BLOOD PRESSURE: 86 MMHG | WEIGHT: 292 LBS | BODY MASS INDEX: 38.7 KG/M2 | HEIGHT: 73 IN | SYSTOLIC BLOOD PRESSURE: 142 MMHG | OXYGEN SATURATION: 98 %

## 2020-06-26 DIAGNOSIS — Z95.828 PRESENCE OF IVC FILTER: ICD-10-CM

## 2020-06-26 DIAGNOSIS — I71.40 ABDOMINAL AORTIC ANEURYSM (AAA) WITHOUT RUPTURE (HCC): Primary | ICD-10-CM

## 2020-06-26 PROCEDURE — 99213 OFFICE O/P EST LOW 20 MIN: CPT | Performed by: NURSE PRACTITIONER

## 2020-06-26 NOTE — PROGRESS NOTES
CVTS Office Progress Note     Subjective   Patient ID: Clint Mack is a 64 y.o. male as new patient to establish care for prior IVC filter placement    Chief Complaint:    Chief Complaint   Patient presents with   • Follow-up     aaa     HPI    PCP:  Efrain Matute MD     Mr. Mack is a pleasant 63-year-old male with a history of hypertension, CAD, hyperlipidemia, DVT, pulmonary embolus, obstructive sleep apnea, obesity, tardive dyskinesia, depression, anxiety, osteoarthritis, CKD3.  He establishes today as a new patient after he became concerned about his IVC filter and the possibility of defective hardware.  Patient has been on lifelong anticoagulation since 2009 for recurrent DVT, PE with Coumadin.  Required hip replacement 2014 necessitating IVC filter placement prior to procedure.  Patient apparently received notification of the potential of defective IVC filter device and obtained outpatient CT scan in [place].  Dr. Carrizales reviewed CT last year, no further intervention recommended.  Follows up today with repeat IVC/Aorta duplex.    2008 DVT  2009 PE Life long anticoagulation instituted with warfarin  2014 ORIF Hip Replacement  2014 IVC Filter Placement  2019 CT Abd/Pelv WO contrast: One filter leg potentially extraluminal, small 27mm infrarenal AAA, CT reviewed by Dr. Carrizales  2/2020 CTA abdomen/Pelvis: IVC filter placement, 1 leg extraluminal, infrarenal aorta 29mm  6/2020 Aorta/IVC Ultrasound: distal aorta 24mm, limited due to bowel gas/body habitus     The following portions of the patient's history were reviewed and updated as appropriate: allergies, current medications, past family history, past medical history, past social history, past surgical history and problem list.  Recent images independently reviewed.  Available laboratory values reviewed.      Past Medical History:   Diagnosis Date   • Anxiety    • Arthritis     osteoarthritis   • CKD (chronic kidney disease), stage III  (CMS/HCC)    • COPD (chronic obstructive pulmonary disease) (CMS/HCC)    • Coronary artery disease    • Depression    • Diabetes mellitus (CMS/HCC)    • DVT (deep venous thrombosis) (CMS/HCC)    • Heart disease    • History of embolic filter insertion    • History of stomach ulcers    • Hyperlipidemia    • Hypertension    • Injury of back     back surgery x3    • Lupus (CMS/HCC)    • LUCIANA on CPAP    • Pulmonary embolism (CMS/HCC)      Past Surgical History:   Procedure Laterality Date   • BACK SURGERY     • CARDIAC SURGERY  2001   • CIRCUMCISION     • COLONOSCOPY N/A 9/11/2018    Procedure: COLONOSCOPY;  Surgeon: Jose C Batres DO;  Location: [facility] ENDOSCOPY;  Service: Gastroenterology   • ENDOSCOPY N/A 9/11/2018    Procedure: ESOPHAGOGASTRODUODENOSCOPY;  Surgeon: Jose C Batres DO;  Location: [facility] ENDOSCOPY;  Service: Gastroenterology   • ENDOSCOPY N/A 3/3/2020    Procedure: ESOPHAGOGASTRODUODENOSCOPY;  Surgeon: Jose C Batres DO;  Location: [facility] ENDOSCOPY;  Service: Gastroenterology;  Laterality: N/A;   • GALLBLADDER SURGERY  2000   • HIP SURGERY     • JOINT REPLACEMENT Right 05/02/2016    hip   • SPINE SURGERY       Family History   Problem Relation Age of Onset   • Heart disease Father    • Hypertension Father    • Stroke Father    • Diabetes Sister    • Heart disease Brother    • Hypertension Brother    • COPD Brother    • Lung disease Other      Social History     Tobacco Use   • Smoking status: Never Smoker   • Smokeless tobacco: Never Used   Substance Use Topics   • Alcohol use: No   • Drug use: No       ALLERGIES:   Tetrabenazine; Alfuzosin; Austedo [deutetrabenazine]; Lyrica [pregabalin]; Celexa [citalopram hydrobromide]; Crestor [rosuvastatin calcium]; Ingrezza [valbenazine tosylate]; and Lipitor [atorvastatin]    MEDICATIONS:      Current Outpatient Medications:   •  Alirocumab (PRALUENT) 75 MG/ML solution pen-injector, Inject 75 mg under the skin into the appropriate area as directed  "Every 14 (Fourteen) Days., Disp: 6 pen, Rfl: 7  •  aspirin 81 MG chewable tablet, Chew 81 mg Take As Directed. Take 1 pill by mouth Monday, Wednesday, Friday, Disp: , Rfl:   •  baclofen (LIORESAL) 10 MG tablet, Take 10 mg by mouth 3 (Three) Times a Day., Disp: , Rfl:   •  Blood Glucose Monitoring Suppl (ACCU-CHEK ARGELIA PLUS) w/Device kit, , Disp: , Rfl:   •  clonazePAM (KlonoPIN) 1 MG tablet, Take 1 tablet by mouth 3 (Three) Times a Day. Or dystonia, Disp: 90 tablet, Rfl: 5  •  COMFORT ASSIST INSULIN SYRINGE 31G X 5/16\" 0.3 ML misc, , Disp: , Rfl:   •  fluticasone (FLONASE) 50 MCG/ACT nasal spray, 2 sprays into the nostril(s) as directed by provider Daily., Disp: , Rfl:   •  furosemide (LASIX) 20 MG tablet, Take 20 mg by mouth Daily. prn, Disp: , Rfl:   •  glimepiride (AMARYL) 4 MG tablet, Take 4 mg by mouth 2 (Two) Times a Day., Disp: , Rfl:   •  glucose blood (ONE TOUCH ULTRA TEST) test strip, , Disp: , Rfl:   •  HYDROcodone-acetaminophen (NORCO)  MG per tablet, Take 1 tablet by mouth. Five times daily, Disp: , Rfl:   •  insulin aspart (novoLOG) 100 UNIT/ML injection, Inject 10 Units under the skin 3 (Three) Times a Day Before Meals. (Patient taking differently: Inject 10 Units under the skin into the appropriate area as directed. Sliding scale), Disp: 10 mL, Rfl: 12  •  isosorbide mononitrate (IMDUR) 60 MG 24 hr tablet, Take 1 tablet by mouth Daily. (Patient taking differently: Take 30 mg by mouth Daily.), Disp: 30 tablet, Rfl: 1  •  Lancets (ONETOUCH ULTRASOFT) lancets, , Disp: , Rfl:   •  metoprolol tartrate (LOPRESSOR) 25 MG tablet, Take 25 mg by mouth 2 (Two) Times a Day., Disp: , Rfl:   •  Morphine (MS CONTIN) 15 MG 12 hr tablet, Take 15 mg by mouth 2 (Two) Times a Day., Disp: , Rfl:   •  Omega-3 Fatty Acids (FISH OIL) 1200 MG capsule delayed-release, Take  by mouth 2 (Two) Times a Day., Disp: , Rfl:   •  QUEtiapine (SEROquel) 25 MG tablet, Take 1 tablet by mouth Every Night., Disp: 30 tablet, Rfl: " 1  •  ranolazine (RANEXA) 500 MG 12 hr tablet, Take 1 tablet by mouth 2 (Two) Times a Day., Disp: 90 tablet, Rfl: 3  •  tamsulosin (FLOMAX) 0.4 MG capsule 24 hr capsule, Take 1 capsule by mouth Daily., Disp: , Rfl:   •  vitamin D (ERGOCALCIFEROL) 84304 UNITS capsule capsule, Take 50,000 Units by mouth. Two times monthly, Disp: , Rfl:   •  warfarin (COUMADIN) 5 MG tablet, Take 5 mg by mouth Every Night. Patient takes 5 mg every day except MWF when he takes 7.5 mg, Disp: , Rfl:     Review of Systems   Constitution: Negative for chills, decreased appetite, fever and weight loss.   HENT: Negative for congestion, nosebleeds and sore throat.    Eyes: Negative for blurred vision, visual disturbance and visual halos.   Cardiovascular: Positive for dyspnea on exertion (conditioning) and leg swelling (mild chronic). Negative for chest pain.   Respiratory: Negative for cough, shortness of breath, sputum production and wheezing.    Endocrine: Negative for cold intolerance and polyuria.   Hematologic/Lymphatic: Negative for bleeding problem. Bruises/bleeds easily.        Does not report S/S of adverse bleeding event including nose bleeds, hematuria, melena, gingival bleeding, or hematemesis.   Skin: Negative for flushing, nail changes and unusual hair distribution.   Musculoskeletal: Positive for arthritis, back pain and joint pain.   Gastrointestinal: Negative for bloating, abdominal pain, hematemesis, melena, nausea and vomiting.   Genitourinary: Negative for flank pain and hematuria.   Neurological: Positive for tremors. Negative for brief paralysis, difficulty with concentration, focal weakness, light-headedness, loss of balance, numbness, paresthesias and weakness.        Repetitive head motions, tongue motions, reported history of tardive dyskinesia    No amaurosis fugax, TIA, or CVA.     Psychiatric/Behavioral: Negative for altered mental status, depression, substance abuse and suicidal ideas.   Allergic/Immunologic:  Negative for hives and persistent infections.     Vitals:    06/26/20 0833   BP: 142/86   Pulse: 75   SpO2: 98%      Objective   Heart Rate:  [75] 75  BP: (142)/(86) 142/86  Body mass index is 38.52 kg/m².  Physical Exam   Constitutional: He is oriented to person, place, and time. He appears well-developed and well-nourished.   HENT:   Head: Normocephalic and atraumatic.   Mouth/Throat: Oropharynx is clear and moist.   Eyes: Pupils are equal, round, and reactive to light. Conjunctivae and EOM are normal. No scleral icterus.   Neck: Normal range of motion. Neck supple. No JVD present.   Cardiovascular: Normal rate, regular rhythm, normal heart sounds and intact distal pulses.   No murmur heard.  Pulmonary/Chest: Effort normal and breath sounds normal. No stridor. No respiratory distress.   Abdominal: Soft. Bowel sounds are normal. He exhibits no distension. There is no tenderness.   Genitourinary:   Genitourinary Comments: deferred   Musculoskeletal: Normal range of motion. He exhibits edema (mild BLE). He exhibits no tenderness.   Lymphadenopathy:     He has no cervical adenopathy.   Neurological: He is alert and oriented to person, place, and time. No cranial nerve deficit. Coordination normal.   Repetitive motions of head and tongue observed.  Reported history of tardive dyskinesia   Skin: Skin is warm and dry. Capillary refill takes less than 2 seconds.   Psychiatric: He has a normal mood and affect. His behavior is normal. Judgment normal.   Nursing note and vitals reviewed.        Assessment & Plan     Independent Review of Studies  6/2020 Aorta/IVC Ultrasound: distal aorta 24mm, limited due to bowel gas/body habitus    1. Abdominal aortic aneurysm (AAA) without rupture (CMS/HCC)  Mild abdominal aorta ectasia.  Repeat duplex in one year with surveillance of IVC filter.   Recommend SBP goal of <120.     - Duplex Aorta IVC Iliac Graft Limited CAR; Future    2. Presence of IVC filter  Flow above and below IVC,  recent CT of abdomen 2/2020.  Few legs possibly extraluminal, does not appear to impact adjacent structures.  No need for intervention at this time.     Repeat IVC duplex in one year    - Duplex Aorta IVC Iliac Graft Limited CAR; Future      Detailed discussion regarding risks, benefits, and treatment plan. Images independently reviewed. Patient understands, agrees, and wishes to proceed with plan.

## 2020-07-09 ENCOUNTER — TELEPHONE (OUTPATIENT)
Dept: SLEEP MEDICINE | Facility: HOSPITAL | Age: 65
End: 2020-07-09

## 2020-07-09 NOTE — TELEPHONE ENCOUNTER
Pt called stating he had been on 25 mg Seroquel for about a month and has been experiencing tongue and head shaking and thinks its due to medication. Pt contacted pharmacy who told him to call his doctor. Pt was placed on Medication by Dr Che and has only been on it a month. I spoke with my supervisor Radha, who told me to tell him to discontinue medication. Patient voiced understanding.

## 2020-09-08 ENCOUNTER — OFFICE VISIT (OUTPATIENT)
Dept: PODIATRY | Facility: CLINIC | Age: 65
End: 2020-09-08

## 2020-09-08 VITALS — OXYGEN SATURATION: 95 % | BODY MASS INDEX: 38.83 KG/M2 | HEART RATE: 90 BPM | HEIGHT: 73 IN | WEIGHT: 293 LBS

## 2020-09-08 DIAGNOSIS — E11.42 DIABETIC POLYNEUROPATHY ASSOCIATED WITH TYPE 2 DIABETES MELLITUS (HCC): Primary | ICD-10-CM

## 2020-09-08 DIAGNOSIS — B35.1 ONYCHOMYCOSIS: ICD-10-CM

## 2020-09-08 DIAGNOSIS — M79.675 PAIN IN TOES OF BOTH FEET: ICD-10-CM

## 2020-09-08 DIAGNOSIS — B35.3 TINEA PEDIS OF BOTH FEET: ICD-10-CM

## 2020-09-08 DIAGNOSIS — M79.674 PAIN IN TOES OF BOTH FEET: ICD-10-CM

## 2020-09-08 PROCEDURE — 99213 OFFICE O/P EST LOW 20 MIN: CPT | Performed by: PODIATRIST

## 2020-09-08 PROCEDURE — 11721 DEBRIDE NAIL 6 OR MORE: CPT | Performed by: PODIATRIST

## 2020-09-08 RX ORDER — TRAZODONE HYDROCHLORIDE 50 MG/1
TABLET ORAL 2 TIMES DAILY
COMMUNITY
Start: 2020-08-22 | End: 2021-03-23

## 2020-09-08 RX ORDER — CLOTRIMAZOLE AND BETAMETHASONE DIPROPIONATE 10; .64 MG/G; MG/G
CREAM TOPICAL 2 TIMES DAILY
Qty: 45 G | Refills: 1 | Status: SHIPPED | OUTPATIENT
Start: 2020-09-08 | End: 2020-10-14 | Stop reason: ALTCHOICE

## 2020-09-08 RX ORDER — GABAPENTIN 100 MG/1
100 CAPSULE ORAL 2 TIMES DAILY
COMMUNITY
Start: 2020-08-10

## 2020-09-08 NOTE — PROGRESS NOTES
Clint Mack  1955  64 y.o. male   BS-136 per patient  A1C: 6.6 per patient  PCP-Dr. Matute 07/30/2020 ( telehealth )    Patient presents today for diabetic foot care.     09/08/2020        Chief Complaint   Patient presents with   • Right Foot - Diabetic foot care   • Left Foot - Diabetic foot care           History of Present Illness    Clint Mack is a 64 y.o. male presents for f/u diabetic foot exam and nail care.  Does note some newer onset red scaly skin changes to his toes and bottoms of his feet.    Past Medical History:   Diagnosis Date   • Anxiety    • Arthritis     osteoarthritis   • CKD (chronic kidney disease), stage III (CMS/[facility])    • COPD (chronic obstructive pulmonary disease) (CMS/[facility])    • Coronary artery disease    • Depression    • Diabetes mellitus (CMS/[facility])    • DVT (deep venous thrombosis) (CMS/[facility])    • Heart disease    • History of embolic filter insertion    • History of stomach ulcers    • Hyperlipidemia    • Hypertension    • Injury of back     back surgery x3    • Lupus (CMS/[facility])    • LUCIANA on CPAP    • Pulmonary embolism (CMS/[facility])          Past Surgical History:   Procedure Laterality Date   • BACK SURGERY     • CARDIAC SURGERY  2001   • CIRCUMCISION     • COLONOSCOPY N/A 9/11/2018    Procedure: COLONOSCOPY;  Surgeon: Jose C Batres DO;  Location: [facility] ENDOSCOPY;  Service: Gastroenterology   • ENDOSCOPY N/A 9/11/2018    Procedure: ESOPHAGOGASTRODUODENOSCOPY;  Surgeon: Jose C Batres DO;  Location: [facility] ENDOSCOPY;  Service: Gastroenterology   • ENDOSCOPY N/A 3/3/2020    Procedure: ESOPHAGOGASTRODUODENOSCOPY;  Surgeon: Jose C Batres DO;  Location: [facility] ENDOSCOPY;  Service: Gastroenterology;  Laterality: N/A;   • GALLBLADDER SURGERY  2000   • HIP SURGERY     • JOINT REPLACEMENT Right 05/02/2016    hip   • SPINE SURGERY           Family History   Problem Relation Age of Onset   • Heart disease Father    • Hypertension Father    • Stroke Father    •  "Diabetes Sister    • Heart disease Brother    • Hypertension Brother    • COPD Brother    • Lung disease Other          Social History     Socioeconomic History   • Marital status:      Spouse name: Not on file   • Number of children: Not on file   • Years of education: Not on file   • Highest education level: Not on file   Tobacco Use   • Smoking status: Never Smoker   • Smokeless tobacco: Never Used   Substance and Sexual Activity   • Alcohol use: No   • Drug use: No   • Sexual activity: Defer         Current Outpatient Medications   Medication Sig Dispense Refill   • Alirocumab (PRALUENT) 75 MG/ML solution pen-injector Inject 75 mg under the skin into the appropriate area as directed Every 14 (Fourteen) Days. 6 pen 7   • aspirin 81 MG chewable tablet Chew 81 mg Take As Directed. Take 1 pill by mouth Monday, Wednesday, Friday     • baclofen (LIORESAL) 10 MG tablet Take 10 mg by mouth 3 (Three) Times a Day.     • Blood Glucose Monitoring Suppl (ACCU-CHEK ARGELIA PLUS) w/Device kit      • clonazePAM (KlonoPIN) 1 MG tablet Take 1 tablet by mouth 3 (Three) Times a Day. Or dystonia 90 tablet 5   • COMFORT ASSIST INSULIN SYRINGE 31G X 5/16\" 0.3 ML misc      • fluticasone (FLONASE) 50 MCG/ACT nasal spray 2 sprays into the nostril(s) as directed by provider Daily.     • furosemide (LASIX) 20 MG tablet Take 20 mg by mouth Daily. prn     • gabapentin (NEURONTIN) 100 MG capsule Take 100 mg by mouth 2 (Two) Times a Day.     • glimepiride (AMARYL) 4 MG tablet Take 4 mg by mouth 2 (Two) Times a Day.     • glucose blood (ONE TOUCH ULTRA TEST) test strip      • HYDROcodone-acetaminophen (NORCO)  MG per tablet Take 1 tablet by mouth. Five times daily     • insulin aspart (novoLOG) 100 UNIT/ML injection Inject 10 Units under the skin 3 (Three) Times a Day Before Meals. (Patient taking differently: Inject 10 Units under the skin into the appropriate area as directed. Sliding scale) 10 mL 12   • isosorbide mononitrate " "(IMDUR) 60 MG 24 hr tablet Take 1 tablet by mouth Daily. (Patient taking differently: Take 30 mg by mouth Daily.) 30 tablet 1   • Lancets (ONETOUCH ULTRASOFT) lancets      • metoprolol tartrate (LOPRESSOR) 25 MG tablet Take 25 mg by mouth 2 (Two) Times a Day.     • Morphine (MS CONTIN) 15 MG 12 hr tablet Take 15 mg by mouth 2 (Two) Times a Day.     • Omega-3 Fatty Acids (FISH OIL) 1200 MG capsule delayed-release Take  by mouth 2 (Two) Times a Day.     • ranolazine (RANEXA) 500 MG 12 hr tablet Take 1 tablet by mouth 2 (Two) Times a Day. 90 tablet 3   • tamsulosin (FLOMAX) 0.4 MG capsule 24 hr capsule Take 1 capsule by mouth Daily.     • traZODone (DESYREL) 50 MG tablet      • vitamin D (ERGOCALCIFEROL) 01577 UNITS capsule capsule Take 50,000 Units by mouth. Two times monthly     • warfarin (COUMADIN) 5 MG tablet Take 5 mg by mouth Every Night. Patient takes 5 mg every day except MWF when he takes 7.5 mg     • clotrimazole-betamethasone (Lotrisone) 1-0.05 % cream Apply  topically to the appropriate area as directed 2 (Two) Times a Day. 45 g 1   • QUEtiapine (SEROquel) 25 MG tablet Take 1 tablet by mouth Every Night. 30 tablet 1     No current facility-administered medications for this visit.          OBJECTIVE    Pulse 90   Ht 185.4 cm (73\")   Wt 133 kg (293 lb)   SpO2 95%   BMI 38.66 kg/m²       Review of Systems   Constitutional:  Denies recent weight loss, weight gain, fever or chills, no change in exercise tolerance  Musculoskeletal: Toe pain.   Skin:  Thickened nails. Shin discoloration.  Neurological:  Burning sensations to feet b/l.  Psychiatric/Behavioral: Denies depression      Physical Exam    Clint had a diabetic foot exam performed today.      Constitutional: he appears well-developed and well-nourished.   HEENT: Normocephalic. Atraumatic  CV: No tenderness. RRR  Resp: Non-labored respiration. No wheezes.   Psychiatric: he has a normal mood and affect. his   behavior is normal.      Lower Extremity " Exam:  Vascular: DP/PT pulses palpable 1+.   Negative hair growth.   1+ perimalleolar edema  Toes cool  Neuro: Protective sensation diminished to lesser toes, b/l.  DTRs intact  Integument: No open wounds  No lesions  Atrophic skin noted b/l with chronic venous stasis dermatitis changes  Mild xerosis.  Erythematous patches with sloughing desiccated epidermis noted to dorsal digits on left foot, interdigitally and plantar foot bilateral  No masses  Musculoskeletal: LE muscle strength 4/5 on left, 3/5 on right  Gait assisted with cane  Semi-rigid hammertoe deformity toes 2-5 b/l.  Nails 1-5 b/l thickened, elongated with subungual debris. +pain on palpation              ASSESSMENT AND PLAN    Clint was seen today for diabetic foot care and diabetic foot care.    Diagnoses and all orders for this visit:    Diabetic polyneuropathy associated with type 2 diabetes mellitus (CMS/HCC)    Onychomycosis    Pain in toes of both feet    Tinea pedis of both feet    Other orders  -     clotrimazole-betamethasone (Lotrisone) 1-0.05 % cream; Apply  topically to the appropriate area as directed 2 (Two) Times a Day.      -Comprehensive DM foot exam performed. Pt educated on importance of tight glucose control and daily foot checks.  -Pt educated on padding techniques for rigid hammertoes.  Proper extra depth diabetic shoe gear.  Limit barefoot walking.  -Nails 1-5 b/l debrided in length and thickness with nail nipper to decrease pain, fungal load and risk of infection  -Rx Lotrisone for suspected tinea pedis  -Follow up 3 months PRN          This document has been electronically signed by Jose C Novak DPM on September 8, 2020 11:41     EMR Dragon/Transcription disclaimer:   Much of this encounter note is an electronic transcription/translation of spoken language to printed text. The electronic translation of spoken language may permit erroneous, or at times, nonsensical words or phrases to be inadvertently transcribed; Although  I have reviewed the note for such errors, some may still exist.    Jose C Novak DPM  9/8/2020  11:41

## 2020-10-14 ENCOUNTER — OFFICE VISIT (OUTPATIENT)
Dept: CARDIOLOGY | Facility: CLINIC | Age: 65
End: 2020-10-14

## 2020-10-14 VITALS
HEART RATE: 75 BPM | OXYGEN SATURATION: 97 % | HEIGHT: 73 IN | SYSTOLIC BLOOD PRESSURE: 118 MMHG | BODY MASS INDEX: 38.66 KG/M2 | DIASTOLIC BLOOD PRESSURE: 82 MMHG

## 2020-10-14 DIAGNOSIS — I10 ESSENTIAL HYPERTENSION: Primary | Chronic | ICD-10-CM

## 2020-10-14 DIAGNOSIS — E78.2 MIXED HYPERLIPIDEMIA: Chronic | ICD-10-CM

## 2020-10-14 DIAGNOSIS — E66.01 MORBID OBESITY WITH BMI OF 40.0-44.9, ADULT (HCC): ICD-10-CM

## 2020-10-14 DIAGNOSIS — Z95.1 S/P CABG (CORONARY ARTERY BYPASS GRAFT): ICD-10-CM

## 2020-10-14 PROCEDURE — 93000 ELECTROCARDIOGRAM COMPLETE: CPT | Performed by: INTERNAL MEDICINE

## 2020-10-14 PROCEDURE — 99214 OFFICE O/P EST MOD 30 MIN: CPT | Performed by: INTERNAL MEDICINE

## 2020-10-14 NOTE — PROGRESS NOTES
Clint Mack  64 y.o. male      1. Essential hypertension    2. S/P CABG (coronary artery bypass graft)    3. Mixed hyperlipidemia    4. Morbid obesity with BMI of 40.0-44.9, adult (CMS/[facility])        History of Present Illness  Mr. Mack is a 64-year-old male with hypertension, CAD s/p CABG, , hyperlipidemia, DVT, pulmonary embolus, obstructive sleep apnea, obesity, tardive dyskinesia, depression, anxiety, osteoarthritis, CKD3.  He has done reasonably well from a cardiac standpoint and denied any chest pain.  He brought several blood pressure readings for review and his blood pressure is between 110 and 120 systolic in the mornings.  No dizziness or syncope is reported.  He has been compliant with his medications.  He now follows up with vascular surgery on a regular basis for his IVC filter related issues and abdominal aortic aneurysm.     He has seen a specialist in [place] for his back pain and may be considered for surgery in the future.  This seems to be his major issue today.  His LDL is known to be elevated and he is now back on Praluent.    Echocardiogram in November 2019 showed:  · The study is technically difficult for diagnosis. The quality of the study is limited due to poor acoustic windows related to patient body habitus. Definity used  · The left ventricular cavity is mild-to-moderately dilated.  · Left ventricular wall thickness is consistent with mild concentric hypertrophy.  · Estimated EF = 52%.  · Left ventricular systolic function is normal.  · Left ventricular diastolic dysfunction (grade I) consistent with impaired relaxation.  · Right ventricular cavity is mildly dilated.  · Left atrial cavity size is mildly dilated.    Patient apparently had a CT scan of the abdomen which showed an abdominal aneurysm measuring 4.0 cm.      Clinical exam today was unremarkable.  His blood pressure was within normal limits.  No signs of bronchospasm or congestive heart failure was noted.    EKG  today showed sinus rhythm with heart rate of 72 bpm.  Old inferior wall myocardial infarction.  Nonspecific T wave changes.    SUBJECTIVE    Allergies   Allergen Reactions   • Tetrabenazine Dizziness   • Alfuzosin Other (See Comments)     syncope   • Austedo [Deutetrabenazine] Other (See Comments)     Insomnia     • Lyrica [Pregabalin] Swelling   • Celexa [Citalopram Hydrobromide] Dystonia   • Crestor [Rosuvastatin Calcium] Myalgia   • Ingrezza [Valbenazine Tosylate] Other (See Comments)     fatigue   • Lipitor [Atorvastatin] Other (See Comments)     Aches and pains all over         Past Medical History:   Diagnosis Date   • Anxiety    • Arthritis     osteoarthritis   • CKD (chronic kidney disease), stage III    • COPD (chronic obstructive pulmonary disease) (CMS/[facility])    • Coronary artery disease    • Depression    • Diabetes mellitus (CMS/[facility])    • DVT (deep venous thrombosis) (CMS/[facility])    • Heart disease    • History of embolic filter insertion    • History of stomach ulcers    • Hyperlipidemia    • Hypertension    • Injury of back     back surgery x3    • Lupus (CMS/HCC)    • LUCIANA on CPAP    • Pulmonary embolism (CMS/[facility])          Past Surgical History:   Procedure Laterality Date   • BACK SURGERY     • CARDIAC SURGERY  2001   • CIRCUMCISION     • COLONOSCOPY N/A 9/11/2018    Procedure: COLONOSCOPY;  Surgeon: Jose C Batres DO;  Location: [facility] ENDOSCOPY;  Service: Gastroenterology   • ENDOSCOPY N/A 9/11/2018    Procedure: ESOPHAGOGASTRODUODENOSCOPY;  Surgeon: Jose C Batres DO;  Location: [facility] ENDOSCOPY;  Service: Gastroenterology   • ENDOSCOPY N/A 3/3/2020    Procedure: ESOPHAGOGASTRODUODENOSCOPY;  Surgeon: Jose C Batres DO;  Location: [facility] ENDOSCOPY;  Service: Gastroenterology;  Laterality: N/A;   • GALLBLADDER SURGERY  2000   • HIP SURGERY     • JOINT REPLACEMENT Right 05/02/2016    hip   • SPINE SURGERY           Family History   Problem Relation Age of Onset   • Heart disease Father    •  "Hypertension Father    • Stroke Father    • Diabetes Sister    • Heart disease Brother    • Hypertension Brother    • COPD Brother    • Lung disease Other          Social History     Socioeconomic History   • Marital status:      Spouse name: Not on file   • Number of children: Not on file   • Years of education: Not on file   • Highest education level: Not on file   Tobacco Use   • Smoking status: Never Smoker   • Smokeless tobacco: Never Used   Substance and Sexual Activity   • Alcohol use: No   • Drug use: No   • Sexual activity: Defer         Current Outpatient Medications   Medication Sig Dispense Refill   • Alirocumab (PRALUENT) 75 MG/ML solution pen-injector Inject 75 mg under the skin into the appropriate area as directed Every 14 (Fourteen) Days. 6 pen 7   • aspirin 81 MG chewable tablet Chew 81 mg Take As Directed. Take 1 pill by mouth Monday, Wednesday, Friday     • baclofen (LIORESAL) 10 MG tablet Take 10 mg by mouth 3 (Three) Times a Day.     • Blood Glucose Monitoring Suppl (ACCU-CHEK ARGELIA PLUS) w/Device kit      • clonazePAM (KlonoPIN) 1 MG tablet Take 1 tablet by mouth 3 (Three) Times a Day. Or dystonia 90 tablet 5   • COMFORT ASSIST INSULIN SYRINGE 31G X 5/16\" 0.3 ML misc      • fluticasone (FLONASE) 50 MCG/ACT nasal spray 2 sprays into the nostril(s) as directed by provider Daily.     • furosemide (LASIX) 20 MG tablet Take 20 mg by mouth Daily. prn     • gabapentin (NEURONTIN) 100 MG capsule Take 100 mg by mouth 2 (Two) Times a Day.     • glimepiride (AMARYL) 4 MG tablet Take 4 mg by mouth 2 (Two) Times a Day.     • glucose blood (ONE TOUCH ULTRA TEST) test strip      • HYDROcodone-acetaminophen (NORCO)  MG per tablet Take 1 tablet by mouth. Five times daily     • insulin aspart (novoLOG) 100 UNIT/ML injection Inject 10 Units under the skin 3 (Three) Times a Day Before Meals. (Patient taking differently: Inject 10 Units under the skin into the appropriate area as directed. Sliding " "scale) 10 mL 12   • isosorbide mononitrate (IMDUR) 60 MG 24 hr tablet Take 1 tablet by mouth Daily. (Patient taking differently: Take 30 mg by mouth Daily.) 30 tablet 1   • Lancets (ONETOUCH ULTRASOFT) lancets      • metoprolol tartrate (LOPRESSOR) 25 MG tablet Take 25 mg by mouth 2 (Two) Times a Day.     • Morphine (MS CONTIN) 15 MG 12 hr tablet Take 30 mg by mouth 2 (Two) Times a Day.     • Omega-3 Fatty Acids (FISH OIL) 1200 MG capsule delayed-release Take  by mouth 2 (Two) Times a Day.     • ranolazine (RANEXA) 500 MG 12 hr tablet Take 1 tablet by mouth 2 (Two) Times a Day. 90 tablet 3   • tamsulosin (FLOMAX) 0.4 MG capsule 24 hr capsule Take 1 capsule by mouth Daily.     • traZODone (DESYREL) 50 MG tablet 2 (two) times a day.     • vitamin D (ERGOCALCIFEROL) 10716 UNITS capsule capsule Take 50,000 Units by mouth. Two times monthly     • warfarin (COUMADIN) 5 MG tablet Take 5 mg by mouth Every Night. Patient takes 5 mg every day except MWF when he takes 7.5 mg     • clotrimazole-betamethasone (Lotrisone) 1-0.05 % cream Apply  topically to the appropriate area as directed 2 (Two) Times a Day. 45 g 1   • QUEtiapine (SEROquel) 25 MG tablet Take 1 tablet by mouth Every Night. 30 tablet 1     No current facility-administered medications for this visit.          OBJECTIVE    /82 (BP Location: Left arm, Patient Position: Sitting, Cuff Size: Adult)   Pulse 75   Ht 185.4 cm (73\")   SpO2 97%   BMI 38.66 kg/m²         Review of Systems     Constitutional:  Denies recent weight loss, weight gain, fever or chills     HENT:  Denies any hearing loss, epistaxis, hoarseness, or difficulty speaking.     Eyes: Wears eyeglasses or contact lenses     Respiratory:  Dyspnea with exertion,no cough, wheezing, or hemoptysis.     Cardiovascular: Negative for palpations, chest pain.  Chronic leg edema    Gastrointestinal:  Denies change in bowel habits, dyspepsia, ulcer disease, hematochezia, or melena.     Endocrine: Negative " for cold intolerance, heat intolerance, polydipsia, polyphagia and polyuria.    Genitourinary: Negative.      Musculoskeletal: DJD    Skin:  Denies any change in hair or nails, rashes, or skin lesions.     Allergic/Immunologic: Negative.  Negative for environmental allergies, food allergies and immunocompromised state.     Neurological: History of tardive dyskinesia    Hematological: Denies any food allergies, seasonal allergies, bleeding disorders, or lymphadenopathy.     Psychiatric/Behavioral: history of anxiety/depression, no substance abuse, or change in cognitive function.         Physical Exam     Constitutional: Cooperative, alert and oriented, in no acute distress.     HENT:   Head: Normocephalic, normal hair patterns, no masses or tenderness.  Ears, Nose, and Throat: No gross abnormalities. No pallor or cyanosis.   Eyes: EOMS intact, PERRL, conjunctivae and lids unremarkable. Fundoscopic exam and visual fields not performed.   Neck: No palpable masses or adenopathy, no thyromegaly, no JVD, carotid pulses are full and equal bilaterally and without  Bruits.     Cardiovascular: Regular rhythm, S1 and S2 normal, no S3 or S4.  No murmurs, gallops, or rubs detected.     Pulmonary/Chest: Chest: normal symmetry, normal respiratory excursion, no intercostal retraction, no use of accessory muscles.            Pulmonary: Normal breath sounds. No rales or ronchi.    Abdominal: Abdomen soft, bowel sounds normoactive, no masses, no hepatosplenomegaly, non-tender, no bruits.     Musculoskeletal: No deformities, clubbing, cyanosis, erythema, or edema observed.     Neurological:  tardive dyskinesia improved    Skin: Warm and dry to the touch, no apparent skin lesions or masses noted.     Psychiatric: He has a normal mood and affect. His behavior is normal. Judgment and thought content normal.         Procedures      Lab Results   Component Value Date    WBC 7.52 02/24/2020    HGB 15.2 03/01/2020    HGB 15.2 03/01/2020     HCT 44.9 03/01/2020    HCT 44.9 03/01/2020    MCV 90.5 02/24/2020     02/24/2020     Lab Results   Component Value Date    GLUCOSE 109 (H) 02/24/2020    BUN 22 02/24/2020    CREATININE 1.66 (H) 02/24/2020    EGFRIFNONA 42 (L) 02/24/2020    EGFRIFAFRI 50 11/13/2018    BCR 13.3 02/24/2020    CO2 22.0 02/24/2020    CALCIUM 9.2 02/24/2020    ALBUMIN 3.80 02/24/2020    AST 20 02/24/2020    ALT 27 02/24/2020     Lab Results   Component Value Date    CHOL 113 02/21/2019    CHOL 216 (H) 10/12/2018     Lab Results   Component Value Date    TRIG 182 02/21/2019    TRIG 187 10/12/2018     Lab Results   Component Value Date    HDL 34 02/21/2019    HDL 33 10/12/2018     No components found for: LDLCALC  Lab Results   Component Value Date    LDL 54 02/21/2019     (H) 10/12/2018     No results found for: HDLLDLRATIO  No components found for: CHOLHDL  Lab Results   Component Value Date    HGBA1C 6.4 (H) 04/29/2020     Lab Results   Component Value Date    TSH 2.04 07/22/2019           ASSESSMENT AND PLAN  Mr. Mack has multiple medical issues but fortunately stable from a cardiac standpoint with no definite evidence of angina, arrhythmia or congestive heart failure.  He had several questions with regards to his cardiac, renal, GI, Vascular, musculoskeletal and neurological status and these were discussed.      I have continued antiplatelet therapy with aspirin, antianginal therapy with isosorbide mononitrate and Ranexa and antihypertensive therapy with metoprolol tartrate and low-dose diuretics.  His renal function is being monitored closely by Dr. Holt.  He will continue follow-up with vascular surgery.    I understand that he may be considered for spine surgery in the future.  Anticoagulation with warfarin has been continued.  His PT and INR are monitored by Dr. Matute.    He has restarted Praluent injections which he gets directly from the company/distributor.     Diagnoses and all orders for this visit:    1.  Essential hypertension (Primary)  -     ECG 12 Lead    2. S/P CABG (coronary artery bypass graft)    3. Mixed hyperlipidemia    4. Morbid obesity with BMI of 40.0-44.9, adult (CMS/[facility])        Patient's Body mass index is 38.66 kg/m². BMI is above normal parameters. Recommendations include: exercise counseling and nutrition counseling.  Patient is a non-smoker      Keke Kumar MD  10/14/2020  11:01 CDT

## 2020-11-03 ENCOUNTER — HOSPITAL ENCOUNTER (OUTPATIENT)
Dept: ULTRASOUND IMAGING | Facility: HOSPITAL | Age: 65
Discharge: HOME | End: 2020-11-03
Admitting: INTERNAL MEDICINE

## 2020-11-03 DIAGNOSIS — N02.8 IGA NEPHROPATHY: ICD-10-CM

## 2020-11-03 DIAGNOSIS — E13.69 OTHER SPECIFIED DIABETES MELLITUS WITH OTHER SPECIFIED COMPLICATION, UNSPECIFIED WHETHER LONG TERM INSULIN USE (HCC): ICD-10-CM

## 2020-11-03 DIAGNOSIS — N28.1 SIMPLE RENAL CYST: ICD-10-CM

## 2020-11-03 DIAGNOSIS — N17.9 ACUTE RENAL FAILURE, UNSPECIFIED ACUTE RENAL FAILURE TYPE (HCC): ICD-10-CM

## 2020-11-03 DIAGNOSIS — E55.9 VITAMIN D DEFICIENCY: ICD-10-CM

## 2020-11-03 DIAGNOSIS — N18.30 CHRONIC KIDNEY DISEASE, STAGE 3 (HCC): ICD-10-CM

## 2020-11-03 DIAGNOSIS — I10 ESSENTIAL (PRIMARY) HYPERTENSION: ICD-10-CM

## 2020-11-03 PROCEDURE — 76775 US EXAM ABDO BACK WALL LIM: CPT

## 2020-11-13 ENCOUNTER — CLINICAL SUPPORT (OUTPATIENT)
Dept: ORTHOPEDIC SURGERY | Facility: CLINIC | Age: 65
End: 2020-11-13

## 2020-11-13 VITALS — WEIGHT: 293 LBS | BODY MASS INDEX: 38.83 KG/M2 | HEIGHT: 73 IN

## 2020-11-13 DIAGNOSIS — M17.11 PRIMARY OSTEOARTHRITIS OF RIGHT KNEE: Primary | ICD-10-CM

## 2020-11-13 DIAGNOSIS — G89.29 CHRONIC PAIN OF RIGHT KNEE: ICD-10-CM

## 2020-11-13 DIAGNOSIS — M25.561 CHRONIC PAIN OF RIGHT KNEE: ICD-10-CM

## 2020-11-13 PROCEDURE — 20610 DRAIN/INJ JOINT/BURSA W/O US: CPT | Performed by: NURSE PRACTITIONER

## 2020-11-13 NOTE — PROGRESS NOTES
"Knee Joint Injection      Patient: Clint Mack    YOB: 1955    Chief Complaint   Patient presents with   • Right Knee - Follow-up   • Injections     Monovisc right knee       History of Present Illness:  Patient is here for a Monovisc injection in the right knee.  No new complaints.  Patient had a previous intra-articular injection of steroid given into the right knee on 6/4/2020, which he states did help his pain but only for about 2 days and then the pain returned.    Physical Exam: 65 y.o. male  General Appearance:    Alert, cooperative, in no acute distress                   Vitals:    11/13/20 0806   Weight: 133 kg (293 lb)   Height: 185.4 cm (73\")   PainSc:   5   PainLoc: Knee        Patient is alert and oriented ×3. No acute distress.  Affect is normal. Respiratory rate is normal and unlabored.  Exam and complaints are unchanged.    Procedure:  Large Joint Arthrocentesis: R knee  Date/Time: 11/13/2020 8:01 AM  Consent given by: patient  Timeout: Immediately prior to procedure a time out was called to verify the correct patient, procedure, equipment, support staff and site/side marked as required   Supporting Documentation  Indications: pain, joint swelling and diagnostic evaluation   Procedure Details  Location: knee - R knee  Preparation: Patient was prepped and draped in the usual sterile fashion  Needle size: 22 G  Approach: anterolateral  Medications administered: 88 mg Hyaluronan 88 MG/4ML  Patient tolerance: patient tolerated the procedure well with no immediate complications      Assessment:    Diagnoses and all orders for this visit:    Primary osteoarthritis of right knee  -     Large Joint Arthrocentesis: R knee    Chronic pain of right knee  -     Large Joint Arthrocentesis: R knee    Plan:   Slowly increase activity as tolerated.  Discussed importance of leg strengthening and general conditioning.  Discussed warning signs of injection.  Discussed that purpose of " injections was symptom improvement and improved activity.  Also discussed that further treatment options depended on symptoms at followup and length of time of improvement after injections.  Recommend elevation and ice therapy to the right knee as needed to minimize pain/inflammation/swelling.  Patient is unable to take oral NSAIDs due to chronic kidney disease and his current use of Coumadin.  Patient already takes Norco and baclofen for chronic pain management per Dr. Delgadillo.  As previously noted, he has significant issues with chronic low back pain due to an initial work-related injury that occurred in 2005.  He has had 4 prior lumbar spine surgeries per Dr. Miles.  He has also seen another spine surgeon in [place] since his last office visit with me who recommended lumbar epidural injections and possibly an ablation.  Patient states he has had difficulty with work comp approving these interventions.  Follow-up in 6 weeks for recheck of the right knee as needed for any new, worsening or persistent symptoms.    Return in about 6 weeks (around 12/25/2020) for Recheck.      This document has been electronically signed by DIANA Kiser on November 13, 2020 08:22 CST      DIANA Kiser

## 2020-12-07 ENCOUNTER — OFFICE VISIT (OUTPATIENT)
Dept: PODIATRY | Facility: CLINIC | Age: 65
End: 2020-12-07

## 2020-12-07 VITALS — HEART RATE: 90 BPM | WEIGHT: 293 LBS | OXYGEN SATURATION: 93 % | HEIGHT: 73 IN | BODY MASS INDEX: 38.83 KG/M2

## 2020-12-07 DIAGNOSIS — M79.675 PAIN IN TOES OF BOTH FEET: ICD-10-CM

## 2020-12-07 DIAGNOSIS — M79.674 PAIN IN TOES OF BOTH FEET: ICD-10-CM

## 2020-12-07 DIAGNOSIS — E11.9 ENCOUNTER FOR DIABETIC FOOT EXAM (HCC): Primary | ICD-10-CM

## 2020-12-07 DIAGNOSIS — B35.1 ONYCHOMYCOSIS: ICD-10-CM

## 2020-12-07 DIAGNOSIS — E11.42 DIABETIC POLYNEUROPATHY ASSOCIATED WITH TYPE 2 DIABETES MELLITUS (HCC): ICD-10-CM

## 2020-12-07 PROCEDURE — 11721 DEBRIDE NAIL 6 OR MORE: CPT | Performed by: PODIATRIST

## 2020-12-07 PROCEDURE — 99213 OFFICE O/P EST LOW 20 MIN: CPT | Performed by: PODIATRIST

## 2020-12-07 NOTE — PROGRESS NOTES
Clint Mack  1955  65 y.o. male   BS-148 per patient  PCP-Dr. Matute 07/30/2020 ( telehealth )    Patient presents today for diabetic foot care.     12/07/2020      Chief Complaint   Patient presents with   • Left Foot - Diabetic foot care   • Right Foot - Diabetic foot care           History of Present Illness    Clint Mack is a 65 y.o. male presents for f/u diabetic foot exam and nail care.      Past Medical History:   Diagnosis Date   • Anxiety    • Arthritis     osteoarthritis   • CKD (chronic kidney disease), stage III    • COPD (chronic obstructive pulmonary disease) (CMS/[facility])    • Coronary artery disease    • Depression    • Diabetes mellitus (CMS/HCC)    • DVT (deep venous thrombosis) (CMS/[facility])    • Heart disease    • History of embolic filter insertion    • History of stomach ulcers    • Hyperlipidemia    • Hypertension    • Injury of back     back surgery x3    • Lupus (CMS/[facility])    • LUCIANA on CPAP    • Pulmonary embolism (CMS/[facility])          Past Surgical History:   Procedure Laterality Date   • BACK SURGERY     • CARDIAC SURGERY  2001   • CIRCUMCISION     • COLONOSCOPY N/A 9/11/2018    Procedure: COLONOSCOPY;  Surgeon: Jose C Batres DO;  Location: [facility] ENDOSCOPY;  Service: Gastroenterology   • ENDOSCOPY N/A 9/11/2018    Procedure: ESOPHAGOGASTRODUODENOSCOPY;  Surgeon: Jose C Batres DO;  Location: [facility] ENDOSCOPY;  Service: Gastroenterology   • ENDOSCOPY N/A 3/3/2020    Procedure: ESOPHAGOGASTRODUODENOSCOPY;  Surgeon: Jose C Batres DO;  Location: [facility] ENDOSCOPY;  Service: Gastroenterology;  Laterality: N/A;   • GALLBLADDER SURGERY  2000   • HIP SURGERY     • JOINT REPLACEMENT Right 05/02/2016    hip   • SPINE SURGERY           Family History   Problem Relation Age of Onset   • Heart disease Father    • Hypertension Father    • Stroke Father    • Diabetes Sister    • Heart disease Brother    • Hypertension Brother    • COPD Brother    • Lung disease Other           "        Social History     Socioeconomic History   • Marital status:      Spouse name: Not on file   • Number of children: Not on file   • Years of education: Not on file   • Highest education level: Not on file   Tobacco Use   • Smoking status: Never Smoker   • Smokeless tobacco: Never Used   Substance and Sexual Activity   • Alcohol use: No   • Drug use: No   • Sexual activity: Defer         Current Outpatient Medications   Medication Sig Dispense Refill   • Alirocumab (PRALUENT) 75 MG/ML solution pen-injector Inject 75 mg under the skin into the appropriate area as directed Every 14 (Fourteen) Days. 6 pen 7   • aspirin 81 MG chewable tablet Chew 81 mg Take As Directed. Take 1 pill by mouth Monday, Wednesday, Friday     • baclofen (LIORESAL) 10 MG tablet Take 10 mg by mouth 3 (Three) Times a Day.     • Blood Glucose Monitoring Suppl (ACCU-CHEK ARGELIA PLUS) w/Device kit      • clonazePAM (KlonoPIN) 1 MG tablet Take 1 tablet by mouth 3 (Three) Times a Day. Or dystonia 90 tablet 5   • COMFORT ASSIST INSULIN SYRINGE 31G X 5/16\" 0.3 ML misc      • fluticasone (FLONASE) 50 MCG/ACT nasal spray 2 sprays into the nostril(s) as directed by provider Daily.     • furosemide (LASIX) 20 MG tablet Take 20 mg by mouth Daily. prn     • gabapentin (NEURONTIN) 100 MG capsule Take 100 mg by mouth 2 (Two) Times a Day.     • glimepiride (AMARYL) 4 MG tablet Take 4 mg by mouth 2 (Two) Times a Day.     • glucose blood (ONE TOUCH ULTRA TEST) test strip      • HYDROcodone-acetaminophen (NORCO)  MG per tablet Take 1 tablet by mouth. Five times daily     • insulin aspart (novoLOG) 100 UNIT/ML injection Inject 10 Units under the skin 3 (Three) Times a Day Before Meals. (Patient taking differently: Inject 10 Units under the skin into the appropriate area as directed. Sliding scale) 10 mL 12   • isosorbide mononitrate (IMDUR) 60 MG 24 hr tablet Take 1 tablet by mouth Daily. (Patient taking differently: Take 30 mg by mouth Daily.) 30 " "tablet 1   • Lancets (ONETOUCH ULTRASOFT) lancets      • metoprolol tartrate (LOPRESSOR) 25 MG tablet Take 25 mg by mouth 2 (Two) Times a Day.     • Morphine (MS CONTIN) 15 MG 12 hr tablet Take 30 mg by mouth 2 (Two) Times a Day.     • Omega-3 Fatty Acids (FISH OIL) 1200 MG capsule delayed-release Take  by mouth 2 (Two) Times a Day.     • ranolazine (RANEXA) 500 MG 12 hr tablet Take 1 tablet by mouth 2 (Two) Times a Day. 90 tablet 3   • tamsulosin (FLOMAX) 0.4 MG capsule 24 hr capsule Take 1 capsule by mouth Daily.     • traZODone (DESYREL) 50 MG tablet 2 (two) times a day.     • vitamin D (ERGOCALCIFEROL) 75571 UNITS capsule capsule Take 50,000 Units by mouth. Two times monthly     • warfarin (COUMADIN) 5 MG tablet Take 5 mg by mouth Every Night. Patient takes 5 mg every day except M and F when he takes 7.5 mg       No current facility-administered medications for this visit.          OBJECTIVE    Pulse 90   Ht 185.4 cm (73\")   Wt 133 kg (293 lb)   SpO2 93%   BMI 38.66 kg/m²       Review of Systems   Constitutional:  Denies recent weight loss, weight gain, fever or chills, no change in exercise tolerance  Musculoskeletal: Toe pain.   Skin:  Thickened nails. Shin discoloration.  Neurological:  Burning sensations to feet b/l.  Psychiatric/Behavioral: Denies depression      Physical Exam    Clint had a diabetic foot exam performed today.      Constitutional: he appears well-developed and well-nourished.   HEENT: Normocephalic. Atraumatic  CV: No tenderness. RRR  Resp: Non-labored respiration. No wheezes.   Psychiatric: he has a normal mood and affect. his   behavior is normal.      Lower Extremity Exam:  Vascular: DP/PT pulses palpable 1+.   Negative hair growth.   1+ perimalleolar edema  Toes cool  Neuro: Protective sensation diminished to lesser toes, b/l.  DTRs intact  Integument: No open wounds  No lesions  Atrophic skin noted b/l with chronic venous stasis dermatitis changes  Mild xerosis b/l  No " masses  Musculoskeletal: LE muscle strength 4/5 on left, 3/5 on right  Gait assisted with cane, wheelchair  Semi-rigid hammertoe deformity toes 2-5 b/l.  Nails 1-5 b/l thickened, elongated with subungual debris. +pain on palpation              ASSESSMENT AND PLAN    Diagnoses and all orders for this visit:    1. Encounter for diabetic foot exam (CMS/[facility]) (Primary)    2. Diabetic polyneuropathy associated with type 2 diabetes mellitus (CMS/[facility])    3. Onychomycosis    4. Pain in toes of both feet      -Comprehensive DM foot exam performed. Pt educated on importance of tight glucose control and daily foot checks.  -Pt educated on padding techniques for rigid hammertoes.  Proper extra depth diabetic shoe gear.  Limit barefoot walking.  -Nails 1-5 b/l debrided in length and thickness with nail nipper to decrease pain, fungal load and risk of infection  -Continue skin hydration  -Follow up 3 months PRN          This document has been electronically signed by Jose C Novak DPM on December 7, 2020 11:45 CST     EMR Dragon/Transcription disclaimer:   Much of this encounter note is an electronic transcription/translation of spoken language to printed text. The electronic translation of spoken language may permit erroneous, or at times, nonsensical words or phrases to be inadvertently transcribed; Although I have reviewed the note for such errors, some may still exist.    Jose C Novak DPM  12/7/2020  11:45 CST

## 2020-12-28 ENCOUNTER — OFFICE VISIT (OUTPATIENT)
Dept: SLEEP MEDICINE | Facility: HOSPITAL | Age: 65
End: 2020-12-28

## 2020-12-28 VITALS
DIASTOLIC BLOOD PRESSURE: 77 MMHG | OXYGEN SATURATION: 98 % | WEIGHT: 294.4 LBS | HEIGHT: 73 IN | SYSTOLIC BLOOD PRESSURE: 128 MMHG | HEART RATE: 73 BPM | BODY MASS INDEX: 39.02 KG/M2

## 2020-12-28 DIAGNOSIS — G47.61 PLMD (PERIODIC LIMB MOVEMENT DISORDER): ICD-10-CM

## 2020-12-28 DIAGNOSIS — G47.34 NOCTURNAL HYPOXEMIA: Primary | ICD-10-CM

## 2020-12-28 DIAGNOSIS — Z79.899 POLYPHARMACY: ICD-10-CM

## 2020-12-28 DIAGNOSIS — G47.33 OSA (OBSTRUCTIVE SLEEP APNEA): ICD-10-CM

## 2020-12-28 DIAGNOSIS — G24.01 TARDIVE DYSKINESIA: ICD-10-CM

## 2020-12-28 DIAGNOSIS — F11.90 OPIOID USE: ICD-10-CM

## 2020-12-28 DIAGNOSIS — F41.9 ANXIETY DISORDER, UNSPECIFIED TYPE: ICD-10-CM

## 2020-12-28 PROCEDURE — 99214 OFFICE O/P EST MOD 30 MIN: CPT | Performed by: PSYCHIATRY & NEUROLOGY

## 2020-12-28 RX ORDER — NALOXONE HYDROCHLORIDE 4 MG/.1ML
1 SPRAY NASAL AS NEEDED
Qty: 1 EACH | Refills: 1 | Status: SHIPPED | OUTPATIENT
Start: 2020-12-28 | End: 2020-12-28

## 2020-12-28 RX ORDER — NALOXONE HYDROCHLORIDE 4 MG/.1ML
1 SPRAY NASAL AS NEEDED
Qty: 1 EACH | Refills: 1 | Status: SHIPPED | OUTPATIENT
Start: 2020-12-28 | End: 2023-01-31

## 2020-12-29 DIAGNOSIS — E78.2 MIXED HYPERLIPIDEMIA: Primary | Chronic | ICD-10-CM

## 2020-12-29 NOTE — TELEPHONE ENCOUNTER
Called pt to inform him that Dr. Warren is going to increase his dose of Praluent to 150 mg and he would also like to get a Lipid Panel done. Pt verbalized understanding.

## 2020-12-30 ENCOUNTER — LAB (OUTPATIENT)
Dept: LAB | Facility: HOSPITAL | Age: 65
End: 2020-12-30

## 2020-12-30 DIAGNOSIS — F41.9 ANXIETY: ICD-10-CM

## 2020-12-30 DIAGNOSIS — E78.2 MIXED HYPERLIPIDEMIA: Chronic | ICD-10-CM

## 2020-12-30 LAB
CHOLEST SERPL-MCNC: 171 MG/DL (ref 0–200)
HDLC SERPL-MCNC: 36 MG/DL (ref 40–60)
LDLC SERPL CALC-MCNC: 91 MG/DL (ref 0–100)
LDLC/HDLC SERPL: 2.3 {RATIO}
TRIGL SERPL-MCNC: 261 MG/DL (ref 0–150)
VLDLC SERPL-MCNC: 44 MG/DL (ref 5–40)

## 2020-12-30 PROCEDURE — 36415 COLL VENOUS BLD VENIPUNCTURE: CPT | Performed by: INTERNAL MEDICINE

## 2020-12-30 PROCEDURE — 80061 LIPID PANEL: CPT | Performed by: INTERNAL MEDICINE

## 2020-12-30 RX ORDER — CLONAZEPAM 0.5 MG/1
0.75 TABLET ORAL 3 TIMES DAILY
Qty: 135 TABLET | Refills: 1 | Status: SHIPPED | OUTPATIENT
Start: 2020-12-30 | End: 2021-01-03 | Stop reason: SDUPTHER

## 2021-01-03 RX ORDER — CLONAZEPAM 0.5 MG/1
0.75 TABLET ORAL 3 TIMES DAILY
Qty: 135 TABLET | Refills: 1 | Status: SHIPPED | OUTPATIENT
Start: 2021-01-03 | End: 2021-01-04 | Stop reason: SDUPTHER

## 2021-01-04 ENCOUNTER — DOCUMENTATION (OUTPATIENT)
Dept: CARDIOLOGY | Facility: CLINIC | Age: 66
End: 2021-01-04

## 2021-01-04 RX ORDER — CLONAZEPAM 0.5 MG/1
0.75 TABLET ORAL 3 TIMES DAILY
Qty: 135 TABLET | Refills: 1 | Status: SHIPPED | OUTPATIENT
Start: 2021-01-04 | End: 2021-03-23 | Stop reason: DRUGHIGH

## 2021-01-04 NOTE — PROGRESS NOTES
Lipid panel results per Dr Warren    Total and LDL ok   Trigs high  Watch sugar/carb intake    Patient verbalized understanding

## 2021-01-19 ENCOUNTER — OFFICE VISIT (OUTPATIENT)
Dept: ORTHOPEDIC SURGERY | Facility: CLINIC | Age: 66
End: 2021-01-19

## 2021-01-19 VITALS — WEIGHT: 294 LBS | BODY MASS INDEX: 38.97 KG/M2 | HEIGHT: 73 IN

## 2021-01-19 DIAGNOSIS — G89.29 CHRONIC MIDLINE LOW BACK PAIN WITH RIGHT-SIDED SCIATICA: ICD-10-CM

## 2021-01-19 DIAGNOSIS — E66.01 MORBID OBESITY (HCC): ICD-10-CM

## 2021-01-19 DIAGNOSIS — R53.81 PHYSICAL DECONDITIONING: ICD-10-CM

## 2021-01-19 DIAGNOSIS — G89.29 CHRONIC PAIN OF RIGHT KNEE: Primary | ICD-10-CM

## 2021-01-19 DIAGNOSIS — R29.898 WEAKNESS OF RIGHT LEG: ICD-10-CM

## 2021-01-19 DIAGNOSIS — M25.561 CHRONIC PAIN OF RIGHT KNEE: Primary | ICD-10-CM

## 2021-01-19 DIAGNOSIS — M54.41 CHRONIC MIDLINE LOW BACK PAIN WITH RIGHT-SIDED SCIATICA: ICD-10-CM

## 2021-01-19 DIAGNOSIS — M17.11 PRIMARY OSTEOARTHRITIS OF RIGHT KNEE: ICD-10-CM

## 2021-01-19 PROCEDURE — 20610 DRAIN/INJ JOINT/BURSA W/O US: CPT | Performed by: NURSE PRACTITIONER

## 2021-01-19 PROCEDURE — 99213 OFFICE O/P EST LOW 20 MIN: CPT | Performed by: NURSE PRACTITIONER

## 2021-01-19 RX ADMIN — LIDOCAINE HYDROCHLORIDE 2 ML: 10 INJECTION, SOLUTION EPIDURAL; INFILTRATION; INTRACAUDAL; PERINEURAL at 09:55

## 2021-01-19 RX ADMIN — TRIAMCINOLONE ACETONIDE 40 MG: 40 INJECTION, SUSPENSION INTRA-ARTICULAR; INTRAMUSCULAR at 09:55

## 2021-01-19 NOTE — PROGRESS NOTES
Clint Mack is a 65 y.o. male returns for     Chief Complaint   Patient presents with   • Right Knee - Follow-up       HISTORY OF PRESENT ILLNESS: Patient presents to office for follow-up of chronic right knee pain.  Patient continues to complain of ongoing right knee pain, which has been an issue for several years.  Patient also has pain in the left knee at times that is milder.  Patient was previously given a Monovisc injection in the right knee on 11/13/2020, which he states did not improve his pain.  He does report improved pain with a series of Orthovisc injections in his right knee in November/December 2018 and request to repeat these as soon as he can.  Patient has also been treated previously with intra-articular injections of steroid, which did not offer him much lasting improvement.  Last steroid injection in the right knee was given on 6/4/2020.  Patient has some mild osteoarthritic changes in his knees on x-ray but nothing significant.  As previously noted, patient has a long history of chronic low back pain and degenerative issues in his lumbar spine with 4 prior back surgeries.  Patient had an initial work-related injury to his low back in 2005.  He has chronic issues with weakness in his right hip/leg and known significant atrophy of the psoas muscle on the right per previous MRI.  He has been treated in the past with physical therapy with minimal improvement.  Patient is a minimal ambulator.  Patient takes MS Contin for chronic pain as well as Norco and baclofen and is managed per pain management.  No new complaints or concerns noted since last office visit.  No falls or injuries reported.  Pain scale today is 5/10.    CONCURRENT MEDICAL HISTORY:    The following portions of the patient's history were reviewed and updated as appropriate: allergies, current medications, past family history, past medical history, past social history, past surgical history and problem list.     ROS  No fevers  "or chills.  No chest pain or shortness of air.  No GI or  disturbances. Right knee pain. Low back pain.     PHYSICAL EXAMINATION:       Ht 185.4 cm (72.99\")   Wt 133 kg (294 lb)   BMI 38.80 kg/m²     Physical Exam  Vitals signs reviewed.   Constitutional:       General: He is not in acute distress.     Appearance: He is well-developed. He is obese. He is not ill-appearing.   HENT:      Head: Normocephalic.   Pulmonary:      Effort: Pulmonary effort is normal. No respiratory distress.   Abdominal:      General: There is no distension.      Palpations: Abdomen is soft.   Musculoskeletal:         General: Swelling (Mild, right knee) and tenderness (Mild, right knee) present. No deformity or signs of injury.      Right knee: He exhibits no effusion.      Left knee: He exhibits no effusion.   Skin:     General: Skin is warm and dry.      Capillary Refill: Capillary refill takes less than 2 seconds.      Findings: No erythema.   Neurological:      Mental Status: He is alert and oriented to person, place, and time.      GCS: GCS eye subscore is 4. GCS verbal subscore is 5. GCS motor subscore is 6.   Psychiatric:         Speech: Speech normal.         Behavior: Behavior normal.         Thought Content: Thought content normal.         Judgment: Judgment normal.         GAIT:     []  Normal  []  Antalgic    Assistive device: []  None  []  Walker     []  Crutches  []  Cane     [x]  Wheelchair  []  Stretcher    Right Knee Exam     Tenderness   Right knee tenderness location: Mild, diffuse.    Range of Motion   Extension: 5   Flexion: 90     Tests   Tari:  Medial - negative Lateral - negative  Varus: negative Valgus: negative    Other   Erythema: absent  Sensation: normal  Pulse: present  Swelling: mild  Effusion: no effusion present    Comments:  Pain and limitations with range of motion.  Significant quadricep weakness is noted.  Mild/minimal generalized swelling noted.  No definitive effusion.  No erythema.  No " warmth.  No signs of infection noted.      Left Knee Exam     Tenderness   The patient is experiencing no tenderness.     Range of Motion   Extension: 0   Flexion: 110     Tests   Tari:  Medial - negative Lateral - negative  Varus: negative Valgus: negative    Other   Erythema: absent  Sensation: normal  Pulse: present  Swelling: none  Effusion: no effusion present            Xr Knee Bilateral Ap Standing     Result Date: 6/4/2020  Narrative: AP standing view of the bilateral knees reveals no evidence of acute fracture or dislocation.  There are mild degenerative changes noted bilaterally.  There are mild subchondral sclerotic changes noted in the medial compartments bilaterally.  There is prominence and spurring of the tibial spines bilaterally.  Medial and lateral compartmental joint spacing appears well-maintained.  The knee joints appear well-aligned.  Soft tissues appear unremarkable.  No acute bony radiologic abnormalities are noted at this time.  No significant changes are noted when compared with prior images from 11/1/2017.06/04/20 at 5:09 PM by DIANA Kiser      Xr Knee 1 Or 2 View Bilateral     Result Date: 6/4/2020  Narrative: Lateral views of the bilateral knees reveals no evidence of acute fracture or dislocation.  There are mild to moderate degenerative changes at the patellofemoral compartments of each knee.  There are small osteophyte formations noted at the superior and inferior poles of the patellas bilaterally.  The knee joints appear well aligned.  Soft tissues appear unremarkable.  There is a small suprapatellar joint effusion noted in the right knee.  There are small metallic foreign bodies seen in the right posterior leg, presumably from prior vascular surgery.  No acute bony radiologic abnormalities are noted at this time.  No significant changes are noted in the right knee when compared with prior images from 11/1/2017.  No prior comparison images of the left knee are available  for review.06/04/20 at 5:13 PM by DIANA Kiser         ASSESSMENT:    Diagnoses and all orders for this visit:    Chronic pain of right knee  -     Large Joint Arthrocentesis: R knee    Morbid obesity (CMS/HCC)    Weakness of right leg  -     Large Joint Arthrocentesis: R knee    Primary osteoarthritis of right knee  -     Large Joint Arthrocentesis: R knee    Physical deconditioning    Chronic midline low back pain with right-sided sciatica      Large Joint Arthrocentesis: R knee  Date/Time: 1/19/2021 9:55 AM  Consent given by: patient  Timeout: Immediately prior to procedure a time out was called to verify the correct patient, procedure, equipment, support staff and site/side marked as required   Supporting Documentation  Indications: pain, diagnostic evaluation and joint swelling   Procedure Details  Location: knee - R knee  Preparation: Patient was prepped and draped in the usual sterile fashion  Needle size: 22 G  Approach: anterolateral  Medications administered: 2 mL lidocaine PF 1% 1 %; 40 mg triamcinolone acetonide 40 MG/ML  Patient tolerance: patient tolerated the procedure well with no immediate complications      PLAN    Patient continues to complain of chronic right knee pain.  Unfortunately, he states that the Monovisc injection given on 11/13/2020 did not improve his pain at all.  Patient request today to repeat the Orthovisc injection series that he did in November/December 2018 as these did offer him some pain improvement.  We discussed that his insurance would not cover further viscosupplementation injections until May 2021.  Patient verbalized understanding of this.  As previously noted, his x-rays show some mild degenerative changes bilaterally but nothing significant.  He had a previous MRI of the right knee performed in 2018, which was normal.  He has experienced chronic right knee pain for several years and is also now having some increased pain in the left knee that is milder.  We  discussed that his MRI findings may have changed since 2018 and we again discussed the possibilities of degenerative meniscal tears, knee strain, chondromalacia, etc.  However, I remain more suspicious that his chronic knee pain is related to his significant lumbar spine issues and the chronic weakness of his right hip/leg.  On previous imaging, he was noted to have had significant atrophy of the psoas muscle.  He essentially is a minimal ambulator and primarily uses a wheelchair in his walker for transferring.  He does primarily complain of significant weakness is the reason for his difficulty ambulating.  I have offered to refer him to physical therapy to help with strengthening and the patient declines this.  Patient states that he has done physical therapy several times in the past without any improvement.  He has significant quadricep weakness on physical exam.  Recommend to continue with his home exercises for conditioning and strengthening of his legs that he previously learned in PT.  Recommend an intra-articular injection of steroid to the right knee today for management of joint pain/inflammation/swelling.  He has not had an injection of steroid into the right knee since June 2020 and this did offer him some temporary improvement.  Recommend elevation and ice therapy as needed to minimize pain/swelling/inflammation.  Patient is again reminded to monitor his blood sugar closely and follow his diabetic diet.  We discussed the risk of hyperglycemia with steroid injection.  Patient does have sliding scale that he can use to cover for any hyperglycemia.  Patient already takes high doses of narcotics and muscle relaxants for management of his chronic pain per pain management with Dr. Delgadillo.  He cannot take oral NSAIDs due to chronic kidney disease and his current use of Coumadin.  Treatment options for his right knee pain are quite limited due to his mobility status and multiple comorbid medical conditions.   Plan to repeat the Orthovisc series in May 2021 as the patient requested.  Patient can follow-up sooner if needed for any new, worsening or persistent symptoms.    Patient's Body mass index is 38.8 kg/m². BMI is above normal parameters.  Nutrition counseling and exercise counseling is provided.     Return in about 6 weeks (around 3/2/2021), or if symptoms worsen or fail to improve, for Recheck.      This document has been electronically signed by DIANA Kiser on January 23, 2021 09:25 CST      DIANA Kiser

## 2021-01-23 PROBLEM — M54.41 CHRONIC MIDLINE LOW BACK PAIN WITH RIGHT-SIDED SCIATICA: Status: ACTIVE | Noted: 2021-01-23

## 2021-01-23 PROBLEM — G89.29 CHRONIC MIDLINE LOW BACK PAIN WITH RIGHT-SIDED SCIATICA: Status: ACTIVE | Noted: 2021-01-23

## 2021-01-23 RX ORDER — TRIAMCINOLONE ACETONIDE 40 MG/ML
40 INJECTION, SUSPENSION INTRA-ARTICULAR; INTRAMUSCULAR
Status: COMPLETED | OUTPATIENT
Start: 2021-01-19 | End: 2021-01-19

## 2021-01-23 RX ORDER — LIDOCAINE HYDROCHLORIDE 10 MG/ML
2 INJECTION, SOLUTION EPIDURAL; INFILTRATION; INTRACAUDAL; PERINEURAL
Status: COMPLETED | OUTPATIENT
Start: 2021-01-19 | End: 2021-01-19

## 2021-01-26 ENCOUNTER — HOSPITAL ENCOUNTER (OUTPATIENT)
Facility: HOSPITAL | Age: 66
Setting detail: OBSERVATION
Discharge: HOME | End: 2021-01-27
Attending: EMERGENCY MEDICINE | Admitting: INTERNAL MEDICINE

## 2021-01-26 DIAGNOSIS — R13.10 DYSPHAGIA, UNSPECIFIED TYPE: ICD-10-CM

## 2021-01-26 DIAGNOSIS — T78.3XXA ANGIOEDEMA, INITIAL ENCOUNTER: Primary | ICD-10-CM

## 2021-01-26 LAB
ANION GAP SERPL CALCULATED.3IONS-SCNC: 9 MMOL/L (ref 5–15)
BASOPHILS # BLD AUTO: 0.02 10*3/MM3 (ref 0–0.2)
BASOPHILS NFR BLD AUTO: 0.3 % (ref 0–1.5)
BUN SERPL-MCNC: 28 MG/DL (ref 8–23)
BUN/CREAT SERPL: 17 (ref 7–25)
CALCIUM SPEC-SCNC: 9.1 MG/DL (ref 8.6–10.5)
CHLORIDE SERPL-SCNC: 102 MMOL/L (ref 98–107)
CO2 SERPL-SCNC: 25 MMOL/L (ref 22–29)
CREAT SERPL-MCNC: 1.65 MG/DL (ref 0.76–1.27)
DEPRECATED RDW RBC AUTO: 42.3 FL (ref 37–54)
DEPRECATED RDW RBC AUTO: 42.4 FL (ref 37–54)
EOSINOPHIL # BLD AUTO: 0.08 10*3/MM3 (ref 0–0.4)
EOSINOPHIL NFR BLD AUTO: 1.1 % (ref 0.3–6.2)
ERYTHROCYTE [DISTWIDTH] IN BLOOD BY AUTOMATED COUNT: 12.9 % (ref 12.3–15.4)
ERYTHROCYTE [DISTWIDTH] IN BLOOD BY AUTOMATED COUNT: 13.1 % (ref 12.3–15.4)
FLUAV RNA RESP QL NAA+PROBE: NOT DETECTED
FLUBV RNA RESP QL NAA+PROBE: NOT DETECTED
GFR SERPL CREATININE-BSD FRML MDRD: 42 ML/MIN/1.73
GLUCOSE BLDC GLUCOMTR-MCNC: 182 MG/DL (ref 70–130)
GLUCOSE BLDC GLUCOMTR-MCNC: 201 MG/DL (ref 70–130)
GLUCOSE BLDC GLUCOMTR-MCNC: 211 MG/DL (ref 70–130)
GLUCOSE SERPL-MCNC: 199 MG/DL (ref 65–99)
HCT VFR BLD AUTO: 42.5 % (ref 37.5–51)
HCT VFR BLD AUTO: 44 % (ref 37.5–51)
HGB BLD-MCNC: 15.2 G/DL (ref 13–17.7)
HGB BLD-MCNC: 15.2 G/DL (ref 13–17.7)
HOLD SPECIMEN: NORMAL
IMM GRANULOCYTES # BLD AUTO: 0.02 10*3/MM3 (ref 0–0.05)
IMM GRANULOCYTES NFR BLD AUTO: 0.3 % (ref 0–0.5)
INR PPP: 2.01 (ref 0.8–1.2)
INR PPP: 2.18 (ref 0.8–1.2)
LYMPHOCYTES # BLD AUTO: 2.34 10*3/MM3 (ref 0.7–3.1)
LYMPHOCYTES NFR BLD AUTO: 31.4 % (ref 19.6–45.3)
MCH RBC QN AUTO: 30.8 PG (ref 26.6–33)
MCH RBC QN AUTO: 31.5 PG (ref 26.6–33)
MCHC RBC AUTO-ENTMCNC: 34.5 G/DL (ref 31.5–35.7)
MCHC RBC AUTO-ENTMCNC: 35.8 G/DL (ref 31.5–35.7)
MCV RBC AUTO: 88.2 FL (ref 79–97)
MCV RBC AUTO: 89.2 FL (ref 79–97)
MONOCYTES # BLD AUTO: 0.74 10*3/MM3 (ref 0.1–0.9)
MONOCYTES NFR BLD AUTO: 9.9 % (ref 5–12)
NEUTROPHILS NFR BLD AUTO: 4.25 10*3/MM3 (ref 1.7–7)
NEUTROPHILS NFR BLD AUTO: 57 % (ref 42.7–76)
NRBC BLD AUTO-RTO: 0 /100 WBC (ref 0–0.2)
PLATELET # BLD AUTO: 172 10*3/MM3 (ref 140–450)
PLATELET # BLD AUTO: 181 10*3/MM3 (ref 140–450)
PMV BLD AUTO: 9.5 FL (ref 6–12)
PMV BLD AUTO: 9.9 FL (ref 6–12)
POTASSIUM SERPL-SCNC: 4.6 MMOL/L (ref 3.5–5.2)
PROTHROMBIN TIME: 24 SECONDS (ref 11.1–15.3)
PROTHROMBIN TIME: 25.6 SECONDS (ref 11.1–15.3)
QT INTERVAL: 428 MS
QTC INTERVAL: 413 MS
RBC # BLD AUTO: 4.82 10*6/MM3 (ref 4.14–5.8)
RBC # BLD AUTO: 4.93 10*6/MM3 (ref 4.14–5.8)
SARS-COV-2 RNA RESP QL NAA+PROBE: NOT DETECTED
SODIUM SERPL-SCNC: 136 MMOL/L (ref 136–145)
TROPONIN T SERPL-MCNC: <0.01 NG/ML (ref 0–0.03)
WBC # BLD AUTO: 7.45 10*3/MM3 (ref 3.4–10.8)
WBC # BLD AUTO: 9.23 10*3/MM3 (ref 3.4–10.8)
WHOLE BLOOD HOLD SPECIMEN: NORMAL

## 2021-01-26 PROCEDURE — 80048 BASIC METABOLIC PNL TOTAL CA: CPT | Performed by: HOSPITALIST

## 2021-01-26 PROCEDURE — 85610 PROTHROMBIN TIME: CPT | Performed by: EMERGENCY MEDICINE

## 2021-01-26 PROCEDURE — 99285 EMERGENCY DEPT VISIT HI MDM: CPT

## 2021-01-26 PROCEDURE — 93010 ELECTROCARDIOGRAM REPORT: CPT | Performed by: INTERNAL MEDICINE

## 2021-01-26 PROCEDURE — 96372 THER/PROPH/DIAG INJ SC/IM: CPT

## 2021-01-26 PROCEDURE — 96361 HYDRATE IV INFUSION ADD-ON: CPT

## 2021-01-26 PROCEDURE — 96374 THER/PROPH/DIAG INJ IV PUSH: CPT

## 2021-01-26 PROCEDURE — 63710000001 INSULIN ASPART PER 5 UNITS: Performed by: HOSPITALIST

## 2021-01-26 PROCEDURE — 82962 GLUCOSE BLOOD TEST: CPT

## 2021-01-26 PROCEDURE — 87636 SARSCOV2 & INF A&B AMP PRB: CPT | Performed by: EMERGENCY MEDICINE

## 2021-01-26 PROCEDURE — G0378 HOSPITAL OBSERVATION PER HR: HCPCS

## 2021-01-26 PROCEDURE — 25010000002 MORPHINE PER 10 MG: Performed by: HOSPITALIST

## 2021-01-26 PROCEDURE — 25010000002 METHYLPREDNISOLONE PER 125 MG: Performed by: HOSPITALIST

## 2021-01-26 PROCEDURE — 85025 COMPLETE CBC W/AUTO DIFF WBC: CPT | Performed by: EMERGENCY MEDICINE

## 2021-01-26 PROCEDURE — 85027 COMPLETE CBC AUTOMATED: CPT | Performed by: HOSPITALIST

## 2021-01-26 PROCEDURE — 85610 PROTHROMBIN TIME: CPT | Performed by: INTERNAL MEDICINE

## 2021-01-26 PROCEDURE — 25010000002 DIPHENHYDRAMINE PER 50 MG

## 2021-01-26 PROCEDURE — 84484 ASSAY OF TROPONIN QUANT: CPT | Performed by: EMERGENCY MEDICINE

## 2021-01-26 PROCEDURE — 96376 TX/PRO/DX INJ SAME DRUG ADON: CPT

## 2021-01-26 PROCEDURE — 25010000002 MORPHINE PER 10 MG: Performed by: INTERNAL MEDICINE

## 2021-01-26 PROCEDURE — 99284 EMERGENCY DEPT VISIT MOD MDM: CPT

## 2021-01-26 PROCEDURE — 93005 ELECTROCARDIOGRAM TRACING: CPT | Performed by: EMERGENCY MEDICINE

## 2021-01-26 PROCEDURE — 92610 EVALUATE SWALLOWING FUNCTION: CPT | Performed by: SPEECH-LANGUAGE PATHOLOGIST

## 2021-01-26 PROCEDURE — 25010000002 METHYLPREDNISOLONE PER 125 MG

## 2021-01-26 PROCEDURE — 25010000002 EPINEPHRINE (ANAPHYLAXIS) 1 MG/ML SOLUTION

## 2021-01-26 PROCEDURE — C9803 HOPD COVID-19 SPEC COLLECT: HCPCS

## 2021-01-26 PROCEDURE — 96375 TX/PRO/DX INJ NEW DRUG ADDON: CPT

## 2021-01-26 RX ORDER — FUROSEMIDE 20 MG/1
20 TABLET ORAL DAILY
Status: DISCONTINUED | OUTPATIENT
Start: 2021-01-27 | End: 2021-01-27 | Stop reason: HOSPADM

## 2021-01-26 RX ORDER — METHYLPREDNISOLONE SODIUM SUCCINATE 125 MG/2ML
80 INJECTION, POWDER, LYOPHILIZED, FOR SOLUTION INTRAMUSCULAR; INTRAVENOUS EVERY 6 HOURS
Status: DISCONTINUED | OUTPATIENT
Start: 2021-01-26 | End: 2021-01-27 | Stop reason: HOSPADM

## 2021-01-26 RX ORDER — SODIUM CHLORIDE 9 MG/ML
100 INJECTION, SOLUTION INTRAVENOUS CONTINUOUS
Status: DISCONTINUED | OUTPATIENT
Start: 2021-01-26 | End: 2021-01-27

## 2021-01-26 RX ORDER — SODIUM CHLORIDE 0.9 % (FLUSH) 0.9 %
10 SYRINGE (ML) INJECTION EVERY 12 HOURS SCHEDULED
Status: DISCONTINUED | OUTPATIENT
Start: 2021-01-26 | End: 2021-01-27 | Stop reason: HOSPADM

## 2021-01-26 RX ORDER — MORPHINE SULFATE 15 MG/1
15 TABLET, FILM COATED, EXTENDED RELEASE ORAL EVERY 12 HOURS SCHEDULED
Status: DISCONTINUED | OUTPATIENT
Start: 2021-01-26 | End: 2021-01-27 | Stop reason: HOSPADM

## 2021-01-26 RX ORDER — SODIUM CHLORIDE 0.9 % (FLUSH) 0.9 %
10 SYRINGE (ML) INJECTION AS NEEDED
Status: DISCONTINUED | OUTPATIENT
Start: 2021-01-26 | End: 2021-01-27 | Stop reason: HOSPADM

## 2021-01-26 RX ORDER — DIPHENHYDRAMINE HYDROCHLORIDE 50 MG/ML
INJECTION INTRAMUSCULAR; INTRAVENOUS
Status: COMPLETED
Start: 2021-01-26 | End: 2021-01-26

## 2021-01-26 RX ORDER — CLONAZEPAM 0.5 MG/1
1.5 TABLET ORAL EVERY 8 HOURS SCHEDULED
Status: DISCONTINUED | OUTPATIENT
Start: 2021-01-26 | End: 2021-01-27 | Stop reason: HOSPADM

## 2021-01-26 RX ORDER — DEXTROSE MONOHYDRATE 25 G/50ML
25 INJECTION, SOLUTION INTRAVENOUS
Status: DISCONTINUED | OUTPATIENT
Start: 2021-01-26 | End: 2021-01-27 | Stop reason: HOSPADM

## 2021-01-26 RX ORDER — METHYLPREDNISOLONE SODIUM SUCCINATE 125 MG/2ML
125 INJECTION, POWDER, LYOPHILIZED, FOR SOLUTION INTRAMUSCULAR; INTRAVENOUS ONCE
Status: COMPLETED | OUTPATIENT
Start: 2021-01-26 | End: 2021-01-26

## 2021-01-26 RX ORDER — DIPHENHYDRAMINE HYDROCHLORIDE 50 MG/ML
25 INJECTION INTRAMUSCULAR; INTRAVENOUS EVERY 6 HOURS PRN
Status: DISCONTINUED | OUTPATIENT
Start: 2021-01-26 | End: 2021-01-27 | Stop reason: HOSPADM

## 2021-01-26 RX ORDER — HYDROCODONE BITARTRATE AND ACETAMINOPHEN 7.5; 325 MG/1; MG/1
1 TABLET ORAL EVERY 8 HOURS PRN
Status: DISCONTINUED | OUTPATIENT
Start: 2021-01-26 | End: 2021-01-27 | Stop reason: HOSPADM

## 2021-01-26 RX ORDER — EPINEPHRINE 1 MG/ML
0.3 INJECTION, SOLUTION INTRAMUSCULAR; SUBCUTANEOUS ONCE
Status: COMPLETED | OUTPATIENT
Start: 2021-01-26 | End: 2021-01-26

## 2021-01-26 RX ORDER — NICOTINE POLACRILEX 4 MG
15 LOZENGE BUCCAL
Status: DISCONTINUED | OUTPATIENT
Start: 2021-01-26 | End: 2021-01-27 | Stop reason: HOSPADM

## 2021-01-26 RX ORDER — RANOLAZINE 500 MG/1
500 TABLET, EXTENDED RELEASE ORAL EVERY 12 HOURS SCHEDULED
Status: DISCONTINUED | OUTPATIENT
Start: 2021-01-27 | End: 2021-01-27 | Stop reason: HOSPADM

## 2021-01-26 RX ORDER — ASPIRIN 81 MG/1
81 TABLET ORAL DAILY
Status: DISCONTINUED | OUTPATIENT
Start: 2021-01-26 | End: 2021-01-27 | Stop reason: HOSPADM

## 2021-01-26 RX ORDER — METHYLPREDNISOLONE SODIUM SUCCINATE 125 MG/2ML
INJECTION, POWDER, LYOPHILIZED, FOR SOLUTION INTRAMUSCULAR; INTRAVENOUS
Status: COMPLETED
Start: 2021-01-26 | End: 2021-01-26

## 2021-01-26 RX ORDER — EPINEPHRINE 1 MG/ML
INJECTION, SOLUTION INTRAMUSCULAR; SUBCUTANEOUS
Status: COMPLETED
Start: 2021-01-26 | End: 2021-01-26

## 2021-01-26 RX ORDER — ISOSORBIDE MONONITRATE 30 MG/1
30 TABLET, EXTENDED RELEASE ORAL
Status: DISCONTINUED | OUTPATIENT
Start: 2021-01-27 | End: 2021-01-27 | Stop reason: HOSPADM

## 2021-01-26 RX ORDER — FAMOTIDINE 20 MG/1
20 TABLET, FILM COATED ORAL
Status: DISCONTINUED | OUTPATIENT
Start: 2021-01-26 | End: 2021-01-27 | Stop reason: HOSPADM

## 2021-01-26 RX ORDER — DIPHENHYDRAMINE HYDROCHLORIDE 50 MG/ML
25 INJECTION INTRAMUSCULAR; INTRAVENOUS ONCE
Status: COMPLETED | OUTPATIENT
Start: 2021-01-26 | End: 2021-01-26

## 2021-01-26 RX ADMIN — INSULIN ASPART 4 UNITS: 100 INJECTION, SOLUTION INTRAVENOUS; SUBCUTANEOUS at 11:14

## 2021-01-26 RX ADMIN — METOPROLOL TARTRATE 25 MG: 25 TABLET, FILM COATED ORAL at 23:59

## 2021-01-26 RX ADMIN — METHYLPREDNISOLONE SODIUM SUCCINATE 80 MG: 125 INJECTION, POWDER, FOR SOLUTION INTRAMUSCULAR; INTRAVENOUS at 08:57

## 2021-01-26 RX ADMIN — MORPHINE SULFATE 4 MG: 4 INJECTION INTRAVENOUS at 04:34

## 2021-01-26 RX ADMIN — SODIUM CHLORIDE 100 ML/HR: 9 INJECTION, SOLUTION INTRAVENOUS at 20:54

## 2021-01-26 RX ADMIN — SODIUM CHLORIDE 100 ML/HR: 9 INJECTION, SOLUTION INTRAVENOUS at 04:33

## 2021-01-26 RX ADMIN — ASPIRIN 81 MG: 81 TABLET, COATED ORAL at 18:07

## 2021-01-26 RX ADMIN — MORPHINE SULFATE 15 MG: 15 TABLET, EXTENDED RELEASE ORAL at 20:54

## 2021-01-26 RX ADMIN — METHYLPREDNISOLONE SODIUM SUCCINATE 125 MG: 125 INJECTION, POWDER, FOR SOLUTION INTRAMUSCULAR; INTRAVENOUS at 02:04

## 2021-01-26 RX ADMIN — CLONAZEPAM 1.5 MG: 0.5 TABLET ORAL at 18:07

## 2021-01-26 RX ADMIN — DIPHENHYDRAMINE HYDROCHLORIDE 25 MG: 50 INJECTION INTRAMUSCULAR; INTRAVENOUS at 02:04

## 2021-01-26 RX ADMIN — METHYLPREDNISOLONE SODIUM SUCCINATE 80 MG: 125 INJECTION, POWDER, FOR SOLUTION INTRAMUSCULAR; INTRAVENOUS at 15:42

## 2021-01-26 RX ADMIN — MORPHINE SULFATE 4 MG: 4 INJECTION INTRAVENOUS at 13:09

## 2021-01-26 RX ADMIN — INSULIN ASPART 4 UNITS: 100 INJECTION, SOLUTION INTRAVENOUS; SUBCUTANEOUS at 16:56

## 2021-01-26 RX ADMIN — METHYLPREDNISOLONE SODIUM SUCCINATE 125 MG: 125 INJECTION, POWDER, LYOPHILIZED, FOR SOLUTION INTRAMUSCULAR; INTRAVENOUS at 02:04

## 2021-01-26 RX ADMIN — EPINEPHRINE 0.3 MG: 1 INJECTION, SOLUTION INTRAMUSCULAR; SUBCUTANEOUS at 02:05

## 2021-01-26 RX ADMIN — EPINEPHRINE 0.3 MG: 1 INJECTION INTRAMUSCULAR; INTRAVENOUS; SUBCUTANEOUS at 02:05

## 2021-01-26 RX ADMIN — FAMOTIDINE 20 MG: 20 TABLET, FILM COATED ORAL at 18:07

## 2021-01-26 RX ADMIN — METHYLPREDNISOLONE SODIUM SUCCINATE 80 MG: 125 INJECTION, POWDER, FOR SOLUTION INTRAMUSCULAR; INTRAVENOUS at 20:53

## 2021-01-27 VITALS
SYSTOLIC BLOOD PRESSURE: 145 MMHG | RESPIRATION RATE: 22 BRPM | OXYGEN SATURATION: 98 % | WEIGHT: 293.21 LBS | TEMPERATURE: 98.2 F | DIASTOLIC BLOOD PRESSURE: 83 MMHG | HEIGHT: 71 IN | BODY MASS INDEX: 41.05 KG/M2 | HEART RATE: 89 BPM

## 2021-01-27 LAB
ANION GAP SERPL CALCULATED.3IONS-SCNC: 10 MMOL/L (ref 5–15)
BASOPHILS # BLD AUTO: 0 10*3/MM3 (ref 0–0.2)
BASOPHILS NFR BLD AUTO: 0 % (ref 0–1.5)
BUN SERPL-MCNC: 26 MG/DL (ref 8–23)
BUN/CREAT SERPL: 19.5 (ref 7–25)
CALCIUM SPEC-SCNC: 8.6 MG/DL (ref 8.6–10.5)
CHLORIDE SERPL-SCNC: 102 MMOL/L (ref 98–107)
CO2 SERPL-SCNC: 23 MMOL/L (ref 22–29)
CREAT SERPL-MCNC: 1.33 MG/DL (ref 0.76–1.27)
DEPRECATED RDW RBC AUTO: 41.1 FL (ref 37–54)
EOSINOPHIL # BLD AUTO: 0.01 10*3/MM3 (ref 0–0.4)
EOSINOPHIL NFR BLD AUTO: 0.1 % (ref 0.3–6.2)
ERYTHROCYTE [DISTWIDTH] IN BLOOD BY AUTOMATED COUNT: 12.7 % (ref 12.3–15.4)
GFR SERPL CREATININE-BSD FRML MDRD: 54 ML/MIN/1.73
GLUCOSE BLDC GLUCOMTR-MCNC: 192 MG/DL (ref 70–130)
GLUCOSE BLDC GLUCOMTR-MCNC: 233 MG/DL (ref 70–130)
GLUCOSE SERPL-MCNC: 213 MG/DL (ref 65–99)
HCT VFR BLD AUTO: 41.9 % (ref 37.5–51)
HGB BLD-MCNC: 14.8 G/DL (ref 13–17.7)
IMM GRANULOCYTES # BLD AUTO: 0.03 10*3/MM3 (ref 0–0.05)
IMM GRANULOCYTES NFR BLD AUTO: 0.3 % (ref 0–0.5)
INR PPP: 2.35 (ref 0.8–1.2)
LYMPHOCYTES # BLD AUTO: 1.28 10*3/MM3 (ref 0.7–3.1)
LYMPHOCYTES NFR BLD AUTO: 13.8 % (ref 19.6–45.3)
MCH RBC QN AUTO: 31.2 PG (ref 26.6–33)
MCHC RBC AUTO-ENTMCNC: 35.3 G/DL (ref 31.5–35.7)
MCV RBC AUTO: 88.2 FL (ref 79–97)
MONOCYTES # BLD AUTO: 0.27 10*3/MM3 (ref 0.1–0.9)
MONOCYTES NFR BLD AUTO: 2.9 % (ref 5–12)
NEUTROPHILS NFR BLD AUTO: 7.7 10*3/MM3 (ref 1.7–7)
NEUTROPHILS NFR BLD AUTO: 82.9 % (ref 42.7–76)
NRBC BLD AUTO-RTO: 0 /100 WBC (ref 0–0.2)
PLATELET # BLD AUTO: 192 10*3/MM3 (ref 140–450)
PMV BLD AUTO: 9.8 FL (ref 6–12)
POTASSIUM SERPL-SCNC: 4 MMOL/L (ref 3.5–5.2)
PROTHROMBIN TIME: 27.2 SECONDS (ref 11.1–15.3)
RBC # BLD AUTO: 4.75 10*6/MM3 (ref 4.14–5.8)
SODIUM SERPL-SCNC: 135 MMOL/L (ref 136–145)
WBC # BLD AUTO: 9.29 10*3/MM3 (ref 3.4–10.8)

## 2021-01-27 PROCEDURE — 86003 ALLG SPEC IGE CRUDE XTRC EA: CPT | Performed by: INTERNAL MEDICINE

## 2021-01-27 PROCEDURE — 82962 GLUCOSE BLOOD TEST: CPT

## 2021-01-27 PROCEDURE — 92526 ORAL FUNCTION THERAPY: CPT | Performed by: SPEECH-LANGUAGE PATHOLOGIST

## 2021-01-27 PROCEDURE — 25010000002 METHYLPREDNISOLONE PER 125 MG: Performed by: HOSPITALIST

## 2021-01-27 PROCEDURE — G0378 HOSPITAL OBSERVATION PER HR: HCPCS

## 2021-01-27 PROCEDURE — 96376 TX/PRO/DX INJ SAME DRUG ADON: CPT

## 2021-01-27 PROCEDURE — 80048 BASIC METABOLIC PNL TOTAL CA: CPT | Performed by: INTERNAL MEDICINE

## 2021-01-27 PROCEDURE — 96361 HYDRATE IV INFUSION ADD-ON: CPT

## 2021-01-27 PROCEDURE — 36415 COLL VENOUS BLD VENIPUNCTURE: CPT | Performed by: INTERNAL MEDICINE

## 2021-01-27 PROCEDURE — 25010000002 MORPHINE PER 10 MG: Performed by: HOSPITALIST

## 2021-01-27 PROCEDURE — 85025 COMPLETE CBC W/AUTO DIFF WBC: CPT | Performed by: INTERNAL MEDICINE

## 2021-01-27 PROCEDURE — 63710000001 INSULIN ASPART PER 5 UNITS: Performed by: HOSPITALIST

## 2021-01-27 PROCEDURE — 85610 PROTHROMBIN TIME: CPT | Performed by: INTERNAL MEDICINE

## 2021-01-27 RX ORDER — FAMOTIDINE 20 MG/1
20 TABLET, FILM COATED ORAL
Qty: 10 TABLET | Refills: 0 | Status: SHIPPED | OUTPATIENT
Start: 2021-01-27 | End: 2021-02-01

## 2021-01-27 RX ORDER — PREDNISONE 10 MG/1
TABLET ORAL
Qty: 21 TABLET | Refills: 0 | Status: SHIPPED | OUTPATIENT
Start: 2021-01-27 | End: 2021-02-05

## 2021-01-27 RX ORDER — DIPHENHYDRAMINE HCL 25 MG
25 TABLET ORAL DAILY
Qty: 3 TABLET | Refills: 0 | Status: SHIPPED | OUTPATIENT
Start: 2021-01-27 | End: 2021-01-30

## 2021-01-27 RX ORDER — MORPHINE SULFATE 2 MG/ML
4 INJECTION, SOLUTION INTRAMUSCULAR; INTRAVENOUS EVERY 4 HOURS PRN
Status: DISCONTINUED | OUTPATIENT
Start: 2021-01-27 | End: 2021-01-27 | Stop reason: HOSPADM

## 2021-01-27 RX ADMIN — METOPROLOL TARTRATE 25 MG: 25 TABLET, FILM COATED ORAL at 08:17

## 2021-01-27 RX ADMIN — RANOLAZINE 500 MG: 500 TABLET, FILM COATED, EXTENDED RELEASE ORAL at 08:16

## 2021-01-27 RX ADMIN — INSULIN ASPART 4 UNITS: 100 INJECTION, SOLUTION INTRAVENOUS; SUBCUTANEOUS at 12:00

## 2021-01-27 RX ADMIN — ASPIRIN 81 MG: 81 TABLET, COATED ORAL at 08:16

## 2021-01-27 RX ADMIN — SODIUM CHLORIDE 100 ML/HR: 9 INJECTION, SOLUTION INTRAVENOUS at 08:16

## 2021-01-27 RX ADMIN — RANOLAZINE 500 MG: 500 TABLET, FILM COATED, EXTENDED RELEASE ORAL at 00:00

## 2021-01-27 RX ADMIN — CLONAZEPAM 1.5 MG: 0.5 TABLET ORAL at 06:24

## 2021-01-27 RX ADMIN — FUROSEMIDE 20 MG: 20 TABLET ORAL at 08:17

## 2021-01-27 RX ADMIN — ISOSORBIDE MONONITRATE 30 MG: 30 TABLET, EXTENDED RELEASE ORAL at 00:00

## 2021-01-27 RX ADMIN — MORPHINE SULFATE 4 MG: 2 INJECTION, SOLUTION INTRAMUSCULAR; INTRAVENOUS at 02:54

## 2021-01-27 RX ADMIN — HYDROCODONE BITARTRATE AND ACETAMINOPHEN 1 TABLET: 7.5; 325 TABLET ORAL at 06:24

## 2021-01-27 RX ADMIN — METHYLPREDNISOLONE SODIUM SUCCINATE 80 MG: 125 INJECTION, POWDER, FOR SOLUTION INTRAMUSCULAR; INTRAVENOUS at 02:45

## 2021-01-27 RX ADMIN — METHYLPREDNISOLONE SODIUM SUCCINATE 80 MG: 125 INJECTION, POWDER, FOR SOLUTION INTRAMUSCULAR; INTRAVENOUS at 08:17

## 2021-01-27 RX ADMIN — SODIUM CHLORIDE, PRESERVATIVE FREE 10 ML: 5 INJECTION INTRAVENOUS at 08:18

## 2021-01-27 RX ADMIN — MORPHINE SULFATE 15 MG: 15 TABLET, EXTENDED RELEASE ORAL at 08:16

## 2021-01-27 RX ADMIN — INSULIN ASPART 2 UNITS: 100 INJECTION, SOLUTION INTRAVENOUS; SUBCUTANEOUS at 06:30

## 2021-01-27 RX ADMIN — FAMOTIDINE 20 MG: 20 TABLET, FILM COATED ORAL at 06:30

## 2021-01-28 ENCOUNTER — TELEPHONE (OUTPATIENT)
Dept: CARDIOLOGY | Facility: CLINIC | Age: 66
End: 2021-01-28

## 2021-01-30 LAB
CLAM IGE QN: <0.1 KU/L
CODFISH IGE QN: <0.1 KU/L
CONV CLASS DESCRIPTION: NORMAL
CORN IGE QN: <0.1 KU/L
COW MILK IGE QN: <0.1 KU/L
EGG WHITE IGE QN: <0.1 KU/L
PEANUT IGE QN: <0.1 KU/L
SCALLOP IGE QN: <0.1 KU/L
SESAME SEED IGE QN: <0.1 KU/L
SHRIMP IGE QN: <0.1 KU/L
SOYBEAN IGE QN: <0.1 KU/L
WALNUT IGE QN: <0.1 KU/L
WHEAT IGE QN: <0.1 KU/L

## 2021-02-05 ENCOUNTER — ANTICOAGULATION VISIT (OUTPATIENT)
Dept: CARDIAC SURGERY | Facility: CLINIC | Age: 66
End: 2021-02-05

## 2021-02-05 DIAGNOSIS — Z86.711 HISTORY OF PULMONARY EMBOLISM: ICD-10-CM

## 2021-02-05 DIAGNOSIS — Z79.01 LONG TERM (CURRENT) USE OF ANTICOAGULANTS: ICD-10-CM

## 2021-02-05 DIAGNOSIS — Z51.81 ENCOUNTER FOR THERAPEUTIC DRUG MONITORING: ICD-10-CM

## 2021-02-05 LAB — INR PPP: 2.5 (ref 0.9–1.1)

## 2021-02-05 PROCEDURE — 93793 ANTICOAG MGMT PT WARFARIN: CPT | Performed by: NURSE PRACTITIONER

## 2021-02-05 PROCEDURE — 85610 PROTHROMBIN TIME: CPT | Performed by: NURSE PRACTITIONER

## 2021-02-05 PROCEDURE — 36416 COLLJ CAPILLARY BLOOD SPEC: CPT | Performed by: NURSE PRACTITIONER

## 2021-02-05 RX ORDER — ISOSORBIDE MONONITRATE 30 MG/1
30 TABLET, EXTENDED RELEASE ORAL DAILY
COMMUNITY
End: 2021-02-19 | Stop reason: SDUPTHER

## 2021-02-05 NOTE — PROGRESS NOTES
PT here today for enrollment in Coumadin Clinic. PT was given Coumadin informational packet.  Educated pt on reasoning for Coumadin therapy. Explained mechanism of action with Coumadin and vitamin k. Risk and benefits were explained including rationale for INR range. Stressed compliance and consistency with diet, medications, and appts. PT was educated on interaction of other medications and instructed to call [facility] with any med changes immediately including those that are short term. Instructed pt to take Coumadin between supper time and bedtime. Pt was educated on expectations of visits including intervals and frequencies. Educated pt on dietary restrictions and Coumadin friendly diet. PT was given information sheets regarding acceptable green veggies. PT was instructed to contact [facility] for any planned medical procedures and to make sure all providers are aware of Coumadin therapy. PT was educated on need for Lovenox if Coumadin therapy is interrupted. PT was educated on s/s of bleeding and to report bleeding to ER immediatly. PT was advised to wear medical alert bracelet. Emphasized safety while on blood thinner including power tool safety and to always obtain CT scan for head/blunt trauma. Discussed with pt the need for soft toothbrush, waxed dental floss and safety razor. PT was instructed that [facility] Clinic would manage pt's INR and refill Coumadin. Pt was originally on coumadin in 2008 for DVT and was taken off after a few months. Pt developed a PE in 2009 and has been on coumadin ever since. Pt states he once had a procedure and was not bridged with Lovenox and developed another DVT. Pt and wife were very receptive to teaching and an hour was spent with them. Pt was discharged from hospital last week with 6 day supply of Prednisone that he finished on Tuesday. Pt is currently on usual dose of coumadin. Pt was instructed to continue current dose and appt made for 2 weeks; pt verbalized. Findings reported by  Ketty Moscoso RN.    Today's INR is   Lab Results - Last 18 Months   Lab Units 02/05/21   INR  2.50*

## 2021-02-19 DIAGNOSIS — I10 ESSENTIAL HYPERTENSION: Primary | ICD-10-CM

## 2021-02-19 RX ORDER — ISOSORBIDE MONONITRATE 30 MG/1
30 TABLET, EXTENDED RELEASE ORAL DAILY
Qty: 90 TABLET | Refills: 3 | Status: SHIPPED | OUTPATIENT
Start: 2021-02-19 | End: 2021-11-03

## 2021-02-23 ENCOUNTER — ANTICOAGULATION VISIT (OUTPATIENT)
Dept: CARDIAC SURGERY | Facility: CLINIC | Age: 66
End: 2021-02-23

## 2021-02-23 VITALS — HEART RATE: 74 BPM | OXYGEN SATURATION: 98 %

## 2021-02-23 DIAGNOSIS — Z51.81 ENCOUNTER FOR THERAPEUTIC DRUG MONITORING: ICD-10-CM

## 2021-02-23 DIAGNOSIS — Z86.711 HISTORY OF PULMONARY EMBOLISM: ICD-10-CM

## 2021-02-23 DIAGNOSIS — Z79.01 LONG TERM (CURRENT) USE OF ANTICOAGULANTS: ICD-10-CM

## 2021-02-23 LAB — INR PPP: 2.2 (ref 0.9–1.1)

## 2021-02-23 PROCEDURE — 93793 ANTICOAG MGMT PT WARFARIN: CPT | Performed by: NURSE PRACTITIONER

## 2021-02-23 PROCEDURE — 36416 COLLJ CAPILLARY BLOOD SPEC: CPT | Performed by: NURSE PRACTITIONER

## 2021-02-23 PROCEDURE — 85610 PROTHROMBIN TIME: CPT | Performed by: NURSE PRACTITIONER

## 2021-02-23 NOTE — PROGRESS NOTES
Patient states no med changes or bleeding problems or unexplained bruising. Patient instructed to continue current dosing schedule. Verbalizes understanding. Will recheck 1 month.  Patient instructed regarding medication; results given and questions answered. Nutritional counseling given.  Dietary factors affecting therapy addressed.  Patient instructed to monitor for excessive bruising or bleeding. Findings reported by Ketty Moscoso RN.    Today's INR is   Lab Results - Last 18 Months   Lab Units 02/23/21   INR  2.20*

## 2021-03-04 ENCOUNTER — TELEPHONE (OUTPATIENT)
Dept: SLEEP MEDICINE | Facility: HOSPITAL | Age: 66
End: 2021-03-04

## 2021-03-04 NOTE — TELEPHONE ENCOUNTER
Spoke with mr kauffman ask him to take his card to his dme next time he is out so we can get a compliance check on him patient said he would

## 2021-03-08 ENCOUNTER — OFFICE VISIT (OUTPATIENT)
Dept: PODIATRY | Facility: CLINIC | Age: 66
End: 2021-03-08

## 2021-03-08 VITALS — WEIGHT: 293 LBS | OXYGEN SATURATION: 98 % | BODY MASS INDEX: 41.02 KG/M2 | HEART RATE: 80 BPM | HEIGHT: 71 IN

## 2021-03-08 DIAGNOSIS — E11.42 DIABETIC POLYNEUROPATHY ASSOCIATED WITH TYPE 2 DIABETES MELLITUS (HCC): Primary | ICD-10-CM

## 2021-03-08 DIAGNOSIS — M79.674 PAIN IN TOES OF BOTH FEET: ICD-10-CM

## 2021-03-08 DIAGNOSIS — M79.675 PAIN IN TOES OF BOTH FEET: ICD-10-CM

## 2021-03-08 DIAGNOSIS — B35.1 ONYCHOMYCOSIS: ICD-10-CM

## 2021-03-08 PROCEDURE — 11721 DEBRIDE NAIL 6 OR MORE: CPT | Performed by: PODIATRIST

## 2021-03-08 RX ORDER — LANCING DEVICE/LANCETS
KIT MISCELLANEOUS
COMMUNITY
Start: 2020-12-07

## 2021-03-08 RX ORDER — CLONAZEPAM 0.5 MG/1
0.75 TABLET ORAL
COMMUNITY
End: 2021-03-23 | Stop reason: DRUGHIGH

## 2021-03-08 RX ORDER — WARFARIN SODIUM 5 MG/1
TABLET ORAL
COMMUNITY
Start: 2020-11-18 | End: 2021-03-08

## 2021-03-08 RX ORDER — EPINEPHRINE 0.3 MG/.3ML
INJECTION SUBCUTANEOUS
COMMUNITY
Start: 2021-01-27 | End: 2023-01-31 | Stop reason: ALTCHOICE

## 2021-03-08 RX ORDER — MORPHINE SULFATE 30 MG/1
30 TABLET, FILM COATED, EXTENDED RELEASE ORAL
COMMUNITY
Start: 2021-02-22 | End: 2021-03-08

## 2021-03-08 RX ORDER — RANOLAZINE 500 MG/1
500 TABLET, EXTENDED RELEASE ORAL
COMMUNITY
Start: 2020-11-18 | End: 2021-03-08

## 2021-03-08 RX ORDER — HYDROCODONE BITARTRATE AND ACETAMINOPHEN 7.5; 325 MG/1; MG/1
1 TABLET ORAL 3 TIMES DAILY
COMMUNITY
Start: 2021-02-22

## 2021-03-08 RX ORDER — TRAZODONE HYDROCHLORIDE 50 MG/1
TABLET ORAL
COMMUNITY
Start: 2020-10-26 | End: 2021-03-08

## 2021-03-08 RX ORDER — NALOXONE HYDROCHLORIDE 4 MG/.1ML
1 SPRAY NASAL
COMMUNITY
Start: 2020-12-28 | End: 2021-03-08

## 2021-03-08 RX ORDER — BACLOFEN 10 MG/1
10 TABLET ORAL 3 TIMES DAILY
COMMUNITY
Start: 2020-12-01 | End: 2021-03-08

## 2021-03-08 RX ORDER — GABAPENTIN 100 MG/1
100 CAPSULE ORAL
COMMUNITY
Start: 2021-02-17 | End: 2021-03-23 | Stop reason: SDUPTHER

## 2021-03-08 RX ORDER — CHLORAL HYDRATE 500 MG
1000 CAPSULE ORAL
COMMUNITY
End: 2021-03-23 | Stop reason: SDUPTHER

## 2021-03-08 NOTE — PROGRESS NOTES
Clint Mack  1955  65 y.o. male   BS-108  per patient  PCP-Dr. Matute 10/26/2020  Patient presents today for diabetic foot care.     03/08/2021      Chief Complaint   Patient presents with   • Left Foot - diabetic foot care   • Right Foot - diabetic foot care           History of Present Illness    Clint Mack is a 65 y.o. male presents for f/u diabetic foot exam and nail care.      Past Medical History:   Diagnosis Date   • Anxiety    • Arthritis     osteoarthritis   • CKD (chronic kidney disease), stage III (CMS/HCC)    • COPD (chronic obstructive pulmonary disease) (CMS/HCC)    • Coronary artery disease    • Depression    • Diabetes mellitus (CMS/HCC)    • DVT (deep venous thrombosis) (CMS/[facility])    • Heart disease    • History of embolic filter insertion    • History of stomach ulcers    • Hyperlipidemia    • Hypertension    • Injury of back     back surgery x3    • Lupus (CMS/HCC)    • LUCIANA on CPAP    • Pulmonary embolism (CMS/HCC)          Past Surgical History:   Procedure Laterality Date   • BACK SURGERY     • CARDIAC SURGERY  2001   • CIRCUMCISION     • COLONOSCOPY N/A 9/11/2018    Procedure: COLONOSCOPY;  Surgeon: Jose C Batres DO;  Location: [facility] ENDOSCOPY;  Service: Gastroenterology   • ENDOSCOPY N/A 9/11/2018    Procedure: ESOPHAGOGASTRODUODENOSCOPY;  Surgeon: Jose C Batres DO;  Location: [facility] ENDOSCOPY;  Service: Gastroenterology   • ENDOSCOPY N/A 3/3/2020    Procedure: ESOPHAGOGASTRODUODENOSCOPY;  Surgeon: Jose C Batres DO;  Location: [facility] ENDOSCOPY;  Service: Gastroenterology;  Laterality: N/A;   • GALLBLADDER SURGERY  2000   • HIP SURGERY     • JOINT REPLACEMENT Right 05/02/2016    hip   • SPINE SURGERY           Family History   Problem Relation Age of Onset   • Heart disease Father    • Hypertension Father    • Stroke Father    • Diabetes Sister    • Heart disease Brother    • Hypertension Brother    • COPD Brother    • Lung disease Other          Social  "History     Socioeconomic History   • Marital status:      Spouse name: Not on file   • Number of children: Not on file   • Years of education: Not on file   • Highest education level: Not on file   Tobacco Use   • Smoking status: Never Smoker   • Smokeless tobacco: Never Used   Vaping Use   • Vaping Use: Never used   Substance and Sexual Activity   • Alcohol use: No   • Drug use: No   • Sexual activity: Defer         Current Outpatient Medications   Medication Sig Dispense Refill   • Alirocumab 75 MG/ML solution auto-injector Inject 75 mg under the skin into the appropriate area as directed Every 14 (Fourteen) Days.     • aspirin 81 MG chewable tablet Chew 81 mg Take As Directed. Take 1 pill by mouth Monday, Wednesday, Friday     • baclofen (LIORESAL) 10 MG tablet Take 10 mg by mouth 3 (Three) Times a Day.     • Blood Glucose Monitoring Suppl (ACCU-CHEK ARGELIA PLUS) w/Device kit      • clonazePAM (KlonoPIN) 0.5 MG tablet Take 1.5 tablets by mouth 3 (Three) Times a Day. For anxiety and tardive dyskinesia. Do not take within 60 min of any opioid pain medication 135 tablet 1   • clonazePAM (KlonoPIN) 0.5 MG tablet Take 0.75 mg by mouth.     • COMFORT ASSIST INSULIN SYRINGE 31G X 5/16\" 0.3 ML misc      • EPINEPHrine (EPIPEN) 0.3 MG/0.3ML solution auto-injector injection      • fluticasone (FLONASE) 50 MCG/ACT nasal spray 2 sprays into the nostril(s) as directed by provider Daily.     • furosemide (LASIX) 20 MG tablet Take 20 mg by mouth Daily.     • gabapentin (NEURONTIN) 100 MG capsule Take 100 mg by mouth 2 (Two) Times a Day.     • gabapentin (NEURONTIN) 100 MG capsule Take 100 mg by mouth.     • glimepiride (AMARYL) 4 MG tablet Take 4 mg by mouth 2 (Two) Times a Day As Needed.     • glucose blood (ONE TOUCH ULTRA TEST) test strip      • HYDROcodone-acetaminophen (NORCO) 7.5-325 MG per tablet Take 1 tablet by mouth Every 8 (Eight) Hours As Needed for Moderate Pain . Five times daily     • " "HYDROcodone-acetaminophen (NORCO) 7.5-325 MG per tablet Take 1 tablet by mouth.     • insulin aspart (novoLOG) 100 UNIT/ML injection Inject  under the skin into the appropriate area as directed 3 (Three) Times a Day Before Meals. PT TAKES PER SLIDING SCALE STARTING AT 3 UNITS FOR BLOOD SUGAR 150-200     • isosorbide mononitrate (IMDUR) 30 MG 24 hr tablet Take 1 tablet by mouth Daily. 90 tablet 3   • Lancets (ONETOUCH ULTRASOFT) lancets      • Lancets Misc. (Accu-Chek Softclix Lancet Dev) kit      • metoprolol tartrate (LOPRESSOR) 25 MG tablet Take 25 mg by mouth 2 (Two) Times a Day.     • Morphine (MS CONTIN) 15 MG 12 hr tablet Take 30 mg by mouth 2 (Two) Times a Day.     • naloxone (Narcan) 4 MG/0.1ML nasal spray 1 spray into the nostril(s) as directed by provider As Needed (opioid overdose) for up to 2 doses. 1 each 1   • Omega-3 Fatty Acids (fish oil) 1000 MG capsule capsule Take 1,000 mg by mouth.     • Omega-3 Fatty Acids (FISH OIL) 1200 MG capsule delayed-release Take  by mouth 2 (Two) Times a Day.     • ranolazine (RANEXA) 500 MG 12 hr tablet Take 1 tablet by mouth 2 (Two) Times a Day. 90 tablet 3   • tamsulosin (FLOMAX) 0.4 MG capsule 24 hr capsule Take 1 capsule by mouth Daily.     • traZODone (DESYREL) 50 MG tablet 2 (two) times a day.     • vitamin D (ERGOCALCIFEROL) 97827 UNITS capsule capsule Take 50,000 Units by mouth. Two times monthly     • warfarin (COUMADIN) 5 MG tablet Take 5 mg by mouth Every Night. Patient takes 5 mg every day except M and F when he takes 7.5 mg       No current facility-administered medications for this visit.         OBJECTIVE    Pulse 80   Ht 180.3 cm (71\")   Wt 133 kg (293 lb)   SpO2 98%   BMI 40.87 kg/m²       Review of Systems   Constitutional:  Denies recent weight loss, weight gain, fever or chills, no change in exercise tolerance  Musculoskeletal: Toe pain.   Skin:  Thickened nails. Shin discoloration.  Neurological:  Burning sensations to feet " b/l.  Psychiatric/Behavioral: Denies depression      Physical Exam    Clint had a diabetic foot exam performed today.      Constitutional: he appears well-developed and well-nourished.   HEENT: Normocephalic. Atraumatic  CV: No tenderness. RRR  Resp: Non-labored respiration. No wheezes.   Psychiatric: he has a normal mood and affect. his   behavior is normal.      Lower Extremity Exam:  Vascular: DP/PT pulses palpable 1+.   Negative hair growth.   1+ perimalleolar edema  Toes cool  Neuro: Protective sensation diminished to lesser toes, b/l.  DTRs intact  Integument: No open wounds  No lesions  Atrophic skin noted b/l with chronic venous stasis dermatitis changes  Mild xerosis b/l  No masses  Musculoskeletal: LE muscle strength 4/5 on left, 3/5 on right  Gait assisted with cane, wheelchair  Semi-rigid hammertoe deformity toes 2-5 b/l.  Nails 1-5 b/l thickened, elongated with subungual debris. +pain on palpation              ASSESSMENT AND PLAN    Diagnoses and all orders for this visit:    1. Diabetic polyneuropathy associated with type 2 diabetes mellitus (CMS/HCC) (Primary)    2. Onychomycosis    3. Pain in toes of both feet      -Comprehensive DM foot exam performed. Pt educated on importance of tight glucose control and daily foot checks.  -Pt educated on padding techniques for rigid hammertoes.  Proper extra depth diabetic shoe gear.  Limit barefoot walking.  -Nails 1-5 b/l debrided in length and thickness with nail nipper to decrease pain, fungal load and risk of infection  -Continue skin hydration  -Follow up 3 months PRN          This document has been electronically signed by Jose C Novak DPM on March 8, 2021 12:35 CST     EMR Dragon/Transcription disclaimer:   Much of this encounter note is an electronic transcription/translation of spoken language to printed text. The electronic translation of spoken language may permit erroneous, or at times, nonsensical words or phrases to be inadvertently  transcribed; Although I have reviewed the note for such errors, some may still exist.    Jose C Novak DPM  3/8/2021  12:35 CST

## 2021-03-23 ENCOUNTER — ANTICOAGULATION VISIT (OUTPATIENT)
Dept: CARDIAC SURGERY | Facility: CLINIC | Age: 66
End: 2021-03-23

## 2021-03-23 VITALS — OXYGEN SATURATION: 95 % | HEART RATE: 69 BPM

## 2021-03-23 DIAGNOSIS — Z79.01 LONG TERM (CURRENT) USE OF ANTICOAGULANTS: ICD-10-CM

## 2021-03-23 DIAGNOSIS — Z51.81 ENCOUNTER FOR THERAPEUTIC DRUG MONITORING: ICD-10-CM

## 2021-03-23 DIAGNOSIS — F41.9 ANXIETY: ICD-10-CM

## 2021-03-23 DIAGNOSIS — Z86.711 HISTORY OF PULMONARY EMBOLISM: ICD-10-CM

## 2021-03-23 LAB — INR PPP: 2.3 (ref 0.9–1.1)

## 2021-03-23 PROCEDURE — 85610 PROTHROMBIN TIME: CPT | Performed by: NURSE PRACTITIONER

## 2021-03-23 PROCEDURE — 36416 COLLJ CAPILLARY BLOOD SPEC: CPT | Performed by: NURSE PRACTITIONER

## 2021-03-23 PROCEDURE — 93793 ANTICOAG MGMT PT WARFARIN: CPT | Performed by: NURSE PRACTITIONER

## 2021-03-23 RX ORDER — FUROSEMIDE 20 MG/1
20 TABLET ORAL DAILY
COMMUNITY
End: 2021-03-23 | Stop reason: SDUPTHER

## 2021-03-23 RX ORDER — MIRTAZAPINE 15 MG/1
15 TABLET, FILM COATED ORAL NIGHTLY
COMMUNITY
End: 2021-06-11

## 2021-03-23 RX ORDER — CLONAZEPAM 0.5 MG/1
0.5 TABLET ORAL 3 TIMES DAILY
COMMUNITY
End: 2021-05-17 | Stop reason: SDUPTHER

## 2021-03-23 NOTE — PROGRESS NOTES
Pt states he started Mirtzapine this past Friday which does show potential for interaction with warfarin.  Pt denies bleeding issues.  Will recheck INR next wk to monitor for potential interaction with new med.  Pt verbalizes.  Patient instructed regarding medication; results given and questions answered. Nutritional counseling given.  Dietary factors affecting therapy addressed.  Patient instructed to monitor for excessive bruising or bleeding.  Findings reported by George Lucia RN.   Today's INR is   Lab Results - Last 18 Months   Lab Units 03/23/21  0000   INR  2.30*

## 2021-03-29 NOTE — PROGRESS NOTES
Sleep Medicine Follow-Up Note    CC & ID:  Mr. Clint Mack is a 65 y.o. male seen for follow-up regarding LUCIANA, nocturnal hypoxia & RLS in context of SHERINE & TD, as well as chronic pain.      Treatment Summary:   --Last seen in our clinic in Dec 20; per my note:  --Obstructive Sleep Apnea:  Appears stable  --No EDS  --Reports compliance with use without issue  -Unfortunately no recent data is available for the last year and a half.  We have requested records from his DME to state that patient will need to bring his card there.  Patient is agreeable to do so doing.    ---RLS vs PLMD: stable  --no issues  --Monitor, particularly with Klonopin Taper    --Nocturnal hypoxia: stable,  --Using O2 using base of PAP; nightly use  --Plan for overnight pulse oximetry to confirm adequate saturations    --Anxiety Disorder; stable  --D/w pt need for BH eval and referral; currently declines; working w/ current outpt providers.  --Klonopin has been helpful and is interested in tapering off medication  --We discussed a slow and gradual taper of no more than 25% a month from initial dose.  Discussed probability of 4 to 6 months for completion of taper as well as expectations of rebound anxiety.  Patient reports he knows of rebound symptoms before and will be cognizant of these.  Did discuss the possible deadly effects of abruptly stopping medicine from seizures and excessive toxicity and he is understandable.      --Tardive dyskinesia:  Stable though ongoing and sub optimally controlled   --States was from Celexa, though chart notes Risperdal and anti-emetic from chart; rhythmic movements - ongoing for years; been on Ingrezza - allergy, got restless  --We will plan for neurology referral to Dr. Boyer in [place].  Referral placed.  --Have also encouraged patient to see psychiatry he states he will consider but would prefer to see neurology at this time.    --Polypharmacy w/: Multiple narcotics including opioids as well as  concurrent benzodiazepines.    --Discussed with patient risk of respiratory suppression and death, critical need to use CPAP.  He is agreeable.   --Discussed that I will provide a prescription for Narcan and he and his wife are agreeable, so he does not currently have such a prescription.      --> [facility] w/ plan to continue Supplies, PAP tx, Rx BZD ongoing.         HPI:   Today, patient presents accompanied w/ his wife.    [facility].  He states that there were concerns of tx planning and themes of sub optimal communication leading to f/u with Dr. Che.      We reviewed tx course in detail.  Mis-communication on refills and contacting clinic.  Rapport initially deteriorated but improved and then maintained.      We discussed long term use of Klonopin and goal of very gradual reduction.  Encouraged psychiatry referral given ongoing anxiety and affective concerns.  He is agreeable.  He requests it go through Dr. Matute who is maintaining his worker's comp.     Discussed very graudal taper of Klonopin; most recently reduced from 1mg TID to 0.5mg TID - 50% reduction.  Educated on 25% tapering from psychiatric literature.  He is agreeable.        Consider nocturnal hypoxia, stable, O2 using base of PAP; stable; nightly use.  No waking HA or EDS; no naps or issues    Plan to re-evaluate in 6 months here.   Pt wishes to keep care here and not go to [facility].       Current Issue: Sleep apnea  -Location: Global  -Quality: This is impairing  -Severity: Severe  -Duration: Ongoing  -Timing: Nightly  -Context: with sleep  -Modifying: Benefiting from CPAP   -Associated: anxiety, TD, noct hypoxia      Treatment: CPAP  -Pressure: 12.5 cm    -Pressure intolerance: no   -Aerophagia: no   -Air Hunger: no  -Interface: nasal  -Fit: fairly good  -Chin strap: none  -HCC: [facility] - getting supplies  -Patient's perceived outcome: benefits        Compliance Card Data:    - Date Range: 12/30/20 - 3/29/21   - Days: 90    - % Days used: 100%  - % Days with Usage > 4 hrs: 100%  - Average usage days used: 11 h 27 m   --Pt reports use when not sleeping at times    - Setting: CPAP at 12.5cm fixed    - Residual AHI: 1.6   - PB: 0.9%   - KOLE 0.2    - Vibratory Snore Index: 0.1  - Average Time in Large Leak:  17 min          Respiratory:  -Ongoing Snoring: None breakthrough  -Nocturnal Gasping: None    ROS:  Review of Systems   Constitutional: Negative for fever.   Musculoskeletal: Positive for back pain.   Neurological:        +Gait difficulties due to back pain     Psychiatric/Behavioral:        +Anxiety; negative SI     -CONSTITUTIONAL: Weight: Stable  Wt Readings from Last 5 Encounters:   03/08/21 133 kg (293 lb)   01/27/21 133 kg (293 lb 3.4 oz)   01/19/21 133 kg (294 lb)   12/28/20 134 kg (294 lb 6.4 oz)   12/07/20 133 kg (293 lb)       Questionnaires: ESS 0      Patient Active Problem List   Diagnosis   • LUCIANA (obstructive sleep apnea)   • DVT (deep venous thrombosis) (CMS/[facility])   • HTN (hypertension)   • DM (diabetes mellitus) (CMS/[facility])   • Right hip pain   • Osteoarthritis of right hip   • Staphylococcal arthritis of right hip (CMS/[facility])   • Presence of right artificial hip joint   • Chronic infection of hip joint prosthesis (CMS/[facility])   • CAD (coronary artery disease)   • S/P CABG (coronary artery bypass graft)   • Mixed hyperlipidemia   • Lumbosacral spondylosis without myelopathy   • Pain of right hip joint   • DDD (degenerative disc disease), lumbar   • Current use of steroid medication   • Dyspnea on exertion   • Chest pain   • Anxiety   • Morbid obesity with BMI of 40.0-44.9, adult (CMS/[facility])   • Chronic pain   • CKD (chronic kidney disease) stage 3, GFR 30-59 ml/min (CMS/[facility])   • Allergic rhinitis   • Chronic kidney disease   • Chronic respiratory failure with hypoxia (CMS/[facility])   • Hypoxemia requiring supplemental oxygen   • IgA nephropathy   • Morbid obesity (CMS/[facility])   • OA  "(osteoarthritis) of hip   • Other osteomyelitis, other site (CMS/HCC)   • Pulmonary embolism (CMS/HCC)   • Radiculitis   • Right-sided low back pain with right-sided sciatica   • Screening PSA (prostate specific antigen)   • Slow transit constipation   • Type 2 diabetes mellitus with hyperglycemia (CMS/HCC)   • Right knee pain   • Physical deconditioning   • Primary osteoarthritis of right knee   • Syncope and collapse   • Personal history of venous thrombosis and embolism   • Anticoagulated on Coumadin   • Chronic pain of right knee   • Weakness of right leg   • Chronic midline low back pain with right-sided sciatica   • Angio-edema   • Long term (current) use of anticoagulants   • History of pulmonary embolism   • Encounter for therapeutic drug monitoring       CMHx:  --> GI issues - considering GI pacemaker   Denies any significant medical changes since last visit.   --> Supplemental Oxygen Use: 2 LPM      Current Outpatient Medications   Medication Sig Dispense Refill   • Alirocumab 75 MG/ML solution auto-injector Inject 75 mg under the skin into the appropriate area as directed Every 14 (Fourteen) Days.     • aspirin 81 MG chewable tablet Chew 81 mg Take As Directed. Take 1 pill by mouth Monday, Wednesday, Friday     • baclofen (LIORESAL) 10 MG tablet Take 10 mg by mouth 3 (Three) Times a Day.     • Blood Glucose Monitoring Suppl (ACCU-CHEK ARGELIA PLUS) w/Device kit      • clonazePAM (KlonoPIN) 0.5 MG tablet Take 0.5 mg by mouth 3 (Three) Times a Day.     • COMFORT ASSIST INSULIN SYRINGE 31G X 5/16\" 0.3 ML misc      • EPINEPHrine (EPIPEN) 0.3 MG/0.3ML solution auto-injector injection      • fluticasone (FLONASE) 50 MCG/ACT nasal spray 2 sprays into the nostril(s) as directed by provider Daily.     • furosemide (LASIX) 20 MG tablet Take 20 mg by mouth Daily.     • gabapentin (NEURONTIN) 100 MG capsule Take 100 mg by mouth 2 (Two) Times a Day.     • glimepiride (AMARYL) 4 MG tablet Take 4 mg by mouth 2 (Two) Times " a Day As Needed.     • glucose blood (ONE TOUCH ULTRA TEST) test strip      • HYDROcodone-acetaminophen (NORCO) 7.5-325 MG per tablet Take 1 tablet by mouth Every 8 (Eight) Hours As Needed for Moderate Pain . Five times daily     • HYDROcodone-acetaminophen (NORCO) 7.5-325 MG per tablet Take 1 tablet by mouth.     • insulin aspart (novoLOG) 100 UNIT/ML injection Inject  under the skin into the appropriate area as directed 3 (Three) Times a Day Before Meals. PT TAKES PER SLIDING SCALE STARTING AT 3 UNITS FOR BLOOD SUGAR 150-200     • isosorbide mononitrate (IMDUR) 30 MG 24 hr tablet Take 1 tablet by mouth Daily. 90 tablet 3   • Lancets (ONETOUCH ULTRASOFT) lancets      • Lancets Misc. (Accu-Chek Softclix Lancet Dev) kit      • metoprolol tartrate (LOPRESSOR) 25 MG tablet Take 25 mg by mouth 2 (Two) Times a Day.     • mirtazapine (REMERON) 7.5 MG half tablet Take 15 mg by mouth Every Night.     • Morphine (MS CONTIN) 15 MG 12 hr tablet Take 30 mg by mouth 2 (Two) Times a Day.     • naloxone (Narcan) 4 MG/0.1ML nasal spray 1 spray into the nostril(s) as directed by provider As Needed (opioid overdose) for up to 2 doses. 1 each 1   • Omega-3 Fatty Acids (FISH OIL) 1200 MG capsule delayed-release Take  by mouth 2 (Two) Times a Day.     • ranolazine (RANEXA) 500 MG 12 hr tablet Take 1 tablet by mouth 2 (Two) Times a Day. 90 tablet 3   • tamsulosin (FLOMAX) 0.4 MG capsule 24 hr capsule Take 1 capsule by mouth Daily.     • vitamin D (ERGOCALCIFEROL) 07141 UNITS capsule capsule Take 50,000 Units by mouth. Two times monthly     • warfarin (COUMADIN) 5 MG tablet Take 5 mg by mouth Every Night. Patient takes 5 mg every day except M and F when he takes 7.5 mg       No current facility-administered medications for this visit.     -Notable Current Medications:  --> Lasix, Neurontin BID, NOrco, Morphine, Trazodone, Klonopin    PE:  Body mass index is 39.96 kg/m².  Vitals:    03/30/21 1021   BP: 156/84   Pulse: 72   SpO2: 97%  "  Weight: 130 kg (286 lb 6.4 oz)   Height: 180.3 cm (70.98\")     Wt Readings from Last 5 Encounters:   03/08/21 133 kg (293 lb)   01/27/21 133 kg (293 lb 3.4 oz)   01/19/21 133 kg (294 lb)   12/28/20 134 kg (294 lb 6.4 oz)   12/07/20 133 kg (293 lb)     Physical Exam  General:  In NAD.  Head: Atraumatic  Eyes: EOMI.  CV: No Clubbing.   Pul: Respirations: unlaboured.    MS: No atrophy.  Neuro: No resting tremor noted.  Oral-buccal dyskinesia noted through mouth though limited eval with mask wearing.  Ambulation with aid of powered wheelchair.   Psych: Socially appropriate.  Pleasant.  No overt dysphoria.         Assessment & Planning:  Mr. Clint Mack is a 65 y.o. male who is seen for follow-up of:     --Obstructive Sleep Apnea:  Appears stable  --No EDS  --Reports compliance with use without issue  -Compliant and using well.     ---RLS vs PLMD: stable  --no issues  --Monitor, particularly with Klonopin Taper    --Nocturnal hypoxia: stable,  --Using O2 using base of PAP; nightly use  --Monitor for worsening EDS or waking HA    --Anxiety Disorder; stable  --D/w pt need for BH eval and referral; currently agreeable; requests it go through [employer]'s comp - will send message to Dr. Matute via Epic    --Klonopin has been helpful and is interested in tapering off medication    --We discussed a slow and gradual taper of no more than 25% a 3-month from initial dose.  Discussed probability of 9-12 months or longer for completion of taper as well as expectations of rebound anxiety.  Discussed may need to increase dose to 0.75mg if rebound sx occur from 0.5mg dose from outside provider.  He wishes to continue TID dosing of 0.5mg now, which is reasonable.     --Patient reports he knows of rebound symptoms before and will be cognizant of these.  Did discuss the possible deadly effects of abruptly stopping medicine from seizures and excessive toxicity and he is understandable.      --Pt and wife state they have another " refill from Dr. Che, so will need renewal in May.  Discussed only one provider to be filling BZD, they are agreeable.    --D/w pt and his wife need to notify clinic 1-2 weeks in advance when refill would be due.  They are agreeable.       --Tardive dyskinesia:  Stable though ongoing and sub optimally controlled   --States was from Celexa, though chart notes Risperdal and anti-emetic from chart; rhythmic movements - ongoing for years; been on Ingrezza - allergy, got restless  --We will plan for psychiatry referal      --Polypharmacy w/: Multiple narcotics including opioids as well as concurrent benzodiazepines.    --Discussed with patient risk of respiratory suppression and death, critical need to use CPAP.  He is agreeable.   --Discussed that I will provide a prescription for Narcan and he and his wife are agreeable, so he does not currently have such a prescription.      [facility] reviewed, #[id number].  Standing Rx for Morphine Sulfate ER & hydrocodone/Acetaminophen 10/235 mg by Dr. Delgadillo and Klonopin 0.5mg TID by Dr. Che.         -->  Follow-up 6 months; sooner, if needed.  Plan to eval BZD taper at that point.      --> Call with any questions or concerns.      All questions answered for the patient and his wife, who indicated understanding and agreed with the plan.       Washington Stoddard MD  03/30/21   Sleep Medicine      CC: Sushma Myers MD

## 2021-03-30 ENCOUNTER — OFFICE VISIT (OUTPATIENT)
Dept: SLEEP MEDICINE | Facility: HOSPITAL | Age: 66
End: 2021-03-30

## 2021-03-30 ENCOUNTER — ANTICOAGULATION VISIT (OUTPATIENT)
Dept: CARDIAC SURGERY | Facility: CLINIC | Age: 66
End: 2021-03-30

## 2021-03-30 VITALS
SYSTOLIC BLOOD PRESSURE: 156 MMHG | HEART RATE: 72 BPM | HEIGHT: 71 IN | BODY MASS INDEX: 40.1 KG/M2 | OXYGEN SATURATION: 97 % | WEIGHT: 286.4 LBS | DIASTOLIC BLOOD PRESSURE: 84 MMHG

## 2021-03-30 VITALS — HEART RATE: 82 BPM | OXYGEN SATURATION: 96 % | WEIGHT: 286.4 LBS | BODY MASS INDEX: 39.94 KG/M2

## 2021-03-30 DIAGNOSIS — F41.9 ANXIETY DISORDER, UNSPECIFIED TYPE: ICD-10-CM

## 2021-03-30 DIAGNOSIS — G47.34 NOCTURNAL HYPOXEMIA: ICD-10-CM

## 2021-03-30 DIAGNOSIS — Z86.711 HISTORY OF PULMONARY EMBOLISM: ICD-10-CM

## 2021-03-30 DIAGNOSIS — Z79.01 LONG TERM (CURRENT) USE OF ANTICOAGULANTS: ICD-10-CM

## 2021-03-30 DIAGNOSIS — G47.33 OBSTRUCTIVE SLEEP APNEA, ADULT: Primary | ICD-10-CM

## 2021-03-30 DIAGNOSIS — Z51.81 ENCOUNTER FOR THERAPEUTIC DRUG MONITORING: ICD-10-CM

## 2021-03-30 DIAGNOSIS — F11.90 OPIOID USE: ICD-10-CM

## 2021-03-30 DIAGNOSIS — F41.9 ANXIETY: ICD-10-CM

## 2021-03-30 DIAGNOSIS — G24.01 TARDIVE DYSKINESIA: ICD-10-CM

## 2021-03-30 LAB — INR PPP: 2.3 (ref 0.9–1.1)

## 2021-03-30 PROCEDURE — 85610 PROTHROMBIN TIME: CPT | Performed by: NURSE PRACTITIONER

## 2021-03-30 PROCEDURE — 93793 ANTICOAG MGMT PT WARFARIN: CPT | Performed by: NURSE PRACTITIONER

## 2021-03-30 PROCEDURE — 36416 COLLJ CAPILLARY BLOOD SPEC: CPT | Performed by: NURSE PRACTITIONER

## 2021-03-30 PROCEDURE — 99214 OFFICE O/P EST MOD 30 MIN: CPT | Performed by: PSYCHIATRY & NEUROLOGY

## 2021-03-30 NOTE — PATIENT INSTRUCTIONS
--Call us when you need refills and give 1-2 weeks.  --Remain on Klonopin as current  --call with any questions  --call with any worsening daytime sleepiness or waking headaches

## 2021-03-30 NOTE — PROGRESS NOTES
Patient now states he did not start Mirtazapine (can elevate INR) until last Tuesday.  Pt denies bleeding issues.  Recheck INR in two wks.  Pt verbalzies.  Patient instructed regarding medication; results given and questions answered. Nutritional counseling given.  Dietary factors affecting therapy addressed.  Patient instructed to monitor for excessive bruising or bleeding.  Findings reported by George Lucia RN.   Today's INR is   Lab Results - Last 18 Months   Lab Units 03/30/21  0000   INR  2.30*

## 2021-04-13 ENCOUNTER — DOCUMENTATION (OUTPATIENT)
Dept: CARDIAC SURGERY | Facility: CLINIC | Age: 66
End: 2021-04-13

## 2021-04-13 NOTE — PROGRESS NOTES
Pt called to say he is starting Cephalexin and Flagyl for 14 days beginning tomorrow.  Pt holly CC appt tomorrow as well.  No changes made today.  Findings reported by George Lucia RN.

## 2021-04-14 ENCOUNTER — ANTICOAGULATION VISIT (OUTPATIENT)
Dept: CARDIAC SURGERY | Facility: CLINIC | Age: 66
End: 2021-04-14

## 2021-04-14 VITALS — HEART RATE: 99 BPM | OXYGEN SATURATION: 94 % | BODY MASS INDEX: 40.74 KG/M2 | WEIGHT: 292 LBS

## 2021-04-14 DIAGNOSIS — Z79.01 LONG TERM (CURRENT) USE OF ANTICOAGULANTS: Primary | ICD-10-CM

## 2021-04-14 DIAGNOSIS — Z51.81 ENCOUNTER FOR THERAPEUTIC DRUG MONITORING: ICD-10-CM

## 2021-04-14 DIAGNOSIS — Z86.711 HISTORY OF PULMONARY EMBOLISM: ICD-10-CM

## 2021-04-14 LAB — INR PPP: 2.1 (ref 0.9–1.1)

## 2021-04-14 PROCEDURE — 36416 COLLJ CAPILLARY BLOOD SPEC: CPT | Performed by: NURSE PRACTITIONER

## 2021-04-14 PROCEDURE — 93793 ANTICOAG MGMT PT WARFARIN: CPT | Performed by: NURSE PRACTITIONER

## 2021-04-14 PROCEDURE — 85610 PROTHROMBIN TIME: CPT | Performed by: NURSE PRACTITIONER

## 2021-04-14 NOTE — PROGRESS NOTES
Pt started Keflex and Flagyl today both for 14 days. Pt denies bleeding problems. Dose slightly decreased due to potential interactions with both new medications. Pt was instructed on dosing and to have a serving of green veggies Saturday; pt verbalized. Patient instructed regarding medication; results given and questions answered. Nutritional counseling given.  Dietary factors affecting therapy addressed.  Patient instructed to monitor for excessive bruising or bleeding. Will recheck in 5 days. Findings reported by Ketty Moscoso RN.    Today's INR is   Lab Results - Last 18 Months   Lab Units 04/14/21  0000   INR  2.10*

## 2021-04-19 ENCOUNTER — ANTICOAGULATION VISIT (OUTPATIENT)
Dept: CARDIAC SURGERY | Facility: CLINIC | Age: 66
End: 2021-04-19

## 2021-04-19 VITALS — HEART RATE: 88 BPM | OXYGEN SATURATION: 98 %

## 2021-04-19 DIAGNOSIS — Z79.01 LONG TERM (CURRENT) USE OF ANTICOAGULANTS: Primary | ICD-10-CM

## 2021-04-19 DIAGNOSIS — Z51.81 ENCOUNTER FOR THERAPEUTIC DRUG MONITORING: ICD-10-CM

## 2021-04-19 DIAGNOSIS — Z86.711 HISTORY OF PULMONARY EMBOLISM: ICD-10-CM

## 2021-04-19 LAB — INR PPP: 2.2 (ref 0.9–1.1)

## 2021-04-19 PROCEDURE — 93793 ANTICOAG MGMT PT WARFARIN: CPT | Performed by: NURSE PRACTITIONER

## 2021-04-19 PROCEDURE — 85610 PROTHROMBIN TIME: CPT | Performed by: NURSE PRACTITIONER

## 2021-04-19 PROCEDURE — 36416 COLLJ CAPILLARY BLOOD SPEC: CPT | Performed by: NURSE PRACTITIONER

## 2021-04-19 NOTE — PROGRESS NOTES
PT will be on Keflex and Flagyl through Tuesday, 4/27. Denies any other med changes or bleeding issues. Denies any s/s of blood clot. PT's dose decreased slightly due to med changes. Recheck INR later in week when pt can return. Patient instructed regarding medication; results given and questions answered. Nutritional counseling given.  Dietary factors affecting therapy addressed.  Patient instructed to monitor for excessive bruising or bleeding. PT verbalizes understanding. Findings reported by Gabriela Perez RN.   Today's INR is   Lab Results - Last 18 Months   Lab Units 04/19/21  0000   INR  2.20*

## 2021-04-23 ENCOUNTER — ANTICOAGULATION VISIT (OUTPATIENT)
Dept: CARDIAC SURGERY | Facility: CLINIC | Age: 66
End: 2021-04-23

## 2021-04-23 VITALS — OXYGEN SATURATION: 98 % | HEART RATE: 83 BPM

## 2021-04-23 DIAGNOSIS — Z79.01 LONG TERM (CURRENT) USE OF ANTICOAGULANTS: Primary | ICD-10-CM

## 2021-04-23 DIAGNOSIS — Z51.81 ENCOUNTER FOR THERAPEUTIC DRUG MONITORING: ICD-10-CM

## 2021-04-23 DIAGNOSIS — Z86.711 HISTORY OF PULMONARY EMBOLISM: ICD-10-CM

## 2021-04-23 LAB — INR PPP: 2.5 (ref 0.9–1.1)

## 2021-04-23 PROCEDURE — 85610 PROTHROMBIN TIME: CPT | Performed by: NURSE PRACTITIONER

## 2021-04-23 PROCEDURE — 36416 COLLJ CAPILLARY BLOOD SPEC: CPT | Performed by: NURSE PRACTITIONER

## 2021-04-23 PROCEDURE — 93793 ANTICOAG MGMT PT WARFARIN: CPT | Performed by: NURSE PRACTITIONER

## 2021-04-23 NOTE — PROGRESS NOTES
PT will finish AB/Flagyl on Tuesday. PT denies any med changes or bleeding issues. PT left on slightly lesser Coumadin dose and will be seen on Wednesday when see Dr Warren. Patient instructed regarding medication; results given and questions answered. Nutritional counseling given.  Dietary factors affecting therapy addressed.  Patient instructed to monitor for excessive bruising or bleeding. PT verbalizes understanding. Findings reported by Gabriela Perez RN.   Today's INR is   Lab Results - Last 18 Months   Lab Units 04/23/21  0000   INR  2.50*            This document has been electronically signed by FARIDEH ElaineP-BC @  On April 23, 2021 14:30 CDT

## 2021-04-28 ENCOUNTER — ANTICOAGULATION VISIT (OUTPATIENT)
Dept: CARDIAC SURGERY | Facility: CLINIC | Age: 66
End: 2021-04-28

## 2021-04-28 ENCOUNTER — OFFICE VISIT (OUTPATIENT)
Dept: CARDIOLOGY | Facility: CLINIC | Age: 66
End: 2021-04-28

## 2021-04-28 VITALS — OXYGEN SATURATION: 94 % | HEART RATE: 86 BPM

## 2021-04-28 VITALS
BODY MASS INDEX: 41.02 KG/M2 | WEIGHT: 293 LBS | HEIGHT: 71 IN | SYSTOLIC BLOOD PRESSURE: 122 MMHG | OXYGEN SATURATION: 98 % | DIASTOLIC BLOOD PRESSURE: 74 MMHG | HEART RATE: 85 BPM

## 2021-04-28 DIAGNOSIS — Z86.711 HISTORY OF PULMONARY EMBOLISM: ICD-10-CM

## 2021-04-28 DIAGNOSIS — Z95.1 S/P CABG (CORONARY ARTERY BYPASS GRAFT): ICD-10-CM

## 2021-04-28 DIAGNOSIS — E78.2 MIXED HYPERLIPIDEMIA: Chronic | ICD-10-CM

## 2021-04-28 DIAGNOSIS — Z51.81 ENCOUNTER FOR THERAPEUTIC DRUG MONITORING: ICD-10-CM

## 2021-04-28 DIAGNOSIS — Z79.01 LONG TERM (CURRENT) USE OF ANTICOAGULANTS: Primary | ICD-10-CM

## 2021-04-28 DIAGNOSIS — I10 ESSENTIAL HYPERTENSION: Chronic | ICD-10-CM

## 2021-04-28 DIAGNOSIS — E66.01 MORBID OBESITY (HCC): Primary | ICD-10-CM

## 2021-04-28 DIAGNOSIS — I25.10 CORONARY ARTERY DISEASE INVOLVING NATIVE CORONARY ARTERY OF NATIVE HEART WITHOUT ANGINA PECTORIS: Chronic | ICD-10-CM

## 2021-04-28 LAB — INR PPP: 2.2 (ref 0.9–1.1)

## 2021-04-28 PROCEDURE — 36416 COLLJ CAPILLARY BLOOD SPEC: CPT | Performed by: NURSE PRACTITIONER

## 2021-04-28 PROCEDURE — 99214 OFFICE O/P EST MOD 30 MIN: CPT | Performed by: INTERNAL MEDICINE

## 2021-04-28 PROCEDURE — 85610 PROTHROMBIN TIME: CPT | Performed by: NURSE PRACTITIONER

## 2021-04-28 NOTE — PROGRESS NOTES
Clint Mack  65 y.o. male      1. Morbid obesity (CMS/HCC)    2. S/P CABG (coronary artery bypass graft)    3. Mixed hyperlipidemia    4. Coronary artery disease involving native coronary artery of native heart without angina pectoris    5. Essential hypertension        History of Present Illness  Mr. Mack is a 65-year-old male with hypertension, CAD s/p CABG, , hyperlipidemia, DVT, pulmonary embolus, obstructive sleep apnea, obesity, tardive dyskinesia, abdominal aortic aneurysm, depression, anxiety, osteoarthritis, CKD3.    He was admitted to [facility] in January 2021 following an episode of angioedema, the reason for which was unclear.  On the day of admission he had taken Praluent injection a little later in the day when the episode occurred at night.  I note that the patient has since gone off Praluent since his insurance company would not pay for it.  His LDL was 91 when checked in December 2020.    The patient denied any chest pain, shortness of breath or any further episodes of angioedema.  The patient has been some research on which drugs would benefit tardive dyskinesia which seems to be his major problem at the present time he wondered if he could start taking propranolol if recommended by his neurologist.  I reviewed the medicines and I believe that this could be started and metoprolol tartrate could be discontinued at that time.  He now follows up with vascular surgery on a regular basis for his IVC filter related issues and abdominal aortic aneurysm.     Echocardiogram in November 2019 showed normal LV systolic function with an EF of 52%.  There is mild LVH.  Right ventricle was mildly dilated and left atrium was mildly dilated.  There is grade 1 diastolic dysfunction.    Clinical exam today showed normal heart rate and blood pressure.  No bronchospasm no signs of congestive heart failure was noted.  He does have tardive dyskinesia.    SUBJECTIVE    Allergies   Allergen  Reactions   • Tetrabenazine Dizziness, Nausea And Vomiting, Nausea Only and Other (See Comments)     Other reaction(s): Vertigo   • Alfuzosin Other (See Comments)     syncope  Syncope   syncope  Syncope   syncope   • Deutetrabenazine Other (See Comments)     Insomnia    Insomnia  Insomnia   • Pregabalin Swelling     Leg swelling   • Metoclopramide Other (See Comments)     Tardive dyskinesia  Tardive dyskinesia     • Valbenazine Other (See Comments)   • Celexa [Citalopram Hydrobromide] Dystonia   • Citalopram Other (See Comments)     Other reaction(s): Dystonia  Tardive dyskinesia    Other reaction(s): Respiratory distress   • Crestor [Rosuvastatin Calcium] Myalgia   • Ingrezza [Valbenazine Tosylate] Other (See Comments)     fatigue   • Lipitor [Atorvastatin] Other (See Comments)     Aches and pains all over   • Rosuvastatin Other (See Comments)     Aches and pains all over  Aches and pains all over  Aches and pains all over         Past Medical History:   Diagnosis Date   • Anxiety    • Arthritis     osteoarthritis   • CKD (chronic kidney disease), stage III (CMS/[facility])    • COPD (chronic obstructive pulmonary disease) (CMS/[facility])    • Coronary artery disease    • Depression    • Diabetes mellitus (CMS/[facility])    • DVT (deep venous thrombosis) (CMS/[facility])    • Heart disease    • History of embolic filter insertion    • History of stomach ulcers    • Hyperlipidemia    • Hypertension    • Injury of back     back surgery x3    • Lupus (CMS/HCC)    • LUCIANA on CPAP    • Pulmonary embolism (CMS/[facility])          Past Surgical History:   Procedure Laterality Date   • BACK SURGERY     • CARDIAC SURGERY  2001   • CIRCUMCISION     • COLONOSCOPY N/A 9/11/2018    Procedure: COLONOSCOPY;  Surgeon: Jose C Batres DO;  Location: [facility] ENDOSCOPY;  Service: Gastroenterology   • ENDOSCOPY N/A 9/11/2018    Procedure: ESOPHAGOGASTRODUODENOSCOPY;  Surgeon: Jose C Batres DO;  Location: [facility] ENDOSCOPY;  Service: Gastroenterology   •  "ENDOSCOPY N/A 3/3/2020    Procedure: ESOPHAGOGASTRODUODENOSCOPY;  Surgeon: Jose C Batres DO;  Location: [facility] ENDOSCOPY;  Service: Gastroenterology;  Laterality: N/A;   • GALLBLADDER SURGERY  2000   • HIP SURGERY     • JOINT REPLACEMENT Right 05/02/2016    hip   • SPINE SURGERY           Family History   Problem Relation Age of Onset   • Heart disease Father    • Hypertension Father    • Stroke Father    • Diabetes Sister    • Heart disease Brother    • Hypertension Brother    • COPD Brother    • Lung disease Other          Social History     Socioeconomic History   • Marital status:      Spouse name: Not on file   • Number of children: Not on file   • Years of education: Not on file   • Highest education level: Not on file   Tobacco Use   • Smoking status: Never Smoker   • Smokeless tobacco: Never Used   Vaping Use   • Vaping Use: Never used   Substance and Sexual Activity   • Alcohol use: No   • Drug use: No   • Sexual activity: Defer         Current Outpatient Medications   Medication Sig Dispense Refill   • Alirocumab 75 MG/ML solution auto-injector Inject 75 mg under the skin into the appropriate area as directed Every 14 (Fourteen) Days.     • aspirin 81 MG chewable tablet Chew 81 mg Take As Directed. Take 1 pill by mouth Monday, Wednesday, Friday     • baclofen (LIORESAL) 10 MG tablet Take 10 mg by mouth 3 (Three) Times a Day.     • Blood Glucose Monitoring Suppl (ACCU-CHEK ARGELIA PLUS) w/Device kit      • clonazePAM (KlonoPIN) 0.5 MG tablet Take 0.5 mg by mouth 3 (Three) Times a Day.     • COMFORT ASSIST INSULIN SYRINGE 31G X 5/16\" 0.3 ML misc      • EPINEPHrine (EPIPEN) 0.3 MG/0.3ML solution auto-injector injection      • furosemide (LASIX) 20 MG tablet Take 20 mg by mouth Daily.     • gabapentin (NEURONTIN) 100 MG capsule Take 100 mg by mouth 2 (Two) Times a Day.     • glimepiride (AMARYL) 4 MG tablet Take 4 mg by mouth 2 (Two) Times a Day As Needed.     • glucose blood (ONE TOUCH ULTRA TEST) " "test strip      • HYDROcodone-acetaminophen (NORCO) 7.5-325 MG per tablet Take 1 tablet by mouth 3 (Three) Times a Day.     • insulin aspart (novoLOG) 100 UNIT/ML injection Inject  under the skin into the appropriate area as directed 3 (Three) Times a Day Before Meals. PT TAKES PER SLIDING SCALE STARTING AT 3 UNITS FOR BLOOD SUGAR 150-200     • isosorbide mononitrate (IMDUR) 30 MG 24 hr tablet Take 1 tablet by mouth Daily. 90 tablet 3   • Lancets (ONETOUCH ULTRASOFT) lancets      • Lancets Misc. (Accu-Chek Softclix Lancet Dev) kit      • metoprolol tartrate (LOPRESSOR) 25 MG tablet Take 25 mg by mouth 2 (Two) Times a Day.     • mirtazapine (REMERON) 7.5 MG half tablet Take 15 mg by mouth Every Night.     • Morphine (MS CONTIN) 15 MG 12 hr tablet Take 30 mg by mouth 2 (Two) Times a Day.     • naloxone (Narcan) 4 MG/0.1ML nasal spray 1 spray into the nostril(s) as directed by provider As Needed (opioid overdose) for up to 2 doses. 1 each 1   • Omega-3 Fatty Acids (FISH OIL) 1200 MG capsule delayed-release Take  by mouth 2 (Two) Times a Day.     • ranolazine (RANEXA) 500 MG 12 hr tablet Take 1 tablet by mouth 2 (Two) Times a Day. 90 tablet 3   • tamsulosin (FLOMAX) 0.4 MG capsule 24 hr capsule Take 1 capsule by mouth Daily.     • vitamin D (ERGOCALCIFEROL) 08836 UNITS capsule capsule Take 50,000 Units by mouth. Two times monthly     • warfarin (COUMADIN) 5 MG tablet Take 5 mg by mouth Every Night. Patient takes 5 mg every day except M and F when he takes 7.5 mg       No current facility-administered medications for this visit.         OBJECTIVE    /74   Pulse 85   Ht 180.3 cm (71\")   Wt 133 kg (293 lb)   SpO2 98%   BMI 40.87 kg/m²         Review of Systems : The following systems were reviewed and no changes noted    Constitutional:  Denies recent weight loss, weight gain, fever or chills     HENT:  Denies any hearing loss, epistaxis, hoarseness, or difficulty speaking.     Eyes: Wears eyeglasses or contact " lenses     Respiratory:  Dyspnea with exertion,no cough, wheezing, or hemoptysis.     Cardiovascular: Negative for palpations, chest pain.  Chronic leg edema    Gastrointestinal:  Denies change in bowel habits, dyspepsia, ulcer disease, hematochezia, or melena.     Endocrine: Negative for cold intolerance, heat intolerance, polydipsia, polyphagia and polyuria.    Genitourinary: Negative.      Musculoskeletal: DJD    Allergic/Immunologic: History of angioedema in the past    Neurological: History of tardive dyskinesia    Hematological: Denies any food allergies, seasonal allergies, bleeding disorders, or lymphadenopathy.     Psychiatric/Behavioral: history of anxiety/depression, no substance abuse, or change in cognitive function.     Physical Exam : The following systems were reassessed and no changes noted    Constitutional: Cooperative, alert and oriented, in no acute distress.     HENT:   Head: Normocephalic, normal hair patterns, no masses or tenderness.  Ears, Nose, and Throat: No gross abnormalities. No pallor or cyanosis.   Eyes: EOMS intact, PERRL, conjunctivae and lids unremarkable. Fundoscopic exam and visual fields not performed.   Neck: No palpable masses or adenopathy, no thyromegaly, no JVD, carotid pulses are full and equal bilaterally and without  Bruits.     Cardiovascular: Regular rhythm, S1 and S2 normal, no S3 or S4.  No murmurs, gallops, or rubs detected.     Pulmonary/Chest: Chest: normal symmetry, normal respiratory excursion, no intercostal retraction, no use of accessory muscles.            Pulmonary: Normal breath sounds. No rales or ronchi.    Abdominal: Abdomen soft, bowel sounds normoactive, no masses, no hepatosplenomegaly, non-tender, no bruits.     Musculoskeletal: No deformities, clubbing, cyanosis, erythema, or edema observed.     Neurological:  tardive dyskinesia improved    Skin: Warm and dry to the touch, no apparent skin lesions or masses noted.     Psychiatric: He has a normal  mood and affect. His behavior is normal. Judgment and thought content normal.         Procedures      Lab Results   Component Value Date    WBC 9.29 01/27/2021    HGB 14.8 01/27/2021    HCT 41.9 01/27/2021    MCV 88.2 01/27/2021     01/27/2021     Lab Results   Component Value Date    GLUCOSE 213 (H) 01/27/2021    BUN 26 (H) 01/27/2021    CREATININE 1.33 (H) 01/27/2021    EGFRIFNONA 54 (L) 01/27/2021    EGFRIFAFRI 50 11/13/2018    BCR 19.5 01/27/2021    CO2 23.0 01/27/2021    CALCIUM 8.6 01/27/2021    ALBUMIN 3.80 02/24/2020    AST 20 02/24/2020    ALT 27 02/24/2020     Lab Results   Component Value Date    CHOL 171 12/30/2020    CHOL 113 02/21/2019    CHOL 216 (H) 10/12/2018     Lab Results   Component Value Date    TRIG 261 (H) 12/30/2020    TRIG 182 02/21/2019    TRIG 187 10/12/2018     Lab Results   Component Value Date    HDL 36 (L) 12/30/2020    HDL 34 02/21/2019    HDL 33 10/12/2018     No components found for: LDLCALC  Lab Results   Component Value Date    LDL 91 12/30/2020    LDL 54 02/21/2019     (H) 10/12/2018     No results found for: HDLLDLRATIO  No components found for: CHOLHDL  Lab Results   Component Value Date    HGBA1C 6.4 (H) 04/29/2020     Lab Results   Component Value Date    TSH 2.04 07/22/2019           ASSESSMENT AND PLAN  Mr. Mack has multiple medical issues but fortunately stable from a cardiac standpoint with no definite evidence of angina, arrhythmia or congestive heart failure.  He had several questions with regards to his cardiac, renal, GI, Vascular, musculoskeletal and neurological status and these were discussed.  The patient wondered if he could go on propranolol if his neurologist recommended this.  He could go on this medication and at that time metoprolol tartrate could be discontinued.  I have not restarted Praluent since it is not known if this caused allergic reaction in January of this year.    Patient wondered if he take Viagra.  I have instructed him that  this could be done only if he goes off isosorbide mononitrate.  He is fully aware of the risks and interactions.    I have continued antiplatelet therapy with aspirin, antianginal therapy with isosorbide mononitrate and Ranexa and antihypertensive therapy with metoprolol tartrate and low-dose diuretics.  His renal function is being monitored closely by Dr. Holt.  Anticoagulation with Coumadin has been continued.  His INR was 2.2 today.    Diagnoses and all orders for this visit:    1. Morbid obesity (CMS/HCC) (Primary)    2. S/P CABG (coronary artery bypass graft)    3. Mixed hyperlipidemia    4. Coronary artery disease involving native coronary artery of native heart without angina pectoris    5. Essential hypertension        Patient's Body mass index is 40.87 kg/m². BMI is above normal parameters. Recommendations include: exercise counseling and nutrition counseling.  Patient is a non-smoker      Keke Kumar MD  4/28/2021  10:12 CDT

## 2021-04-28 NOTE — PROGRESS NOTES
Pt has completed Abs.  Pt denies bleeding issues.  Pt will resume previous coumadin dose, if therapeutic then, place out for one month.  Pt verbalizes.  Patient instructed regarding medication; results given and questions answered. Nutritional counseling given.  Dietary factors affecting therapy addressed.  Patient instructed to monitor for excessive bruising or bleeding.  Pt seeing Dr. Warren today as well.  Findings reported by George Lucia RN.   Today's INR is   Lab Results - Last 18 Months   Lab Units 04/28/21  0000   INR  2.20*

## 2021-05-11 DIAGNOSIS — G47.33 OSA (OBSTRUCTIVE SLEEP APNEA): Primary | ICD-10-CM

## 2021-05-11 NOTE — PROGRESS NOTES
Order for continued supplies for CPAP 12.5cm fixed pressure to DME.     Washington Stoddard II, MD  05/11/21 @ 8:55 AM CDT

## 2021-05-12 ENCOUNTER — ANTICOAGULATION VISIT (OUTPATIENT)
Dept: CARDIAC SURGERY | Facility: CLINIC | Age: 66
End: 2021-05-12

## 2021-05-12 VITALS — OXYGEN SATURATION: 97 % | WEIGHT: 293.1 LBS | BODY MASS INDEX: 40.88 KG/M2 | HEART RATE: 88 BPM

## 2021-05-12 DIAGNOSIS — Z51.81 ENCOUNTER FOR THERAPEUTIC DRUG MONITORING: ICD-10-CM

## 2021-05-12 DIAGNOSIS — Z79.01 LONG TERM (CURRENT) USE OF ANTICOAGULANTS: Primary | ICD-10-CM

## 2021-05-12 DIAGNOSIS — Z86.711 HISTORY OF PULMONARY EMBOLISM: ICD-10-CM

## 2021-05-12 LAB — INR PPP: 2 (ref 0.9–1.1)

## 2021-05-12 PROCEDURE — 36416 COLLJ CAPILLARY BLOOD SPEC: CPT | Performed by: NURSE PRACTITIONER

## 2021-05-12 PROCEDURE — 93793 ANTICOAG MGMT PT WARFARIN: CPT | Performed by: NURSE PRACTITIONER

## 2021-05-12 PROCEDURE — 85610 PROTHROMBIN TIME: CPT | Performed by: NURSE PRACTITIONER

## 2021-05-12 NOTE — PROGRESS NOTES
Patient states no med changes or bleeding problems or unexplained bruising. Patient instructed to continue current dosing schedule. Verbalizes understanding. Will recheck 1 month.  Patient instructed regarding medication; results given and questions answered. Nutritional counseling given.  Dietary factors affecting therapy addressed.  Patient instructed to monitor for excessive bruising or bleeding. Findings reported by Ketty Moscoso RN.    Today's INR is   Lab Results - Last 18 Months   Lab Units 05/12/21  0000   INR  2.00*

## 2021-05-17 ENCOUNTER — TELEPHONE (OUTPATIENT)
Dept: SLEEP MEDICINE | Facility: HOSPITAL | Age: 66
End: 2021-05-17

## 2021-05-17 DIAGNOSIS — F41.9 ANXIETY: Primary | ICD-10-CM

## 2021-05-17 DIAGNOSIS — G24.01 TARDIVE DYSKINESIA: ICD-10-CM

## 2021-05-17 RX ORDER — CLONAZEPAM 0.5 MG/1
0.5 TABLET ORAL 3 TIMES DAILY
Qty: 90 TABLET | Refills: 1 | Status: SHIPPED | OUTPATIENT
Start: 2021-05-17 | End: 2021-06-02 | Stop reason: DRUGHIGH

## 2021-05-17 NOTE — PROGRESS NOTES
Chart review.  Klonopin 0.5mg TID for anxiety & TD.  Plan to slowly taper per my last note, reviewed, on 3/30.      SAMRA reviewed w/in window.    Has f/u w/ me in Sept 21    Will renew; sent to [id number] [facility] &  [address] .     Washington Stoddard II, MD  05/17/21 @ 9:04 AM CDT

## 2021-05-18 RX ORDER — WARFARIN SODIUM 5 MG/1
TABLET ORAL
Qty: 100 TABLET | Refills: 1 | Status: SHIPPED | OUTPATIENT
Start: 2021-05-18 | End: 2021-11-22 | Stop reason: SDUPTHER

## 2021-05-31 ENCOUNTER — APPOINTMENT (OUTPATIENT)
Dept: LAB | Facility: HOSPITAL | Age: 66
End: 2021-05-31

## 2021-06-01 ENCOUNTER — LAB (OUTPATIENT)
Dept: LAB | Facility: HOSPITAL | Age: 66
End: 2021-06-01

## 2021-06-01 ENCOUNTER — DOCUMENTATION (OUTPATIENT)
Dept: CARDIAC SURGERY | Facility: CLINIC | Age: 66
End: 2021-06-01

## 2021-06-01 DIAGNOSIS — Z01.818 PREOP TESTING: Primary | ICD-10-CM

## 2021-06-01 LAB — SARS-COV-2 N GENE RESP QL NAA+PROBE: NOT DETECTED

## 2021-06-01 PROCEDURE — C9803 HOPD COVID-19 SPEC COLLECT: HCPCS

## 2021-06-01 PROCEDURE — 87635 SARS-COV-2 COVID-19 AMP PRB: CPT

## 2021-06-01 NOTE — PROGRESS NOTES
Pt called to report he began holding coumadin last night for a colonoscopy Loly 3. Pt was advised he would need Lovenox bridging and to always call CC as soon as he is told he will need to hold coumadin; pt verbalized. I consulted with DIANA Farrar who advised pt take Lovenox 40mg sq daily. Pt was instructed that he could  Lovenox but not to begin taking it until after he is seen tomorrow and given specific instructions; pt verbalized. Appt made for tomorrow. Findings reported by Ketty Moscoso RN.

## 2021-06-02 ENCOUNTER — ANTICOAGULATION VISIT (OUTPATIENT)
Dept: CARDIAC SURGERY | Facility: CLINIC | Age: 66
End: 2021-06-02

## 2021-06-02 DIAGNOSIS — Z86.711 HISTORY OF PULMONARY EMBOLISM: ICD-10-CM

## 2021-06-02 DIAGNOSIS — Z51.81 ENCOUNTER FOR THERAPEUTIC DRUG MONITORING: ICD-10-CM

## 2021-06-02 DIAGNOSIS — Z79.01 LONG TERM (CURRENT) USE OF ANTICOAGULANTS: Primary | ICD-10-CM

## 2021-06-02 LAB — INR PPP: 1.6 (ref 0.9–1.1)

## 2021-06-02 PROCEDURE — 93793 ANTICOAG MGMT PT WARFARIN: CPT | Performed by: NURSE PRACTITIONER

## 2021-06-02 PROCEDURE — 36416 COLLJ CAPILLARY BLOOD SPEC: CPT | Performed by: NURSE PRACTITIONER

## 2021-06-02 PROCEDURE — 85610 PROTHROMBIN TIME: CPT | Performed by: NURSE PRACTITIONER

## 2021-06-02 RX ORDER — CLONAZEPAM 1 MG/1
TABLET ORAL TAKE AS DIRECTED
COMMUNITY

## 2021-06-02 NOTE — PROGRESS NOTES
PT states he will begin bowel prep at 1600 today. PT states his Klonopin was changed from 0.5mg to 1mg TID. Denies any bleeding issues or s/s of blood clot. PT instructed to take Lovenox 40mg sq at 1600 today.  PT instructed to restart Coumadin and Lovenox at 2000 tomorrow if ok with Dr Batres. PT instructed to continue both Coumadin and Lovenox post procedure until appt with CC on Monday. If pt is unable to restart Coumadin and Lovenox on night of procedure, pt instructed to call [facility] on Friday.  PT reeducated on rationale for Lovenox injection, Lovenox injection technique and location. PT educated on safety while on Lovenox including s/s of bleeding and obtaining CT scan for head/blunt trauma. PT instructed to monitor for skin infections and report any s/s  to [facility]in [facility].PT instructed to be alert for s/s of blood clot during bridging. PT instructed to report s/s of blood clot immediately. PT will be seen on Monday post procedure. PT verbalizes understanding. Findings reported by Gabriela Perez RN.   Today's INR is   Lab Results - Last 18 Months   Lab Units 06/02/21  0000   INR  1.60*

## 2021-06-03 ENCOUNTER — HOSPITAL ENCOUNTER (OUTPATIENT)
Facility: HOSPITAL | Age: 66
Setting detail: HOSPITAL OUTPATIENT SURGERY
Discharge: HOME | End: 2021-06-03
Attending: INTERNAL MEDICINE | Admitting: INTERNAL MEDICINE

## 2021-06-03 ENCOUNTER — ANESTHESIA EVENT (OUTPATIENT)
Dept: GASTROENTEROLOGY | Facility: HOSPITAL | Age: 66
End: 2021-06-03

## 2021-06-03 ENCOUNTER — ANESTHESIA (OUTPATIENT)
Dept: GASTROENTEROLOGY | Facility: HOSPITAL | Age: 66
End: 2021-06-03

## 2021-06-03 VITALS
HEIGHT: 71 IN | DIASTOLIC BLOOD PRESSURE: 63 MMHG | SYSTOLIC BLOOD PRESSURE: 117 MMHG | WEIGHT: 295 LBS | OXYGEN SATURATION: 97 % | HEART RATE: 78 BPM | TEMPERATURE: 97.9 F | RESPIRATION RATE: 16 BRPM | BODY MASS INDEX: 41.3 KG/M2

## 2021-06-03 DIAGNOSIS — K22.2 STRICTURE OF ESOPHAGUS: ICD-10-CM

## 2021-06-03 LAB — GLUCOSE BLDC GLUCOMTR-MCNC: 119 MG/DL (ref 70–130)

## 2021-06-03 PROCEDURE — 25010000002 PROPOFOL 10 MG/ML EMULSION: Performed by: NURSE ANESTHETIST, CERTIFIED REGISTERED

## 2021-06-03 PROCEDURE — 25010000002 ATROPINE PER 0.01 MG: Performed by: NURSE ANESTHETIST, CERTIFIED REGISTERED

## 2021-06-03 PROCEDURE — 88305 TISSUE EXAM BY PATHOLOGIST: CPT

## 2021-06-03 PROCEDURE — 82962 GLUCOSE BLOOD TEST: CPT

## 2021-06-03 RX ORDER — ATROPINE SULFATE 1 MG/ML
INJECTION, SOLUTION INTRAMUSCULAR; INTRAVENOUS; SUBCUTANEOUS AS NEEDED
Status: DISCONTINUED | OUTPATIENT
Start: 2021-06-03 | End: 2021-06-03 | Stop reason: SURG

## 2021-06-03 RX ORDER — PROMETHAZINE HYDROCHLORIDE 25 MG/1
25 SUPPOSITORY RECTAL ONCE AS NEEDED
Status: DISCONTINUED | OUTPATIENT
Start: 2021-06-03 | End: 2021-06-03 | Stop reason: HOSPADM

## 2021-06-03 RX ORDER — LIDOCAINE HYDROCHLORIDE 20 MG/ML
INJECTION, SOLUTION INTRAVENOUS AS NEEDED
Status: DISCONTINUED | OUTPATIENT
Start: 2021-06-03 | End: 2021-06-03 | Stop reason: SURG

## 2021-06-03 RX ORDER — ONDANSETRON 2 MG/ML
4 INJECTION INTRAMUSCULAR; INTRAVENOUS ONCE AS NEEDED
Status: DISCONTINUED | OUTPATIENT
Start: 2021-06-03 | End: 2021-06-03 | Stop reason: HOSPADM

## 2021-06-03 RX ORDER — PROMETHAZINE HYDROCHLORIDE 25 MG/1
25 TABLET ORAL ONCE AS NEEDED
Status: DISCONTINUED | OUTPATIENT
Start: 2021-06-03 | End: 2021-06-03 | Stop reason: HOSPADM

## 2021-06-03 RX ORDER — DEXTROSE AND SODIUM CHLORIDE 5; .45 G/100ML; G/100ML
30 INJECTION, SOLUTION INTRAVENOUS CONTINUOUS PRN
Status: DISCONTINUED | OUTPATIENT
Start: 2021-06-03 | End: 2021-06-03 | Stop reason: HOSPADM

## 2021-06-03 RX ORDER — MEPERIDINE HYDROCHLORIDE 25 MG/ML
12.5 INJECTION INTRAMUSCULAR; INTRAVENOUS; SUBCUTANEOUS
Status: DISCONTINUED | OUTPATIENT
Start: 2021-06-03 | End: 2021-06-03 | Stop reason: HOSPADM

## 2021-06-03 RX ORDER — PROPOFOL 10 MG/ML
VIAL (ML) INTRAVENOUS AS NEEDED
Status: DISCONTINUED | OUTPATIENT
Start: 2021-06-03 | End: 2021-06-03 | Stop reason: SURG

## 2021-06-03 RX ADMIN — LIDOCAINE HYDROCHLORIDE 100 MG: 20 INJECTION, SOLUTION INTRAVENOUS at 09:10

## 2021-06-03 RX ADMIN — ATROPINE SULFATE 0.5 MG: 1 INJECTION, SOLUTION INTRAMUSCULAR; INTRAVENOUS; SUBCUTANEOUS at 09:09

## 2021-06-03 RX ADMIN — GLYCOPYRROLATE 200 MCG: 0.2 INJECTION, SOLUTION INTRAMUSCULAR; INTRAVITREAL at 09:09

## 2021-06-03 RX ADMIN — PROPOFOL 100 MG: 10 INJECTION, EMULSION INTRAVENOUS at 09:10

## 2021-06-03 RX ADMIN — DEXTROSE AND SODIUM CHLORIDE 30 ML/HR: 5; 450 INJECTION, SOLUTION INTRAVENOUS at 08:39

## 2021-06-03 NOTE — ANESTHESIA PREPROCEDURE EVALUATION
Anesthesia Evaluation     Patient summary reviewed and Nursing notes reviewed   no history of anesthetic complications:  NPO Solid Status: > 8 hours  NPO Liquid Status: > 8 hours           Airway   Mallampati: III  TM distance: <3 FB  Neck ROM: limited  Possible difficult intubation and Large neck circumference  Dental    (+) edentulous    Pulmonary - normal exam   (+) pulmonary embolism, COPD moderate, home oxygen (2L at night), shortness of breath, sleep apnea on CPAP,   Cardiovascular - normal exam  Exercise tolerance: poor (<4 METS)    ECG reviewed  PT is on anticoagulation therapy  Patient on routine beta blocker    (+) hypertension poorly controlled 2 medications or greater, CAD, DVT resolved, hyperlipidemia,     ROS comment: Vent. Rate : 056 BPM     Atrial Rate : 056 BPM     P-R Int : 188 ms          QRS Dur : 100 ms      QT Int : 428 ms       P-R-T Axes : 018 -13 106 degrees     QTc Int : 413 ms     Sinus bradycardia  Incomplete right bundle branch block  Inferior infarct (cited on or before 14-MAY-2016)  Abnormal ECG  When compared with ECG of 14-OCT-2020 10:52,  Incomplete right bundle branch block is now present  Confirmed by REBECA ARAYA ([id number]) on 1/26/2021 5:54:41 PM     Referred By:             Confirmed By:REBECA ARAYA    · The left ventricular cavity is mild-to-moderately dilated.  · Left ventricular wall thickness is consistent with mild concentric hypertrophy.  · Estimated EF = 52%.  · Left ventricular systolic function is normal.  · Left ventricular diastolic dysfunction (grade I) consistent with impaired relaxation.  · Right ventricular cavity is mildly dilated.  · Left atrial cavity size is mildly dilated.    Neuro/Psych  (+) syncope, numbness, psychiatric history Anxiety and Depression,     GI/Hepatic/Renal/Endo    (+) morbid obesity,  renal disease CRI, diabetes mellitus type 2 well controlled using insulin,     Musculoskeletal     (+) arthralgias, back pain, chronic pain,   Abdominal   (+)  obese,    Substance History - negative use     OB/GYN negative ob/gyn ROS         Other   arthritis,      ROS/Med Hx Other: Tardive dyskinesia- pt reports he has trouble swallowing, head twitches and lip smacking                  Anesthesia Plan    ASA 4     MAC     intravenous induction     Anesthetic plan, all risks, benefits, and alternatives have been provided, discussed and informed consent has been obtained with: patient.    Plan discussed with CRNA.

## 2021-06-03 NOTE — ANESTHESIA POSTPROCEDURE EVALUATION
Patient: Clint Mack    Procedure Summary     Date: 06/03/21 Room / Location: [facility] ENDOSCOPY 2 / [facility] ENDOSCOPY    Anesthesia Start: 0906 Anesthesia Stop: 0934    Procedures:       ESOPHAGOGASTRODUODENOSCOPY (N/A )      COLONOSCOPY (N/A ) Diagnosis:       Stricture of esophagus      (Stricture of esophagus [K22.2])    Surgeons: Jose C Batres DO Provider: Cayetano Zhu CRNA    Anesthesia Type: MAC ASA Status: 4          Anesthesia Type: MAC    Vitals  No vitals data found for the desired time range.          Post Anesthesia Care and Evaluation    Patient location during evaluation: bedside  Patient participation: complete - patient cannot participate  Level of consciousness: awake  Pain score: 0  Pain management: adequate  Airway patency: patent  Anesthetic complications: No anesthetic complications  PONV Status: none  Cardiovascular status: acceptable  Respiratory status: acceptable  Hydration status: acceptable

## 2021-06-03 NOTE — DISCHARGE INSTR - APPOINTMENTS
Dr. Jose C Batres, DO  [address], [address]  [place] [address]  [phone]    Appointment date and time:    August 6, 2021  @  10:45 am

## 2021-06-03 NOTE — H&P
Neyda Rashid DO,[facility]  Gastroenterology  Hepatology  Endoscopy  Board Certified in Internal Medicine and gastroenterology  [address], [address]  [place]. [address]  T- [phone]   F - [phone]     GASTROENTEROLOGY HISTORY AND PHYSICAL  NOTE   NEYDA RASHID DO.         SUBJECTIVE:   6/3/2021    Name: Clint Mack  DOD: 1955        Chief Complaint:     Subjective : Dysphagia.  Personal history of colon polyps    Patient is 65 y.o. male presents with desire for elective EGD with possible dilation and biopsy and colonoscopy.      ROS/HISTORY/ CURRENT MEDICATIONS/OBJECTIVE/VS/PE:   Review of Systems:  All systems unremarkable unless specified below.  Constitutional   HENT  Eyes   Respiratory    Cardiovascular  Gastrointestinal   Endocrine  Genitourinary    Musculoskeletal   Skin  Allergic/Immunologic    Neurological    Hematological  Psychiatric/Behavioral    History:     Past Medical History:   Diagnosis Date    Anxiety     Arthritis     osteoarthritis    CKD (chronic kidney disease), stage III (CMS/HCC)     COPD (chronic obstructive pulmonary disease) (CMS/HCC)     Coronary artery disease     Depression     Diabetes mellitus (CMS/HCC)     DVT (deep venous thrombosis) (CMS/HCC)     Heart disease     History of embolic filter insertion     History of stomach ulcers     Hyperlipidemia     Hypertension     Injury of back     back surgery x3     Lupus (CMS/HCC)     LUCIANA on CPAP     Pulmonary embolism (CMS/HCC)      Past Surgical History:   Procedure Laterality Date    BACK SURGERY      CARDIAC SURGERY  2001    CIRCUMCISION      COLONOSCOPY N/A 9/11/2018    Procedure: COLONOSCOPY;  Surgeon: Neyda Rashid DO;  Location: [facility] ENDOSCOPY;  Service: Gastroenterology    ENDOSCOPY N/A 9/11/2018    Procedure: ESOPHAGOGASTRODUODENOSCOPY;  Surgeon: Neyda Rashid DO;  Location: [facility] ENDOSCOPY;  Service: Gastroenterology    ENDOSCOPY N/A 3/3/2020    Procedure:  "ESOPHAGOGASTRODUODENOSCOPY;  Surgeon: Jose C Batres DO;  Location: [facility] ENDOSCOPY;  Service: Gastroenterology;  Laterality: N/A;    GALLBLADDER SURGERY  2000    HIP SURGERY      JOINT REPLACEMENT Right 05/02/2016    hip    SPINE SURGERY       Family History   Problem Relation Age of Onset    Heart disease Father     Hypertension Father     Stroke Father     Diabetes Sister     Heart disease Brother     Hypertension Brother     COPD Brother     Lung disease Other      Social History     Tobacco Use    Smoking status: Never Smoker    Smokeless tobacco: Never Used   Vaping Use    Vaping Use: Never used   Substance Use Topics    Alcohol use: No    Drug use: No     Prior to Admission medications    Medication Sig Start Date End Date Taking? Authorizing Provider   aspirin 81 MG chewable tablet Chew 81 mg Take As Directed. Take 1 pill by mouth Monday, Wednesday, Friday    Joseph Keenan MD   baclofen (LIORESAL) 10 MG tablet Take 10 mg by mouth 3 (Three) Times a Day.    Joseph Keenan MD   Blood Glucose Monitoring Suppl (ACCU-CHEK ARGELIA PLUS) w/Device kit  2/5/18   Joseph Keenan MD   clonazePAM (KlonoPIN) 1 MG tablet Take 1 mg by mouth 3 (Three) Times a Day.    Joseph Keenan MD   COMFORT ASSIST INSULIN SYRINGE 31G X 5/16\" 0.3 ML misc  8/28/16   Joseph Keenan MD   enoxaparin (LOVENOX) 40 MG/0.4ML solution syringe Inject 0.4 mL under the skin into the appropriate area as directed Daily. 6/2/21   Gabriella Toledo APRN   EPINEPHrine (EPIPEN) 0.3 MG/0.3ML solution auto-injector injection  1/27/21   Joseph Keenan MD   furosemide (LASIX) 20 MG tablet Take 20 mg by mouth Daily.    Joseph Keenan MD   gabapentin (NEURONTIN) 100 MG capsule Take 100 mg by mouth 2 (Two) Times a Day. 8/10/20   Joseph Keenan MD   glimepiride (AMARYL) 4 MG tablet Take 4 mg by mouth 2 (Two) Times a Day As Needed. 8/1/16   Joseph Keenan MD   glucose blood (ONE TOUCH ULTRA TEST) " test strip  4/12/16   Joseph Keenan MD   HYDROcodone-acetaminophen (NORCO) 7.5-325 MG per tablet Take 1 tablet by mouth 3 (Three) Times a Day. 2/22/21   Joseph Keenan MD   insulin aspart (novoLOG) 100 UNIT/ML injection Inject  under the skin into the appropriate area as directed 3 (Three) Times a Day Before Meals. PT TAKES PER SLIDING SCALE STARTING AT 3 UNITS FOR BLOOD SUGAR 150-200    Joseph Keenan MD   isosorbide mononitrate (IMDUR) 30 MG 24 hr tablet Take 1 tablet by mouth Daily. 2/19/21   Keke Kumar MD   Lancets (ONETOUCH ULTRASOFT) lancets  3/6/17   Joseph Keenan MD   Lancets Misc. (Accu-Chek Softclix Lancet Dev) kit  12/7/20   Joseph Keenan MD   metoprolol tartrate (LOPRESSOR) 25 MG tablet Take 25 mg by mouth 2 (Two) Times a Day.    Joseph Keenan MD   mirtazapine (REMERON) 7.5 MG half tablet Take 15 mg by mouth Every Night.    Joseph Keenan MD   Morphine (MS CONTIN) 15 MG 12 hr tablet Take 30 mg by mouth 2 (Two) Times a Day.    Joseph Keenan MD   naloxone (Narcan) 4 MG/0.1ML nasal spray 1 spray into the nostril(s) as directed by provider As Needed (opioid overdose) for up to 2 doses. 12/28/20   Washington Stoddard II, MD   Omega-3 Fatty Acids (FISH OIL) 1200 MG capsule delayed-release Take  by mouth 2 (Two) Times a Day.    Joseph Keenan MD   ranolazine (RANEXA) 500 MG 12 hr tablet Take 1 tablet by mouth 2 (Two) Times a Day. 8/28/19   Keke Kumar MD   tamsulosin (FLOMAX) 0.4 MG capsule 24 hr capsule Take 1 capsule by mouth Daily.    Joseph Keenan MD   vitamin D (ERGOCALCIFEROL) 26573 UNITS capsule capsule Take 50,000 Units by mouth. Two times monthly 7/2/16   Joseph Keenan MD   warfarin (COUMADIN) 5 MG tablet Patient takes 5 mg every day except M and F when he takes 7.5 mg 5/18/21   Gabriella Toledo APRN     Allergies:  Tetrabenazine, Alfuzosin, Deutetrabenazine, Pregabalin,  "Metoclopramide, Valbenazine, Celexa [citalopram hydrobromide], Citalopram, Crestor [rosuvastatin calcium], Ingrezza [valbenazine tosylate], Lipitor [atorvastatin], and Rosuvastatin    I have reviewed the patients medical history, surgical history and family history in the available medical record system.     Current Medications:     No current facility-administered medications for this encounter.       Objective     Physical Exam:    /63   Pulse 78   Temp 97.9 °F (36.6 °C)   Resp 16   Ht 180.3 cm (71\")   Wt 134 kg (295 lb)   SpO2 97%   BMI 41.14 kg/m²      Physical Exam:  General Appearance:    Alert, cooperative, in no acute distress   Head:    Normocephalic, without obvious abnormality, atraumatic   Eyes:            Lids and lashes normal, conjunctivae and sclerae normal, no icterus, no pallor, corneas clear, PERRLA   Ears:    Ears appear intact with no abnormalities noted   Throat:   No oral lesions, no thrush, oral mucosa moist   Neck:   No adenopathy, supple, trachea midline, no thyromegaly, no  carotid bruit, no JVD   Back:     No kyphosis present, no scoliosis present, no skin lesions,   erythema or scars, no tenderness to percussion or                 palpation,  range of motion normal   Lungs:     Clear to auscultation,respirations regular, even and         unlabored    Heart:    Regular rhythm and normal rate, normal S1 and S2, no  murmur, no gallop, no rub, no click   Breast Exam:    Deferred   Abdomen:     Normal bowel sounds, no masses, no organomegaly, soft  nontender, nondistended, no guarding, no rebound                 tenderness   Genitalia:    Deferred   Extremities:   Moves all extremities well, no edema, no cyanosis, no          redness   Pulses:   Pulses palpable and equal bilaterally   Skin:   No bleeding, bruising or rash   Lymph nodes:   No palpable adenopathy   Neurologic:   Cranial nerves 2 - 12 grossly intact, sensation intact, DTR     present and equal bilaterally    "   Results Review:     Lab Results   Component Value Date    WBC 9.29 01/27/2021    WBC 9.23 01/26/2021    WBC 7.45 01/26/2021    HGB 14.8 01/27/2021    HGB 15.2 01/26/2021    HGB 15.2 01/26/2021    HCT 41.9 01/27/2021    HCT 44.0 01/26/2021    HCT 42.5 01/26/2021     01/27/2021     01/26/2021     01/26/2021             No results found for: LIPASE  Lab Results   Component Value Date    INR 1.60 (A) 06/02/2021    INR 2.00 (A) 05/12/2021    INR 2.20 (A) 04/28/2021     No results found for: CULTURE    Radiology Review:  Imaging Results (Last 72 Hours)       Procedure Component Value Units Date/Time    SCANNED - IMAGING [[id number]] Resulted: 06/03/21     Updated: 06/02/21 1050             I reviewed the patient's new clinical results.  I reviewed the patient's new imaging results and agree with the interpretation.     ASSESSMENT/PLAN:   ASSESSMENT:  1.  Dysphagia, probably related to tardive dyskinesia  2.  Personal history of colon polyps    PLAN:  1.  Esophagogastroduodenoscopy with dilation and biopsy as appropriate  2.  Colonoscopy    Risk and benefits associated with the procedure are reviewed with the patient.  The patient wished to proceed     Jose C Batres DO  06/03/21  08:35 CDT

## 2021-06-04 ENCOUNTER — TRANSCRIBE ORDERS (OUTPATIENT)
Dept: OTHER | Facility: OTHER | Age: 66
End: 2021-06-04

## 2021-06-07 ENCOUNTER — APPOINTMENT (OUTPATIENT)
Dept: SPEECH THERAPY | Facility: HOSPITAL | Age: 66
End: 2021-06-07

## 2021-06-07 ENCOUNTER — ANTICOAGULATION VISIT (OUTPATIENT)
Dept: CARDIAC SURGERY | Facility: CLINIC | Age: 66
End: 2021-06-07

## 2021-06-07 ENCOUNTER — TRANSCRIBE ORDERS (OUTPATIENT)
Dept: SPEECH THERAPY | Facility: HOSPITAL | Age: 66
End: 2021-06-07

## 2021-06-07 VITALS — HEART RATE: 79 BPM | OXYGEN SATURATION: 97 %

## 2021-06-07 DIAGNOSIS — Z79.01 LONG TERM (CURRENT) USE OF ANTICOAGULANTS: Primary | ICD-10-CM

## 2021-06-07 DIAGNOSIS — R13.10 DYSPHAGIA, UNSPECIFIED TYPE: Primary | ICD-10-CM

## 2021-06-07 DIAGNOSIS — Z86.711 HISTORY OF PULMONARY EMBOLISM: ICD-10-CM

## 2021-06-07 DIAGNOSIS — Z51.81 ENCOUNTER FOR THERAPEUTIC DRUG MONITORING: ICD-10-CM

## 2021-06-07 LAB
INR PPP: 2.3 (ref 0.9–1.1)
LAB AP CASE REPORT: NORMAL
PATH REPORT.FINAL DX SPEC: NORMAL

## 2021-06-07 PROCEDURE — 93793 ANTICOAG MGMT PT WARFARIN: CPT | Performed by: NURSE PRACTITIONER

## 2021-06-07 PROCEDURE — 36416 COLLJ CAPILLARY BLOOD SPEC: CPT | Performed by: NURSE PRACTITIONER

## 2021-06-07 PROCEDURE — 85610 PROTHROMBIN TIME: CPT | Performed by: NURSE PRACTITIONER

## 2021-06-07 NOTE — PROGRESS NOTES
Pt denies medication changes or bleeding issues.  Instructed pt to discontinue Lovenox injections.  Recheck INR next month.  Pt verbalizes.  Patient instructed regarding medication; results given and questions answered. Nutritional counseling given.  Dietary factors affecting therapy addressed.  Patient instructed to monitor for excessive bruising or bleeding.  Findings reported by George Lucia RN.   Today's INR is   Lab Results - Last 18 Months   Lab Units 06/07/21  0000   INR  2.30*

## 2021-06-11 ENCOUNTER — OFFICE VISIT (OUTPATIENT)
Dept: PODIATRY | Facility: CLINIC | Age: 66
End: 2021-06-11

## 2021-06-11 VITALS — WEIGHT: 296 LBS | HEIGHT: 71 IN | BODY MASS INDEX: 41.44 KG/M2

## 2021-06-11 DIAGNOSIS — E11.42 DIABETIC POLYNEUROPATHY ASSOCIATED WITH TYPE 2 DIABETES MELLITUS (HCC): Primary | ICD-10-CM

## 2021-06-11 DIAGNOSIS — M79.675 PAIN IN TOES OF BOTH FEET: ICD-10-CM

## 2021-06-11 DIAGNOSIS — B35.1 ONYCHOMYCOSIS: ICD-10-CM

## 2021-06-11 DIAGNOSIS — M79.674 PAIN IN TOES OF BOTH FEET: ICD-10-CM

## 2021-06-11 PROCEDURE — 11721 DEBRIDE NAIL 6 OR MORE: CPT | Performed by: PODIATRIST

## 2021-06-11 NOTE — PROGRESS NOTES
Clint Mack  1955  65 y.o. male   BS-114  per patient  PCP-Dr. Sushma Myers MD 02/25/2021    Patient presents today for diabetic foot care.     06/11/2021        Chief Complaint   Patient presents with   • Left Foot - Diabetic foot care   • Right Foot - Diabetic foot care           History of Present Illness    Clint Mack is a 65 y.o. male presents for f/u diabetic foot exam and nail care.      Past Medical History:   Diagnosis Date   • Anxiety    • Arthritis     osteoarthritis   • CKD (chronic kidney disease), stage III (CMS/HCC)    • COPD (chronic obstructive pulmonary disease) (CMS/HCC)    • Coronary artery disease    • Depression    • Diabetes mellitus (CMS/HCC)    • DVT (deep venous thrombosis) (CMS/HCC)    • Heart disease    • History of embolic filter insertion    • History of stomach ulcers    • Hyperlipidemia    • Hypertension    • Injury of back     back surgery x3    • Lupus (CMS/HCC)    • LUCIANA on CPAP    • Pulmonary embolism (CMS/HCC)          Past Surgical History:   Procedure Laterality Date   • BACK SURGERY     • CARDIAC SURGERY  2001   • CIRCUMCISION     • COLONOSCOPY N/A 9/11/2018    Procedure: COLONOSCOPY;  Surgeon: Jose C Batres DO;  Location: [facility] ENDOSCOPY;  Service: Gastroenterology   • COLONOSCOPY N/A 6/3/2021    Procedure: COLONOSCOPY;  Surgeon: Jose C Batres DO;  Location: [facility] ENDOSCOPY;  Service: Gastroenterology;  Laterality: N/A;   • ENDOSCOPY N/A 9/11/2018    Procedure: ESOPHAGOGASTRODUODENOSCOPY;  Surgeon: Jose C Batres DO;  Location: [facility] ENDOSCOPY;  Service: Gastroenterology   • ENDOSCOPY N/A 3/3/2020    Procedure: ESOPHAGOGASTRODUODENOSCOPY;  Surgeon: Jose C Batres DO;  Location: [facility] ENDOSCOPY;  Service: Gastroenterology;  Laterality: N/A;   • ENDOSCOPY N/A 6/3/2021    Procedure: ESOPHAGOGASTRODUODENOSCOPY;  Surgeon: Jose C Batres DO;  Location: [facility] ENDOSCOPY;  Service: Gastroenterology;  Laterality: N/A;   • GALLBLADDER  "SURGERY  2000   • HIP SURGERY     • JOINT REPLACEMENT Right 05/02/2016    hip   • SPINE SURGERY           Family History   Problem Relation Age of Onset   • Heart disease Father    • Hypertension Father    • Stroke Father    • Diabetes Sister    • Heart disease Brother    • Hypertension Brother    • COPD Brother    • Lung disease Other          Social History     Socioeconomic History   • Marital status:      Spouse name: Not on file   • Number of children: Not on file   • Years of education: Not on file   • Highest education level: Not on file   Tobacco Use   • Smoking status: Never Smoker   • Smokeless tobacco: Never Used   Vaping Use   • Vaping Use: Never used   Substance and Sexual Activity   • Alcohol use: No   • Drug use: No   • Sexual activity: Defer         Current Outpatient Medications   Medication Sig Dispense Refill   • aspirin 81 MG chewable tablet Chew 81 mg Take As Directed. Take 1 pill by mouth Monday, Wednesday, Friday     • baclofen (LIORESAL) 10 MG tablet Take 10 mg by mouth 3 (Three) Times a Day.     • Blood Glucose Monitoring Suppl (ACCU-CHEK ARGELIA PLUS) w/Device kit      • clonazePAM (KlonoPIN) 1 MG tablet Take 1 mg by mouth 3 (Three) Times a Day.     • COMFORT ASSIST INSULIN SYRINGE 31G X 5/16\" 0.3 ML misc      • EPINEPHrine (EPIPEN) 0.3 MG/0.3ML solution auto-injector injection      • furosemide (LASIX) 20 MG tablet Take 20 mg by mouth Daily.     • gabapentin (NEURONTIN) 100 MG capsule Take 100 mg by mouth 2 (Two) Times a Day.     • glimepiride (AMARYL) 4 MG tablet Take 4 mg by mouth 2 (Two) Times a Day As Needed.     • glucose blood (ONE TOUCH ULTRA TEST) test strip      • HYDROcodone-acetaminophen (NORCO) 7.5-325 MG per tablet Take 1 tablet by mouth 3 (Three) Times a Day.     • insulin aspart (novoLOG) 100 UNIT/ML injection Inject  under the skin into the appropriate area as directed 3 (Three) Times a Day Before Meals. PT TAKES PER SLIDING SCALE STARTING AT 3 UNITS FOR BLOOD SUGAR " "150-200     • isosorbide mononitrate (IMDUR) 30 MG 24 hr tablet Take 1 tablet by mouth Daily. 90 tablet 3   • Lancets (ONETOUCH ULTRASOFT) lancets      • Lancets Misc. (Accu-Chek Softclix Lancet Dev) kit      • metoprolol tartrate (LOPRESSOR) 25 MG tablet Take 25 mg by mouth 2 (Two) Times a Day.     • Morphine (MS CONTIN) 15 MG 12 hr tablet Take 30 mg by mouth 2 (Two) Times a Day.     • Omega-3 Fatty Acids (FISH OIL) 1200 MG capsule delayed-release Take  by mouth 2 (Two) Times a Day.     • ranolazine (RANEXA) 500 MG 12 hr tablet Take 1 tablet by mouth 2 (Two) Times a Day. 90 tablet 3   • tamsulosin (FLOMAX) 0.4 MG capsule 24 hr capsule Take 1 capsule by mouth Daily.     • vitamin D (ERGOCALCIFEROL) 10695 UNITS capsule capsule Take 50,000 Units by mouth. Two times monthly     • warfarin (COUMADIN) 5 MG tablet Patient takes 5 mg every day except M and F when he takes 7.5 mg 100 tablet 1   • naloxone (Narcan) 4 MG/0.1ML nasal spray 1 spray into the nostril(s) as directed by provider As Needed (opioid overdose) for up to 2 doses. 1 each 1     No current facility-administered medications for this visit.         OBJECTIVE    Ht 180.3 cm (71\")   Wt 134 kg (296 lb)   BMI 41.28 kg/m²       Review of Systems   Constitutional:  Denies recent weight loss, weight gain, fever or chills, no change in exercise tolerance  Musculoskeletal: Toe pain.   Skin:  Thickened nails. Shin discoloration.  Neurological:  Burning sensations to feet b/l.  Psychiatric/Behavioral: Denies depression      Physical Exam    Clint had a diabetic foot exam performed today.      Constitutional: he appears well-developed and well-nourished.   HEENT: Normocephalic. Atraumatic  CV: No tenderness. RRR  Resp: Non-labored respiration. No wheezes.   Psychiatric: he has a normal mood and affect. his   behavior is normal.      Lower Extremity Exam:  Vascular: DP/PT pulses palpable 1+.   Negative hair growth.   1+ perimalleolar edema  Toes cool  Neuro: " Protective sensation diminished to lesser toes, b/l.  DTRs intact  Integument: No open wounds  No lesions  Atrophic skin noted b/l with chronic venous stasis dermatitis changes  Mild xerosis b/l  No masses  Musculoskeletal: LE muscle strength 4/5 on left, 3/5 on right  Gait assisted with cane, wheelchair  Semi-rigid hammertoe deformity toes 2-5 b/l.  Nails 1-5 b/l thickened, elongated with subungual debris. +pain on palpation              ASSESSMENT AND PLAN    Diagnoses and all orders for this visit:    1. Diabetic polyneuropathy associated with type 2 diabetes mellitus (CMS/HCC) (Primary)    2. Onychomycosis    3. Pain in toes of both feet      -Comprehensive DM foot exam performed. Pt educated on importance of tight glucose control and daily foot checks.  -Pt educated on padding techniques for rigid hammertoes.  Proper extra depth diabetic shoe gear.  Limit barefoot walking.  -Nails 1-5 b/l debrided in length and thickness with nail nipper to decrease pain, fungal load and risk of infection  -Continue skin hydration  -Follow up 3 months PRN          This document has been electronically signed by Jose C Novak DPM on June 11, 2021 14:05 CDT     EMR Dragon/Transcription disclaimer:   Much of this encounter note is an electronic transcription/translation of spoken language to printed text. The electronic translation of spoken language may permit erroneous, or at times, nonsensical words or phrases to be inadvertently transcribed; Although I have reviewed the note for such errors, some may still exist.    Jose C Novak DPM  6/11/2021  14:05 CDT

## 2021-06-17 ENCOUNTER — TELEPHONE (OUTPATIENT)
Dept: SLEEP MEDICINE | Facility: HOSPITAL | Age: 66
End: 2021-06-17

## 2021-06-17 NOTE — TELEPHONE ENCOUNTER
Pt called and stated that he will now be following with DR GRIMM  For his medications he ask that I call júnior and cancell any refill that he had on clonazepam and this has been done per patient request

## 2021-07-06 ENCOUNTER — OFFICE VISIT (OUTPATIENT)
Dept: CARDIAC SURGERY | Facility: CLINIC | Age: 66
End: 2021-07-06

## 2021-07-06 ENCOUNTER — ANTICOAGULATION VISIT (OUTPATIENT)
Dept: CARDIAC SURGERY | Facility: CLINIC | Age: 66
End: 2021-07-06

## 2021-07-06 VITALS
OXYGEN SATURATION: 97 % | HEART RATE: 80 BPM | SYSTOLIC BLOOD PRESSURE: 133 MMHG | WEIGHT: 296 LBS | DIASTOLIC BLOOD PRESSURE: 70 MMHG | HEIGHT: 71 IN | BODY MASS INDEX: 41.44 KG/M2

## 2021-07-06 VITALS — OXYGEN SATURATION: 96 % | HEART RATE: 77 BPM

## 2021-07-06 DIAGNOSIS — Z79.01 LONG TERM (CURRENT) USE OF ANTICOAGULANTS: ICD-10-CM

## 2021-07-06 DIAGNOSIS — Z79.01 LONG TERM (CURRENT) USE OF ANTICOAGULANTS: Primary | ICD-10-CM

## 2021-07-06 DIAGNOSIS — E78.2 MIXED HYPERLIPIDEMIA: Chronic | ICD-10-CM

## 2021-07-06 DIAGNOSIS — Z95.828 PRESENCE OF IVC FILTER: ICD-10-CM

## 2021-07-06 DIAGNOSIS — Z86.711 HISTORY OF PULMONARY EMBOLISM: ICD-10-CM

## 2021-07-06 DIAGNOSIS — I71.40 ABDOMINAL AORTIC ANEURYSM (AAA) WITHOUT RUPTURE (HCC): Primary | ICD-10-CM

## 2021-07-06 DIAGNOSIS — Z51.81 ENCOUNTER FOR THERAPEUTIC DRUG MONITORING: ICD-10-CM

## 2021-07-06 LAB — INR PPP: 2.3 (ref 0.9–1.1)

## 2021-07-06 PROCEDURE — 85610 PROTHROMBIN TIME: CPT | Performed by: NURSE PRACTITIONER

## 2021-07-06 PROCEDURE — 36416 COLLJ CAPILLARY BLOOD SPEC: CPT | Performed by: NURSE PRACTITIONER

## 2021-07-06 PROCEDURE — 99214 OFFICE O/P EST MOD 30 MIN: CPT | Performed by: NURSE PRACTITIONER

## 2021-07-06 NOTE — PROGRESS NOTES
CVTS Office Progress Note     Subjective   Patient ID: Clint Mack is a 65 y.o. male as new patient to establish care for prior IVC filter placement    Chief Complaint:    Chief Complaint   Patient presents with   • Aortic Aneurysm     HPI    PCP:  Sushma Myers MD     Mr. Mack is a pleasant 63-year-old male with a history of hypertension, CAD, hyperlipidemia, DVT, pulmonary embolus, obstructive sleep apnea, obesity, tardive dyskinesia, depression, anxiety, osteoarthritis, CKD3.   Patient has been on lifelong anticoagulation since 2009 for recurrent DVT, PE with Coumadin.  Required hip replacement 2014 necessitating IVC filter placement prior to procedure.  Patient apparently received notification of the potential of defective IVC filter device and obtained outpatient CT scan in [place].  One extraluminal leg noted prior, no further intervention recommended.  Vague abdominal pain (chronic).  Follows up today with repeat IVC/Aorta duplex.    2008 DVT  2009 PE Life long anticoagulation instituted with warfarin  2014 ORIF Hip Replacement  2014 IVC Filter Placement  2019 CT Abd/Pelv WO contrast: One filter leg potentially extraluminal, small 27mm infrarenal AAA, CT reviewed by Dr. Carrizales  2/2020 CTA abdomen/Pelvis: IVC filter placement, 1 leg extraluminal, infrarenal aorta 29mm  6/2020 Aorta/IVC Ultrasound: distal aorta 24mm, limited due to bowel gas/body habitus  7/2021 US Aorta/IVC: IVC filter not visualized, distal abdominal aorta ectatic 29mm     The following portions of the patient's history were reviewed and updated as appropriate: allergies, current medications, past family history, past medical history, past social history, past surgical history and problem list.  Recent images independently reviewed.  Available laboratory values reviewed.      Past Medical History:   Diagnosis Date   • Anxiety    • Arthritis     osteoarthritis   • CKD (chronic kidney disease), stage III (CMS/HCC)    •  COPD (chronic obstructive pulmonary disease) (CMS/HCC)    • Coronary artery disease    • Depression    • Diabetes mellitus (CMS/HCC)    • DVT (deep venous thrombosis) (CMS/HCC)    • Heart disease    • History of embolic filter insertion    • History of stomach ulcers    • Hyperlipidemia    • Hypertension    • Injury of back     back surgery x3    • Lupus (CMS/HCC)    • LUCIANA on CPAP    • Pulmonary embolism (CMS/HCC)      Past Surgical History:   Procedure Laterality Date   • BACK SURGERY     • CARDIAC SURGERY  2001   • CIRCUMCISION     • COLONOSCOPY N/A 9/11/2018    Procedure: COLONOSCOPY;  Surgeon: Jose C Batres DO;  Location: [facility] ENDOSCOPY;  Service: Gastroenterology   • COLONOSCOPY N/A 6/3/2021    Procedure: COLONOSCOPY;  Surgeon: Jose C Batres DO;  Location: [facility] ENDOSCOPY;  Service: Gastroenterology;  Laterality: N/A;   • ENDOSCOPY N/A 9/11/2018    Procedure: ESOPHAGOGASTRODUODENOSCOPY;  Surgeon: Jose C Batres DO;  Location: [facility] ENDOSCOPY;  Service: Gastroenterology   • ENDOSCOPY N/A 3/3/2020    Procedure: ESOPHAGOGASTRODUODENOSCOPY;  Surgeon: Jose C Batres DO;  Location: [facility] ENDOSCOPY;  Service: Gastroenterology;  Laterality: N/A;   • ENDOSCOPY N/A 6/3/2021    Procedure: ESOPHAGOGASTRODUODENOSCOPY;  Surgeon: Jose C Batres DO;  Location: [facility] ENDOSCOPY;  Service: Gastroenterology;  Laterality: N/A;   • GALLBLADDER SURGERY  2000   • HIP SURGERY     • JOINT REPLACEMENT Right 05/02/2016    hip   • SPINE SURGERY       Family History   Problem Relation Age of Onset   • Heart disease Father    • Hypertension Father    • Stroke Father    • Diabetes Sister    • Heart disease Brother    • Hypertension Brother    • COPD Brother    • Lung disease Other      Social History     Tobacco Use   • Smoking status: Never Smoker   • Smokeless tobacco: Never Used   Vaping Use   • Vaping Use: Never used   Substance Use Topics   • Alcohol use: No   • Drug use: No       ALLERGIES:   Tetrabenazine,  "Alfuzosin, Deutetrabenazine, Pregabalin, Metoclopramide, Valbenazine, Celexa [citalopram hydrobromide], Citalopram, Crestor [rosuvastatin calcium], Ingrezza [valbenazine tosylate], Lipitor [atorvastatin], and Rosuvastatin    MEDICATIONS:      Current Outpatient Medications:   •  aspirin 81 MG chewable tablet, Chew 81 mg Take As Directed. Take 1 pill by mouth Monday, Wednesday, Friday, Disp: , Rfl:   •  baclofen (LIORESAL) 10 MG tablet, Take 10 mg by mouth 3 (Three) Times a Day., Disp: , Rfl:   •  Blood Glucose Monitoring Suppl (ACCU-CHEK ARGELIA PLUS) w/Device kit, , Disp: , Rfl:   •  clonazePAM (KlonoPIN) 1 MG tablet, Take 1 mg by mouth 3 (Three) Times a Day., Disp: , Rfl:   •  COMFORT ASSIST INSULIN SYRINGE 31G X 5/16\" 0.3 ML misc, , Disp: , Rfl:   •  EPINEPHrine (EPIPEN) 0.3 MG/0.3ML solution auto-injector injection, , Disp: , Rfl:   •  furosemide (LASIX) 20 MG tablet, Take 20 mg by mouth Daily., Disp: , Rfl:   •  gabapentin (NEURONTIN) 100 MG capsule, Take 100 mg by mouth 2 (Two) Times a Day., Disp: , Rfl:   •  glimepiride (AMARYL) 4 MG tablet, Take 4 mg by mouth 2 (Two) Times a Day As Needed., Disp: , Rfl:   •  glucose blood (ONE TOUCH ULTRA TEST) test strip, , Disp: , Rfl:   •  HYDROcodone-acetaminophen (NORCO) 7.5-325 MG per tablet, Take 1 tablet by mouth 3 (Three) Times a Day., Disp: , Rfl:   •  insulin aspart (novoLOG) 100 UNIT/ML injection, Inject  under the skin into the appropriate area as directed 3 (Three) Times a Day Before Meals. PT TAKES PER SLIDING SCALE STARTING AT 3 UNITS FOR BLOOD SUGAR 150-200, Disp: , Rfl:   •  isosorbide mononitrate (IMDUR) 30 MG 24 hr tablet, Take 1 tablet by mouth Daily., Disp: 90 tablet, Rfl: 3  •  Lancets (ONETOUCH ULTRASOFT) lancets, , Disp: , Rfl:   •  Lancets Misc. (Accu-Chek Softclix Lancet Dev) kit, , Disp: , Rfl:   •  metoprolol tartrate (LOPRESSOR) 25 MG tablet, Take 25 mg by mouth 2 (Two) Times a Day., Disp: , Rfl:   •  Morphine (MS CONTIN) 15 MG 12 hr tablet, Take " 30 mg by mouth 2 (Two) Times a Day., Disp: , Rfl:   •  naloxone (Narcan) 4 MG/0.1ML nasal spray, 1 spray into the nostril(s) as directed by provider As Needed (opioid overdose) for up to 2 doses., Disp: 1 each, Rfl: 1  •  Omega-3 Fatty Acids (FISH OIL) 1200 MG capsule delayed-release, Take  by mouth 2 (Two) Times a Day., Disp: , Rfl:   •  ranolazine (RANEXA) 500 MG 12 hr tablet, Take 1 tablet by mouth 2 (Two) Times a Day., Disp: 90 tablet, Rfl: 3  •  tamsulosin (FLOMAX) 0.4 MG capsule 24 hr capsule, Take 1 capsule by mouth Daily., Disp: , Rfl:   •  vitamin D (ERGOCALCIFEROL) 66726 UNITS capsule capsule, Take 50,000 Units by mouth. Two times monthly, Disp: , Rfl:   •  warfarin (COUMADIN) 5 MG tablet, Patient takes 5 mg every day except M and F when he takes 7.5 mg, Disp: 100 tablet, Rfl: 1    Review of Systems   Constitutional: Negative for chills, decreased appetite, fever and weight loss.   HENT: Negative for congestion, nosebleeds and sore throat.    Eyes: Negative for blurred vision, visual disturbance and visual halos.   Cardiovascular: Positive for dyspnea on exertion (conditioning) and leg swelling (mild chronic). Negative for chest pain.   Respiratory: Negative for cough, shortness of breath, sputum production and wheezing.    Endocrine: Negative for cold intolerance and polyuria.   Hematologic/Lymphatic: Negative for bleeding problem. Bruises/bleeds easily.        Does not report S/S of adverse bleeding event including nose bleeds, hematuria, melena, gingival bleeding, or hematemesis.   Skin: Negative for flushing, nail changes and unusual hair distribution.   Musculoskeletal: Positive for arthritis, back pain and joint pain.   Gastrointestinal: Negative for bloating, abdominal pain, hematemesis, melena, nausea and vomiting.   Genitourinary: Negative for flank pain and hematuria.   Neurological: Positive for tremors. Negative for brief paralysis, difficulty with concentration, focal weakness,  light-headedness, loss of balance, numbness, paresthesias and weakness.        Repetitive head motions, tongue motions, reported history of tardive dyskinesia    No amaurosis fugax, TIA, or CVA.     Psychiatric/Behavioral: Negative for altered mental status, depression, substance abuse and suicidal ideas.   Allergic/Immunologic: Negative for hives and persistent infections.     Vitals:    07/06/21 0849   BP: 133/70   Pulse: 80   SpO2: 97%      Objective   Heart Rate:  [77-80] 80  BP: (133)/(70) 133/70  Body mass index is 41.28 kg/m².  Physical Exam   Constitutional: He is oriented to person, place, and time. He appears well-developed.   HENT:   Head: Normocephalic and atraumatic.   Eyes: Pupils are equal, round, and reactive to light. Conjunctivae are normal. No scleral icterus.   Neck: No JVD present.   Cardiovascular: Normal rate, regular rhythm and normal heart sounds.   No murmur heard.  Pulmonary/Chest: Effort normal and breath sounds normal. No stridor. No respiratory distress.   Abdominal: Soft. Bowel sounds are normal. He exhibits no distension. There is no abdominal tenderness.   Genitourinary:    Genitourinary Comments: deferred     Musculoskeletal: Normal range of motion. No tenderness.   Lymphadenopathy:     He has no cervical adenopathy.   Neurological: He is alert and oriented to person, place, and time. No cranial nerve deficit. Coordination normal.   Repetitive motions of head and tongue observed.  Reported history of tardive dyskinesia   Skin: Skin is warm and dry. Capillary refill takes less than 2 seconds.   Psychiatric: His behavior is normal. Judgment normal.   Nursing note and vitals reviewed.        Assessment & Plan     Independent Review of Studies  7/2021  Aorta/IVC: IVC filter not visualized, distal abdominal aorta ectatic 29mm    1. Abdominal aortic aneurysm (AAA) without rupture (CMS/HCC)  Detailed discussion with Clint Mack regarding situation and options.  Aneurysm  abdominal aorta.  Surgical intervention not indicated at this time.  Will plan to follow with interval imaging.  Smoking cessation, lipid management, BP control in 120 range advised.  Discussed symptoms of rupture, details of surgical repair including endovascular and open repair.  Risks, benefits discussed.  Understands and wishes to proceed with plan    Return in 2 years with US Aorta    - US aorta ivc iliac graft complete; Future    2. Long term (current) use of anticoagulants  Adverse bleeding events that require evaluation includes blood in the urine, stool, gums, and nose.  If you are to experience these symptoms you should present to the heart and vascular center for evaluation.  Avoid NSAID's such as ibuprofen and naproxen for pain relief as these medications increase your risk of gastrointestinal bleeding.  Over the counter supplements such as garlic, gingko biloba, and other herbs may increase bleeding risks.     3. Mixed hyperlipidemia  Lipid-lowering therapy has been proven beneficial in patients with cardio-vascular disease. Current guidelines recommend statin treatment for all patients with PAD,CAD and carotid stenosis. Statins are beneficial in preventing cardiovascular events, increasing functional capacity and lower the risk of adverse limb loss in PAD. Statins decrease the progression of plaque formation and may improve peripheral vessel lining, and aid in reversing atherosclerosis.    4. Presence of IVC filter  Limited evaluation of IVC filter limbs on ultrasound due to body habitus.      Obtain CT abdomen/pelvis follow up with Dr. Carrizales to review.         Detailed discussion regarding risks, benefits, and treatment plan. Images independently reviewed. Patient understands, agrees, and wishes to proceed with plan.

## 2021-07-06 NOTE — PROGRESS NOTES
Patient states no med changes or bleeding problems or unexplained bruising. Patient instructed to continue current dosing schedule. Verbalizes understanding. Will recheck in 5 weeks. Patient instructed regarding medication; results given and questions answered. Nutritional counseling given.  Dietary factors affecting therapy addressed.  Patient instructed to monitor for excessive bruising or bleeding. Findings reported by Ketty Moscoso RN.    Today's INR is   Lab Results - Last 18 Months   Lab Units 07/06/21  0000   INR  2.30*

## 2021-07-06 NOTE — PATIENT INSTRUCTIONS
Abdominal Aortic Aneurysm also referred to as AAA is a vascular disease of the abdominal aorta caused by genetic predisposition, trauma, smoking, hypertension, as well as other risks factors.  Periodic surveillance, risk factor reduction, and control of hypertension is the most common method of treatment.  However, when aneurysm size exceeds 5.0cm the potential for rupture is substantially higher and may indicate the need for surgery.  For aneurysms less than 5cm continue to follow up with vascular for surveilance unless you begin to have signs of impending rupture which includes tearing sensation of the abdomen or flank/back pain unrelieved by position change in which case report to your closest emergency department for further evaluation.

## 2021-08-06 ENCOUNTER — ANTICOAGULATION VISIT (OUTPATIENT)
Dept: CARDIAC SURGERY | Facility: CLINIC | Age: 66
End: 2021-08-06

## 2021-08-06 VITALS — OXYGEN SATURATION: 96 % | HEART RATE: 78 BPM

## 2021-08-06 DIAGNOSIS — Z86.711 HISTORY OF PULMONARY EMBOLISM: ICD-10-CM

## 2021-08-06 DIAGNOSIS — Z51.81 ENCOUNTER FOR THERAPEUTIC DRUG MONITORING: ICD-10-CM

## 2021-08-06 DIAGNOSIS — Z79.01 LONG TERM (CURRENT) USE OF ANTICOAGULANTS: Primary | ICD-10-CM

## 2021-08-06 LAB — INR PPP: 2 (ref 0.9–1.1)

## 2021-08-06 PROCEDURE — 93793 ANTICOAG MGMT PT WARFARIN: CPT | Performed by: NURSE PRACTITIONER

## 2021-08-06 PROCEDURE — 85610 PROTHROMBIN TIME: CPT | Performed by: NURSE PRACTITIONER

## 2021-08-06 PROCEDURE — 36416 COLLJ CAPILLARY BLOOD SPEC: CPT | Performed by: NURSE PRACTITIONER

## 2021-08-06 NOTE — PROGRESS NOTES
PT states compliant c tx . PT denies any new medications or bleeding issues. PT denies any s/s of blood clot. PT instructed to continue same dose and Coumadin friendly diet. PT will be seen in 6 weeks. Patient instructed regarding medication; results given and questions answered. Nutritional counseling given.  Dietary factors affecting therapy addressed.  Patient instructed to monitor for excessive bruising or bleeding. PT verbalizes understanding. Findings reported by Gabriela Perez RN.   Today's INR is   Lab Results - Last 18 Months   Lab Units 08/06/21  0000   INR  2.00*

## 2021-09-13 ENCOUNTER — OFFICE VISIT (OUTPATIENT)
Dept: PODIATRY | Facility: CLINIC | Age: 66
End: 2021-09-13

## 2021-09-13 ENCOUNTER — ANTICOAGULATION VISIT (OUTPATIENT)
Dept: CARDIAC SURGERY | Facility: CLINIC | Age: 66
End: 2021-09-13

## 2021-09-13 VITALS
HEIGHT: 71 IN | DIASTOLIC BLOOD PRESSURE: 78 MMHG | HEART RATE: 83 BPM | WEIGHT: 302.2 LBS | OXYGEN SATURATION: 93 % | BODY MASS INDEX: 42.31 KG/M2 | SYSTOLIC BLOOD PRESSURE: 126 MMHG

## 2021-09-13 VITALS — WEIGHT: 301.9 LBS | OXYGEN SATURATION: 98 % | HEART RATE: 85 BPM | BODY MASS INDEX: 42.11 KG/M2

## 2021-09-13 DIAGNOSIS — Z86.711 HISTORY OF PULMONARY EMBOLISM: ICD-10-CM

## 2021-09-13 DIAGNOSIS — M79.674 PAIN IN TOES OF BOTH FEET: ICD-10-CM

## 2021-09-13 DIAGNOSIS — M79.675 PAIN IN TOES OF BOTH FEET: ICD-10-CM

## 2021-09-13 DIAGNOSIS — Z51.81 ENCOUNTER FOR THERAPEUTIC DRUG MONITORING: ICD-10-CM

## 2021-09-13 DIAGNOSIS — Z79.01 LONG TERM (CURRENT) USE OF ANTICOAGULANTS: Primary | ICD-10-CM

## 2021-09-13 DIAGNOSIS — T14.8XXA DEEP TISSUE INJURY: ICD-10-CM

## 2021-09-13 DIAGNOSIS — E11.9 ENCOUNTER FOR DIABETIC FOOT EXAM (HCC): ICD-10-CM

## 2021-09-13 DIAGNOSIS — E11.42 DIABETIC POLYNEUROPATHY ASSOCIATED WITH TYPE 2 DIABETES MELLITUS (HCC): Primary | ICD-10-CM

## 2021-09-13 DIAGNOSIS — B35.1 ONYCHOMYCOSIS: ICD-10-CM

## 2021-09-13 LAB — INR PPP: 2.4 (ref 0.9–1.1)

## 2021-09-13 PROCEDURE — 99213 OFFICE O/P EST LOW 20 MIN: CPT | Performed by: PODIATRIST

## 2021-09-13 PROCEDURE — 93793 ANTICOAG MGMT PT WARFARIN: CPT | Performed by: NURSE PRACTITIONER

## 2021-09-13 PROCEDURE — 36416 COLLJ CAPILLARY BLOOD SPEC: CPT | Performed by: NURSE PRACTITIONER

## 2021-09-13 PROCEDURE — 11721 DEBRIDE NAIL 6 OR MORE: CPT | Performed by: PODIATRIST

## 2021-09-13 PROCEDURE — 85610 PROTHROMBIN TIME: CPT | Performed by: NURSE PRACTITIONER

## 2021-09-13 RX ORDER — ZOLPIDEM TARTRATE 5 MG/1
TABLET ORAL
COMMUNITY
Start: 2021-09-12

## 2021-09-13 NOTE — PROGRESS NOTES
Patient states no med changes or bleeding problems or unexplained bruising. Patient instructed to continue current dosing schedule. Verbalizes understanding. Will recheck in 7 weeks when he returns to see Dr Kumar. Patient instructed regarding medication; results given and questions answered. Nutritional counseling given.  Dietary factors affecting therapy addressed.  Patient instructed to monitor for excessive bruising or bleeding. Findings reported by Ketty Moscoso RN.    Today's INR is   Lab Results - Last 18 Months   Lab Units 09/13/21  0000   INR  2.40*

## 2021-09-13 NOTE — PROGRESS NOTES
Clint Mack  1955  65 y.o. male   BS- 121 per patient  PCP-Dr. Sushma Myers MD 09/02/2021    Patient presents today for diabetic foot care and a wound on right foot    09/13/2021          Chief Complaint   Patient presents with   • Right Foot - Follow-up, Wound Check, Diabetic foot care   • Left Foot - Follow-up, Diabetic foot care           History of Present Illness    Clint Mack is a 65 y.o. male presents for f/u diabetic foot exam and nail care.  Does note possible pressure sore to right heel.    Past Medical History:   Diagnosis Date   • Anxiety    • Arthritis     osteoarthritis   • CKD (chronic kidney disease), stage III (CMS/HCC)    • COPD (chronic obstructive pulmonary disease) (CMS/HCC)    • Coronary artery disease    • Depression    • Diabetes mellitus (CMS/HCC)    • DVT (deep venous thrombosis) (CMS/HCC)    • Heart disease    • History of embolic filter insertion    • History of stomach ulcers    • Hyperlipidemia    • Hypertension    • Injury of back     back surgery x3    • Lupus (CMS/HCC)    • LUCIANA on CPAP    • Pulmonary embolism (CMS/HCC)          Past Surgical History:   Procedure Laterality Date   • BACK SURGERY     • CARDIAC SURGERY  2001   • CIRCUMCISION     • COLONOSCOPY N/A 9/11/2018    Procedure: COLONOSCOPY;  Surgeon: Jose C Batres DO;  Location: [facility] ENDOSCOPY;  Service: Gastroenterology   • COLONOSCOPY N/A 6/3/2021    Procedure: COLONOSCOPY;  Surgeon: Jose C Batres DO;  Location: [facility] ENDOSCOPY;  Service: Gastroenterology;  Laterality: N/A;   • ENDOSCOPY N/A 9/11/2018    Procedure: ESOPHAGOGASTRODUODENOSCOPY;  Surgeon: Jose C Batres DO;  Location: [facility] ENDOSCOPY;  Service: Gastroenterology   • ENDOSCOPY N/A 3/3/2020    Procedure: ESOPHAGOGASTRODUODENOSCOPY;  Surgeon: Jose C Batres DO;  Location: [facility] ENDOSCOPY;  Service: Gastroenterology;  Laterality: N/A;   • ENDOSCOPY N/A 6/3/2021    Procedure: ESOPHAGOGASTRODUODENOSCOPY;  Surgeon:  "Jose C Batres DO;  Location: [facility] ENDOSCOPY;  Service: Gastroenterology;  Laterality: N/A;   • GALLBLADDER SURGERY  2000   • HIP SURGERY     • JOINT REPLACEMENT Right 05/02/2016    hip   • SPINE SURGERY           Family History   Problem Relation Age of Onset   • Heart disease Father    • Hypertension Father    • Stroke Father    • Diabetes Sister    • Heart disease Brother    • Hypertension Brother    • COPD Brother    • Lung disease Other          Social History     Socioeconomic History   • Marital status:      Spouse name: Not on file   • Number of children: Not on file   • Years of education: Not on file   • Highest education level: Not on file   Tobacco Use   • Smoking status: Never Smoker   • Smokeless tobacco: Never Used   Vaping Use   • Vaping Use: Never used   Substance and Sexual Activity   • Alcohol use: No   • Drug use: No   • Sexual activity: Defer         Current Outpatient Medications   Medication Sig Dispense Refill   • aspirin 81 MG chewable tablet Chew 81 mg Take As Directed. Take 1 pill by mouth Monday, Wednesday, Friday     • baclofen (LIORESAL) 10 MG tablet Take 10 mg by mouth 3 (Three) Times a Day.     • Blood Glucose Monitoring Suppl (ACCU-CHEK ARGELIA PLUS) w/Device kit      • clonazePAM (KlonoPIN) 1 MG tablet Take 1 mg by mouth 3 (Three) Times a Day.     • COMFORT ASSIST INSULIN SYRINGE 31G X 5/16\" 0.3 ML misc      • EPINEPHrine (EPIPEN) 0.3 MG/0.3ML solution auto-injector injection      • furosemide (LASIX) 20 MG tablet Take 20 mg by mouth Daily.     • gabapentin (NEURONTIN) 100 MG capsule Take 100 mg by mouth 2 (Two) Times a Day.     • glimepiride (AMARYL) 4 MG tablet Take 4 mg by mouth 2 (Two) Times a Day As Needed.     • glucose blood (ONE TOUCH ULTRA TEST) test strip      • HYDROcodone-acetaminophen (NORCO) 7.5-325 MG per tablet Take 1 tablet by mouth 3 (Three) Times a Day.     • insulin aspart (novoLOG) 100 UNIT/ML injection Inject  under the skin into the appropriate area " "as directed 3 (Three) Times a Day Before Meals. PT TAKES PER SLIDING SCALE STARTING AT 3 UNITS FOR BLOOD SUGAR 150-200     • isosorbide mononitrate (IMDUR) 30 MG 24 hr tablet Take 1 tablet by mouth Daily. 90 tablet 3   • Lancets (ONETOUCH ULTRASOFT) lancets      • Lancets Misc. (Accu-Chek Softclix Lancet Dev) kit      • metoprolol tartrate (LOPRESSOR) 25 MG tablet Take 25 mg by mouth 2 (Two) Times a Day.     • Morphine (MS CONTIN) 15 MG 12 hr tablet Take 30 mg by mouth 2 (Two) Times a Day.     • naloxone (Narcan) 4 MG/0.1ML nasal spray 1 spray into the nostril(s) as directed by provider As Needed (opioid overdose) for up to 2 doses. 1 each 1   • Omega-3 Fatty Acids (FISH OIL) 1200 MG capsule delayed-release Take  by mouth 2 (Two) Times a Day.     • ranolazine (RANEXA) 500 MG 12 hr tablet Take 1 tablet by mouth 2 (Two) Times a Day. 90 tablet 3   • tamsulosin (FLOMAX) 0.4 MG capsule 24 hr capsule Take 1 capsule by mouth Daily.     • vitamin D (ERGOCALCIFEROL) 84648 UNITS capsule capsule Take 50,000 Units by mouth. Two times monthly     • warfarin (COUMADIN) 5 MG tablet Patient takes 5 mg every day except M and F when he takes 7.5 mg 100 tablet 1   • zolpidem (AMBIEN) 5 MG tablet        No current facility-administered medications for this visit.         OBJECTIVE    /78   Pulse 83   Ht 180.3 cm (71\")   Wt (!) 137 kg (302 lb 3.2 oz)   SpO2 93%   BMI 42.15 kg/m²       Review of Systems   Constitutional:  Denies recent weight loss, weight gain, fever or chills, no change in exercise tolerance  Musculoskeletal: Toe pain.   Skin:  Thickened nails. Shin discoloration.  Neurological:  Burning sensations to feet b/l.  Psychiatric/Behavioral: Denies depression      Physical Exam    Clint had a diabetic foot exam performed today.      Constitutional: he appears well-developed and well-nourished.   HEENT: Normocephalic. Atraumatic  CV: No tenderness. RRR  Resp: Non-labored respiration. No wheezes.   Psychiatric: he " has a normal mood and affect. his   behavior is normal.      Lower Extremity Exam:  Vascular: DP/PT pulses palpable 1+.   Negative hair growth.   1+ perimalleolar edema  Toes cool  Neuro: Protective sensation diminished to lesser toes, b/l.  DTRs intact  Integument: Desiccated hemorrhagic tissue to medial right heel, healing deep tissue injury.  Pinpoint residual partial-thickness breakdown.  No signs of infection.  No lesions  Atrophic skin noted b/l with chronic venous stasis dermatitis changes  Mild xerosis b/l  No masses  Musculoskeletal: LE muscle strength 4/5 on left, 3/5 on right  Gait assisted with cane, wheelchair  Semi-rigid hammertoe deformity toes 2-5 b/l.  Nails 1-5 b/l thickened, elongated with subungual debris. +pain on palpation              ASSESSMENT AND PLAN    Diagnoses and all orders for this visit:    1. Diabetic polyneuropathy associated with type 2 diabetes mellitus (CMS/[facility]) (Primary)    2. Onychomycosis    3. Pain in toes of both feet    4. Deep tissue injury    5. Encounter for diabetic foot exam (CMS/[facility])      -Comprehensive DM foot exam performed. Pt educated on importance of tight glucose control and daily foot checks.  -Pt educated on padding techniques for rigid hammertoes.  Proper extra depth diabetic shoe gear.  Limit barefoot walking.  -Nails 1-5 b/l debrided in length and thickness with nail nipper to decrease pain, fungal load and risk of infection  -Continue skin hydration  -Debridement of sloughing desiccated skin to right medial heel.  Underlying deep tissue injury predominantly healed.  Advised offloading of the site.  Will order vascular boot to aid in offloading  -Follow up 3 months PRN          This document has been electronically signed by Jose C Novak DPM on September 15, 2021 16:22 CDT     EMR Dragon/Transcription disclaimer:   Much of this encounter note is an electronic transcription/translation of spoken language to printed text. The electronic translation of  spoken language may permit erroneous, or at times, nonsensical words or phrases to be inadvertently transcribed; Although I have reviewed the note for such errors, some may still exist.    Jose C Novak DPM  9/15/2021  16:22 CDT

## 2021-09-27 ENCOUNTER — TELEPHONE (OUTPATIENT)
Dept: PODIATRY | Facility: CLINIC | Age: 66
End: 2021-09-27

## 2021-10-12 ENCOUNTER — ANTICOAGULATION VISIT (OUTPATIENT)
Dept: CARDIAC SURGERY | Facility: CLINIC | Age: 66
End: 2021-10-12

## 2021-10-12 VITALS — HEART RATE: 75 BPM | OXYGEN SATURATION: 98 %

## 2021-10-12 DIAGNOSIS — Z51.81 ENCOUNTER FOR THERAPEUTIC DRUG MONITORING: ICD-10-CM

## 2021-10-12 DIAGNOSIS — Z86.711 HISTORY OF PULMONARY EMBOLISM: ICD-10-CM

## 2021-10-12 DIAGNOSIS — Z79.01 LONG TERM (CURRENT) USE OF ANTICOAGULANTS: Primary | ICD-10-CM

## 2021-10-12 LAB — INR PPP: 2.1 (ref 0.9–1.1)

## 2021-10-12 PROCEDURE — 93793 ANTICOAG MGMT PT WARFARIN: CPT | Performed by: NURSE PRACTITIONER

## 2021-10-12 PROCEDURE — 85610 PROTHROMBIN TIME: CPT | Performed by: NURSE PRACTITIONER

## 2021-10-12 PROCEDURE — 36416 COLLJ CAPILLARY BLOOD SPEC: CPT | Performed by: NURSE PRACTITIONER

## 2021-10-12 NOTE — PROGRESS NOTES
Pt here today after starting Niacin last week. Pt denies other med changes or bleeding problems. Pt instructed to keep previously scheduled appt on 11/3; pt verbalized. Patient instructed regarding medication; results given and questions answered. Nutritional counseling given.  Dietary factors affecting therapy addressed.  Patient instructed to monitor for excessive bruising or bleeding. Findings reported by Ketty Moscoso RN.    Today's INR is Lab Results - Last 18 Months   Lab Units 10/12/21  0000   INR  2.10*

## 2021-10-29 DIAGNOSIS — M25.561 CHRONIC PAIN OF RIGHT KNEE: Primary | ICD-10-CM

## 2021-10-29 DIAGNOSIS — G89.29 CHRONIC PAIN OF RIGHT KNEE: Primary | ICD-10-CM

## 2021-11-02 ENCOUNTER — OFFICE VISIT (OUTPATIENT)
Dept: ORTHOPEDIC SURGERY | Facility: CLINIC | Age: 66
End: 2021-11-02

## 2021-11-02 VITALS — HEIGHT: 71 IN | WEIGHT: 309 LBS | BODY MASS INDEX: 43.26 KG/M2

## 2021-11-02 DIAGNOSIS — G89.29 CHRONIC PAIN OF RIGHT KNEE: Primary | ICD-10-CM

## 2021-11-02 DIAGNOSIS — M17.11 PRIMARY OSTEOARTHRITIS OF RIGHT KNEE: ICD-10-CM

## 2021-11-02 DIAGNOSIS — M25.561 CHRONIC PAIN OF RIGHT KNEE: Primary | ICD-10-CM

## 2021-11-02 PROCEDURE — 20610 DRAIN/INJ JOINT/BURSA W/O US: CPT | Performed by: NURSE PRACTITIONER

## 2021-11-02 PROCEDURE — 99213 OFFICE O/P EST LOW 20 MIN: CPT | Performed by: NURSE PRACTITIONER

## 2021-11-02 RX ORDER — NIACIN 250 MG
250 TABLET ORAL
COMMUNITY
End: 2022-01-17 | Stop reason: ALTCHOICE

## 2021-11-02 RX ADMIN — TRIAMCINOLONE ACETONIDE 40 MG: 40 INJECTION, SUSPENSION INTRA-ARTICULAR; INTRAMUSCULAR at 08:43

## 2021-11-02 RX ADMIN — LIDOCAINE HYDROCHLORIDE 2 ML: 10 INJECTION, SOLUTION INFILTRATION; PERINEURAL at 08:43

## 2021-11-02 NOTE — PROGRESS NOTES
Clint Mack is a 66 y.o. male returns for     Chief Complaint   Patient presents with   • Right Knee - Follow-up, Pain       HISTORY OF PRESENT ILLNESS: Patient presents to office for follow-up of chronic right knee pain.  Patient has experienced ongoing right knee pain for several years that has continued to progressively worsen.  Patient has previously been treated with intra-articular injections of steroid as well as viscosupplementation with some intermittent improvement.  Patient was previously given a series of Orthovisc injections in his right knee in November/December 2018, which he states offered good pain improvement in his right knee.  Patient was more recently given a Monovisc injection in the right knee on 11/13/2020, which did not improve his pain at all.  Last injection of steroid was given on 1/19/2021, which only offered improvement for a few days and then he states the pain returned.  Patient reports popping sensations in the right knee joint and intermittent giving of the right knee.  As previously noted, patient has a long history of chronic low back pain and degenerative changes in the lumbar spine with 4 prior back surgeries.  Patient sustained an initial work-related injury to his low back in 2005.  Patient has chronic issues with weakness in his right hip/leg and known significant atrophy of the right psoas muscle.  Patient has done physical therapy in the past with minimal improvement.  Patient takes MS Contin for chronic pain as well as Norco 7.5 mg and Baclofen as prescribed by his pain management physician.  No new complaints or concerns noted since last office visit.  No falls or injuries reported since last office visit.  X-rays are repeated in office today.     CONCURRENT MEDICAL HISTORY:    The following portions of the patient's history were reviewed and updated as appropriate: allergies, current medications, past family history, past medical history, past social history,  "past surgical history and problem list.     ROS  No fevers or chills.  No chest pain or shortness of air.  No GI or  disturbances.  Right knee pain.    PHYSICAL EXAMINATION:       Ht 180.3 cm (71\")   Wt (!) 140 kg (309 lb)   BMI 43.10 kg/m²     Physical Exam  Vitals reviewed.   Constitutional:       General: He is not in acute distress.     Appearance: He is well-developed. He is obese. He is not ill-appearing.   HENT:      Head: Normocephalic.   Pulmonary:      Effort: Pulmonary effort is normal. No respiratory distress.   Abdominal:      General: There is no distension.      Palpations: Abdomen is soft.   Musculoskeletal:         General: Swelling (Mild, right knee) and tenderness (Mild, right knee) present. No deformity or signs of injury.      Right knee: No effusion.      Instability Tests: Medial Tari test negative and lateral Tari test negative.   Skin:     General: Skin is warm and dry.      Capillary Refill: Capillary refill takes less than 2 seconds.      Findings: No erythema.   Neurological:      Mental Status: He is alert and oriented to person, place, and time.      GCS: GCS eye subscore is 4. GCS verbal subscore is 5. GCS motor subscore is 6.   Psychiatric:         Speech: Speech normal.         Behavior: Behavior normal.         Thought Content: Thought content normal.         Judgment: Judgment normal.         GAIT:     []  Normal  [x]  Antalgic    Assistive device: []  None  []  Walker     []  Crutches  [x]  Cane     []  Wheelchair  []  Stretcher    Right Knee Exam     Tenderness   Right knee tenderness location: Mild, diffuse.    Range of Motion   Extension: 5   Flexion: 100     Tests   Tari:  Medial - negative Lateral - negative  Varus: negative Valgus: negative    Other   Erythema: absent  Sensation: normal  Pulse: present  Swelling: mild  Effusion: no effusion present    Comments:  Pain and limitations with range of motion.  Significant quadricep weakness is noted. "             XR Knee 1 or 2 View Right    Result Date: 11/3/2021  Narrative: EXAM: XR KNEE 1-2 VIEWS, XR KNEES ANTEROPOSTERIOR STANDING BILATERAL ORDERING PROVIDER: STEPHANIE MARTIN CLINICAL HISTORY: Pain COMPARISON STUDY: TECHNIQUE: Comparison AP view of both knees, and also two views of the right knee FINDINGS: Joint space narrowing with osteophyte formation in the right knee due to mild to moderate tricompartmental osteoarthritis with narrowing of the medial compartment. Postsurgical change in the medial aspect of the right knee region with multiple surgical clips in place. There is no acute displaced fracture.  The alignment is overall anatomic.  No soft tissue abnormality, or radiopaque foreign body is seen.  There is small knee joint effusion.     Impression: Mild-to-moderate tricompartmental osteoarthritis. No acute displaced fracture, or traumatic subluxation based on current assessment. Small knee joint effusion. Prior post surgical change in the medial aspect of the right knee region with multiple vascular clips in place. Electronically signed by:  Adal Carlson MD  11/3/2021 6:56 AM CDT Workstation: [id number]    XR Knee Bilateral AP Standing    Result Date: 11/3/2021  Narrative: EXAM: XR KNEE 1-2 VIEWS, XR KNEES ANTEROPOSTERIOR STANDING BILATERAL ORDERING PROVIDER: STEPHANIE MARTIN CLINICAL HISTORY: Pain COMPARISON STUDY: TECHNIQUE: Comparison AP view of both knees, and also two views of the right knee FINDINGS: Joint space narrowing with osteophyte formation in the right knee due to mild to moderate tricompartmental osteoarthritis with narrowing of the medial compartment. Postsurgical change in the medial aspect of the right knee region with multiple surgical clips in place. There is no acute displaced fracture.  The alignment is overall anatomic.  No soft tissue abnormality, or radiopaque foreign body is seen.  There is small knee joint effusion.     Impression: Mild-to-moderate tricompartmental osteoarthritis. No  acute displaced fracture, or traumatic subluxation based on current assessment. Small knee joint effusion. Prior post surgical change in the medial aspect of the right knee region with multiple vascular clips in place. Electronically signed by:  Adal Carlson MD  11/3/2021 6:56 AM CDT Workstation: [id number]      Large Joint Arthrocentesis: R knee  Date/Time: 11/2/2021 8:43 AM  Consent given by: patient  Timeout: Immediately prior to procedure a time out was called to verify the correct patient, procedure, equipment, support staff and site/side marked as required   Supporting Documentation  Indications: pain, diagnostic evaluation and joint swelling   Procedure Details  Location: knee - R knee  Preparation: Patient was prepped and draped in the usual sterile fashion  Needle size: 22 G  Approach: anterolateral  Medications administered: 40 mg triamcinolone acetonide 40 MG/ML; 2 mL lidocaine 1 %  Patient tolerance: patient tolerated the procedure well with no immediate complications        ASSESSMENT:    Diagnoses and all orders for this visit:    Chronic pain of right knee  -     Large Joint Arthrocentesis: R knee    Primary osteoarthritis of right knee  -     Large Joint Arthrocentesis: R knee    PLAN    AP standing x-ray of the bilateral knees with a lateral x-ray of the right knee performed in office today and reviewed with no acute findings noted.  Patient has some degenerative changes in the right knee, primarily at the patellofemoral joint.  Medial and lateral joint spaces appear well-maintained.  Patient complains of aching pain in the right knee, which has been a chronic and ongoing issue for several years.  Patient also complains of popping sensations. Patient had good pain improvement previously with Orthovisc series in 2018.  However, patient was given Monovisc on 11/13/2020, which did not improve his pain at all.  Patient also had very little lasting improvement with the previous intra-articular injection of  steroid given at last office visit on 1/19/2021.  We discussed possible repeat MRI imaging of the right knee to evaluate for soft tissue injuries including meniscal tearing, ligament injuries/insufficiency and/or osteochondral defects/injury/chondromalacia.  Last MRI of the right knee was performed in 2018, which was normal at that time.  Patient states he would consider repeat MRI imaging if his pain persist.  For today, he wants to proceed with a repeat injection in an effort to improve his pain.  Recommend a repeat intra-articular injection of steroid to the right knee today for management of joint pain/inflammation/swelling.  We discussed the risk of hyperglycemia with steroid injection.  Patient is instructed to monitor his blood sugar closely, take his diabetic medications as prescribed and follow a diabetic diet.  Patient does have sliding scale insulin so he can cover for any elevations in his blood sugar, which should be transient.    As previously noted, it is suspected that some of his chronic right knee pain is related to his significant lumbar spine issues and chronic weakness of his right hip/leg.  On previous MRI imaging, patient was noted to have significant atrophy of the psoas muscle on the right side.  Patient has experienced significant quadriceps weakness on the right side for several years.  Patient previously reported that he had none several courses of physical therapy in the past without any improvement.  Recommend to continue with his home exercises for conditioning and strengthening of the right leg.    Recommend the following:    -Rest and activity modification as tolerated and based on pain.  -Modified weightbearing of the affected extremity with use of a cane or walker as needed.  Patient is using his cane in office today.  -Gradual progression of weightbearing and activity as pain and swelling allow.  -Conditioning and strengthening exercises of the bilateral knees/legs.  -Elevation  and ice therapy to the affected knee to minimize pain/swelling/inflammation.   -Recommend Tylenol as needed for pain/discomfort.  Patient already takes MS Contin and Norco 7.5 mg for management of his chronic pain as prescribed by his pain management physician.  Patient cannot take oral NSAIDs due to his history of chronic kidney disease and his current use of Coumadin.    We also discussed today repeating the Orthovisc series as he states again that he did have good pain improvement in the popping sensations in his right knee stopped following the Orthovisc.  I have instructed the patient that I am happy to order the Orthovisc series again for him.  Patient states that he will notify me when/if he wants to proceed with that but wants to wait and see if the steroid injection helps his right knee pain.    Time spent of a minimum of 20 minutes including the face to face evaluation, reviewing of medical history and prior medial records, reviewing of diagnostic studies, documentation, patient education and coordination of care.     EMR Dragon/Transciption Disclaimer: Some of this note may be an electronic transcription/translation of spoken language to printed text.  The electronic translation of spoken language may permit erroneous, or at times, nonsensical words or phrases to be inadvertently transcribed. Although I have reviewed the note for such errors, some may still exist.     Return in about 3 months (around 2/2/2022), or if symptoms worsen or fail to improve, for Recheck.        This document has been electronically signed by DIANA Kiser on November 3, 2021 07:58 CDT      DIANA Kiser

## 2021-11-03 ENCOUNTER — ANTICOAGULATION VISIT (OUTPATIENT)
Dept: CARDIAC SURGERY | Facility: CLINIC | Age: 66
End: 2021-11-03

## 2021-11-03 ENCOUNTER — OFFICE VISIT (OUTPATIENT)
Dept: CARDIOLOGY | Facility: CLINIC | Age: 66
End: 2021-11-03

## 2021-11-03 VITALS
SYSTOLIC BLOOD PRESSURE: 136 MMHG | DIASTOLIC BLOOD PRESSURE: 78 MMHG | OXYGEN SATURATION: 96 % | HEART RATE: 76 BPM | WEIGHT: 308 LBS | HEIGHT: 71 IN | BODY MASS INDEX: 43.12 KG/M2 | TEMPERATURE: 98 F

## 2021-11-03 VITALS — HEART RATE: 84 BPM | OXYGEN SATURATION: 94 %

## 2021-11-03 DIAGNOSIS — R06.09 DYSPNEA ON EXERTION: ICD-10-CM

## 2021-11-03 DIAGNOSIS — Z79.01 LONG TERM (CURRENT) USE OF ANTICOAGULANTS: Primary | ICD-10-CM

## 2021-11-03 DIAGNOSIS — Z86.711 HISTORY OF PULMONARY EMBOLISM: ICD-10-CM

## 2021-11-03 DIAGNOSIS — Z51.81 ENCOUNTER FOR THERAPEUTIC DRUG MONITORING: ICD-10-CM

## 2021-11-03 DIAGNOSIS — E66.01 MORBID OBESITY WITH BMI OF 40.0-44.9, ADULT (HCC): ICD-10-CM

## 2021-11-03 DIAGNOSIS — Z95.1 S/P CABG (CORONARY ARTERY BYPASS GRAFT): Primary | ICD-10-CM

## 2021-11-03 DIAGNOSIS — E78.2 MIXED HYPERLIPIDEMIA: Chronic | ICD-10-CM

## 2021-11-03 DIAGNOSIS — I10 PRIMARY HYPERTENSION: Chronic | ICD-10-CM

## 2021-11-03 LAB — INR PPP: 2.4 (ref 0.9–1.1)

## 2021-11-03 PROCEDURE — 36416 COLLJ CAPILLARY BLOOD SPEC: CPT | Performed by: NURSE PRACTITIONER

## 2021-11-03 PROCEDURE — 93793 ANTICOAG MGMT PT WARFARIN: CPT | Performed by: NURSE PRACTITIONER

## 2021-11-03 PROCEDURE — 99214 OFFICE O/P EST MOD 30 MIN: CPT | Performed by: INTERNAL MEDICINE

## 2021-11-03 PROCEDURE — 85610 PROTHROMBIN TIME: CPT | Performed by: NURSE PRACTITIONER

## 2021-11-03 RX ORDER — RANOLAZINE 500 MG/1
500 TABLET, EXTENDED RELEASE ORAL 2 TIMES DAILY
Qty: 180 TABLET | Refills: 3 | Status: SHIPPED | OUTPATIENT
Start: 2021-11-03 | End: 2022-10-17 | Stop reason: SDUPTHER

## 2021-11-03 RX ORDER — LIDOCAINE HYDROCHLORIDE 10 MG/ML
2 INJECTION, SOLUTION INFILTRATION; PERINEURAL
Status: COMPLETED | OUTPATIENT
Start: 2021-11-02 | End: 2021-11-02

## 2021-11-03 RX ORDER — TRIAMCINOLONE ACETONIDE 40 MG/ML
40 INJECTION, SUSPENSION INTRA-ARTICULAR; INTRAMUSCULAR
Status: COMPLETED | OUTPATIENT
Start: 2021-11-02 | End: 2021-11-02

## 2021-11-03 RX ORDER — ISOSORBIDE MONONITRATE 60 MG/1
60 TABLET, EXTENDED RELEASE ORAL EVERY MORNING
Qty: 90 TABLET | Refills: 3 | Status: SHIPPED | OUTPATIENT
Start: 2021-11-03 | End: 2022-10-17 | Stop reason: SDUPTHER

## 2021-11-03 NOTE — PROGRESS NOTES
Clint Mack  66 y.o. male      1. S/P CABG (coronary artery bypass graft)    2. Mixed hyperlipidemia    3. Primary hypertension    4. Morbid obesity with BMI of 40.0-44.9, adult (HCC)    5. Dyspnea on exertion        History of Present Illness  Mr. Mack is a 66-year-old male with hypertension, CAD s/p CABG, hyperlipidemia, DVT, pulmonary embolus, obstructive sleep apnea, obesity, tardive dyskinesia, abdominal aortic aneurysm, depression, anxiety, osteoarthritis, CKD3.    He was admitted to [facility] in January 2021 following an episode of angioedema, the reason for which was unclear.  On the day of admission he had taken Praluent injection a little later in the day when the episode occurred at night.  I note that the patient has since gone off Praluent since his insurance company would not pay for it.  His LDL was 91 when checked in December 2020.  I understand that he had lab work done by his primary care physician in September 2020 and the total cholesterol and LDL were elevated and he now wishes to reconsider Repatha.  He was started on niacin recently and he has noticed that his blood sugars are elevated following this.  He wants to go off it.  He has had significant intolerance to multiple statins.  The patient apparently has elevated triglycerides and is concerned about it.  He has been reading articles about taking fish oil which he has read does not help much.  I did suggest Vascepa since there is documented evidence that there is no readily lowers triglycerides but decreases ischemia cardiac events.    He does follow-up with vascular surgery.  He has had his Coumadin checked today and his PT and INR are in the therapeutic range.    The patient did report dyspnea on exertion which is NYHA class II and intermittent episodes of chest pain which is not necessarily related to exertion.    Echocardiogram in November 2019 showed normal LV systolic function with an EF of 52%.  There is  mild LVH.  Right ventricle was mildly dilated and left atrium was mildly dilated.  There is grade 1 diastolic dysfunction.    Clinical exam today showed normal heart rate and blood pressure.  No bronchospasm no signs of congestive heart failure was noted.  He does have tardive dyskinesia.  His blood pressure was in the normal range.    Allergies   Allergen Reactions   • Tetrabenazine Dizziness, Nausea And Vomiting, Nausea Only and Other (See Comments)     Other reaction(s): Vertigo   • Alfuzosin Other (See Comments)     syncope  Syncope   syncope  Syncope   syncope   • Deutetrabenazine Other (See Comments)     Insomnia    Insomnia  Insomnia   • Pregabalin Swelling     Leg swelling   • Metoclopramide Other (See Comments)     Tardive dyskinesia  Tardive dyskinesia     • Valbenazine Other (See Comments)   • Celexa [Citalopram Hydrobromide] Dystonia   • Citalopram Other (See Comments)     Other reaction(s): Dystonia  Tardive dyskinesia    Other reaction(s): Respiratory distress   • Crestor [Rosuvastatin Calcium] Myalgia   • Ingrezza [Valbenazine Tosylate] Other (See Comments)     fatigue   • Lipitor [Atorvastatin] Other (See Comments)     Aches and pains all over   • Rosuvastatin Other (See Comments)     Aches and pains all over  Aches and pains all over  Aches and pains all over         Past Medical History:   Diagnosis Date   • Anxiety    • Arthritis     osteoarthritis   • CKD (chronic kidney disease), stage III ([facility])    • COPD (chronic obstructive pulmonary disease) ([facility])    • Coronary artery disease    • Depression    • Diabetes mellitus ([facility])    • DVT (deep venous thrombosis) ([facility])    • Heart disease    • History of embolic filter insertion    • History of stomach ulcers    • Hyperlipidemia    • Hypertension    • Injury of back     back surgery x3    • Lupus ([facility])    • LUCIANA on CPAP    • Pulmonary embolism ([facility])          Past Surgical History:   Procedure Laterality Date   • BACK SURGERY     • CARDIAC SURGERY  2001    • CIRCUMCISION     • COLONOSCOPY N/A 9/11/2018    Procedure: COLONOSCOPY;  Surgeon: Jose C Batres DO;  Location: [facility] ENDOSCOPY;  Service: Gastroenterology   • COLONOSCOPY N/A 6/3/2021    Procedure: COLONOSCOPY;  Surgeon: Jose C Batres DO;  Location: [facility] ENDOSCOPY;  Service: Gastroenterology;  Laterality: N/A;   • ENDOSCOPY N/A 9/11/2018    Procedure: ESOPHAGOGASTRODUODENOSCOPY;  Surgeon: Jose C Batres DO;  Location: [facility] ENDOSCOPY;  Service: Gastroenterology   • ENDOSCOPY N/A 3/3/2020    Procedure: ESOPHAGOGASTRODUODENOSCOPY;  Surgeon: Jose C Batres DO;  Location: [facility] ENDOSCOPY;  Service: Gastroenterology;  Laterality: N/A;   • ENDOSCOPY N/A 6/3/2021    Procedure: ESOPHAGOGASTRODUODENOSCOPY;  Surgeon: Jose C Batres DO;  Location: [facility] ENDOSCOPY;  Service: Gastroenterology;  Laterality: N/A;   • GALLBLADDER SURGERY  2000   • HIP SURGERY     • JOINT REPLACEMENT Right 05/02/2016    hip   • SPINE SURGERY           Family History   Problem Relation Age of Onset   • Heart disease Father    • Hypertension Father    • Stroke Father    • Diabetes Sister    • Heart disease Brother    • Hypertension Brother    • COPD Brother    • Lung disease Other          Social History     Socioeconomic History   • Marital status:    Tobacco Use   • Smoking status: Never Smoker   • Smokeless tobacco: Never Used   Vaping Use   • Vaping Use: Never used   Substance and Sexual Activity   • Alcohol use: No   • Drug use: No   • Sexual activity: Defer         Current Outpatient Medications   Medication Sig Dispense Refill   • aspirin 81 MG chewable tablet Chew 81 mg Take As Directed. Take 1 pill by mouth Monday, Wednesday, Friday     • baclofen (LIORESAL) 10 MG tablet Take 10 mg by mouth 3 (Three) Times a Day.     • Blood Glucose Monitoring Suppl (ACCU-CHEK ARGELIA PLUS) w/Device kit      • clonazePAM (KlonoPIN) 1 MG tablet Take 1 mg by mouth 3 (Three) Times a Day.     • COMFORT ASSIST INSULIN SYRINGE  "31G X 5/16\" 0.3 ML misc      • EPINEPHrine (EPIPEN) 0.3 MG/0.3ML solution auto-injector injection      • furosemide (LASIX) 20 MG tablet Take 20 mg by mouth Daily.     • gabapentin (NEURONTIN) 100 MG capsule Take 100 mg by mouth 2 (Two) Times a Day.     • glimepiride (AMARYL) 4 MG tablet Take 4 mg by mouth 2 (Two) Times a Day As Needed.     • glucose blood (ONE TOUCH ULTRA TEST) test strip      • HYDROcodone-acetaminophen (NORCO) 7.5-325 MG per tablet Take 1 tablet by mouth 3 (Three) Times a Day.     • insulin aspart (novoLOG) 100 UNIT/ML injection Inject  under the skin into the appropriate area as directed 3 (Three) Times a Day Before Meals. PT TAKES PER SLIDING SCALE STARTING AT 3 UNITS FOR BLOOD SUGAR 150-200     • Lancets (ONETOUCH ULTRASOFT) lancets      • Lancets Misc. (Accu-Chek Softclix Lancet Dev) kit      • metoprolol tartrate (LOPRESSOR) 25 MG tablet Take 25 mg by mouth 2 (Two) Times a Day.     • Morphine (MS CONTIN) 15 MG 12 hr tablet Take 30 mg by mouth 2 (Two) Times a Day.     • naloxone (Narcan) 4 MG/0.1ML nasal spray 1 spray into the nostril(s) as directed by provider As Needed (opioid overdose) for up to 2 doses. 1 each 1   • niacin 250 MG tablet Take 250 mg by mouth Daily With Breakfast.     • ranolazine (Ranexa) 500 MG 12 hr tablet Take 1 tablet by mouth 2 (Two) Times a Day. 180 tablet 3   • tamsulosin (FLOMAX) 0.4 MG capsule 24 hr capsule Take 1 capsule by mouth Daily.     • vitamin D (ERGOCALCIFEROL) 58166 UNITS capsule capsule Take 50,000 Units by mouth. Two times monthly     • warfarin (COUMADIN) 5 MG tablet Patient takes 5 mg every day except M and F when he takes 7.5 mg 100 tablet 1   • zolpidem (AMBIEN) 5 MG tablet      • Evolocumab (REPATHA) solution auto-injector SureClick injection Inject 1 mL under the skin into the appropriate area as directed Every 14 (Fourteen) Days. 1 mL 11   • isosorbide mononitrate (IMDUR) 60 MG 24 hr tablet Take 1 tablet by mouth Every Morning. 90 tablet 3 " "    No current facility-administered medications for this visit.         OBJECTIVE    /78   Pulse 76   Temp 98 °F (36.7 °C)   Ht 180.3 cm (71\")   Wt (!) 140 kg (308 lb)   SpO2 96%   BMI 42.96 kg/m²         Review of Systems : The following systems were reviewed and  changes noted as indicated under history of present illness.    Constitutional:  Denies recent weight loss, weight gain, fever or chills     HENT:  Denies any hearing loss, epistaxis, hoarseness, or difficulty speaking.     Eyes: Wears eyeglasses or contact lenses     Respiratory:  Dyspnea with exertion,no cough, wheezing, or hemoptysis.     Cardiovascular: Occasional chest pain.  No palpitation.    Gastrointestinal:  Denies change in bowel habits, dyspepsia, ulcer disease, hematochezia, or melena.     Endocrine: Negative for cold intolerance, heat intolerance, polydipsia, polyphagia and polyuria.    Genitourinary: Negative.      Musculoskeletal: DJD    Allergic/Immunologic: History of angioedema in the past    Neurological: History of tardive dyskinesia    Hematological: Denies any food allergies, seasonal allergies, bleeding disorders, or lymphadenopathy.     Psychiatric/Behavioral: history of anxiety/depression, no substance abuse, or change in cognitive function.     Physical Exam : The following systems were reassessed and no changes noted    Constitutional: Cooperative, alert and oriented, in no acute distress.     HENT:   Head: Normocephalic, normal hair patterns, no masses or tenderness.  Ears, Nose, and Throat: No gross abnormalities. No pallor or cyanosis.   Eyes: EOMS intact, PERRL, conjunctivae and lids unremarkable. Fundoscopic exam and visual fields not performed.   Neck: No palpable masses or adenopathy, no thyromegaly, no JVD, carotid pulses are full and equal bilaterally and without  Bruits.     Cardiovascular: Regular rhythm, S1 and S2 normal, no S3 or S4.  No murmurs, gallops, or rubs detected.     Pulmonary/Chest: Chest: " normal symmetry, normal respiratory excursion, no intercostal retraction, no use of accessory muscles.            Pulmonary: Normal breath sounds. No rales or ronchi.    Abdominal: Abdomen soft, bowel sounds normoactive, no masses, no hepatosplenomegaly, non-tender, no bruits.     Musculoskeletal: No deformities, clubbing, cyanosis, erythema, or edema observed.     Neurological:  tardive dyskinesia improved    Skin: Warm and dry to the touch, no apparent skin lesions or masses noted.     Psychiatric: He has a normal mood and affect. His behavior is normal. Judgment and thought content normal.         Procedures      Lab Results   Component Value Date    WBC 9.29 01/27/2021    HGB 14.8 01/27/2021    HCT 41.9 01/27/2021    MCV 88.2 01/27/2021     01/27/2021     Lab Results   Component Value Date    GLUCOSE 213 (H) 01/27/2021    BUN 26 (H) 01/27/2021    CREATININE 1.33 (H) 01/27/2021    EGFRIFNONA 54 (L) 01/27/2021    EGFRIFAFRI 46 12/02/2020    BCR 19.5 01/27/2021    CO2 23.0 01/27/2021    CALCIUM 8.6 01/27/2021    ALBUMIN 3.1 (L) 12/02/2020    AST 20 02/24/2020    ALT 27 02/24/2020     Lab Results   Component Value Date    CHOL 171 12/30/2020    CHOL 113 02/21/2019    CHOL 216 (H) 10/12/2018     Lab Results   Component Value Date    TRIG 261 (H) 12/30/2020    TRIG 249 (H) 04/29/2020    TRIG 182 02/21/2019     Lab Results   Component Value Date    HDL 36 (L) 12/30/2020    HDL 36 04/29/2020    HDL 34 02/21/2019     No components found for: LDLCALC  Lab Results   Component Value Date    LDL 91 12/30/2020     04/29/2020    LDL 54 02/21/2019     No results found for: HDLLDLRATIO  No components found for: CHOLHDL  Lab Results   Component Value Date    HGBA1C 6.4 (H) 04/29/2020     Lab Results   Component Value Date    TSH 2.04 07/22/2019           ASSESSMENT AND PLAN  Mr. Mack is a management challenge secondary to multiple medical issues, comorbidities, medication allergies.  He has NYHA class II dyspnea  on exertion which is clearly multifactorial.  To make sure that there is no change in LV function and echocardiogram has been arranged.    He does have intermittent chest pain and progression of coronary artery disease cannot be ruled out.  In February 2018 Lexiscan Cardiolite stress test did not show any significant reversible ischemia.  However given his multitude of symptoms, definitive evaluation by cardiac catheterization would be reasonable and this was discussed.  Patient is very concerned about worsening renal function and does not wish to consider this test.    After discussing pros and cons, I have increased the dose of isosorbide mononitrate to 60 mg daily and Ranexa 500 mg twice a day has been continued.  I have continued antiplatelet therapy with aspirin, antihypertensive therapy with metoprolol tartrate and low-dose diuretics.  His renal function is being monitored closely by Dr. Holt.  Anticoagulation with Coumadin has been continued.     If his triglycerides are consistently elevated we could consider Vascepa.  This was discussed with the patient.  He had several questions with regards to his cardiac status and these were discussed.    Diagnoses and all orders for this visit:    1. S/P CABG (coronary artery bypass graft) (Primary)  -     Adult Transthoracic Echo Complete w/ Color, Spectral and Contrast if Necessary Per Protocol; Future    2. Mixed hyperlipidemia    3. Primary hypertension    4. Morbid obesity with BMI of 40.0-44.9, adult (HCC)  -     Adult Transthoracic Echo Complete w/ Color, Spectral and Contrast if Necessary Per Protocol; Future    5. Dyspnea on exertion  -     Adult Transthoracic Echo Complete w/ Color, Spectral and Contrast if Necessary Per Protocol; Future    Other orders  -     ranolazine (Ranexa) 500 MG 12 hr tablet; Take 1 tablet by mouth 2 (Two) Times a Day.  Dispense: 180 tablet; Refill: 3  -     Evolocumab (REPATHA) solution auto-injector SureClick injection; Inject 1  mL under the skin into the appropriate area as directed Every 14 (Fourteen) Days.  Dispense: 1 mL; Refill: 11  -     isosorbide mononitrate (IMDUR) 60 MG 24 hr tablet; Take 1 tablet by mouth Every Morning.  Dispense: 90 tablet; Refill: 3        Patient's Body mass index is 42.96 kg/m². BMI is above normal parameters. Recommendations include: exercise counseling and nutrition counseling.  Patient is a non-smoker      Keke Kumar MD  11/3/2021  12:18 CDT

## 2021-11-03 NOTE — PROGRESS NOTES
Patient states he is stopping fish oil which may affect INR by lowering level.   Patient instructed to continue current dosing schedule.  Recheck INR in 3 weeks instead of 7.  Pt verbalizes understanding. Patient instructed regarding medication; results given and questions answered. Nutritional counseling given.  Dietary factors affecting therapy addressed.  Patient instructed to monitor for excessive bruising or bleeding.  Pt seeing Dr. Warren today as well.  Findings reported by George Lucia RN.   Today's INR is Lab Results - Last 18 Months   Lab Units 11/03/21  0000   INR  2.40*

## 2021-11-05 ENCOUNTER — TELEPHONE (OUTPATIENT)
Dept: CARDIOLOGY | Facility: CLINIC | Age: 66
End: 2021-11-05

## 2021-11-05 DIAGNOSIS — E78.2 MIXED HYPERLIPIDEMIA: Primary | ICD-10-CM

## 2021-11-08 ENCOUNTER — LAB (OUTPATIENT)
Dept: LAB | Facility: HOSPITAL | Age: 66
End: 2021-11-08

## 2021-11-08 LAB
ALBUMIN SERPL-MCNC: 3.8 G/DL (ref 3.5–5.2)
ALBUMIN/GLOB SERPL: 1.2 G/DL
ALP SERPL-CCNC: 56 U/L (ref 39–117)
ALT SERPL W P-5'-P-CCNC: 20 U/L (ref 1–41)
ANION GAP SERPL CALCULATED.3IONS-SCNC: 7.8 MMOL/L (ref 5–15)
AST SERPL-CCNC: 14 U/L (ref 1–40)
BILIRUB SERPL-MCNC: 0.3 MG/DL (ref 0–1.2)
BUN SERPL-MCNC: 23 MG/DL (ref 8–23)
BUN/CREAT SERPL: 16.9 (ref 7–25)
CALCIUM SPEC-SCNC: 9 MG/DL (ref 8.6–10.5)
CHLORIDE SERPL-SCNC: 105 MMOL/L (ref 98–107)
CHOLEST SERPL-MCNC: 229 MG/DL (ref 0–200)
CO2 SERPL-SCNC: 25.2 MMOL/L (ref 22–29)
CREAT SERPL-MCNC: 1.36 MG/DL (ref 0.76–1.27)
GFR SERPL CREATININE-BSD FRML MDRD: 52 ML/MIN/1.73
GLOBULIN UR ELPH-MCNC: 3.3 GM/DL
GLUCOSE SERPL-MCNC: 140 MG/DL (ref 65–99)
HDLC SERPL-MCNC: 38 MG/DL (ref 40–60)
LDLC SERPL CALC-MCNC: 146 MG/DL (ref 0–100)
LDLC/HDLC SERPL: 3.73 {RATIO}
POTASSIUM SERPL-SCNC: 4.6 MMOL/L (ref 3.5–5.2)
PROT SERPL-MCNC: 7.1 G/DL (ref 6–8.5)
SODIUM SERPL-SCNC: 138 MMOL/L (ref 136–145)
TRIGL SERPL-MCNC: 247 MG/DL (ref 0–150)
VLDLC SERPL-MCNC: 45 MG/DL (ref 5–40)

## 2021-11-08 PROCEDURE — 80061 LIPID PANEL: CPT | Performed by: INTERNAL MEDICINE

## 2021-11-08 PROCEDURE — 80053 COMPREHEN METABOLIC PANEL: CPT | Performed by: INTERNAL MEDICINE

## 2021-11-08 PROCEDURE — 36415 COLL VENOUS BLD VENIPUNCTURE: CPT | Performed by: INTERNAL MEDICINE

## 2021-11-09 ENCOUNTER — TELEPHONE (OUTPATIENT)
Dept: CARDIOLOGY | Facility: CLINIC | Age: 66
End: 2021-11-09

## 2021-11-09 NOTE — TELEPHONE ENCOUNTER
Contacted patient per Dr. Warren to give lab work results:   His lipid profiles are abnormal. He needs to be on Repatha.  He was allergic to Praluent.   We can check with him if his insurance would cover this.  We had sent the prescription.    Patient voiced his understanding and would go  the medication.

## 2021-11-09 NOTE — TELEPHONE ENCOUNTER
----- Message from Cristin Morales MA sent at 11/9/2021  1:16 PM CST -----    ----- Message -----  From: Keke Kumar MD  Sent: 11/9/2021   7:45 AM CST  To: Cristin Morales MA    His lipid profiles are abnormal. He needs to be on Repatha.  He was allergic to Praluent.  We can check with him if his insurance would cover this.  We had sent the prescription.  ----- Message -----  From: Lab, Background User  Sent: 11/8/2021   7:23 PM CST  To: Keke Kumar MD

## 2021-11-15 ENCOUNTER — TELEPHONE (OUTPATIENT)
Dept: CARDIOLOGY | Facility: CLINIC | Age: 66
End: 2021-11-15

## 2021-11-22 RX ORDER — WARFARIN SODIUM 5 MG/1
TABLET ORAL
Qty: 100 TABLET | Refills: 1 | Status: SHIPPED | OUTPATIENT
Start: 2021-11-22 | End: 2022-02-16 | Stop reason: SDUPTHER

## 2021-11-24 ENCOUNTER — ANTICOAGULATION VISIT (OUTPATIENT)
Dept: CARDIAC SURGERY | Facility: CLINIC | Age: 66
End: 2021-11-24

## 2021-11-24 VITALS
OXYGEN SATURATION: 98 % | BODY MASS INDEX: 41.37 KG/M2 | HEART RATE: 86 BPM | SYSTOLIC BLOOD PRESSURE: 145 MMHG | DIASTOLIC BLOOD PRESSURE: 86 MMHG | WEIGHT: 296.6 LBS

## 2021-11-24 DIAGNOSIS — Z79.01 LONG TERM (CURRENT) USE OF ANTICOAGULANTS: Primary | ICD-10-CM

## 2021-11-24 DIAGNOSIS — Z51.81 ENCOUNTER FOR THERAPEUTIC DRUG MONITORING: ICD-10-CM

## 2021-11-24 DIAGNOSIS — Z86.711 HISTORY OF PULMONARY EMBOLISM: ICD-10-CM

## 2021-11-24 LAB — INR PPP: 2.9 (ref 0.9–1.1)

## 2021-11-24 PROCEDURE — 36416 COLLJ CAPILLARY BLOOD SPEC: CPT | Performed by: NURSE PRACTITIONER

## 2021-11-24 PROCEDURE — 93793 ANTICOAG MGMT PT WARFARIN: CPT | Performed by: NURSE PRACTITIONER

## 2021-11-24 PROCEDURE — 85610 PROTHROMBIN TIME: CPT | Performed by: NURSE PRACTITIONER

## 2021-11-24 RX ORDER — PROPRANOLOL HYDROCHLORIDE 20 MG/1
20 TABLET ORAL 2 TIMES DAILY
COMMUNITY
End: 2022-11-15

## 2021-11-24 NOTE — PROGRESS NOTES
Pt states he started Repatha and Propranolol last week. Pt denies bleeding problems. Pt instructed to continue current dose and have a serving of green veggies today and again in 3-4 days; pt verbalized. Pt is having a Uro-Lift prostate procedure on 12/13 and was instructed to hold coumadin for a week. Pt was instructed to begin holding coumadin on 12/6, eat a serving of green veggies that day, and appt was made for Lovenox bridging on 12/7; pt verbalized. Pt instructed not to begin Lovenox injections until specifically told at the appt on 12/7; pt verbalized. Patient instructed regarding medication; results given and questions answered. Nutritional counseling given.  Dietary factors affecting therapy addressed.  Patient instructed to monitor for excessive bruising or bleeding. I consulted with DIANA Elaine who recommended Lovenox 40mg every 12 hours. Findings reported by Ketty Moscoso RN.    Today's INR is Lab Results - Last 18 Months   Lab Units 11/24/21  0000   INR  2.90*

## 2021-11-29 ENCOUNTER — TELEPHONE (OUTPATIENT)
Dept: CARDIOLOGY | Facility: CLINIC | Age: 66
End: 2021-11-29

## 2021-11-30 ENCOUNTER — DOCUMENTATION (OUTPATIENT)
Dept: CARDIAC SURGERY | Facility: CLINIC | Age: 66
End: 2021-11-30

## 2021-11-30 NOTE — PROGRESS NOTES
Surgical clearance form re: holding coumadin and Lovenox bridging faxed to Dr Gray's office. Findings reported by Ketty Moscoso RN.

## 2021-12-03 ENCOUNTER — OFFICE VISIT (OUTPATIENT)
Dept: PODIATRY | Facility: CLINIC | Age: 66
End: 2021-12-03

## 2021-12-03 VITALS
BODY MASS INDEX: 41.67 KG/M2 | HEIGHT: 71 IN | SYSTOLIC BLOOD PRESSURE: 154 MMHG | WEIGHT: 297.62 LBS | DIASTOLIC BLOOD PRESSURE: 84 MMHG | OXYGEN SATURATION: 95 % | HEART RATE: 68 BPM

## 2021-12-03 DIAGNOSIS — L89.611 PRESSURE INJURY OF RIGHT HEEL, STAGE 1: ICD-10-CM

## 2021-12-03 DIAGNOSIS — E11.42 DIABETIC POLYNEUROPATHY ASSOCIATED WITH TYPE 2 DIABETES MELLITUS (HCC): ICD-10-CM

## 2021-12-03 DIAGNOSIS — M79.674 PAIN IN TOES OF BOTH FEET: ICD-10-CM

## 2021-12-03 DIAGNOSIS — M79.675 PAIN IN TOES OF BOTH FEET: ICD-10-CM

## 2021-12-03 DIAGNOSIS — T14.8XXA DEEP TISSUE INJURY: Primary | ICD-10-CM

## 2021-12-03 DIAGNOSIS — B35.1 ONYCHOMYCOSIS: ICD-10-CM

## 2021-12-03 PROCEDURE — 11721 DEBRIDE NAIL 6 OR MORE: CPT | Performed by: PODIATRIST

## 2021-12-03 PROCEDURE — 99213 OFFICE O/P EST LOW 20 MIN: CPT | Performed by: PODIATRIST

## 2021-12-03 NOTE — PROGRESS NOTES
Clint Mack  1955  66 y.o. male   BS- 135 per patient  PCP-Dr. Sushma Myers MD 09/02/2021    Patient presents today for diabetic foot care and a wound on right foot    12/03/2021        Chief Complaint   Patient presents with   • Left Foot - Diabetic foot care, Wound Check   • Right Foot - Diabetic foot care, Wound Check           History of Present Illness    Clint Mack is a 66 y.o. male presents for f/u diabetic foot exam and nail care.  Continues to note intermittent issues with pressure sores to bilateral heels.  Previously obtained vascular offloading boots because of back pain due to leg elevation..    Past Medical History:   Diagnosis Date   • Anxiety    • Arthritis     osteoarthritis   • CKD (chronic kidney disease), stage III (HCC)    • COPD (chronic obstructive pulmonary disease) (HCC)    • Coronary artery disease    • Depression    • Diabetes mellitus (HCC)    • DVT (deep venous thrombosis) (HCC)    • Heart disease    • History of embolic filter insertion    • History of stomach ulcers    • Hyperlipidemia    • Hypertension    • Injury of back     back surgery x3    • Lupus (HCC)    • LUCIANA on CPAP    • Pulmonary embolism (HCC)          Past Surgical History:   Procedure Laterality Date   • BACK SURGERY     • CARDIAC SURGERY  2001   • CIRCUMCISION     • COLONOSCOPY N/A 9/11/2018    Procedure: COLONOSCOPY;  Surgeon: Jose C Batres DO;  Location: [facility] ENDOSCOPY;  Service: Gastroenterology   • COLONOSCOPY N/A 6/3/2021    Procedure: COLONOSCOPY;  Surgeon: Jose C Batres DO;  Location: [facility] ENDOSCOPY;  Service: Gastroenterology;  Laterality: N/A;   • ENDOSCOPY N/A 9/11/2018    Procedure: ESOPHAGOGASTRODUODENOSCOPY;  Surgeon: Jose C Batres DO;  Location: [facility] ENDOSCOPY;  Service: Gastroenterology   • ENDOSCOPY N/A 3/3/2020    Procedure: ESOPHAGOGASTRODUODENOSCOPY;  Surgeon: Jose C Batres DO;  Location: [facility] ENDOSCOPY;  Service: Gastroenterology;  Laterality:  "N/A;   • ENDOSCOPY N/A 6/3/2021    Procedure: ESOPHAGOGASTRODUODENOSCOPY;  Surgeon: Jose C Batres DO;  Location: [facility] ENDOSCOPY;  Service: Gastroenterology;  Laterality: N/A;   • GALLBLADDER SURGERY  2000   • HIP SURGERY     • JOINT REPLACEMENT Right 05/02/2016    hip   • SPINE SURGERY           Family History   Problem Relation Age of Onset   • Heart disease Father    • Hypertension Father    • Stroke Father    • Diabetes Sister    • Heart disease Brother    • Hypertension Brother    • COPD Brother    • Lung disease Other          Social History     Socioeconomic History   • Marital status:    Tobacco Use   • Smoking status: Never Smoker   • Smokeless tobacco: Never Used   Vaping Use   • Vaping Use: Never used   Substance and Sexual Activity   • Alcohol use: No   • Drug use: No   • Sexual activity: Defer         Current Outpatient Medications   Medication Sig Dispense Refill   • aspirin 81 MG chewable tablet Chew 81 mg Take As Directed. Take 1 pill by mouth Monday, Wednesday, Friday     • baclofen (LIORESAL) 10 MG tablet Take 10 mg by mouth 3 (Three) Times a Day.     • Blood Glucose Monitoring Suppl (ACCU-CHEK ARGELIA PLUS) w/Device kit      • clonazePAM (KlonoPIN) 1 MG tablet Take 1 mg by mouth 3 (Three) Times a Day.     • COMFORT ASSIST INSULIN SYRINGE 31G X 5/16\" 0.3 ML misc      • enoxaparin (Lovenox) 40 MG/0.4ML solution syringe Inject 0.4ml every 12 hours as instructed by [facility] 8 mL 0   • EPINEPHrine (EPIPEN) 0.3 MG/0.3ML solution auto-injector injection      • Evolocumab (REPATHA) solution auto-injector SureClick injection Inject 1 mL under the skin into the appropriate area as directed Every 14 (Fourteen) Days. 1 mL 11   • furosemide (LASIX) 20 MG tablet Take 20 mg by mouth Daily.     • gabapentin (NEURONTIN) 100 MG capsule Take 100 mg by mouth 2 (Two) Times a Day.     • glimepiride (AMARYL) 4 MG tablet Take 4 mg by mouth 2 (Two) Times a Day As Needed.     • glucose blood (ONE TOUCH " "ULTRA TEST) test strip      • HYDROcodone-acetaminophen (NORCO) 7.5-325 MG per tablet Take 1 tablet by mouth 3 (Three) Times a Day.     • insulin aspart (novoLOG) 100 UNIT/ML injection Inject  under the skin into the appropriate area as directed 3 (Three) Times a Day Before Meals. PT TAKES PER SLIDING SCALE STARTING AT 3 UNITS FOR BLOOD SUGAR 150-200     • isosorbide mononitrate (IMDUR) 60 MG 24 hr tablet Take 1 tablet by mouth Every Morning. 90 tablet 3   • Lancets (ONETOUCH ULTRASOFT) lancets      • Lancets Misc. (Accu-Chek Softclix Lancet Dev) kit      • Morphine (MS CONTIN) 15 MG 12 hr tablet Take 30 mg by mouth 2 (Two) Times a Day.     • naloxone (Narcan) 4 MG/0.1ML nasal spray 1 spray into the nostril(s) as directed by provider As Needed (opioid overdose) for up to 2 doses. 1 each 1   • niacin 250 MG tablet Take 250 mg by mouth Daily With Breakfast.     • propranolol (INDERAL) 20 MG tablet Take 20 mg by mouth 2 (Two) Times a Day.     • ranolazine (Ranexa) 500 MG 12 hr tablet Take 1 tablet by mouth 2 (Two) Times a Day. 180 tablet 3   • tamsulosin (FLOMAX) 0.4 MG capsule 24 hr capsule Take 1 capsule by mouth Daily.     • vitamin D (ERGOCALCIFEROL) 26764 UNITS capsule capsule Take 50,000 Units by mouth. Two times monthly     • warfarin (COUMADIN) 5 MG tablet Patient takes 5 mg every day except M and F when he takes 7.5 mg 100 tablet 1   • zolpidem (AMBIEN) 5 MG tablet        No current facility-administered medications for this visit.         OBJECTIVE    /84   Pulse 68   Ht 180.3 cm (70.98\")   Wt 135 kg (297 lb 9.9 oz)   SpO2 95%   BMI 41.53 kg/m²       Review of Systems   Constitutional:  Denies recent weight loss, weight gain, fever or chills, no change in exercise tolerance  Musculoskeletal: Toe pain.   Skin:  Thickened nails. Shin discoloration.  Neurological:  Burning sensations to feet b/l.  Psychiatric/Behavioral: Denies depression      Physical Exam    Clint had a diabetic foot exam performed " today.      Constitutional: he appears well-developed and well-nourished.   HEENT: Normocephalic. Atraumatic  CV: No tenderness. RRR  Resp: Non-labored respiration. No wheezes.   Psychiatric: he has a normal mood and affect. his   behavior is normal.      Lower Extremity Exam:  Vascular: DP/PT pulses palpable 1+.   Negative hair growth.   1+ perimalleolar edema  Toes cool  Neuro: Protective sensation diminished to lesser toes, b/l.  DTRs intact  Integument: Desiccated hemorrhagic tissue to medial left heel, healing deep tissue injury.  Pinpoint residual partial-thickness breakdown.  No signs of infection.  Right medial heel with sloughing macerated epidermis underlying stage I decubitus ulceration measuring 2.0 x 1.0 cm.  Granular base.  No signs of infection  No lesions  Atrophic skin noted b/l with chronic venous stasis dermatitis changes  Mild xerosis b/l  No masses  Musculoskeletal: LE muscle strength 4/5 on left, 3/5 on right  Gait assisted with cane, wheelchair  Semi-rigid hammertoe deformity toes 2-5 b/l.  Nails 1-5 b/l thickened, elongated with subungual debris. +pain on palpation              ASSESSMENT AND PLAN    Diagnoses and all orders for this visit:    1. Deep tissue injury (Primary)    2. Pressure injury of right heel, stage 1    3. Diabetic polyneuropathy associated with type 2 diabetes mellitus (HCC)    4. Pain in toes of both feet    5. Onychomycosis      -Comprehensive DM foot exam performed. Pt educated on importance of tight glucose control and daily foot checks.  -Pt educated on padding techniques for rigid hammertoes.  Proper extra depth diabetic shoe gear.  Limit barefoot walking.  -Nails 1-5 b/l debrided in length and thickness with nail nipper to decrease pain, fungal load and risk of infection  -Continue skin hydration  -Debridement of sloughing desiccated skin to right medial heel.  Again advised offloading of the site.  Patient cannot tolerate vascular offloading boot.  Will trial gel  heel sleeve to reduce pressure and friction  -Follow up 3 weeks for wound check          This document has been electronically signed by Jose C Novak DPM on December 3, 2021 13:54 CST     EMR Dragon/Transcription disclaimer:   Much of this encounter note is an electronic transcription/translation of spoken language to printed text. The electronic translation of spoken language may permit erroneous, or at times, nonsensical words or phrases to be inadvertently transcribed; Although I have reviewed the note for such errors, some may still exist.    Jose C Novka DPM  12/3/2021  13:54 CST

## 2021-12-07 ENCOUNTER — ANTICOAGULATION VISIT (OUTPATIENT)
Dept: CARDIAC SURGERY | Facility: CLINIC | Age: 66
End: 2021-12-07

## 2021-12-07 VITALS — HEART RATE: 79 BPM | OXYGEN SATURATION: 96 %

## 2021-12-07 DIAGNOSIS — Z86.711 HISTORY OF PULMONARY EMBOLISM: ICD-10-CM

## 2021-12-07 DIAGNOSIS — Z51.81 ENCOUNTER FOR THERAPEUTIC DRUG MONITORING: ICD-10-CM

## 2021-12-07 DIAGNOSIS — Z79.01 LONG TERM (CURRENT) USE OF ANTICOAGULANTS: Primary | ICD-10-CM

## 2021-12-07 LAB — INR PPP: 1.8 (ref 0.9–1.1)

## 2021-12-07 PROCEDURE — 36416 COLLJ CAPILLARY BLOOD SPEC: CPT | Performed by: NURSE PRACTITIONER

## 2021-12-07 PROCEDURE — 85610 PROTHROMBIN TIME: CPT | Performed by: NURSE PRACTITIONER

## 2021-12-07 PROCEDURE — 93793 ANTICOAG MGMT PT WARFARIN: CPT | Performed by: NURSE PRACTITIONER

## 2021-12-07 NOTE — PROGRESS NOTES
Pt here today for Lovenox bridging for Uro-lift on 12/13. Pt denied med changes or bleeding problems. Pt instructed on coumadin dosing as noted on above calendar; pt verbalized. Pt instructed to notify CC if he is given different instructions for coumadin by MD performing procedure; pt verbalized. Pt has used Lovenox in the past. Pt was educated regarding Lovenox safety, administration, and disposal; pt verbalized. Pt took first Lovenox injection in the office in [facility]. Pt was instructed to continue Lovenox every 12 hours but to hold the morning of the procedure as he has to be there at noon; pt verbalized. Pt instructed to resume Lovenox the evening of procedure and take every 12 hours unless instructed otherwise by MD performing procedure; pt verbalized. Pt was provided with a Lovenox dosing calendar and I stressed the importance of looking at coumadin calendar for coumadin dosing and Lovenox calendar for Lovenox dosing; pt verbalized. Patient instructed regarding medication; results given and questions answered. Nutritional counseling given.  Dietary factors affecting therapy addressed.  Patient instructed to monitor for excessive bruising or bleeding. Will recheck 3 days post procedure. Findings reported by Ketty Moscoso RN.    Today's INR is Lab Results - Last 18 Months   Lab Units 12/07/21  0000   INR  1.80*

## 2021-12-09 ENCOUNTER — TELEPHONE (OUTPATIENT)
Dept: PODIATRY | Facility: CLINIC | Age: 66
End: 2021-12-09

## 2021-12-14 ENCOUNTER — OFFICE VISIT (OUTPATIENT)
Dept: PODIATRY | Facility: CLINIC | Age: 66
End: 2021-12-14

## 2021-12-14 VITALS
HEIGHT: 71 IN | DIASTOLIC BLOOD PRESSURE: 88 MMHG | WEIGHT: 297.62 LBS | HEART RATE: 67 BPM | SYSTOLIC BLOOD PRESSURE: 160 MMHG | BODY MASS INDEX: 41.67 KG/M2 | OXYGEN SATURATION: 99 %

## 2021-12-14 DIAGNOSIS — T14.8XXA DEEP TISSUE INJURY: ICD-10-CM

## 2021-12-14 DIAGNOSIS — L89.611 PRESSURE INJURY OF RIGHT HEEL, STAGE 1: Primary | ICD-10-CM

## 2021-12-14 DIAGNOSIS — E11.42 DIABETIC POLYNEUROPATHY ASSOCIATED WITH TYPE 2 DIABETES MELLITUS (HCC): ICD-10-CM

## 2021-12-14 PROCEDURE — 99213 OFFICE O/P EST LOW 20 MIN: CPT | Performed by: PODIATRIST

## 2021-12-14 NOTE — PROGRESS NOTES
Clint Mack  1955  66 y.o. male   BS- 98 per patient  PCP-Dr. Sushma Myers MD 09/02/2021    Patient presents today for diabetic foot care and a wound on right foot    12/14/2021      Chief Complaint   Patient presents with   • Left Foot - Follow-up, Wound Check   • Right Foot - Follow-up, Wound Check           History of Present Illness    Clint Mack is a 66 y.o. male presents for f/u diabetic foot exam and bilateral heel wound recheck.    Past Medical History:   Diagnosis Date   • Anxiety    • Arthritis     osteoarthritis   • CKD (chronic kidney disease), stage III (HCC)    • COPD (chronic obstructive pulmonary disease) (HCC)    • Coronary artery disease    • Depression    • Diabetes mellitus (HCC)    • DVT (deep venous thrombosis) (HCC)    • Heart disease    • History of embolic filter insertion    • History of stomach ulcers    • Hyperlipidemia    • Hypertension    • Injury of back     back surgery x3    • Lupus (HCC)    • LUCIANA on CPAP    • Pulmonary embolism (HCC)          Past Surgical History:   Procedure Laterality Date   • BACK SURGERY     • CARDIAC SURGERY  2001   • CIRCUMCISION     • COLONOSCOPY N/A 9/11/2018    Procedure: COLONOSCOPY;  Surgeon: Jose C Batres DO;  Location: [facility] ENDOSCOPY;  Service: Gastroenterology   • COLONOSCOPY N/A 6/3/2021    Procedure: COLONOSCOPY;  Surgeon: Jose C Batres DO;  Location: [facility] ENDOSCOPY;  Service: Gastroenterology;  Laterality: N/A;   • ENDOSCOPY N/A 9/11/2018    Procedure: ESOPHAGOGASTRODUODENOSCOPY;  Surgeon: Jose C Batres DO;  Location: [facility] ENDOSCOPY;  Service: Gastroenterology   • ENDOSCOPY N/A 3/3/2020    Procedure: ESOPHAGOGASTRODUODENOSCOPY;  Surgeon: Jose C Batres DO;  Location: [facility] ENDOSCOPY;  Service: Gastroenterology;  Laterality: N/A;   • ENDOSCOPY N/A 6/3/2021    Procedure: ESOPHAGOGASTRODUODENOSCOPY;  Surgeon: Jose C Batres DO;  Location: [facility] ENDOSCOPY;  Service: Gastroenterology;   "Laterality: N/A;   • GALLBLADDER SURGERY  2000   • HIP SURGERY     • JOINT REPLACEMENT Right 05/02/2016    hip   • SPINE SURGERY           Family History   Problem Relation Age of Onset   • Heart disease Father    • Hypertension Father    • Stroke Father    • Diabetes Sister    • Heart disease Brother    • Hypertension Brother    • COPD Brother    • Lung disease Other          Social History     Socioeconomic History   • Marital status:    Tobacco Use   • Smoking status: Never Smoker   • Smokeless tobacco: Never Used   Vaping Use   • Vaping Use: Never used   Substance and Sexual Activity   • Alcohol use: No   • Drug use: No   • Sexual activity: Defer         Current Outpatient Medications   Medication Sig Dispense Refill   • aspirin 81 MG chewable tablet Chew 81 mg Take As Directed. Take 1 pill by mouth Monday, Wednesday, Friday     • baclofen (LIORESAL) 10 MG tablet Take 10 mg by mouth 3 (Three) Times a Day.     • Blood Glucose Monitoring Suppl (ACCU-CHEK ARGELIA PLUS) w/Device kit      • clonazePAM (KlonoPIN) 1 MG tablet Take 1 mg by mouth 3 (Three) Times a Day.     • COMFORT ASSIST INSULIN SYRINGE 31G X 5/16\" 0.3 ML misc      • enoxaparin (Lovenox) 40 MG/0.4ML solution syringe Inject 0.4ml every 12 hours as instructed by [facility] 8 mL 0   • enoxaparin (Lovenox) 40 MG/0.4ML solution syringe Inject 0.4ml SQ every 12 hours as instructed by the coumadin clinic 4 mL 0   • EPINEPHrine (EPIPEN) 0.3 MG/0.3ML solution auto-injector injection      • Evolocumab (REPATHA) solution auto-injector SureClick injection Inject 1 mL under the skin into the appropriate area as directed Every 14 (Fourteen) Days. 1 mL 11   • furosemide (LASIX) 20 MG tablet Take 20 mg by mouth Daily.     • gabapentin (NEURONTIN) 100 MG capsule Take 100 mg by mouth 2 (Two) Times a Day.     • glimepiride (AMARYL) 4 MG tablet Take 4 mg by mouth 2 (Two) Times a Day As Needed.     • glucose blood (ONE TOUCH ULTRA TEST) test strip      • " "HYDROcodone-acetaminophen (NORCO) 7.5-325 MG per tablet Take 1 tablet by mouth 3 (Three) Times a Day.     • insulin aspart (novoLOG) 100 UNIT/ML injection Inject  under the skin into the appropriate area as directed 3 (Three) Times a Day Before Meals. PT TAKES PER SLIDING SCALE STARTING AT 3 UNITS FOR BLOOD SUGAR 150-200     • isosorbide mononitrate (IMDUR) 60 MG 24 hr tablet Take 1 tablet by mouth Every Morning. 90 tablet 3   • Lancets (ONETOUCH ULTRASOFT) lancets      • Lancets Misc. (Accu-Chek Softclix Lancet Dev) kit      • Morphine (MS CONTIN) 15 MG 12 hr tablet Take 30 mg by mouth 2 (Two) Times a Day.     • naloxone (Narcan) 4 MG/0.1ML nasal spray 1 spray into the nostril(s) as directed by provider As Needed (opioid overdose) for up to 2 doses. 1 each 1   • niacin 250 MG tablet Take 250 mg by mouth Daily With Breakfast.     • propranolol (INDERAL) 20 MG tablet Take 20 mg by mouth 2 (Two) Times a Day.     • ranolazine (Ranexa) 500 MG 12 hr tablet Take 1 tablet by mouth 2 (Two) Times a Day. 180 tablet 3   • tamsulosin (FLOMAX) 0.4 MG capsule 24 hr capsule Take 1 capsule by mouth Daily.     • vitamin D (ERGOCALCIFEROL) 68598 UNITS capsule capsule Take 50,000 Units by mouth. Two times monthly     • warfarin (COUMADIN) 5 MG tablet Patient takes 5 mg every day except M and F when he takes 7.5 mg 100 tablet 1   • zolpidem (AMBIEN) 5 MG tablet        No current facility-administered medications for this visit.         OBJECTIVE    /88   Pulse 67   Ht 180.3 cm (70.98\")   Wt 135 kg (297 lb 9.9 oz)   SpO2 99%   BMI 41.53 kg/m²       Review of Systems   Constitutional:  Denies recent weight loss, weight gain, fever or chills, no change in exercise tolerance  Musculoskeletal: Toe pain.   Skin:  Thickened nails. Shin discoloration.  Neurological:  Burning sensations to feet b/l.  Psychiatric/Behavioral: Denies depression      Physical Exam    Clint had a diabetic foot exam performed today.      Constitutional: " he appears well-developed and well-nourished.   HEENT: Normocephalic. Atraumatic  CV: No tenderness. RRR  Resp: Non-labored respiration. No wheezes.   Psychiatric: he has a normal mood and affect. his   behavior is normal.      Lower Extremity Exam:  Vascular: DP/PT pulses palpable 1+.   Negative hair growth.   1+ perimalleolar edema  Toes cool  Neuro: Protective sensation diminished to lesser toes, b/l.  DTRs intact  Integument: Left medial heel deep tissue injury healed  Right medial heel  stage I decubitus ulceration measuring 0.5 x 1.0 cm.  Granular base.  No signs of infection  New onset partial-thickness ulceration/abrasion to distal tuft of right third digit measuring 0.2 x 0.2 cm.  No signs of  No lesions  Atrophic skin noted b/l with chronic venous stasis dermatitis changes  Mild xerosis b/l  No masses  Musculoskeletal: LE muscle strength 4/5 on left, 3/5 on right  Gait assisted with cane, wheelchair  Semi-rigid hammertoe deformity toes 2-5 b/l.  Nails 1-5 b/l thickened, elongated with subungual debris. +pain on palpation              ASSESSMENT AND PLAN    Diagnoses and all orders for this visit:    1. Pressure injury of right heel, stage 1 (Primary)    2. Diabetic polyneuropathy associated with type 2 diabetes mellitus (HCC)    3. Deep tissue injury      -Comprehensive DM foot exam performed. Pt educated on importance of tight glucose control and daily foot checks.  -Pt educated on padding techniques for rigid hammertoes.  Proper extra depth diabetic shoe gear.  Limit barefoot walking.  -Continue new right heel third digit local wound care.  Heel offloading with gel heel sleeve.  -Rx for new diabetic shoes dispensed  -Recheck 3 months, sooner if wound sites worsen          This document has been electronically signed by Jose C Novak DPM on December 14, 2021 09:43 CST     EMR Dragon/Transcription disclaimer:   Much of this encounter note is an electronic transcription/translation of spoken language  to printed text. The electronic translation of spoken language may permit erroneous, or at times, nonsensical words or phrases to be inadvertently transcribed; Although I have reviewed the note for such errors, some may still exist.    Jose C Novak DPM  12/14/2021  09:43 CST

## 2021-12-16 ENCOUNTER — ANTICOAGULATION VISIT (OUTPATIENT)
Dept: CARDIAC SURGERY | Facility: CLINIC | Age: 66
End: 2021-12-16

## 2021-12-16 DIAGNOSIS — Z79.01 LONG TERM (CURRENT) USE OF ANTICOAGULANTS: Primary | ICD-10-CM

## 2021-12-16 DIAGNOSIS — Z86.711 HISTORY OF PULMONARY EMBOLISM: ICD-10-CM

## 2021-12-16 DIAGNOSIS — Z51.81 ENCOUNTER FOR THERAPEUTIC DRUG MONITORING: ICD-10-CM

## 2021-12-16 LAB — INR PPP: 1 (ref 0.9–1.1)

## 2021-12-16 PROCEDURE — 36416 COLLJ CAPILLARY BLOOD SPEC: CPT | Performed by: NURSE PRACTITIONER

## 2021-12-16 PROCEDURE — 93793 ANTICOAG MGMT PT WARFARIN: CPT | Performed by: NURSE PRACTITIONER

## 2021-12-16 PROCEDURE — 85610 PROTHROMBIN TIME: CPT | Performed by: NURSE PRACTITIONER

## 2021-12-16 NOTE — PROGRESS NOTES
Pt states he was instructed to resume coumadin tonight by Urologist so he has not had coumadin since 12/5. Pt is on day 3 of 3 of Bactrim. Pt denies bleeding problems. Pt was instructed on coumadin dosing, to hold green veggies, and continue Lovenox every 12 hours; pt verbalized. Patient instructed regarding medication; results given and questions answered. Nutritional counseling given.  Dietary factors affecting therapy addressed.  Patient instructed to monitor for excessive bruising or bleeding. Will recheck in 5 days. Findings reported by Ketty Moscoso RN.    Today's INR is Lab Results - Last 18 Months   Lab Units 12/16/21  0000   INR  1.00

## 2021-12-21 ENCOUNTER — ANTICOAGULATION VISIT (OUTPATIENT)
Dept: CARDIAC SURGERY | Facility: CLINIC | Age: 66
End: 2021-12-21

## 2021-12-21 VITALS — OXYGEN SATURATION: 96 % | HEART RATE: 77 BPM

## 2021-12-21 DIAGNOSIS — Z51.81 ENCOUNTER FOR THERAPEUTIC DRUG MONITORING: ICD-10-CM

## 2021-12-21 DIAGNOSIS — Z86.711 HISTORY OF PULMONARY EMBOLISM: ICD-10-CM

## 2021-12-21 DIAGNOSIS — Z79.01 LONG TERM (CURRENT) USE OF ANTICOAGULANTS: Primary | ICD-10-CM

## 2021-12-21 LAB — INR PPP: 1.5 (ref 0.9–1.1)

## 2021-12-21 PROCEDURE — 93793 ANTICOAG MGMT PT WARFARIN: CPT | Performed by: NURSE PRACTITIONER

## 2021-12-21 PROCEDURE — 85610 PROTHROMBIN TIME: CPT | Performed by: NURSE PRACTITIONER

## 2021-12-21 PROCEDURE — 36416 COLLJ CAPILLARY BLOOD SPEC: CPT | Performed by: NURSE PRACTITIONER

## 2021-12-21 NOTE — PROGRESS NOTES
Pt denies med changes or bleeding problems. Pt was instructed on coumadin dosing and to take last Lovenox tomorrow night; pt verbalized. Patient instructed regarding medication; results given and questions answered. Nutritional counseling given.  Dietary factors affecting therapy addressed.  Patient instructed to monitor for excessive bruising or bleeding. Will recheck next week back on usual dose. Findings reported by Ketty Moscoso RN.      Today's INR is Lab Results - Last 18 Months   Lab Units 12/21/21  0000   INR  1.50*

## 2021-12-30 ENCOUNTER — ANTICOAGULATION VISIT (OUTPATIENT)
Dept: CARDIAC SURGERY | Facility: CLINIC | Age: 66
End: 2021-12-30

## 2021-12-30 ENCOUNTER — HOSPITAL ENCOUNTER (OUTPATIENT)
Dept: CT IMAGING | Facility: HOSPITAL | Age: 66
Discharge: HOME | End: 2021-12-30
Admitting: NURSE PRACTITIONER

## 2021-12-30 VITALS — HEART RATE: 91 BPM | BODY MASS INDEX: 40.5 KG/M2 | WEIGHT: 290.2 LBS | OXYGEN SATURATION: 98 %

## 2021-12-30 DIAGNOSIS — Z79.01 LONG TERM (CURRENT) USE OF ANTICOAGULANTS: Primary | ICD-10-CM

## 2021-12-30 DIAGNOSIS — Z95.828 PRESENCE OF IVC FILTER: ICD-10-CM

## 2021-12-30 DIAGNOSIS — Z51.81 ENCOUNTER FOR THERAPEUTIC DRUG MONITORING: ICD-10-CM

## 2021-12-30 DIAGNOSIS — Z86.711 HISTORY OF PULMONARY EMBOLISM: ICD-10-CM

## 2021-12-30 LAB — INR PPP: 2.3 (ref 0.9–1.1)

## 2021-12-30 PROCEDURE — 85610 PROTHROMBIN TIME: CPT | Performed by: NURSE PRACTITIONER

## 2021-12-30 PROCEDURE — 93793 ANTICOAG MGMT PT WARFARIN: CPT | Performed by: NURSE PRACTITIONER

## 2021-12-30 PROCEDURE — 74176 CT ABD & PELVIS W/O CONTRAST: CPT

## 2021-12-30 PROCEDURE — 36416 COLLJ CAPILLARY BLOOD SPEC: CPT | Performed by: NURSE PRACTITIONER

## 2021-12-30 NOTE — PROGRESS NOTES
Patient states no med changes or bleeding problems or unexplained bruising. Patient instructed to continue current dosing schedule. Verbalizes understanding. Will recheck in 2.5 weeks. Patient instructed regarding medication; results given and questions answered. Nutritional counseling given.  Dietary factors affecting therapy addressed.  Patient instructed to monitor for excessive bruising or bleeding. Findings reported by Ketty Moscoso RN.    Today's INR is Lab Results - Last 18 Months   Lab Units 12/30/21  0000   INR  2.3*

## 2022-01-17 ENCOUNTER — OFFICE VISIT (OUTPATIENT)
Dept: CARDIAC SURGERY | Facility: CLINIC | Age: 67
End: 2022-01-17

## 2022-01-17 ENCOUNTER — ANTICOAGULATION VISIT (OUTPATIENT)
Dept: CARDIAC SURGERY | Facility: CLINIC | Age: 67
End: 2022-01-17

## 2022-01-17 VITALS
HEIGHT: 71 IN | OXYGEN SATURATION: 97 % | DIASTOLIC BLOOD PRESSURE: 86 MMHG | HEART RATE: 79 BPM | BODY MASS INDEX: 41.58 KG/M2 | TEMPERATURE: 97.7 F | WEIGHT: 297 LBS | SYSTOLIC BLOOD PRESSURE: 138 MMHG

## 2022-01-17 DIAGNOSIS — Z95.828 PRESENCE OF IVC FILTER: ICD-10-CM

## 2022-01-17 DIAGNOSIS — I26.94 MULTIPLE SUBSEGMENTAL PULMONARY EMBOLI WITHOUT ACUTE COR PULMONALE: ICD-10-CM

## 2022-01-17 DIAGNOSIS — M54.41 CHRONIC MIDLINE LOW BACK PAIN WITH RIGHT-SIDED SCIATICA: ICD-10-CM

## 2022-01-17 DIAGNOSIS — G89.29 CHRONIC MIDLINE LOW BACK PAIN WITH RIGHT-SIDED SCIATICA: ICD-10-CM

## 2022-01-17 DIAGNOSIS — E66.01 CLASS 3 SEVERE OBESITY DUE TO EXCESS CALORIES WITH SERIOUS COMORBIDITY AND BODY MASS INDEX (BMI) OF 40.0 TO 44.9 IN ADULT: ICD-10-CM

## 2022-01-17 DIAGNOSIS — Z86.711 HISTORY OF PULMONARY EMBOLISM: ICD-10-CM

## 2022-01-17 DIAGNOSIS — N18.31 STAGE 3A CHRONIC KIDNEY DISEASE: Chronic | ICD-10-CM

## 2022-01-17 DIAGNOSIS — I25.10 CORONARY ARTERY DISEASE INVOLVING NATIVE CORONARY ARTERY OF NATIVE HEART WITHOUT ANGINA PECTORIS: Chronic | ICD-10-CM

## 2022-01-17 DIAGNOSIS — Z51.81 ENCOUNTER FOR THERAPEUTIC DRUG MONITORING: ICD-10-CM

## 2022-01-17 DIAGNOSIS — E78.2 MIXED HYPERLIPIDEMIA: Chronic | ICD-10-CM

## 2022-01-17 DIAGNOSIS — I71.40 ABDOMINAL AORTIC ANEURYSM (AAA) WITHOUT RUPTURE: Primary | ICD-10-CM

## 2022-01-17 DIAGNOSIS — I10 PRIMARY HYPERTENSION: Chronic | ICD-10-CM

## 2022-01-17 DIAGNOSIS — Z95.1 S/P CABG (CORONARY ARTERY BYPASS GRAFT): ICD-10-CM

## 2022-01-17 DIAGNOSIS — E11.42 TYPE 2 DIABETES MELLITUS WITH DIABETIC POLYNEUROPATHY, WITH LONG-TERM CURRENT USE OF INSULIN: ICD-10-CM

## 2022-01-17 DIAGNOSIS — Z79.4 TYPE 2 DIABETES MELLITUS WITH DIABETIC POLYNEUROPATHY, WITH LONG-TERM CURRENT USE OF INSULIN: ICD-10-CM

## 2022-01-17 DIAGNOSIS — Z79.01 LONG TERM (CURRENT) USE OF ANTICOAGULANTS: Primary | ICD-10-CM

## 2022-01-17 DIAGNOSIS — G47.33 OSA (OBSTRUCTIVE SLEEP APNEA): Chronic | ICD-10-CM

## 2022-01-17 LAB — INR PPP: 2.8 (ref 0.9–1.1)

## 2022-01-17 PROCEDURE — 36416 COLLJ CAPILLARY BLOOD SPEC: CPT | Performed by: NURSE PRACTITIONER

## 2022-01-17 PROCEDURE — 85610 PROTHROMBIN TIME: CPT | Performed by: NURSE PRACTITIONER

## 2022-01-17 PROCEDURE — 99214 OFFICE O/P EST MOD 30 MIN: CPT | Performed by: THORACIC SURGERY (CARDIOTHORACIC VASCULAR SURGERY)

## 2022-01-17 NOTE — PROGRESS NOTES
Pt here today seeing Dr Carrizales. Patient med list updated per CT/VASC and no med changes effect warfarin. Patient states no bleeding problems or unexplained bruising. Patient instructed to continue current dosing schedule. Verbalizes understanding. Will recheck 1 month. Patient instructed regarding medication; results given and questions answered. Nutritional counseling given.  Dietary factors affecting therapy addressed.  Patient instructed to monitor for excessive bruising or bleeding.  Findings reported by Ketty Moscoso RN.    Today's INR is Lab Results - Last 18 Months   Lab Units 01/17/22  0000   INR  2.80*

## 2022-02-14 ENCOUNTER — ANTICOAGULATION VISIT (OUTPATIENT)
Dept: CARDIAC SURGERY | Facility: CLINIC | Age: 67
End: 2022-02-14

## 2022-02-14 VITALS — HEART RATE: 85 BPM | OXYGEN SATURATION: 93 %

## 2022-02-14 DIAGNOSIS — Z79.01 LONG TERM (CURRENT) USE OF ANTICOAGULANTS: Primary | ICD-10-CM

## 2022-02-14 DIAGNOSIS — Z86.711 HISTORY OF PULMONARY EMBOLISM: ICD-10-CM

## 2022-02-14 DIAGNOSIS — Z51.81 ENCOUNTER FOR THERAPEUTIC DRUG MONITORING: ICD-10-CM

## 2022-02-14 LAB — INR PPP: 2.3 (ref 0.9–1.1)

## 2022-02-14 PROCEDURE — 36416 COLLJ CAPILLARY BLOOD SPEC: CPT | Performed by: NURSE PRACTITIONER

## 2022-02-14 PROCEDURE — 85610 PROTHROMBIN TIME: CPT | Performed by: NURSE PRACTITIONER

## 2022-02-14 PROCEDURE — 93793 ANTICOAG MGMT PT WARFARIN: CPT | Performed by: NURSE PRACTITIONER

## 2022-02-14 RX ORDER — PROPRANOLOL HYDROCHLORIDE 20 MG/1
10 TABLET ORAL DAILY
COMMUNITY
End: 2022-05-10 | Stop reason: SDUPTHER

## 2022-02-14 NOTE — PROGRESS NOTES
Patient states no med changes or bleeding problems or unexplained bruising. Patient instructed to continue current dosing schedule. Verbalizes understanding. Will recheck 1 month.   Patient instructed regarding medication; results given and questions answered. Nutritional counseling given.  Dietary factors affecting therapy addressed.  Patient instructed to monitor for excessive bruising or bleeding.  Findings reported by George Lucia RN.   Today's INR is Lab Results - Last 18 Months   Lab Units 02/14/22  0000   INR  2.30*

## 2022-02-16 RX ORDER — WARFARIN SODIUM 5 MG/1
TABLET ORAL
Qty: 100 TABLET | Refills: 1 | Status: SHIPPED | OUTPATIENT
Start: 2022-02-16 | End: 2022-08-04 | Stop reason: SDUPTHER

## 2022-03-06 PROBLEM — I71.40 ABDOMINAL AORTIC ANEURYSM (AAA) WITHOUT RUPTURE: Status: ACTIVE | Noted: 2022-03-06

## 2022-03-06 NOTE — PROGRESS NOTES
3/6/2022    Clint Mack  1955    Chief Complaint:    Chief Complaint   Patient presents with   • presence of ivc filter     CT results       HPI:      PCP:  Sushma Myers MD  Cardiology:    Nephrology:      66 y.o. male with HTN(stable, increased risk stroke, rupture), Diabetes Mellitus(stable, increased risk cardiovascular events), Obesity(uncontrolled, increased risk cardiovascular events) and Chronic Kidney Disease(stable, increased risk renal failure) , DVT/PTE(stable, chronic anticoagulation), LUCIANA(stable), tardive dyskinesia(stable), AAA(stable, increase risk rupture).  never smoked.   lifelong anticoagulation since 2009 for recurrent DVT, PE with Coumadin.  Required hip replacement 2014 necessitating IVC filter placement prior to procedure.  Patient apparently received notification of the potential of defective IVC filter device and obtained outpatient CT scan in [place].  One extraluminal leg noted prior, no further intervention recommended.  Vague abdominal pain (chronic). .  No other associated signs, symptoms or modifying factors.      2008 DVT  2009 PE Life long anticoagulation instituted with warfarin  2014 ORIF Hip Replacement  2014 IVC Filter Placement  2019 CT Abd/Pelv without contrast: One filter leg potentially extraluminal, small 27mm infrarenal AAA  2/2020 CTA abdomen/Pelvis: IVC filter placement, 1 leg extraluminal, infrarenal aorta 29mm  6/2020 Aorta/IVC Ultrasound: distal aorta 24mm, limited due to bowel gas/body habitus  7/2021 US Aorta/IVC: IVC filter not visualized, distal abdominal aorta ectatic 29mm  12/2021 CT Abdomen Pelvis without:  Infrarenal aorta 29mm.  IVC filter in good position, possible externalization 1 leg, no apparent injury to other structures.    1/2021 ECG:  SB 56, QTc 413  11/2021 Echocardiogram:  EF 50%. LA 43mm, LV 57mm, RVSP 22mmHg.  Tr MR TR.    The following portions of the patient's history were reviewed and updated as appropriate: allergies,  current medications, past family history, past medical history, past social history, past surgical history and problem list.  Recent images independently reviewed.  Available laboratory values reviewed.    PMH:  Past Medical History:   Diagnosis Date   • Anxiety    • Arthritis     osteoarthritis   • CKD (chronic kidney disease), stage III (HCC)    • COPD (chronic obstructive pulmonary disease) (HCC)    • Coronary artery disease    • Depression    • Diabetes mellitus (HCC)    • DVT (deep venous thrombosis) (HCC)    • Heart disease    • History of embolic filter insertion    • History of stomach ulcers    • Hyperlipidemia    • Hypertension    • Injury of back     back surgery x3    • Lupus (HCC)    • LUCIANA on CPAP    • Pulmonary embolism (HCC)      Past Surgical History:   Procedure Laterality Date   • BACK SURGERY     • CARDIAC SURGERY  2001   • CIRCUMCISION     • COLONOSCOPY N/A 9/11/2018    Procedure: COLONOSCOPY;  Surgeon: Jose C Batres DO;  Location: [facility] ENDOSCOPY;  Service: Gastroenterology   • COLONOSCOPY N/A 6/3/2021    Procedure: COLONOSCOPY;  Surgeon: Jose C Batres DO;  Location: [facility] ENDOSCOPY;  Service: Gastroenterology;  Laterality: N/A;   • ENDOSCOPY N/A 9/11/2018    Procedure: ESOPHAGOGASTRODUODENOSCOPY;  Surgeon: Jose C Batres DO;  Location: [facility] ENDOSCOPY;  Service: Gastroenterology   • ENDOSCOPY N/A 3/3/2020    Procedure: ESOPHAGOGASTRODUODENOSCOPY;  Surgeon: Jose C Batres DO;  Location: [facility] ENDOSCOPY;  Service: Gastroenterology;  Laterality: N/A;   • ENDOSCOPY N/A 6/3/2021    Procedure: ESOPHAGOGASTRODUODENOSCOPY;  Surgeon: Jose C Batres DO;  Location: [facility] ENDOSCOPY;  Service: Gastroenterology;  Laterality: N/A;   • GALLBLADDER SURGERY  2000   • HIP SURGERY     • JOINT REPLACEMENT Right 05/02/2016    hip   • PROSTATE SURGERY  12/2021    UROLIFT (METAL IMPLANT)   • SPINE SURGERY       Family History   Problem Relation Age of Onset   • Heart disease Father    •  "Hypertension Father    • Stroke Father    • Diabetes Sister    • Heart disease Brother    • Hypertension Brother    • COPD Brother    • Lung disease Other      Social History     Tobacco Use   • Smoking status: Never Smoker   • Smokeless tobacco: Never Used   Vaping Use   • Vaping Use: Never used   Substance Use Topics   • Alcohol use: No   • Drug use: No       ALLERGIES:  Allergies   Allergen Reactions   • Tetrabenazine Dizziness, Nausea And Vomiting, Nausea Only and Other (See Comments)     Other reaction(s): Vertigo   • Alfuzosin Other (See Comments)     syncope  Syncope   syncope  Syncope   syncope   • Deutetrabenazine Other (See Comments)     Insomnia    Insomnia  Insomnia   • Pregabalin Swelling     Leg swelling   • Metoclopramide Other (See Comments)     Tardive dyskinesia  Tardive dyskinesia     • Valbenazine Other (See Comments)   • Celexa [Citalopram Hydrobromide] Dystonia   • Citalopram Other (See Comments)     Other reaction(s): Dystonia  Tardive dyskinesia    Other reaction(s): Respiratory distress   • Crestor [Rosuvastatin Calcium] Myalgia   • Ingrezza [Valbenazine Tosylate] Other (See Comments)     fatigue   • Lipitor [Atorvastatin] Other (See Comments)     Aches and pains all over   • Rosuvastatin Other (See Comments)     Aches and pains all over  Aches and pains all over  Aches and pains all over         MEDICATIONS:    Current Outpatient Medications:   •  aspirin 81 MG chewable tablet, Chew 81 mg Take As Directed. Take 1 pill by mouth Monday, Wednesday, Friday, Disp: , Rfl:   •  B Complex Vitamins (VITAMIN B COMPLEX PO), Take 1,000 mcg by mouth Daily., Disp: , Rfl:   •  Blood Glucose Monitoring Suppl (ACCU-CHEK ARGELIA PLUS) w/Device kit, , Disp: , Rfl:   •  clonazePAM (KlonoPIN) 1 MG tablet, Take 1 mg by mouth 3 (Three) Times a Day., Disp: , Rfl:   •  COMFORT ASSIST INSULIN SYRINGE 31G X 5/16\" 0.3 ML misc, , Disp: , Rfl:   •  EPINEPHrine (EPIPEN) 0.3 MG/0.3ML solution auto-injector injection, , " Disp: , Rfl:   •  Evolocumab (REPATHA) solution auto-injector SureClick injection, Inject 1 mL under the skin into the appropriate area as directed Every 14 (Fourteen) Days., Disp: 1 mL, Rfl: 11  •  furosemide (LASIX) 20 MG tablet, Take 20 mg by mouth Daily., Disp: , Rfl:   •  gabapentin (NEURONTIN) 100 MG capsule, Take 100 mg by mouth 2 (Two) Times a Day., Disp: , Rfl:   •  glimepiride (AMARYL) 4 MG tablet, Take 4 mg by mouth 2 (Two) Times a Day., Disp: , Rfl:   •  glucose blood (ONE TOUCH ULTRA TEST) test strip, , Disp: , Rfl:   •  HYDROcodone-acetaminophen (NORCO) 7.5-325 MG per tablet, Take 1 tablet by mouth 3 (Three) Times a Day., Disp: , Rfl:   •  insulin aspart (novoLOG) 100 UNIT/ML injection, Inject  under the skin into the appropriate area as directed 3 (Three) Times a Day Before Meals. PT TAKES PER SLIDING SCALE STARTING AT 3 UNITS FOR BLOOD SUGAR 150-200, Disp: , Rfl:   •  isosorbide mononitrate (IMDUR) 60 MG 24 hr tablet, Take 1 tablet by mouth Every Morning., Disp: 90 tablet, Rfl: 3  •  Lancets (ONETOUCH ULTRASOFT) lancets, , Disp: , Rfl:   •  Lancets Misc. (Accu-Chek Softclix Lancet Dev) kit, , Disp: , Rfl:   •  Morphine Sulfate ER 30 MG tablet extended-release 12 hour, Take 30 mg by mouth 2 (Two) Times a Day., Disp: , Rfl:   •  naloxone (Narcan) 4 MG/0.1ML nasal spray, 1 spray into the nostril(s) as directed by provider As Needed (opioid overdose) for up to 2 doses., Disp: 1 each, Rfl: 1  •  propranolol (INDERAL) 20 MG tablet, Take 20 mg by mouth 2 (Two) Times a Day. Take 10mg at 1:30, take 20mg bid, Disp: , Rfl:   •  ranolazine (Ranexa) 500 MG 12 hr tablet, Take 1 tablet by mouth 2 (Two) Times a Day., Disp: 180 tablet, Rfl: 3  •  vitamin D (ERGOCALCIFEROL) 17387 UNITS capsule capsule, Take 50,000 Units by mouth. Two times monthly, Disp: , Rfl:   •  zolpidem (AMBIEN) 5 MG tablet, , Disp: , Rfl:   •  propranolol (INDERAL) 20 MG tablet, Take 10 mg by mouth Daily., Disp: , Rfl:   •  warfarin (COUMADIN) 5  "MG tablet, Patient takes 5 mg every day except M and F when he takes 7.5 mg, Disp: 100 tablet, Rfl: 1    Review of Systems   Review of Systems   Constitutional: Positive for malaise/fatigue. Negative for weight loss.   Cardiovascular: Positive for dyspnea on exertion. Negative for chest pain and claudication.   Respiratory: Positive for shortness of breath. Negative for cough.    Hematologic/Lymphatic: Bruises/bleeds easily.   Skin: Negative for color change and poor wound healing.   Musculoskeletal: Positive for back pain, joint pain, muscle weakness and myalgias.   Neurological: Negative for dizziness, numbness and weakness.       Physical Exam   Vitals:    22 1307   BP: 138/86   BP Location: Left arm   Pulse: 79   Temp: 97.7 °F (36.5 °C)   TempSrc: Infrared   SpO2: 97%   Weight: 135 kg (297 lb)   Height: 180.3 cm (71\")     Body surface area is 2.5 meters squared.  Body mass index is 41.42 kg/m².  Physical Exam  Constitutional:       General: He is not in acute distress.     Appearance: He is not ill-appearing.   HENT:      Right Ear: Hearing normal.      Left Ear: Hearing normal.      Nose: No nasal deformity.      Mouth/Throat:      Dentition: Normal dentition. Does not have dentures.   Cardiovascular:      Rate and Rhythm: Normal rate and regular rhythm.      Pulses:           Carotid pulses are 2+ on the right side and 2+ on the left side.       Radial pulses are 2+ on the right side and 2+ on the left side.        Posterior tibial pulses are 1+ on the right side and 1+ on the left side.      Heart sounds: No murmur heard.  Pulmonary:      Effort: Pulmonary effort is normal.      Breath sounds: Normal breath sounds.   Abdominal:      General: There is no distension.      Palpations: Abdomen is soft. There is no mass.      Tenderness: There is no abdominal tenderness.   Musculoskeletal:         General: No deformity.      Right lower le+ Edema present.      Left lower le+ Edema present.      " Comments: Gait normal.    Skin:     General: Skin is warm and dry.      Coloration: Skin is not pale.      Findings: No erythema.      Comments: No venous staining   Neurological:      Mental Status: He is alert and oriented to person, place, and time.   Psychiatric:         Speech: Speech normal.         Behavior: Behavior is cooperative.         Thought Content: Thought content normal.         Judgment: Judgment normal.         BUN   Date Value Ref Range Status   11/08/2021 23 8 - 23 mg/dL Final   12/02/2020 21 (H) 7 - 18 mg/dL Final     Creatinine   Date Value Ref Range Status   11/08/2021 1.36 (H) 0.76 - 1.27 mg/dL Final   12/02/2020 1.8 (H) 0.7 - 1.3 mg/dL Final     eGFR Non  Amer   Date Value Ref Range Status   11/08/2021 52 (L) >60 mL/min/1.73 Final     eGFR African Am   Date Value Ref Range Status   11/13/2018 50  Final     Comment:         GFR    WITH KIDNEY DAMAGE     W/O DAMAGE  >=90         STAGE 1            NORMAL  60-89        STAGE 2            DEC GFR  30-59        STAGE 3            STAGE 3  15-29        STAGE 4            STAGE 4  <15          STAGE 5            STAGE 5      The MDRD GFR formula is valid only for adults b/w age17 and 70.     eGFR   Amer   Date Value Ref Range Status   12/02/2020 46 mL/min Final     Comment:         GFR    WITH KIDNEY DAMAGE     W/O DAMAGE  >=90         STAGE 1            NORMAL  60-89        STAGE 2            DEC GFR  30-59        STAGE 3            STAGE 3  15-29        STAGE 4            STAGE 4  <15          STAGE 5            STAGE 5      The MDRD GFR formula is valid only for adults between age 18 and 70.       ASSESSMENT:  Diagnoses and all orders for this visit:    1. Abdominal aortic aneurysm (AAA) without rupture (HCC) (Primary)  -     Duplex Aorta IVC Iliac Graft Complete CAR; Future    2. LUCIANA (obstructive sleep apnea)    3. Primary hypertension    4. Type 2 diabetes mellitus with diabetic polyneuropathy, with long-term current use of  insulin ([facility])    5. Coronary artery disease involving native coronary artery of native heart without angina pectoris    6. Mixed hyperlipidemia    7. Stage 3a chronic kidney disease ([facility])    8. S/P CABG (coronary artery bypass graft)    9. Class 3 severe obesity due to excess calories with serious comorbidity and body mass index (BMI) of 40.0 to 44.9 in adult ([facility])    10. Multiple subsegmental pulmonary emboli without acute cor pulmonale ([facility])    11. Chronic midline low back pain with right-sided sciatica    12. Presence of IVC filter    PLAN:  Detailed discussion with Clint Mack regarding situation and options. Possible externalization 1 leg IVC filter on CT imaging, no injury to other structures, asymptomatic. No indication for removal, significant benefit to leaving in place.  Aneurysm abdominal aorta.  Surgical intervention not indicated at this time.  Will plan to follow with interval imaging.  Smoking cessation, lipid management, BP control in 120 range advised.  Discussed symptoms of rupture, details of surgical repair including endovascular and open repair.  Risks, benefits discussed.  Understands and wishes to proceed with plan    Return in 2 years with US Aorta    Return after above studies complete  Obesity Class  3. Increased risk cardiovascular events, sleep and breathing disorders, joint issues, type 2 diabetes mellitus. Options for weight management, heart healthy diet, exercise programs, and associated health risks of obesity discussed.    Recommended regular physical activity, progressive walking program.  Continue current medications as directed.  Advance Care Planning   ACP discussion was declined by the patient. Patient has an advance directive in EMR which is still valid.     Thank you for the opportunity to participate in this patient's care.    Copy to primary care provider.

## 2022-03-14 ENCOUNTER — ANTICOAGULATION VISIT (OUTPATIENT)
Dept: CARDIAC SURGERY | Facility: CLINIC | Age: 67
End: 2022-03-14

## 2022-03-14 ENCOUNTER — OFFICE VISIT (OUTPATIENT)
Dept: PODIATRY | Facility: CLINIC | Age: 67
End: 2022-03-14

## 2022-03-14 VITALS — HEART RATE: 66 BPM | HEIGHT: 71 IN | OXYGEN SATURATION: 94 % | WEIGHT: 298.6 LBS | BODY MASS INDEX: 41.8 KG/M2

## 2022-03-14 VITALS — HEART RATE: 76 BPM | OXYGEN SATURATION: 94 %

## 2022-03-14 DIAGNOSIS — B35.1 ONYCHOMYCOSIS: ICD-10-CM

## 2022-03-14 DIAGNOSIS — Z79.01 LONG TERM (CURRENT) USE OF ANTICOAGULANTS: Primary | ICD-10-CM

## 2022-03-14 DIAGNOSIS — E11.42 DIABETIC POLYNEUROPATHY ASSOCIATED WITH TYPE 2 DIABETES MELLITUS: Primary | ICD-10-CM

## 2022-03-14 DIAGNOSIS — M79.674 PAIN IN TOES OF BOTH FEET: ICD-10-CM

## 2022-03-14 DIAGNOSIS — Z51.81 ENCOUNTER FOR THERAPEUTIC DRUG MONITORING: ICD-10-CM

## 2022-03-14 DIAGNOSIS — Z86.711 HISTORY OF PULMONARY EMBOLISM: ICD-10-CM

## 2022-03-14 DIAGNOSIS — M79.675 PAIN IN TOES OF BOTH FEET: ICD-10-CM

## 2022-03-14 LAB — INR PPP: 3.1 (ref 0.9–1.1)

## 2022-03-14 PROCEDURE — 93793 ANTICOAG MGMT PT WARFARIN: CPT | Performed by: NURSE PRACTITIONER

## 2022-03-14 PROCEDURE — 11721 DEBRIDE NAIL 6 OR MORE: CPT | Performed by: PODIATRIST

## 2022-03-14 PROCEDURE — 85610 PROTHROMBIN TIME: CPT | Performed by: NURSE PRACTITIONER

## 2022-03-14 PROCEDURE — 36416 COLLJ CAPILLARY BLOOD SPEC: CPT | Performed by: NURSE PRACTITIONER

## 2022-03-14 NOTE — PROGRESS NOTES
Pt states no changes to meds or diet.  Pt denies bleeding issues.  Adjusted coumadin dose for today only and instructed pt to increase Vit K intake today with a lite green serving.  Recheck INR in one month.  Pt verbalizes.  Patient instructed regarding medication; results given and questions answered. Nutritional counseling given.  Dietary factors affecting therapy addressed.  Patient instructed to monitor for excessive bruising or bleeding.  Findings reported by George Lucia RN.   Today's INR is Lab Results - Last 18 Months   Lab Units 03/14/22  0000   INR  3.10*

## 2022-03-14 NOTE — PROGRESS NOTES
Clint Mack  1955  66 y.o. male   BS- 95 per patient  PCP-Dr. Sushma Myers MD 03/01/2022    Patient presents today for diabetic foot care.    03/14/2022        Chief Complaint   Patient presents with   • Left Foot - Follow-up     Diabetic foot care   • Right Foot - Follow-up     Diabetic foot care           History of Present Illness    Clint Mack is a 66 y.o. male presents for f/u diabetic foot exam.    Past Medical History:   Diagnosis Date   • Anxiety    • Arthritis     osteoarthritis   • CKD (chronic kidney disease), stage III (HCC)    • COPD (chronic obstructive pulmonary disease) (HCC)    • Coronary artery disease    • Depression    • Diabetes mellitus (HCC)    • DVT (deep venous thrombosis) (HCC)    • Heart disease    • History of embolic filter insertion    • History of stomach ulcers    • Hyperlipidemia    • Hypertension    • Injury of back     back surgery x3    • Lupus (HCC)    • LUCIANA on CPAP    • Pulmonary embolism (HCC)          Past Surgical History:   Procedure Laterality Date   • BACK SURGERY     • CARDIAC SURGERY  2001   • CIRCUMCISION     • COLONOSCOPY N/A 9/11/2018    Procedure: COLONOSCOPY;  Surgeon: Jose C Batres DO;  Location: [facility] ENDOSCOPY;  Service: Gastroenterology   • COLONOSCOPY N/A 6/3/2021    Procedure: COLONOSCOPY;  Surgeon: Jose C Batres DO;  Location: [facility] ENDOSCOPY;  Service: Gastroenterology;  Laterality: N/A;   • ENDOSCOPY N/A 9/11/2018    Procedure: ESOPHAGOGASTRODUODENOSCOPY;  Surgeon: Jose C Batres DO;  Location: [facility] ENDOSCOPY;  Service: Gastroenterology   • ENDOSCOPY N/A 3/3/2020    Procedure: ESOPHAGOGASTRODUODENOSCOPY;  Surgeon: Jose C Batres DO;  Location: [facility] ENDOSCOPY;  Service: Gastroenterology;  Laterality: N/A;   • ENDOSCOPY N/A 6/3/2021    Procedure: ESOPHAGOGASTRODUODENOSCOPY;  Surgeon: Jose C Batres DO;  Location: [facility] ENDOSCOPY;  Service: Gastroenterology;  Laterality: N/A;   • GALLBLADDER SURGERY  2000  "  • HIP SURGERY     • JOINT REPLACEMENT Right 05/02/2016    hip   • PROSTATE SURGERY  12/2021    UROLIFT (METAL IMPLANT)   • SPINE SURGERY           Family History   Problem Relation Age of Onset   • Heart disease Father    • Hypertension Father    • Stroke Father    • Diabetes Sister    • Heart disease Brother    • Hypertension Brother    • COPD Brother    • Lung disease Other          Social History     Socioeconomic History   • Marital status:    Tobacco Use   • Smoking status: Never Smoker   • Smokeless tobacco: Never Used   Vaping Use   • Vaping Use: Never used   Substance and Sexual Activity   • Alcohol use: No   • Drug use: No   • Sexual activity: Defer         Current Outpatient Medications   Medication Sig Dispense Refill   • aspirin 81 MG chewable tablet Chew 81 mg Take As Directed. Take 1 pill by mouth Monday, Wednesday, Friday     • B Complex Vitamins (VITAMIN B COMPLEX PO) Take 1,000 mcg by mouth Daily.     • Blood Glucose Monitoring Suppl (ACCU-CHEK ARGELIA PLUS) w/Device kit      • clonazePAM (KlonoPIN) 1 MG tablet Take 1 mg by mouth 3 (Three) Times a Day.     • COMFORT ASSIST INSULIN SYRINGE 31G X 5/16\" 0.3 ML misc      • EPINEPHrine (EPIPEN) 0.3 MG/0.3ML solution auto-injector injection      • Evolocumab (REPATHA) solution auto-injector SureClick injection Inject 1 mL under the skin into the appropriate area as directed Every 14 (Fourteen) Days. 1 mL 11   • furosemide (LASIX) 20 MG tablet Take 20 mg by mouth Daily.     • gabapentin (NEURONTIN) 100 MG capsule Take 100 mg by mouth 2 (Two) Times a Day.     • glimepiride (AMARYL) 4 MG tablet Take 4 mg by mouth 2 (Two) Times a Day.     • glucose blood test strip      • HYDROcodone-acetaminophen (NORCO) 7.5-325 MG per tablet Take 1 tablet by mouth 3 (Three) Times a Day.     • insulin aspart (novoLOG) 100 UNIT/ML injection Inject  under the skin into the appropriate area as directed 3 (Three) Times a Day Before Meals. PT TAKES PER SLIDING SCALE " "STARTING AT 3 UNITS FOR BLOOD SUGAR 150-200     • isosorbide mononitrate (IMDUR) 60 MG 24 hr tablet Take 1 tablet by mouth Every Morning. 90 tablet 3   • Morphine Sulfate ER 30 MG tablet extended-release 12 hour Take 30 mg by mouth 2 (Two) Times a Day.     • propranolol (INDERAL) 20 MG tablet Take 20 mg by mouth 2 (Two) Times a Day. Take 10mg at 1:30, take 20mg bid     • propranolol (INDERAL) 20 MG tablet Take 10 mg by mouth Daily.     • ranolazine (Ranexa) 500 MG 12 hr tablet Take 1 tablet by mouth 2 (Two) Times a Day. 180 tablet 3   • vitamin D (ERGOCALCIFEROL) 78800 UNITS capsule capsule Take 50,000 Units by mouth. Two times monthly     • warfarin (COUMADIN) 5 MG tablet Patient takes 5 mg every day except M and F when he takes 7.5 mg 100 tablet 1   • zolpidem (AMBIEN) 5 MG tablet      • Lancets (ONETOUCH ULTRASOFT) lancets      • Lancets Misc. (Accu-Chek Softclix Lancet Dev) kit      • naloxone (Narcan) 4 MG/0.1ML nasal spray 1 spray into the nostril(s) as directed by provider As Needed (opioid overdose) for up to 2 doses. 1 each 1     No current facility-administered medications for this visit.         OBJECTIVE    Pulse 66   Ht 180.3 cm (71\")   Wt 135 kg (298 lb 9.6 oz)   SpO2 94%   BMI 41.65 kg/m²       Review of Systems   Constitutional:  Denies recent weight loss, weight gain, fever or chills, no change in exercise tolerance  Musculoskeletal: Toe pain.   Skin:  Thickened nails. Shin discoloration.  Neurological:  Burning sensations to feet b/l.  Psychiatric/Behavioral: Denies depression      Physical Exam    Clint had a diabetic foot exam performed today.      Constitutional: he appears well-developed and well-nourished.   HEENT: Normocephalic. Atraumatic  CV: No tenderness. RRR  Resp: Non-labored respiration. No wheezes.   Psychiatric: he has a normal mood and affect. his   behavior is normal.      Lower Extremity Exam:  Vascular: DP/PT pulses palpable 1+.   Negative hair growth.   1+ perimalleolar " edema  Toes cool  Neuro: Protective sensation diminished to lesser toes, b/l.  DTRs intact  Integument: No wounds  No lesions  Atrophic skin noted b/l with chronic venous stasis dermatitis changes  Mild xerosis b/l  No masses  Musculoskeletal: LE muscle strength 4/5 on left, 3/5 on right  Gait assisted with cane, wheelchair  Semi-rigid hammertoe deformity toes 2-5 b/l.  Nails 1-5 b/l thickened, elongated with subungual debris. +pain on palpation              ASSESSMENT AND PLAN    Diagnoses and all orders for this visit:    1. Diabetic polyneuropathy associated with type 2 diabetes mellitus (HCC) (Primary)    2. Onychomycosis    3. Pain in toes of both feet      -Comprehensive DM foot exam performed. Patient educated on importance of tight glucose control and daily foot checks.   -Patient educated on padding techniques for hammertoes.  Proper extra depth diabetic shoe gear.  Limit barefoot walking.  -Nails 1-5 b/l debrided in length and thickness with nail nipper to decrease pain, fungal load and risk of infection  -Follow up 3 months PRN            This document has been electronically signed by Jose C Novak DPM on March 14, 2022 12:52 CDT     EMR Dragon/Transcription disclaimer:   Much of this encounter note is an electronic transcription/translation of spoken language to printed text. The electronic translation of spoken language may permit erroneous, or at times, nonsensical words or phrases to be inadvertently transcribed; Although I have reviewed the note for such errors, some may still exist.    Jose C Novak DPM  3/14/2022  12:52 CDT

## 2022-04-12 ENCOUNTER — ANTICOAGULATION VISIT (OUTPATIENT)
Dept: CARDIAC SURGERY | Facility: CLINIC | Age: 67
End: 2022-04-12

## 2022-04-12 VITALS — HEART RATE: 89 BPM | OXYGEN SATURATION: 95 %

## 2022-04-12 DIAGNOSIS — Z86.711 HISTORY OF PULMONARY EMBOLISM: ICD-10-CM

## 2022-04-12 DIAGNOSIS — Z79.01 LONG TERM (CURRENT) USE OF ANTICOAGULANTS: Primary | ICD-10-CM

## 2022-04-12 DIAGNOSIS — Z51.81 ENCOUNTER FOR THERAPEUTIC DRUG MONITORING: ICD-10-CM

## 2022-04-12 LAB — INR PPP: 2.2 (ref 0.9–1.1)

## 2022-04-12 PROCEDURE — 36416 COLLJ CAPILLARY BLOOD SPEC: CPT | Performed by: NURSE PRACTITIONER

## 2022-04-12 PROCEDURE — 93793 ANTICOAG MGMT PT WARFARIN: CPT | Performed by: NURSE PRACTITIONER

## 2022-04-12 PROCEDURE — 85610 PROTHROMBIN TIME: CPT | Performed by: NURSE PRACTITIONER

## 2022-04-12 NOTE — PROGRESS NOTES
Patient states no med changes or bleeding problems or unexplained bruising. Patient instructed to continue current dosing schedule. Verbalizes understanding. Will recheck 1 month.   Patient instructed regarding medication; results given and questions answered. Nutritional counseling given.  Dietary factors affecting therapy addressed.  Patient instructed to monitor for excessive bruising or bleeding.  Findings reported by George Lucia RN.   Today's INR is Lab Results - Last 18 Months   Lab Units 04/12/22  0000   INR  2.20*

## 2022-05-03 ENCOUNTER — OFFICE VISIT (OUTPATIENT)
Dept: SLEEP MEDICINE | Facility: HOSPITAL | Age: 67
End: 2022-05-03

## 2022-05-03 VITALS
HEIGHT: 71 IN | HEART RATE: 73 BPM | WEIGHT: 291 LBS | DIASTOLIC BLOOD PRESSURE: 92 MMHG | SYSTOLIC BLOOD PRESSURE: 152 MMHG | OXYGEN SATURATION: 96 % | BODY MASS INDEX: 40.74 KG/M2

## 2022-05-03 DIAGNOSIS — G47.33 OBSTRUCTIVE SLEEP APNEA, ADULT: Primary | ICD-10-CM

## 2022-05-03 DIAGNOSIS — E66.01 MORBID OBESITY: ICD-10-CM

## 2022-05-03 DIAGNOSIS — G47.34 NOCTURNAL HYPOXIA: ICD-10-CM

## 2022-05-03 DIAGNOSIS — F51.04 PSYCHOPHYSIOLOGICAL INSOMNIA: ICD-10-CM

## 2022-05-03 PROCEDURE — 99215 OFFICE O/P EST HI 40 MIN: CPT | Performed by: NURSE PRACTITIONER

## 2022-05-03 RX ORDER — CLONAZEPAM 0.5 MG/1
0.75 TABLET ORAL
COMMUNITY
End: 2022-11-15

## 2022-05-03 RX ORDER — MIRTAZAPINE 15 MG/1
15 TABLET, FILM COATED ORAL
COMMUNITY
End: 2022-06-28 | Stop reason: DRUGHIGH

## 2022-05-03 NOTE — PROGRESS NOTES
Sleep Clinic Follow Up    Date: 5/3/2022  Primary Care Provider: Sushma Myers MD    Last office visit: 2021 (I reviewed this note)    CC: Follow up: LUCIANA on CPAP, nocturnal hypoxia on nocturnal O2      Interim History:  Since the last visit:    1) moderate LUCIANA -  Clint Mack has remained compliant with CPAP. He denies mask and machine issues, dry mouth, headaches, or pressures intolerance. He denies abnormal dreams, sleep paralysis, nasal congestion, URI sx. Patient feels as though he needs an increase in pressure on his new machine.   He was following up with Dr. Che in [place], but states that he does not feel as though he will continue to be able to make the drive now that he is required to come in for a visit in person.    2) Nocturnal hypoxia - Patient continues to use nocturnal O2 @ 2LPM bled into his CPAP ([employer] for O2). He states that he has not been able to use his new tubing with his current machine because the O2 adapter will not fit into the machine as well as the CPAP tubing. I will send orders for adaptive tubing for O2 to be hooked into tubing instead of into the PAP unit.    3) Insomnia - Patient has been managed by multiple providers with multiple medications over the years. Previously when he was seen here, he was taking clonazepam 1 mg TID and had discussed mediation tapering with Dr. Stoddard. At that time, he was also taking Remeron 15 mg QHS. He is now currently still taking clonazepam 1 mg TID for anxiety/sleep, managed by Psych/Neuro. He is concurrently taking Ambien 5 mg QHS per his PCP in addition to Remeron 15 mg QHS. He is also on opioids, currently taking Norco and MS. He does lie down in bed 1-3 hours before his anticipated sleep time due to chronic back pain. He does not take his medications for sleep until he is ready for sleep. He sometimes awakens a few times per night and is unable to fall back to sleep.    Sleep Testin. PSG in , AHI of 29    2. Currently on 10-20 cm H2O    PAP Data:  ResMed machine  Time frame: 03/15/2022-05/02/2022   Compliance: 100%  Average use on days used: 8 hrs 18 min  Percent of days with usage greater than or equal to 4 hours: 100%  PAP range: 10-20 cm H2O  Average 90% pressure: 10.2 cmH2O  Leak: 0.5 L/minute  Average AHI: 1.5 events/hr  Mask type: Nasal pillows  DME: [employer] (name changing to Verus? Glenn?)  [phone] / [phone]  Fax # [phone]    Bed time: 2000  Sleep latency: 240 minutes  Number of times awakens during the night: 3-4  Wake time: 0730  Estimated total sleep time at night: 5 hours  Caffeine intake: 0 cups of coffee, 0 cups of tea, and 2 decaf sodas per day  Alcohol intake: 0 drinks per week  Nap time: rare   Sleepiness with Driving: none     Vance - 0    PMHx, FH, SH reviewed and pertinent changes are: Started B12, Ambien, propranolol, and repatha.      REVIEW OF SYSTEMS:   Negative for chest pain, SOA, fever, chills, cough, N/V/D, abdominal pain.    Smoking:none    Clint Mack  reports that he has never smoked. He has never used smokeless tobacco.    Exam:  Vitals:    05/03/22 0821   BP: 152/92   Pulse: 73   SpO2: 96%           05/03/22  0821   Weight: 132 kg (291 lb)     Body mass index is 40.6 kg/m². Class 3 Severe Obesity (BMI >=40). Obesity-related health conditions include the following: obstructive sleep apnea, hypertension, coronary heart disease, diabetes mellitus, dyslipidemias and osteoarthritis. Obesity is unchanged. BMI is is above average; BMI management plan is completed. We discussed portion control and increasing exercise.      Gen:                No distress, conversant, pleasant, appears stated age, alert, oriented  Eyes:               Anicteric sclera, moist conjunctiva, no lid lag                           PERRL, EOMI   Heent:             NC/AT                          Oropharynx clear                          Normal hearing  Lungs:             Normal  "effort, non-labored breathing                          Clear to auscultation bilaterally          CV:                  Normal S1/S2, no murmur                          No lower extremity edema  ABD:               Soft, rounded, non-distended                          Normal bowel sounds                    Psych:             Appropriate affect  Neuro:             CN 2-12 appear intact    Past Medical History:   Diagnosis Date   • Anxiety    • Arthritis     osteoarthritis   • CKD (chronic kidney disease), stage III ([facility])    • COPD (chronic obstructive pulmonary disease) ([facility])    • Coronary artery disease    • Depression    • Diabetes mellitus (HCC)    • DVT (deep venous thrombosis) ([facility])    • Heart disease    • History of embolic filter insertion    • History of stomach ulcers    • Hyperlipidemia    • Hypertension    • Injury of back     back surgery x3    • Lupus (HCC)    • LUCIANA on CPAP    • Pulmonary embolism (HCC)        Current Outpatient Medications:   •  aspirin 81 MG chewable tablet, Chew 81 mg Take As Directed. Take 1 pill by mouth Monday, Wednesday, Friday, Disp: , Rfl:   •  B Complex Vitamins (VITAMIN B COMPLEX PO), Take 1,000 mcg by mouth Daily., Disp: , Rfl:   •  Blood Glucose Monitoring Suppl (ACCU-CHEK ARGELIA PLUS) w/Device kit, , Disp: , Rfl:   •  clonazePAM (KlonoPIN) 1 MG tablet, Take 1 mg by mouth 3 (Three) Times a Day., Disp: , Rfl:   •  COMFORT ASSIST INSULIN SYRINGE 31G X 5/16\" 0.3 ML misc, , Disp: , Rfl:   •  cyanocobalamin (VITAMIN B-12) 1000 MCG tablet, Take 1,000 mcg by mouth Daily., Disp: , Rfl:   •  EPINEPHrine (EPIPEN) 0.3 MG/0.3ML solution auto-injector injection, , Disp: , Rfl:   •  Evolocumab (REPATHA) solution auto-injector SureClick injection, Inject 1 mL under the skin into the appropriate area as directed Every 14 (Fourteen) Days., Disp: 1 mL, Rfl: 11  •  furosemide (LASIX) 20 MG tablet, Take 20 mg by mouth Daily., Disp: , Rfl:   •  gabapentin (NEURONTIN) 100 MG capsule, Take 100 mg by " mouth 2 (Two) Times a Day., Disp: , Rfl:   •  glimepiride (AMARYL) 4 MG tablet, Take 4 mg by mouth 2 (Two) Times a Day., Disp: , Rfl:   •  glucose blood test strip, , Disp: , Rfl:   •  HYDROcodone-acetaminophen (NORCO) 7.5-325 MG per tablet, Take 1 tablet by mouth 3 (Three) Times a Day., Disp: , Rfl:   •  insulin aspart (novoLOG) 100 UNIT/ML injection, Inject  under the skin into the appropriate area as directed 3 (Three) Times a Day Before Meals. PT TAKES PER SLIDING SCALE STARTING AT 3 UNITS FOR BLOOD SUGAR 150-200, Disp: , Rfl:   •  isosorbide mononitrate (IMDUR) 60 MG 24 hr tablet, Take 1 tablet by mouth Every Morning., Disp: 90 tablet, Rfl: 3  •  Lancets (ONETOUCH ULTRASOFT) lancets, , Disp: , Rfl:   •  Lancets Misc. (Accu-Chek Softclix Lancet Dev) kit, , Disp: , Rfl:   •  mirtazapine (REMERON) 15 MG tablet, Take 15 mg by mouth., Disp: , Rfl:   •  Morphine Sulfate ER 30 MG tablet extended-release 12 hour, Take 30 mg by mouth 2 (Two) Times a Day., Disp: , Rfl:   •  naloxone (Narcan) 4 MG/0.1ML nasal spray, 1 spray into the nostril(s) as directed by provider As Needed (opioid overdose) for up to 2 doses., Disp: 1 each, Rfl: 1  •  propranolol (INDERAL) 20 MG tablet, Take 20 mg by mouth 2 (Two) Times a Day. Take 10mg at 1:30, take 20mg bid, Disp: , Rfl:   •  propranolol (INDERAL) 20 MG tablet, Take 10 mg by mouth Daily., Disp: , Rfl:   •  ranolazine (Ranexa) 500 MG 12 hr tablet, Take 1 tablet by mouth 2 (Two) Times a Day., Disp: 180 tablet, Rfl: 3  •  vitamin D (ERGOCALCIFEROL) 05770 UNITS capsule capsule, Take 50,000 Units by mouth. Two times monthly, Disp: , Rfl:   •  warfarin (COUMADIN) 5 MG tablet, Patient takes 5 mg every day except M and F when he takes 7.5 mg, Disp: 100 tablet, Rfl: 1  •  zolpidem (AMBIEN) 5 MG tablet, , Disp: , Rfl:   •  clonazePAM (KlonoPIN) 0.5 MG tablet, Take 0.75 mg by mouth., Disp: , Rfl:     WBC   Date Value Ref Range Status   01/27/2021 9.29 3.40 - 10.80 10*3/mm3 Final   07/22/2019  6.8 4.0 - 11.0 10*3/uL Final     RBC   Date Value Ref Range Status   01/27/2021 4.75 4.14 - 5.80 10*6/mm3 Final   07/22/2019 5.42 4.73 - 5.49 10*6/uL Final     Hemoglobin   Date Value Ref Range Status   01/27/2021 14.8 13.0 - 17.7 g/dL Final   03/01/2020 15.2 14.4 - 16.6 g/dL Final   03/01/2020 15.2 14.4 - 16.6 g/dL Final     Hematocrit   Date Value Ref Range Status   01/27/2021 41.9 37.5 - 51.0 % Final   03/01/2020 44.9 42.9 - 49.1 % Final     Comment:     Performed at [facility], [address] [place] [address]   11/13/2018 38.8 (L) 42 - 54 % Final     External Hematocrit   Date Value Ref Range Status   03/01/2020 44.9 42.9 - 49.1 % Final     MCV   Date Value Ref Range Status   01/27/2021 88.2 79.0 - 97.0 fL Final   07/22/2019 92 85 - 95 fl Final     MCH   Date Value Ref Range Status   01/27/2021 31.2 26.6 - 33.0 pg Final   07/22/2019 30.8 28.0 - 32.0 pg Final     MCHC   Date Value Ref Range Status   01/27/2021 35.3 31.5 - 35.7 g/dL Final   07/22/2019 33.3 32.0 - 34.0 g/dL Final     RDW   Date Value Ref Range Status   01/27/2021 12.7 12.3 - 15.4 % Final   07/22/2019 50.9 37 - 54 fl Final   11/13/2018 14.9 (H) 11.5 - 14.5 % Final     RDW-SD   Date Value Ref Range Status   01/27/2021 41.1 37.0 - 54.0 fl Final     MPV   Date Value Ref Range Status   01/27/2021 9.8 6.0 - 12.0 fL Final   07/22/2019 10.4 8.0 - 12.0 fl Final   11/13/2018 8.1 7.4 - 10.4 fL Final     Platelets   Date Value Ref Range Status   01/27/2021 192 140 - 450 10*3/mm3 Final   07/22/2019 189 150 - 450 10*3/uL Final   11/13/2018 103 (L) 150 - 450 10*9/L Final     Neutrophil Rel %   Date Value Ref Range Status   11/13/2018 54.0 35 - 80 % Final     Neutrophil %   Date Value Ref Range Status   01/27/2021 82.9 (H) 42.7 - 76.0 % Final     Lymphocyte Rel %   Date Value Ref Range Status   07/22/2019 34.1 5 - 55 % Final     Lymphocyte %   Date Value Ref Range Status   01/27/2021 13.8 (L) 19.6 - 45.3 % Final     Monocyte Rel %    Date Value Ref Range Status   07/22/2019 13.5 (H) 3 - 8 % Final     Monocyte %   Date Value Ref Range Status   01/27/2021 2.9 (L) 5.0 - 12.0 % Final     Eosinophil Rel %   Date Value Ref Range Status   07/22/2019 1.8 0 - 5 % Final     Eosinophil %   Date Value Ref Range Status   01/27/2021 0.1 (L) 0.3 - 6.2 % Final     Basophil Rel %   Date Value Ref Range Status   07/22/2019 0.4 0 - 2 % Final     Basophil %   Date Value Ref Range Status   01/27/2021 0.0 0.0 - 1.5 % Final     Immature Grans %   Date Value Ref Range Status   01/27/2021 0.3 0.0 - 0.5 % Final   07/22/2019 49.9 45 - 80 % Final     Neutrophils, Absolute   Date Value Ref Range Status   01/27/2021 7.70 (H) 1.70 - 7.00 10*3/mm3 Final     Lymphocytes Absolute   Date Value Ref Range Status   11/13/2018 2.1 0.8 - 4.8 10*9/L Final     Lymphocytes, Absolute   Date Value Ref Range Status   01/27/2021 1.28 0.70 - 3.10 10*3/mm3 Final     Monocytes Absolute   Date Value Ref Range Status   11/13/2018 1.0 (H) 0.2 - 0.9 10*9/L Final     Monocytes, Absolute   Date Value Ref Range Status   01/27/2021 0.27 0.10 - 0.90 10*3/mm3 Final     Eosinophil Abs   Date Value Ref Range Status   11/13/2018 0.2 0.0 - 0.8 10*9/L Final     Eosinophils, Absolute   Date Value Ref Range Status   01/27/2021 0.01 0.00 - 0.40 10*3/mm3 Final     Basophils Absolute   Date Value Ref Range Status   11/13/2018 0.0 0.0 - 0.1 10*9/L Final     Basophils, Absolute   Date Value Ref Range Status   01/27/2021 0.00 0.00 - 0.20 10*3/mm3 Final     Immature Grans, Absolute   Date Value Ref Range Status   01/27/2021 0.03 0.00 - 0.05 10*3/mm3 Final     nRBC   Date Value Ref Range Status   01/27/2021 0.0 0.0 - 0.2 /100 WBC Final       Lab Results   Component Value Date    GLUCOSE 140 (H) 11/08/2021    BUN 23 11/08/2021    CREATININE 1.36 (H) 11/08/2021    EGFRIFNONA 52 (L) 11/08/2021    EGFRIFAFRI 46 12/02/2020    BCR 16.9 11/08/2021    K 4.6 11/08/2021    CO2 25.2 11/08/2021    CALCIUM 9.0 11/08/2021     ALBUMIN 3.80 11/08/2021    AST 14 11/08/2021    ALT 20 11/08/2021       Assessment and Plan:    1. Obstructive sleep apnea - Established, stable (1)  1. Compliant with PAP therapy - extensive time spent researching patient's DME company, locating compliance report, and discussing supplies and equipment  2. Continue PAP as prescribed  3. Script for PAP supplies  4. Discussed mask liners for nasal pillows (online)  5. Return to clinic in 1 year with compliance report unless change in symptoms in interim period  2. Nocturnal hypoxia - Established, stable   1.   Continue nocturnal O2 @ 2 LPM bled into CPAP - orders for adaptive tubing instead of adapter placed into base of unit  3. Insomnia - sleep onset and or maintenance - Established, stable (1)    1.   Continues to take clonazepam 1 mg TID, Remeron 15 mg QHS, and Ambien 5 mg QHS (per Neuro/Psych and PCP)  4. Morbid obesity - BMI 40.6 - stable chronic illness      I spent 45 minutes caring for Clint on this date of service. This time includes time spent by me in the following activities: preparing for the visit, reviewing tests, obtaining and/or reviewing a separately obtained history, performing a medically appropriate examination and/or evaluation , counseling and educating the patient/family/caregiver, documenting information in the medical record and care coordination; discussing PAP therapy, PAP compliance and PAP maintenance    RTC in 12 months. Patient agrees to return sooner if changes in symptoms.        This document has been electronically signed by DIANA Ling on May 3, 2022 08:29 CDT          CC: Sushma Myers MD          No ref. provider found

## 2022-05-10 ENCOUNTER — OFFICE VISIT (OUTPATIENT)
Dept: CARDIOLOGY | Facility: CLINIC | Age: 67
End: 2022-05-10

## 2022-05-10 ENCOUNTER — ANTICOAGULATION VISIT (OUTPATIENT)
Dept: CARDIAC SURGERY | Facility: CLINIC | Age: 67
End: 2022-05-10

## 2022-05-10 VITALS
HEART RATE: 60 BPM | BODY MASS INDEX: 41.16 KG/M2 | HEIGHT: 71 IN | DIASTOLIC BLOOD PRESSURE: 78 MMHG | TEMPERATURE: 98 F | SYSTOLIC BLOOD PRESSURE: 146 MMHG | OXYGEN SATURATION: 97 % | WEIGHT: 294 LBS

## 2022-05-10 VITALS — OXYGEN SATURATION: 96 % | HEART RATE: 68 BPM | BODY MASS INDEX: 41.05 KG/M2 | WEIGHT: 294.2 LBS

## 2022-05-10 DIAGNOSIS — Z51.81 ENCOUNTER FOR THERAPEUTIC DRUG MONITORING: ICD-10-CM

## 2022-05-10 DIAGNOSIS — Z79.4 TYPE 2 DIABETES MELLITUS WITH HYPERGLYCEMIA, WITH LONG-TERM CURRENT USE OF INSULIN: ICD-10-CM

## 2022-05-10 DIAGNOSIS — E78.2 MIXED HYPERLIPIDEMIA: Chronic | ICD-10-CM

## 2022-05-10 DIAGNOSIS — N18.31 STAGE 3A CHRONIC KIDNEY DISEASE: Chronic | ICD-10-CM

## 2022-05-10 DIAGNOSIS — E11.65 TYPE 2 DIABETES MELLITUS WITH HYPERGLYCEMIA, WITH LONG-TERM CURRENT USE OF INSULIN: ICD-10-CM

## 2022-05-10 DIAGNOSIS — G47.33 OSA (OBSTRUCTIVE SLEEP APNEA): Chronic | ICD-10-CM

## 2022-05-10 DIAGNOSIS — I10 PRIMARY HYPERTENSION: Chronic | ICD-10-CM

## 2022-05-10 DIAGNOSIS — I25.10 CORONARY ARTERY DISEASE INVOLVING NATIVE CORONARY ARTERY OF NATIVE HEART WITHOUT ANGINA PECTORIS: Chronic | ICD-10-CM

## 2022-05-10 DIAGNOSIS — Z95.1 S/P CABG (CORONARY ARTERY BYPASS GRAFT): Primary | ICD-10-CM

## 2022-05-10 DIAGNOSIS — Z79.01 LONG TERM (CURRENT) USE OF ANTICOAGULANTS: Primary | ICD-10-CM

## 2022-05-10 DIAGNOSIS — Z86.711 HISTORY OF PULMONARY EMBOLISM: ICD-10-CM

## 2022-05-10 LAB — INR PPP: 3.1 (ref 0.9–1.1)

## 2022-05-10 PROCEDURE — 99214 OFFICE O/P EST MOD 30 MIN: CPT | Performed by: INTERNAL MEDICINE

## 2022-05-10 PROCEDURE — 85610 PROTHROMBIN TIME: CPT | Performed by: NURSE PRACTITIONER

## 2022-05-10 PROCEDURE — 93000 ELECTROCARDIOGRAM COMPLETE: CPT | Performed by: INTERNAL MEDICINE

## 2022-05-10 PROCEDURE — 36416 COLLJ CAPILLARY BLOOD SPEC: CPT | Performed by: NURSE PRACTITIONER

## 2022-05-10 NOTE — PROGRESS NOTES
Clint Mack  66 y.o. male      1. S/P CABG (coronary artery bypass graft)    2. Coronary artery disease involving native coronary artery of native heart without angina pectoris    3. Stage 3a chronic kidney disease (HCC)    4. Primary hypertension    5. LUCIANA (obstructive sleep apnea)    6. Type 2 diabetes mellitus with hyperglycemia, with long-term current use of insulin (HCC)    7. Mixed hyperlipidemia        History of Present Illness  Mr. Mack is a 66-year-old male with hypertension, CAD s/p CABG, hyperlipidemia, DVT, pulmonary embolus, obstructive sleep apnea, obesity, tardive dyskinesia, abdominal aortic aneurysm, depression, anxiety, osteoarthritis, CKD3.    He was admitted to [facility] in January 2021 following an episode of angioedema, the reason for which was unclear.  On the day of admission he had taken Praluent injection a little later in the day when the episode occurred at night. The patient has fortunately tolerated Repatha quite well and his insurance will pay for it.  He has had lab work done by his primary care physician recently and a copy of the results will be obtained for our records.    Echocardiogram in November 2021 showed:  · The quality of the study is limited due to limited views obtained and patient body habitus.  · Calculated left ventricular EF = 52% Estimated left ventricular EF was in agreement with the calculated left ventricular EF. Left ventricular systolic function is low normal.  · Left ventricular diastolic function is consistent with (grade Ia w/high LAP) impaired relaxation.  · Right ventricle not well visualized. The right ventricular cavity is mildly dilated.    He has done reasonably well from a symptom standpoint and is predominant complaint is back pain which is due to longstanding DJD.  He has had multiple back surgeries in the past.  He denied any chest pain but does have chronic NYHA class II dyspnea on exertion which is  multifactorial.    EKG showed baseline artifact.  Heart rate was 60 bpm.  Nonspecific T wave changes.  Could not rule out old inferior wall MI    Allergies   Allergen Reactions   • Tetrabenazine Dizziness, Nausea And Vomiting, Nausea Only and Other (See Comments)     Other reaction(s): Vertigo   • Alfuzosin Other (See Comments)     syncope  Syncope   syncope  Syncope   syncope   • Deutetrabenazine Other (See Comments)     Insomnia    Insomnia  Insomnia   • Pregabalin Swelling     Leg swelling   • Metoclopramide Other (See Comments)     Tardive dyskinesia  Tardive dyskinesia     • Valbenazine Other (See Comments)   • Celexa [Citalopram Hydrobromide] Dystonia   • Citalopram Other (See Comments)     Other reaction(s): Dystonia  Tardive dyskinesia    Other reaction(s): Respiratory distress   • Crestor [Rosuvastatin Calcium] Myalgia   • Ingrezza [Valbenazine Tosylate] Other (See Comments)     fatigue   • Lipitor [Atorvastatin] Other (See Comments)     Aches and pains all over   • Rosuvastatin Other (See Comments)     Aches and pains all over  Aches and pains all over  Aches and pains all over         Past Medical History:   Diagnosis Date   • Anxiety    • Arthritis     osteoarthritis   • CKD (chronic kidney disease), stage III ([facility])    • COPD (chronic obstructive pulmonary disease) ([facility])    • Coronary artery disease    • Depression    • Diabetes mellitus ([facility])    • DVT (deep venous thrombosis) ([facility])    • Heart disease    • History of embolic filter insertion    • History of stomach ulcers    • Hyperlipidemia    • Hypertension    • Injury of back     back surgery x3    • Lupus (HCC)    • LUCIANA on CPAP    • Pulmonary embolism (HCC)          Past Surgical History:   Procedure Laterality Date   • BACK SURGERY     • CARDIAC SURGERY  2001   • CIRCUMCISION     • COLONOSCOPY N/A 9/11/2018    Procedure: COLONOSCOPY;  Surgeon: Jose C Batres DO;  Location: [facility] ENDOSCOPY;  Service: Gastroenterology   • COLONOSCOPY N/A 6/3/2021  "   Procedure: COLONOSCOPY;  Surgeon: Jose C Batres DO;  Location: [facility] ENDOSCOPY;  Service: Gastroenterology;  Laterality: N/A;   • ENDOSCOPY N/A 9/11/2018    Procedure: ESOPHAGOGASTRODUODENOSCOPY;  Surgeon: Jose C Batres DO;  Location: [facility] ENDOSCOPY;  Service: Gastroenterology   • ENDOSCOPY N/A 3/3/2020    Procedure: ESOPHAGOGASTRODUODENOSCOPY;  Surgeon: Jose C Batres DO;  Location: [facility] ENDOSCOPY;  Service: Gastroenterology;  Laterality: N/A;   • ENDOSCOPY N/A 6/3/2021    Procedure: ESOPHAGOGASTRODUODENOSCOPY;  Surgeon: Jose C Batres DO;  Location: [facility] ENDOSCOPY;  Service: Gastroenterology;  Laterality: N/A;   • GALLBLADDER SURGERY  2000   • HIP SURGERY     • JOINT REPLACEMENT Right 05/02/2016    hip   • PROSTATE SURGERY  12/2021    UROLIFT (METAL IMPLANT)   • SPINE SURGERY           Family History   Problem Relation Age of Onset   • Heart disease Father    • Hypertension Father    • Stroke Father    • Diabetes Sister    • Heart disease Brother    • Hypertension Brother    • COPD Brother    • Lung disease Other          Social History     Socioeconomic History   • Marital status:    Tobacco Use   • Smoking status: Never Smoker   • Smokeless tobacco: Never Used   Vaping Use   • Vaping Use: Never used   Substance and Sexual Activity   • Alcohol use: No   • Drug use: No   • Sexual activity: Defer         Current Outpatient Medications   Medication Sig Dispense Refill   • aspirin 81 MG chewable tablet Chew 81 mg Take As Directed. Take 1 pill by mouth Monday, Wednesday, Friday     • B Complex Vitamins (VITAMIN B COMPLEX PO) Take 1,000 mcg by mouth Daily.     • Blood Glucose Monitoring Suppl (ACCU-CHEK ARGELIA PLUS) w/Device kit      • clonazePAM (KlonoPIN) 0.5 MG tablet Take 0.75 mg by mouth.     • clonazePAM (KlonoPIN) 1 MG tablet Take 1 mg by mouth 3 (Three) Times a Day.     • COMFORT ASSIST INSULIN SYRINGE 31G X 5/16\" 0.3 ML misc      • cyanocobalamin (VITAMIN B-12) 1000 MCG tablet " Take 1,000 mcg by mouth Daily.     • EPINEPHrine (EPIPEN) 0.3 MG/0.3ML solution auto-injector injection      • Evolocumab (REPATHA) solution auto-injector SureClick injection Inject 1 mL under the skin into the appropriate area as directed Every 14 (Fourteen) Days. 1 mL 11   • furosemide (LASIX) 20 MG tablet Take 20 mg by mouth Daily.     • gabapentin (NEURONTIN) 100 MG capsule Take 100 mg by mouth 2 (Two) Times a Day.     • glimepiride (AMARYL) 4 MG tablet Take 4 mg by mouth 2 (Two) Times a Day.     • glucose blood test strip      • HYDROcodone-acetaminophen (NORCO) 7.5-325 MG per tablet Take 1 tablet by mouth 3 (Three) Times a Day.     • insulin aspart (novoLOG) 100 UNIT/ML injection Inject  under the skin into the appropriate area as directed 3 (Three) Times a Day Before Meals. PT TAKES PER SLIDING SCALE STARTING AT 3 UNITS FOR BLOOD SUGAR 150-200     • isosorbide mononitrate (IMDUR) 60 MG 24 hr tablet Take 1 tablet by mouth Every Morning. 90 tablet 3   • Lancets (ONETOUCH ULTRASOFT) lancets      • Lancets Misc. (Accu-Chek Softclix Lancet Dev) kit      • mirtazapine (REMERON) 15 MG tablet Take 15 mg by mouth.     • Morphine Sulfate ER 30 MG tablet extended-release 12 hour Take 30 mg by mouth 2 (Two) Times a Day.     • naloxone (Narcan) 4 MG/0.1ML nasal spray 1 spray into the nostril(s) as directed by provider As Needed (opioid overdose) for up to 2 doses. 1 each 1   • propranolol (INDERAL) 20 MG tablet Take 20 mg by mouth 2 (Two) Times a Day. Take 10mg at 1:30, take 20mg bid     • propranolol (INDERAL) 20 MG tablet Take 10 mg by mouth Daily.     • ranolazine (Ranexa) 500 MG 12 hr tablet Take 1 tablet by mouth 2 (Two) Times a Day. 180 tablet 3   • vitamin D (ERGOCALCIFEROL) 71469 UNITS capsule capsule Take 50,000 Units by mouth. Two times monthly     • warfarin (COUMADIN) 5 MG tablet Patient takes 5 mg every day except M and F when he takes 7.5 mg 100 tablet 1   • zolpidem (AMBIEN) 5 MG tablet        No current  "facility-administered medications for this visit.         OBJECTIVE    /78 (BP Location: Left arm, Patient Position: Sitting)   Pulse 60   Temp 98 °F (36.7 °C)   Ht 180.3 cm (71\")   Wt 133 kg (294 lb)   SpO2 97%   BMI 41.00 kg/m²         Review of Systems : The following systems were reviewed and  changes noted as indicated under history of present illness.    Constitutional:  Denies recent weight loss, weight gain, fever or chills     HENT:  Denies any hearing loss, epistaxis, hoarseness, or difficulty speaking.     Eyes: Wears eyeglasses or contact lenses     Respiratory:  Dyspnea with exertion,no cough, wheezing, or hemoptysis.     Cardiovascular: No chest pain, palpitation.    Gastrointestinal:  Denies change in bowel habits, dyspepsia, ulcer disease, hematochezia, or melena.     Endocrine: Negative for cold intolerance, heat intolerance, polydipsia, polyphagia and polyuria.    Genitourinary: Negative.      Musculoskeletal: DJD    Allergic/Immunologic: History of angioedema in the past    Neurological: History of tardive dyskinesia    Hematological: Denies any food allergies, seasonal allergies, bleeding disorders, or lymphadenopathy.     Psychiatric/Behavioral: history of anxiety/depression, no substance abuse, or change in cognitive function.     Physical Exam : The following systems were reassessed and no changes noted    Constitutional: Cooperative, alert and oriented, in no acute distress.     HENT:   Head: Normocephalic, normal hair patterns, no masses or tenderness.  Ears, Nose, and Throat: No gross abnormalities. No pallor or cyanosis.   Eyes: EOMS intact, PERRL, conjunctivae and lids unremarkable. Fundoscopic exam and visual fields not performed.   Neck: No palpable masses or adenopathy, no thyromegaly, no JVD, carotid pulses are full and equal bilaterally and without  Bruits.     Cardiovascular: Regular rhythm, S1 and S2 normal, no S3 or S4.  No murmurs, gallops, or rubs detected. "     Pulmonary/Chest: Chest: normal symmetry, normal respiratory excursion, no intercostal retraction, no use of accessory muscles.            Pulmonary: Normal breath sounds. No rales or ronchi.    Abdominal: Abdomen soft, bowel sounds normoactive, no masses, no hepatosplenomegaly, non-tender, no bruits.     Musculoskeletal: No deformities, clubbing, cyanosis, erythema, or edema observed.     Neurological:  tardive dyskinesia improved    Skin: Warm and dry to the touch, no apparent skin lesions or masses noted.     Psychiatric: He has a normal mood and affect. His behavior is normal. Judgment and thought content normal.         Procedures      Lab Results   Component Value Date    WBC 9.29 01/27/2021    HGB 14.8 01/27/2021    HCT 41.9 01/27/2021    MCV 88.2 01/27/2021     01/27/2021     Lab Results   Component Value Date    GLUCOSE 140 (H) 11/08/2021    BUN 23 11/08/2021    CREATININE 1.36 (H) 11/08/2021    EGFRIFNONA 52 (L) 11/08/2021    EGFRIFAFRI 46 12/02/2020    BCR 16.9 11/08/2021    CO2 25.2 11/08/2021    CALCIUM 9.0 11/08/2021    ALBUMIN 3.80 11/08/2021    AST 14 11/08/2021    ALT 20 11/08/2021     Lab Results   Component Value Date    CHOL 229 (H) 11/08/2021    CHOL 171 12/30/2020    CHOL 113 02/21/2019     Lab Results   Component Value Date    TRIG 247 (H) 11/08/2021    TRIG 261 (H) 12/30/2020    TRIG 249 (H) 04/29/2020     Lab Results   Component Value Date    HDL 38 (L) 11/08/2021    HDL 36 (L) 12/30/2020    HDL 36 04/29/2020     No components found for: LDLCALC  Lab Results   Component Value Date     (H) 11/08/2021    LDL 91 12/30/2020     04/29/2020     No results found for: HDLLDLRATIO  No components found for: CHOLHDL  Lab Results   Component Value Date    HGBA1C 6.0 (H) 12/16/2021     Lab Results   Component Value Date    TSH 2.04 07/22/2019           ASSESSMENT AND PLAN  Mr. Mack is a management challenge secondary to multiple medical issues, comorbidities, medication  allergies.  Fortunately he has remained stable on medical management and denied any chest pain at this time.  He has tolerated Repatha quite well.  I have continued antiplatelet therapy with aspirin, Antianginal therapy with isosorbide mononitrate, Ranexa.  He is on propranolol for beta-blockade and tremors.  His renal function is being monitored closely by Dr. Holt.  Anticoagulation with Coumadin has been continued.  His INR was 3.1 today.  He follows up with Coumadin clinic on a regular basis.      Diagnoses and all orders for this visit:    1. S/P CABG (coronary artery bypass graft) (Primary)  -     ECG 12 Lead    2. Coronary artery disease involving native coronary artery of native heart without angina pectoris    3. Stage 3a chronic kidney disease (HCC)    4. Primary hypertension    5. LUCIANA (obstructive sleep apnea)    6. Type 2 diabetes mellitus with hyperglycemia, with long-term current use of insulin (HCC)    7. Mixed hyperlipidemia        Patient's Body mass index is 41 kg/m². BMI is above normal parameters. Recommendations include: exercise counseling and nutrition counseling.  Patient is a non-smoker      Keke Kumar MD  5/10/2022  09:02 CDT

## 2022-05-10 NOTE — PROGRESS NOTES
Pt here today seeing Dr Kumar. Pt denies med changes or bleeding problems. Dose adjusted today only and pt instructed to have a serving of green veggies today; pt verbalized. Patient instructed regarding medication; results given and questions answered. Nutritional counseling given.  Dietary factors affecting therapy addressed.  Patient instructed to monitor for excessive bruising or bleeding. Findings reported by Ketty Moscoso RN.    Today's INR is Lab Results - Last 18 Months   Lab Units 05/10/22  0000   INR  3.10*

## 2022-05-13 LAB
QT INTERVAL: 456 MS
QTC INTERVAL: 456 MS

## 2022-05-16 DIAGNOSIS — M25.551 RIGHT HIP PAIN: Primary | ICD-10-CM

## 2022-05-17 ENCOUNTER — OFFICE VISIT (OUTPATIENT)
Dept: ORTHOPEDIC SURGERY | Facility: CLINIC | Age: 67
End: 2022-05-17

## 2022-05-17 VITALS — BODY MASS INDEX: 40.6 KG/M2 | HEIGHT: 71 IN | WEIGHT: 290 LBS

## 2022-05-17 DIAGNOSIS — E66.01 MORBID OBESITY WITH BMI OF 40.0-44.9, ADULT: ICD-10-CM

## 2022-05-17 DIAGNOSIS — M25.551 RIGHT HIP PAIN: ICD-10-CM

## 2022-05-17 DIAGNOSIS — R53.81 PHYSICAL DECONDITIONING: ICD-10-CM

## 2022-05-17 DIAGNOSIS — G89.29 CHRONIC MIDLINE LOW BACK PAIN WITHOUT SCIATICA: Primary | ICD-10-CM

## 2022-05-17 DIAGNOSIS — M25.552 LEFT HIP PAIN: ICD-10-CM

## 2022-05-17 DIAGNOSIS — M54.50 CHRONIC MIDLINE LOW BACK PAIN WITHOUT SCIATICA: Primary | ICD-10-CM

## 2022-05-17 DIAGNOSIS — Z96.641 H/O TOTAL HIP ARTHROPLASTY, RIGHT: ICD-10-CM

## 2022-05-17 DIAGNOSIS — M25.552 LEFT HIP PAIN: Primary | ICD-10-CM

## 2022-05-17 PROCEDURE — 99213 OFFICE O/P EST LOW 20 MIN: CPT | Performed by: ORTHOPAEDIC SURGERY

## 2022-05-17 NOTE — PROGRESS NOTES
Clint Mack is a 66 y.o. male   Primary provider:  Sushma Myers MD       Chief Complaint   Patient presents with   • Left Hip - Initial Evaluation, Pain   • Right Hip - Follow-up       HISTORY OF PRESENT ILLNESS:  Patient in for initial eval of left hip pain and follow-up on right hip pain  Xray completed upon arrival.   Patient had a right total hip in 2014 and then a revision in 2016.  Patient states that he has no quad strength since the second surgery.  He states, however, that he has no pain.  No pain in the leg.  He does have severe back pain however.    Patient also states that he has no pain on the left side.     No fevers or chills.  No numbness or tingling.      Hip Pain   There was no injury mechanism. The pain is present in the left hip.        CONCURRENT MEDICAL HISTORY:    Past Medical History:   Diagnosis Date   • Anxiety    • Arthritis     osteoarthritis   • CKD (chronic kidney disease), stage III ([facility])    • COPD (chronic obstructive pulmonary disease) ([facility])    • Coronary artery disease    • Depression    • Diabetes mellitus ([facility])    • DVT (deep venous thrombosis) ([facility])    • Heart disease    • History of embolic filter insertion    • History of stomach ulcers    • Hyperlipidemia    • Hypertension    • Injury of back     back surgery x3    • Lupus ([facility])    • LUCIANA on CPAP    • Pulmonary embolism ([facility])        Allergies   Allergen Reactions   • Tetrabenazine Dizziness, Nausea And Vomiting, Nausea Only and Other (See Comments)     Other reaction(s): Vertigo   • Alfuzosin Other (See Comments)     syncope  Syncope   syncope  Syncope   syncope   • Deutetrabenazine Other (See Comments)     Insomnia    Insomnia  Insomnia   • Pregabalin Swelling     Leg swelling   • Metoclopramide Other (See Comments)     Tardive dyskinesia  Tardive dyskinesia     • Valbenazine Other (See Comments)   • Celexa [Citalopram Hydrobromide] Dystonia   • Citalopram Other (See Comments)     Other reaction(s):  "Dystonia  Tardive dyskinesia    Other reaction(s): Respiratory distress   • Crestor [Rosuvastatin Calcium] Myalgia   • Ingrezza [Valbenazine Tosylate] Other (See Comments)     fatigue   • Lipitor [Atorvastatin] Other (See Comments)     Aches and pains all over   • Rosuvastatin Other (See Comments)     Aches and pains all over  Aches and pains all over  Aches and pains all over         Current Outpatient Medications:   •  aspirin 81 MG chewable tablet, Chew 81 mg Take As Directed. Take 1 pill by mouth Monday, Wednesday, Friday, Disp: , Rfl:   •  B Complex Vitamins (VITAMIN B COMPLEX PO), Take 1,000 mcg by mouth Daily., Disp: , Rfl:   •  Blood Glucose Monitoring Suppl (ACCU-CHEK ARGELIA PLUS) w/Device kit, , Disp: , Rfl:   •  clonazePAM (KlonoPIN) 0.5 MG tablet, Take 0.75 mg by mouth., Disp: , Rfl:   •  clonazePAM (KlonoPIN) 1 MG tablet, Take 1 mg by mouth 3 (Three) Times a Day., Disp: , Rfl:   •  COMFORT ASSIST INSULIN SYRINGE 31G X 5/16\" 0.3 ML misc, , Disp: , Rfl:   •  cyanocobalamin (VITAMIN B-12) 1000 MCG tablet, Take 1,000 mcg by mouth Daily., Disp: , Rfl:   •  EPINEPHrine (EPIPEN) 0.3 MG/0.3ML solution auto-injector injection, , Disp: , Rfl:   •  Evolocumab (REPATHA) solution auto-injector SureClick injection, Inject 1 mL under the skin into the appropriate area as directed Every 14 (Fourteen) Days., Disp: 1 mL, Rfl: 11  •  furosemide (LASIX) 20 MG tablet, Take 20 mg by mouth Daily., Disp: , Rfl:   •  gabapentin (NEURONTIN) 100 MG capsule, Take 100 mg by mouth 2 (Two) Times a Day., Disp: , Rfl:   •  glimepiride (AMARYL) 4 MG tablet, Take 4 mg by mouth 2 (Two) Times a Day., Disp: , Rfl:   •  glucose blood test strip, , Disp: , Rfl:   •  HYDROcodone-acetaminophen (NORCO) 7.5-325 MG per tablet, Take 1 tablet by mouth 3 (Three) Times a Day., Disp: , Rfl:   •  insulin aspart (novoLOG) 100 UNIT/ML injection, Inject  under the skin into the appropriate area as directed 3 (Three) Times a Day Before Meals. PT TAKES PER " SLIDING SCALE STARTING AT 3 UNITS FOR BLOOD SUGAR 150-200, Disp: , Rfl:   •  isosorbide mononitrate (IMDUR) 60 MG 24 hr tablet, Take 1 tablet by mouth Every Morning., Disp: 90 tablet, Rfl: 3  •  Lancets (ONETOUCH ULTRASOFT) lancets, , Disp: , Rfl:   •  Lancets Misc. (Accu-Chek Softclix Lancet Dev) kit, , Disp: , Rfl:   •  mirtazapine (REMERON) 15 MG tablet, Take 15 mg by mouth., Disp: , Rfl:   •  Morphine Sulfate ER 30 MG tablet extended-release 12 hour, Take 30 mg by mouth 2 (Two) Times a Day., Disp: , Rfl:   •  naloxone (Narcan) 4 MG/0.1ML nasal spray, 1 spray into the nostril(s) as directed by provider As Needed (opioid overdose) for up to 2 doses., Disp: 1 each, Rfl: 1  •  propranolol (INDERAL) 20 MG tablet, Take 20 mg by mouth 2 (Two) Times a Day. Take 10mg at 1:30, take 20mg bid, Disp: , Rfl:   •  ranolazine (Ranexa) 500 MG 12 hr tablet, Take 1 tablet by mouth 2 (Two) Times a Day., Disp: 180 tablet, Rfl: 3  •  vitamin D (ERGOCALCIFEROL) 14913 UNITS capsule capsule, Take 50,000 Units by mouth. Two times monthly, Disp: , Rfl:   •  warfarin (COUMADIN) 5 MG tablet, Patient takes 5 mg every day except M and F when he takes 7.5 mg, Disp: 100 tablet, Rfl: 1  •  zolpidem (AMBIEN) 5 MG tablet, , Disp: , Rfl:     Past Surgical History:   Procedure Laterality Date   • BACK SURGERY     • CARDIAC SURGERY  2001   • CIRCUMCISION     • COLONOSCOPY N/A 9/11/2018    Procedure: COLONOSCOPY;  Surgeon: Jose C Batres DO;  Location: [facility] ENDOSCOPY;  Service: Gastroenterology   • COLONOSCOPY N/A 6/3/2021    Procedure: COLONOSCOPY;  Surgeon: Jose C Batres DO;  Location: [facility] ENDOSCOPY;  Service: Gastroenterology;  Laterality: N/A;   • ENDOSCOPY N/A 9/11/2018    Procedure: ESOPHAGOGASTRODUODENOSCOPY;  Surgeon: Jose C Batres DO;  Location: [facility] ENDOSCOPY;  Service: Gastroenterology   • ENDOSCOPY N/A 3/3/2020    Procedure: ESOPHAGOGASTRODUODENOSCOPY;  Surgeon: Jose C Batres DO;  Location: [facility] ENDOSCOPY;   "Service: Gastroenterology;  Laterality: N/A;   • ENDOSCOPY N/A 6/3/2021    Procedure: ESOPHAGOGASTRODUODENOSCOPY;  Surgeon: Jose C Batres DO;  Location: [facility] ENDOSCOPY;  Service: Gastroenterology;  Laterality: N/A;   • GALLBLADDER SURGERY  2000   • HIP SURGERY     • JOINT REPLACEMENT Right 05/02/2016    hip   • PROSTATE SURGERY  12/2021    UROLIFT (METAL IMPLANT)   • SPINE SURGERY         Family History   Problem Relation Age of Onset   • Heart disease Father    • Hypertension Father    • Stroke Father    • Diabetes Sister    • Heart disease Brother    • Hypertension Brother    • COPD Brother    • Lung disease Other        Social History     Socioeconomic History   • Marital status:    Tobacco Use   • Smoking status: Never Smoker   • Smokeless tobacco: Never Used   Vaping Use   • Vaping Use: Never used   Substance and Sexual Activity   • Alcohol use: No   • Drug use: No   • Sexual activity: Defer        Review of Systems   Constitutional: Negative for chills and fever.   Respiratory: Negative.    Cardiovascular: Negative.    Musculoskeletal:        Low back pain.       PHYSICAL EXAMINATION:       Ht 180.3 cm (71\")   Wt 132 kg (290 lb)   BMI 40.45 kg/m²     Physical Exam  Constitutional:       General: He is not in acute distress.     Appearance: Normal appearance.   Pulmonary:      Effort: Pulmonary effort is normal. No respiratory distress.   Neurological:      Mental Status: He is alert and oriented to person, place, and time.         GAIT:     []  Normal  [x]  Antalgic    Assistive device: []  None  []  Walker     []  Crutches  [x]  Cane     []  Wheelchair  []  Stretcher    Right Hip Exam     Comments:  Good range of motion of the right hip.  He does have limited hip flexion.  Internal and external rotation of the right hip causes back pain.  Good distal pulses and sensation.      Left Hip Exam     Comments:  Motion in the left hip.  Good distal pulses and sensation.  Motion of the hip does cause " back pain.                XR Hip With or Without Pelvis 2 - 3 View Left    Result Date: 5/17/2022  Narrative: Ordering Provider:  Jose Raul Leon MD Ordering Diagnosis/Indication:  Left hip pain Procedure:  XR HIP W OR WO PELVIS 2-3 VIEW LEFT Exam Date:  5/17/22 COMPARISON:  Not applicable, no relevant images available.     Impression:  AP and lateral of the left hip show acceptable position and alignment with no evidence of acute bony abnormality.  Mild arthritic changes are noted along the femoral head margin.  Relative maintenance of joint space is noted.  No acute findings. Jose Raul Leon MD 5/17/22     XR Hip With or Without Pelvis 2 - 3 View Right    Result Date: 5/17/2022  Narrative: Ordering Provider:  Jose Raul Leon MD Ordering Diagnosis/Indication:  Right hip pain Procedure:  XR HIP W OR WO PELVIS 2-3 VIEW RIGHT Exam Date:  5/17/22 COMPARISON:  Todays X-rays were compared to previous images dated November 1, 2017.     Impression:  AP standing of the pelvis with AP and lateral of the right hip show acceptable position and alignment of a right total hip arthroplasty.  No sign of implant loosening or failure is noted.  No significant interval change noted in comparison to prior x-ray with the exception of a small calcification just lateral to the femoral head that appears to be more consolidated than on previous film.  There is a staple overlying the superolateral aspect of the acetabulum that is unchanged from prior x-ray.  Stable lumbar fusion hardware again noted.  Mild arthritic changes noted in the left hip joint without any significant progression from prior x-ray.  No acute findings. Jose Raul Leon MD 5/17/22               ASSESSMENT:    Diagnoses and all orders for this visit:    Chronic midline low back pain without sciatica    Right hip pain    Left hip pain    Physical deconditioning    Morbid obesity with BMI of 40.0-44.9, adult (HCC)    H/O total hip arthroplasty,  right          PLAN    Long discussion was held the patient regarding further treatment options.  His right hip appears to be stable on x-ray.  He has limited hip flexion.  We discussed general strength and conditioning exercises and continue to work on motion of his right hip.    We discussed the possibility of further evaluation of his lower back including x-rays and possibility of MRI if his symptoms persist.    No further orthopedic treatment required for right hip or left hip at this time.    Activity as tolerated.    Return if symptoms worsen or fail to improve, for recheck.    Jose Raul Leon MD

## 2022-06-07 ENCOUNTER — ANTICOAGULATION VISIT (OUTPATIENT)
Dept: CARDIAC SURGERY | Facility: CLINIC | Age: 67
End: 2022-06-07

## 2022-06-07 VITALS — OXYGEN SATURATION: 97 % | HEART RATE: 86 BPM

## 2022-06-07 DIAGNOSIS — Z51.81 ENCOUNTER FOR THERAPEUTIC DRUG MONITORING: ICD-10-CM

## 2022-06-07 DIAGNOSIS — Z86.711 HISTORY OF PULMONARY EMBOLISM: ICD-10-CM

## 2022-06-07 DIAGNOSIS — Z79.01 LONG TERM (CURRENT) USE OF ANTICOAGULANTS: Primary | ICD-10-CM

## 2022-06-07 LAB — INR PPP: 3.7 (ref 0.9–1.1)

## 2022-06-07 PROCEDURE — 36416 COLLJ CAPILLARY BLOOD SPEC: CPT | Performed by: NURSE PRACTITIONER

## 2022-06-07 PROCEDURE — 93793 ANTICOAG MGMT PT WARFARIN: CPT | Performed by: NURSE PRACTITIONER

## 2022-06-07 PROCEDURE — 85610 PROTHROMBIN TIME: CPT | Performed by: NURSE PRACTITIONER

## 2022-06-07 NOTE — PROGRESS NOTES
Pt denies med changes or bleeding problems. Dose adjusted and pt instructed to have a serving of green veggies today; pt verbalized. Patient instructed regarding medication; results given and questions answered. Nutritional counseling given.  Dietary factors affecting therapy addressed.  Patient instructed to monitor for excessive bruising or bleeding. Will recheck next week. Findings reported by Ketty Moscoso RN.    Today's INR is Lab Results - Last 18 Months   Lab Units 06/07/22  0000   INR  3.70*

## 2022-06-14 ENCOUNTER — OFFICE VISIT (OUTPATIENT)
Dept: PODIATRY | Facility: CLINIC | Age: 67
End: 2022-06-14

## 2022-06-14 ENCOUNTER — ANTICOAGULATION VISIT (OUTPATIENT)
Dept: CARDIAC SURGERY | Facility: CLINIC | Age: 67
End: 2022-06-14

## 2022-06-14 VITALS — OXYGEN SATURATION: 96 % | HEART RATE: 81 BPM | WEIGHT: 299.2 LBS | BODY MASS INDEX: 41.89 KG/M2 | HEIGHT: 71 IN

## 2022-06-14 VITALS — OXYGEN SATURATION: 92 % | WEIGHT: 298 LBS | HEART RATE: 74 BPM | BODY MASS INDEX: 41.56 KG/M2

## 2022-06-14 DIAGNOSIS — Z51.81 ENCOUNTER FOR THERAPEUTIC DRUG MONITORING: ICD-10-CM

## 2022-06-14 DIAGNOSIS — E11.42 DIABETIC POLYNEUROPATHY ASSOCIATED WITH TYPE 2 DIABETES MELLITUS: Primary | ICD-10-CM

## 2022-06-14 DIAGNOSIS — Z86.711 HISTORY OF PULMONARY EMBOLISM: ICD-10-CM

## 2022-06-14 DIAGNOSIS — B35.1 ONYCHOMYCOSIS: ICD-10-CM

## 2022-06-14 DIAGNOSIS — Z79.01 LONG TERM (CURRENT) USE OF ANTICOAGULANTS: Primary | ICD-10-CM

## 2022-06-14 LAB — INR PPP: 2.4 (ref 0.9–1.1)

## 2022-06-14 PROCEDURE — 36416 COLLJ CAPILLARY BLOOD SPEC: CPT | Performed by: NURSE PRACTITIONER

## 2022-06-14 PROCEDURE — 11721 DEBRIDE NAIL 6 OR MORE: CPT | Performed by: PODIATRIST

## 2022-06-14 PROCEDURE — 93793 ANTICOAG MGMT PT WARFARIN: CPT | Performed by: NURSE PRACTITIONER

## 2022-06-14 PROCEDURE — 85610 PROTHROMBIN TIME: CPT | Performed by: NURSE PRACTITIONER

## 2022-06-14 NOTE — PROGRESS NOTES
Clint Mack  1955  66 y.o. male   BS- 95 per patient  PCP-Dr. Sushma Myers MD 03/01/2022    Patient presents today for diabetic foot care.    6/14/2022        Chief Complaint   Patient presents with   • Left Foot - Follow-up     Diabetic foot care    • Right Foot - Follow-up     Diabetic foot care            History of Present Illness    Clint Mack is a 66 y.o. male presents for f/u diabetic foot exam, nail care.    Past Medical History:   Diagnosis Date   • Anxiety    • Arthritis     osteoarthritis   • CKD (chronic kidney disease), stage III (HCC)    • COPD (chronic obstructive pulmonary disease) (HCC)    • Coronary artery disease    • Depression    • Diabetes mellitus (HCC)    • DVT (deep venous thrombosis) (HCC)    • Heart disease    • History of embolic filter insertion    • History of stomach ulcers    • Hyperlipidemia    • Hypertension    • Injury of back     back surgery x3    • Lupus (HCC)    • LUCIANA on CPAP    • Pulmonary embolism (HCC)          Past Surgical History:   Procedure Laterality Date   • BACK SURGERY     • CARDIAC SURGERY  2001   • CIRCUMCISION     • COLONOSCOPY N/A 9/11/2018    Procedure: COLONOSCOPY;  Surgeon: Jose C Batres DO;  Location: [facility] ENDOSCOPY;  Service: Gastroenterology   • COLONOSCOPY N/A 6/3/2021    Procedure: COLONOSCOPY;  Surgeon: Jose C Batres DO;  Location: [facility] ENDOSCOPY;  Service: Gastroenterology;  Laterality: N/A;   • ENDOSCOPY N/A 9/11/2018    Procedure: ESOPHAGOGASTRODUODENOSCOPY;  Surgeon: Jose C Batres DO;  Location: [facility] ENDOSCOPY;  Service: Gastroenterology   • ENDOSCOPY N/A 3/3/2020    Procedure: ESOPHAGOGASTRODUODENOSCOPY;  Surgeon: Jose C Batres DO;  Location: [facility] ENDOSCOPY;  Service: Gastroenterology;  Laterality: N/A;   • ENDOSCOPY N/A 6/3/2021    Procedure: ESOPHAGOGASTRODUODENOSCOPY;  Surgeon: Jose C Batres DO;  Location: [facility] ENDOSCOPY;  Service: Gastroenterology;  Laterality: N/A;   • GALLBLADDER  "SURGERY  2000   • HIP SURGERY     • JOINT REPLACEMENT Right 05/02/2016    hip   • PROSTATE SURGERY  12/2021    UROLIFT (METAL IMPLANT)   • SPINE SURGERY           Family History   Problem Relation Age of Onset   • Heart disease Father    • Hypertension Father    • Stroke Father    • Diabetes Sister    • Heart disease Brother    • Hypertension Brother    • COPD Brother    • Lung disease Other          Social History     Socioeconomic History   • Marital status:    Tobacco Use   • Smoking status: Never Smoker   • Smokeless tobacco: Never Used   Vaping Use   • Vaping Use: Never used   Substance and Sexual Activity   • Alcohol use: No   • Drug use: No   • Sexual activity: Defer         Current Outpatient Medications   Medication Sig Dispense Refill   • aspirin 81 MG chewable tablet Chew 81 mg Take As Directed. Take 1 pill by mouth Monday, Wednesday, Friday     • B Complex Vitamins (VITAMIN B COMPLEX PO) Take 1,000 mcg by mouth Daily.     • Blood Glucose Monitoring Suppl (ACCU-CHEK ARGELIA PLUS) w/Device kit      • clonazePAM (KlonoPIN) 0.5 MG tablet Take 0.75 mg by mouth.     • clonazePAM (KlonoPIN) 1 MG tablet Take 1 mg by mouth 3 (Three) Times a Day.     • COMFORT ASSIST INSULIN SYRINGE 31G X 5/16\" 0.3 ML misc      • cyanocobalamin (VITAMIN B-12) 1000 MCG tablet Take 1,000 mcg by mouth Daily.     • EPINEPHrine (EPIPEN) 0.3 MG/0.3ML solution auto-injector injection      • Evolocumab (REPATHA) solution auto-injector SureClick injection Inject 1 mL under the skin into the appropriate area as directed Every 14 (Fourteen) Days. 1 mL 11   • furosemide (LASIX) 20 MG tablet Take 20 mg by mouth Daily.     • gabapentin (NEURONTIN) 100 MG capsule Take 100 mg by mouth 2 (Two) Times a Day.     • glimepiride (AMARYL) 4 MG tablet Take 4 mg by mouth 2 (Two) Times a Day.     • glucose blood test strip      • HYDROcodone-acetaminophen (NORCO) 7.5-325 MG per tablet Take 1 tablet by mouth 3 (Three) Times a Day.     • insulin aspart " "(novoLOG) 100 UNIT/ML injection Inject  under the skin into the appropriate area as directed 3 (Three) Times a Day Before Meals. PT TAKES PER SLIDING SCALE STARTING AT 3 UNITS FOR BLOOD SUGAR 150-200     • isosorbide mononitrate (IMDUR) 60 MG 24 hr tablet Take 1 tablet by mouth Every Morning. 90 tablet 3   • Lancets (ONETOUCH ULTRASOFT) lancets      • Lancets Misc. (Accu-Chek Softclix Lancet Dev) kit      • mirtazapine (REMERON) 15 MG tablet Take 15 mg by mouth.     • Morphine Sulfate ER 30 MG tablet extended-release 12 hour Take 30 mg by mouth 2 (Two) Times a Day.     • naloxone (Narcan) 4 MG/0.1ML nasal spray 1 spray into the nostril(s) as directed by provider As Needed (opioid overdose) for up to 2 doses. 1 each 1   • propranolol (INDERAL) 20 MG tablet Take 20 mg by mouth 2 (Two) Times a Day. Take 10mg at 1:30, take 20mg bid     • ranolazine (Ranexa) 500 MG 12 hr tablet Take 1 tablet by mouth 2 (Two) Times a Day. 180 tablet 3   • vitamin D (ERGOCALCIFEROL) 14910 UNITS capsule capsule Take 50,000 Units by mouth. Two times monthly     • warfarin (COUMADIN) 5 MG tablet Patient takes 5 mg every day except M and F when he takes 7.5 mg 100 tablet 1   • zolpidem (AMBIEN) 5 MG tablet        No current facility-administered medications for this visit.         OBJECTIVE    Pulse 81   Ht 180.3 cm (71\")   Wt 136 kg (299 lb 3.2 oz)   SpO2 96%   BMI 41.73 kg/m²       Review of Systems   Constitutional:  Denies recent weight loss, weight gain, fever or chills, no change in exercise tolerance  Musculoskeletal: Toe pain.   Skin:  Thickened nails. Shin discoloration.  Neurological:  Burning sensations to feet b/l.  Psychiatric/Behavioral: Denies depression      Physical Exam    Clint had a diabetic foot exam performed today.      Constitutional: he appears well-developed and well-nourished.   HEENT: Normocephalic. Atraumatic  CV: No tenderness. RRR  Resp: Non-labored respiration. No wheezes.   Psychiatric: he has a normal " mood and affect. his   behavior is normal.      Lower Extremity Exam:  Vascular: DP/PT pulses palpable 1+.   Negative hair growth.   1+ perimalleolar edema  Toes cool  Neuro: Protective sensation diminished to lesser toes, b/l.  DTRs intact  Integument: No wounds  No lesions  Atrophic skin noted b/l with chronic venous stasis dermatitis changes  Mild xerosis b/l  No masses  Musculoskeletal: LE muscle strength 4/5 on left, 3/5 on right  Gait assisted with cane, wheelchair  Semi-rigid hammertoe deformity toes 2-5 b/l.  Nails 1-5 b/l thickened, elongated with subungual debris. +pain on palpation              ASSESSMENT AND PLAN    Diagnoses and all orders for this visit:    1. Diabetic polyneuropathy associated with type 2 diabetes mellitus (HCC) (Primary)    2. Onychomycosis      -Comprehensive DM foot exam performed. Patient educated on importance of tight glucose control and daily foot checks.   -Patient educated on padding techniques for hammertoes.  Proper extra depth diabetic shoe gear.  Limit barefoot walking.  -Nails 1-5 b/l debrided in length and thickness with nail nipper to decrease pain, fungal load and risk of infection  -Follow up 3 months PRN            This document has been electronically signed by Jose C Novak DPM on June 14, 2022 11:29 CDT     EMR Dragon/Transcription disclaimer:   Much of this encounter note is an electronic transcription/translation of spoken language to printed text. The electronic translation of spoken language may permit erroneous, or at times, nonsensical words or phrases to be inadvertently transcribed; Although I have reviewed the note for such errors, some may still exist.    Jose C Novak DPM  6/14/2022  11:29 CDT

## 2022-06-14 NOTE — PROGRESS NOTES
Pt denies med changes or bleeding problems. Pt instructed on dosing and appt made for 2 weeks; pt verbalized. Patient instructed regarding medication; results given and questions answered. Nutritional counseling given.  Dietary factors affecting therapy addressed.  Patient instructed to monitor for excessive bruising or bleeding. Findings reported by Ketty Moscoso RN.    Today's INR is Lab Results - Last 18 Months   Lab Units 06/14/22  0000   INR  2.40*

## 2022-06-28 ENCOUNTER — ANTICOAGULATION VISIT (OUTPATIENT)
Dept: CARDIAC SURGERY | Facility: CLINIC | Age: 67
End: 2022-06-28

## 2022-06-28 VITALS — HEART RATE: 63 BPM | WEIGHT: 295 LBS | OXYGEN SATURATION: 96 % | BODY MASS INDEX: 41.14 KG/M2

## 2022-06-28 DIAGNOSIS — Z79.01 LONG TERM (CURRENT) USE OF ANTICOAGULANTS: Primary | ICD-10-CM

## 2022-06-28 DIAGNOSIS — Z51.81 ENCOUNTER FOR THERAPEUTIC DRUG MONITORING: ICD-10-CM

## 2022-06-28 DIAGNOSIS — Z86.711 HISTORY OF PULMONARY EMBOLISM: ICD-10-CM

## 2022-06-28 LAB — INR PPP: 3.1 (ref 0.9–1.1)

## 2022-06-28 PROCEDURE — 36416 COLLJ CAPILLARY BLOOD SPEC: CPT | Performed by: NURSE PRACTITIONER

## 2022-06-28 PROCEDURE — 93793 ANTICOAG MGMT PT WARFARIN: CPT | Performed by: NURSE PRACTITIONER

## 2022-06-28 PROCEDURE — 85610 PROTHROMBIN TIME: CPT | Performed by: NURSE PRACTITIONER

## 2022-06-28 RX ORDER — MIRTAZAPINE 15 MG/1
15 TABLET, FILM COATED ORAL NIGHTLY
COMMUNITY
End: 2023-01-31 | Stop reason: ALTCHOICE

## 2022-06-28 RX ORDER — BACLOFEN 10 MG/1
10 TABLET ORAL 3 TIMES DAILY
COMMUNITY

## 2022-06-28 NOTE — PROGRESS NOTES
Pt is now taking Mirtazapine (moderate increase) and Baclofen. Pt denies bleeding problems. Dose adjusted and pt instructed to have a cup serving of broccoli today; pt verbalized. Patient instructed regarding medication; results given and questions answered. Nutritional counseling given.  Dietary factors affecting therapy addressed.  Patient instructed to monitor for excessive bruising or bleeding. Will recheck next week. Findings reported by Ketty Moscoso RN.    Today's INR is Lab Results - Last 18 Months   Lab Units 06/28/22  0000   INR  3.10*

## 2022-07-07 ENCOUNTER — ANTICOAGULATION VISIT (OUTPATIENT)
Dept: CARDIAC SURGERY | Facility: CLINIC | Age: 67
End: 2022-07-07

## 2022-07-07 VITALS — HEART RATE: 80 BPM | WEIGHT: 295.4 LBS | OXYGEN SATURATION: 95 % | BODY MASS INDEX: 41.2 KG/M2

## 2022-07-07 DIAGNOSIS — Z86.711 HISTORY OF PULMONARY EMBOLISM: ICD-10-CM

## 2022-07-07 DIAGNOSIS — Z79.01 LONG TERM (CURRENT) USE OF ANTICOAGULANTS: Primary | ICD-10-CM

## 2022-07-07 DIAGNOSIS — Z51.81 ENCOUNTER FOR THERAPEUTIC DRUG MONITORING: ICD-10-CM

## 2022-07-07 LAB — INR PPP: 1.9 (ref 0.9–1.1)

## 2022-07-07 PROCEDURE — 93793 ANTICOAG MGMT PT WARFARIN: CPT | Performed by: NURSE PRACTITIONER

## 2022-07-07 PROCEDURE — 36416 COLLJ CAPILLARY BLOOD SPEC: CPT | Performed by: NURSE PRACTITIONER

## 2022-07-07 PROCEDURE — 85610 PROTHROMBIN TIME: CPT | Performed by: NURSE PRACTITIONER

## 2022-07-07 NOTE — PROGRESS NOTES
Pt continues Mirtazapine. Pt denies other med changes or bleeding problems. Pt instructed on dosing and to hold green veggies 3 days then resume once weekly; pt verbalized. Patient instructed regarding medication; results given and questions answered. Nutritional counseling given.  Dietary factors affecting therapy addressed.  Patient instructed to monitor for excessive bruising or bleeding. Will recheck in 12 days. Findings reported by Ketty Moscoso RN.    Today's INR is Lab Results - Last 18 Months   Lab Units 07/07/22  0000   INR  1.90*

## 2022-07-12 ENCOUNTER — ANTICOAGULATION VISIT (OUTPATIENT)
Dept: CARDIAC SURGERY | Facility: CLINIC | Age: 67
End: 2022-07-12

## 2022-07-12 VITALS — OXYGEN SATURATION: 93 % | HEART RATE: 75 BPM

## 2022-07-12 DIAGNOSIS — Z51.81 ENCOUNTER FOR THERAPEUTIC DRUG MONITORING: ICD-10-CM

## 2022-07-12 DIAGNOSIS — Z86.711 HISTORY OF PULMONARY EMBOLISM: ICD-10-CM

## 2022-07-12 DIAGNOSIS — Z79.01 LONG TERM (CURRENT) USE OF ANTICOAGULANTS: Primary | ICD-10-CM

## 2022-07-12 LAB — INR PPP: 1.8 (ref 0.9–1.1)

## 2022-07-12 PROCEDURE — 85610 PROTHROMBIN TIME: CPT | Performed by: NURSE PRACTITIONER

## 2022-07-12 PROCEDURE — 36416 COLLJ CAPILLARY BLOOD SPEC: CPT | Performed by: NURSE PRACTITIONER

## 2022-07-12 PROCEDURE — 93793 ANTICOAG MGMT PT WARFARIN: CPT | Performed by: NURSE PRACTITIONER

## 2022-07-12 NOTE — PROGRESS NOTES
Pt here a week early. Pt denies med changes or bleeding problems. Dose adjusted and pt instructed to hold green veggies several days; pt verbalized.  Patient instructed regarding medication; results given and questions answered. Nutritional counseling given.  Dietary factors affecting therapy addressed.  Patient instructed to monitor for excessive bruising or bleeding. Will recheck in 2 weeks. Findings reported by Ketty Moscoso RN.    Today's INR is Lab Results - Last 18 Months   Lab Units 07/12/22  0000   INR  1.80*

## 2022-07-25 ENCOUNTER — OFFICE VISIT (OUTPATIENT)
Dept: WOUND CARE | Facility: HOSPITAL | Age: 67
End: 2022-07-25

## 2022-07-25 PROCEDURE — G0463 HOSPITAL OUTPT CLINIC VISIT: HCPCS

## 2022-07-26 ENCOUNTER — ANTICOAGULATION VISIT (OUTPATIENT)
Dept: CARDIAC SURGERY | Facility: CLINIC | Age: 67
End: 2022-07-26

## 2022-07-26 VITALS — OXYGEN SATURATION: 98 % | HEART RATE: 67 BPM

## 2022-07-26 DIAGNOSIS — Z51.81 ENCOUNTER FOR THERAPEUTIC DRUG MONITORING: ICD-10-CM

## 2022-07-26 DIAGNOSIS — Z86.711 HISTORY OF PULMONARY EMBOLISM: ICD-10-CM

## 2022-07-26 DIAGNOSIS — Z79.01 LONG TERM (CURRENT) USE OF ANTICOAGULANTS: Primary | ICD-10-CM

## 2022-07-26 LAB — INR PPP: 2.5 (ref 0.9–1.1)

## 2022-07-26 PROCEDURE — 85610 PROTHROMBIN TIME: CPT | Performed by: NURSE PRACTITIONER

## 2022-07-26 PROCEDURE — 93793 ANTICOAG MGMT PT WARFARIN: CPT | Performed by: NURSE PRACTITIONER

## 2022-07-26 PROCEDURE — 36416 COLLJ CAPILLARY BLOOD SPEC: CPT | Performed by: NURSE PRACTITIONER

## 2022-07-26 NOTE — PROGRESS NOTES
Patient states no med changes or bleeding problems or unexplained bruising. Patient instructed to continue current dosing schedule. Verbalizes understanding. Will recheck in 3 weeks. Patient instructed regarding medication; results given and questions answered. Nutritional counseling given.  Dietary factors affecting therapy addressed.  Patient instructed to monitor for excessive bruising or bleeding. Findings reported by Ketty Moscoso RN.    Today's INR is Lab Results - Last 18 Months   Lab Units 07/26/22  0000   INR  2.50*

## 2022-07-29 ENCOUNTER — TRANSCRIBE ORDERS (OUTPATIENT)
Dept: ADMINISTRATIVE | Facility: HOSPITAL | Age: 67
End: 2022-07-29

## 2022-07-29 DIAGNOSIS — R60.0 LEG EDEMA: Primary | ICD-10-CM

## 2022-07-29 DIAGNOSIS — I73.9 PERIPHERAL VASCULAR DISEASE: ICD-10-CM

## 2022-08-03 ENCOUNTER — OFFICE VISIT (OUTPATIENT)
Dept: WOUND CARE | Facility: HOSPITAL | Age: 67
End: 2022-08-03

## 2022-08-04 RX ORDER — WARFARIN SODIUM 5 MG/1
TABLET ORAL
Qty: 100 TABLET | Refills: 1 | Status: SHIPPED | OUTPATIENT
Start: 2022-08-04 | End: 2023-01-30 | Stop reason: SDUPTHER

## 2022-08-08 ENCOUNTER — OFFICE VISIT (OUTPATIENT)
Dept: WOUND CARE | Facility: HOSPITAL | Age: 67
End: 2022-08-08

## 2022-08-15 ENCOUNTER — OFFICE VISIT (OUTPATIENT)
Dept: WOUND CARE | Facility: HOSPITAL | Age: 67
End: 2022-08-15

## 2022-08-16 ENCOUNTER — ANTICOAGULATION VISIT (OUTPATIENT)
Dept: CARDIAC SURGERY | Facility: CLINIC | Age: 67
End: 2022-08-16

## 2022-08-16 VITALS — HEART RATE: 69 BPM | OXYGEN SATURATION: 100 %

## 2022-08-16 DIAGNOSIS — Z51.81 ENCOUNTER FOR THERAPEUTIC DRUG MONITORING: ICD-10-CM

## 2022-08-16 DIAGNOSIS — Z79.01 LONG TERM (CURRENT) USE OF ANTICOAGULANTS: Primary | ICD-10-CM

## 2022-08-16 DIAGNOSIS — Z86.711 HISTORY OF PULMONARY EMBOLISM: ICD-10-CM

## 2022-08-16 LAB — INR PPP: 2.1 (ref 0.9–1.1)

## 2022-08-16 PROCEDURE — 85610 PROTHROMBIN TIME: CPT | Performed by: NURSE PRACTITIONER

## 2022-08-16 PROCEDURE — 36416 COLLJ CAPILLARY BLOOD SPEC: CPT | Performed by: NURSE PRACTITIONER

## 2022-08-16 PROCEDURE — 93793 ANTICOAG MGMT PT WARFARIN: CPT | Performed by: NURSE PRACTITIONER

## 2022-08-16 NOTE — PROGRESS NOTES
Pt has started weaning Mirtazapine (moderate increase). Pt states he will be off of it by the end of the week. Pt denied bleeding problems. Dose adjusted and pt verbalized an understanding. Patient instructed regarding medication; results given and questions answered. Nutritional counseling given.  Dietary factors affecting therapy addressed.  Patient instructed to monitor for excessive bruising or bleeding. Will recheck in 2 weeks.  Findings reported by Ketty Moscoso RN.    Today's INR is Lab Results - Last 18 Months   Lab Units 08/16/22  0000   INR  2.10*

## 2022-08-22 ENCOUNTER — OFFICE VISIT (OUTPATIENT)
Dept: WOUND CARE | Facility: HOSPITAL | Age: 67
End: 2022-08-22

## 2022-08-29 ENCOUNTER — OFFICE VISIT (OUTPATIENT)
Dept: WOUND CARE | Facility: HOSPITAL | Age: 67
End: 2022-08-29

## 2022-08-30 ENCOUNTER — ANTICOAGULATION VISIT (OUTPATIENT)
Dept: CARDIAC SURGERY | Facility: CLINIC | Age: 67
End: 2022-08-30

## 2022-08-30 VITALS — OXYGEN SATURATION: 98 % | HEART RATE: 61 BPM

## 2022-08-30 DIAGNOSIS — Z86.711 HISTORY OF PULMONARY EMBOLISM: ICD-10-CM

## 2022-08-30 DIAGNOSIS — Z51.81 ENCOUNTER FOR THERAPEUTIC DRUG MONITORING: ICD-10-CM

## 2022-08-30 DIAGNOSIS — Z79.01 LONG TERM (CURRENT) USE OF ANTICOAGULANTS: Primary | ICD-10-CM

## 2022-08-30 LAB — INR PPP: 2.5 (ref 0.9–1.1)

## 2022-08-30 PROCEDURE — 93793 ANTICOAG MGMT PT WARFARIN: CPT | Performed by: NURSE PRACTITIONER

## 2022-08-30 PROCEDURE — 36416 COLLJ CAPILLARY BLOOD SPEC: CPT | Performed by: NURSE PRACTITIONER

## 2022-08-30 PROCEDURE — 85610 PROTHROMBIN TIME: CPT | Performed by: NURSE PRACTITIONER

## 2022-08-30 NOTE — PROGRESS NOTES
Pt states no meds changes or bleeding problems or unexplained bruising. Pt instructed to continue current dosage schedule and continue current diet. Pt verbalizes understanding. Will recheck in 2.5 wks.  If therapeutic, can place out 1 month. Patient instructed regarding medication; results given and questions answered. Nutritional counseling given.  Dietary factors affecting therapy addressed.  Patient instructed to monitor for excessive bruising or bleeding.  Findings reported by Sandee Lugo RN  Today's INR is Lab Results - Last 18 Months   Lab Units 08/30/22  0000   INR  2.50*

## 2022-09-06 ENCOUNTER — OFFICE VISIT (OUTPATIENT)
Dept: WOUND CARE | Facility: HOSPITAL | Age: 67
End: 2022-09-06

## 2022-09-13 ENCOUNTER — OFFICE VISIT (OUTPATIENT)
Dept: WOUND CARE | Facility: HOSPITAL | Age: 67
End: 2022-09-13

## 2022-09-15 ENCOUNTER — OFFICE VISIT (OUTPATIENT)
Dept: PODIATRY | Facility: CLINIC | Age: 67
End: 2022-09-15

## 2022-09-15 ENCOUNTER — ANTICOAGULATION VISIT (OUTPATIENT)
Dept: CARDIAC SURGERY | Facility: CLINIC | Age: 67
End: 2022-09-15

## 2022-09-15 VITALS — OXYGEN SATURATION: 99 % | HEIGHT: 71 IN | WEIGHT: 295 LBS | BODY MASS INDEX: 41.3 KG/M2 | HEART RATE: 77 BPM

## 2022-09-15 VITALS — HEART RATE: 77 BPM | OXYGEN SATURATION: 97 %

## 2022-09-15 DIAGNOSIS — Z51.81 ENCOUNTER FOR THERAPEUTIC DRUG MONITORING: ICD-10-CM

## 2022-09-15 DIAGNOSIS — T14.8XXA DEEP TISSUE INJURY: ICD-10-CM

## 2022-09-15 DIAGNOSIS — Z86.711 HISTORY OF PULMONARY EMBOLISM: ICD-10-CM

## 2022-09-15 DIAGNOSIS — Z79.01 LONG TERM (CURRENT) USE OF ANTICOAGULANTS: Primary | ICD-10-CM

## 2022-09-15 DIAGNOSIS — E11.42 DIABETIC POLYNEUROPATHY ASSOCIATED WITH TYPE 2 DIABETES MELLITUS: Primary | ICD-10-CM

## 2022-09-15 DIAGNOSIS — B35.1 ONYCHOMYCOSIS: ICD-10-CM

## 2022-09-15 DIAGNOSIS — M79.675 PAIN IN TOES OF BOTH FEET: ICD-10-CM

## 2022-09-15 DIAGNOSIS — M79.674 PAIN IN TOES OF BOTH FEET: ICD-10-CM

## 2022-09-15 LAB — INR PPP: 2.8 (ref 0.9–1.1)

## 2022-09-15 PROCEDURE — 85610 PROTHROMBIN TIME: CPT | Performed by: NURSE PRACTITIONER

## 2022-09-15 PROCEDURE — 36416 COLLJ CAPILLARY BLOOD SPEC: CPT | Performed by: NURSE PRACTITIONER

## 2022-09-15 PROCEDURE — 93793 ANTICOAG MGMT PT WARFARIN: CPT | Performed by: NURSE PRACTITIONER

## 2022-09-15 PROCEDURE — 11721 DEBRIDE NAIL 6 OR MORE: CPT | Performed by: PODIATRIST

## 2022-09-15 NOTE — PROGRESS NOTES
Clint Mack  1955  66 y.o. male   BS- 129 per patient  PCP-Dr. Sushma Myers MD 8/30/2022    9/15/2022    Patient came to clinic for diabetic foot care.     Chief Complaint   Patient presents with   • Left Foot - Follow-up     Diabetic foot care    • Right Foot - Follow-up     Diabetic foot care            History of Present Illness    Clint Mack is a 66 y.o. male presents for f/u diabetic foot exam, nail care.  Currently seeing wound care for right heel ulceration.    Past Medical History:   Diagnosis Date   • Anxiety    • Arthritis     osteoarthritis   • CKD (chronic kidney disease), stage III (HCC)    • COPD (chronic obstructive pulmonary disease) (HCC)    • Coronary artery disease    • Depression    • Diabetes mellitus (HCC)    • DVT (deep venous thrombosis) (HCC)    • Heart disease    • History of embolic filter insertion    • History of stomach ulcers    • Hyperlipidemia    • Hypertension    • Injury of back     back surgery x3    • Lupus (HCC)    • LUCIANA on CPAP    • Pulmonary embolism (HCC)          Past Surgical History:   Procedure Laterality Date   • BACK SURGERY     • CARDIAC SURGERY  2001   • CIRCUMCISION     • COLONOSCOPY N/A 9/11/2018    Procedure: COLONOSCOPY;  Surgeon: Jose C Batres DO;  Location: [facility] ENDOSCOPY;  Service: Gastroenterology   • COLONOSCOPY N/A 6/3/2021    Procedure: COLONOSCOPY;  Surgeon: Jose C Batres DO;  Location: [facility] ENDOSCOPY;  Service: Gastroenterology;  Laterality: N/A;   • ENDOSCOPY N/A 9/11/2018    Procedure: ESOPHAGOGASTRODUODENOSCOPY;  Surgeon: Jose C Batres DO;  Location: [facility] ENDOSCOPY;  Service: Gastroenterology   • ENDOSCOPY N/A 3/3/2020    Procedure: ESOPHAGOGASTRODUODENOSCOPY;  Surgeon: Jose C Batres DO;  Location: [facility] ENDOSCOPY;  Service: Gastroenterology;  Laterality: N/A;   • ENDOSCOPY N/A 6/3/2021    Procedure: ESOPHAGOGASTRODUODENOSCOPY;  Surgeon: Jose C Batres DO;  Location: [facility] ENDOSCOPY;  Service:  "Gastroenterology;  Laterality: N/A;   • GALLBLADDER SURGERY  2000   • HIP SURGERY     • JOINT REPLACEMENT Right 05/02/2016    hip   • PROSTATE SURGERY  12/2021    UROLIFT (METAL IMPLANT)   • SPINE SURGERY           Family History   Problem Relation Age of Onset   • Heart disease Father    • Hypertension Father    • Stroke Father    • Diabetes Sister    • Heart disease Brother    • Hypertension Brother    • COPD Brother    • Lung disease Other          Social History     Socioeconomic History   • Marital status:    Tobacco Use   • Smoking status: Never Smoker   • Smokeless tobacco: Never Used   Vaping Use   • Vaping Use: Never used   Substance and Sexual Activity   • Alcohol use: No   • Drug use: No   • Sexual activity: Defer         Current Outpatient Medications   Medication Sig Dispense Refill   • aspirin 81 MG chewable tablet Chew 81 mg Take As Directed. Take 1 pill by mouth Monday, Wednesday, Friday     • B Complex Vitamins (VITAMIN B COMPLEX PO) Take 1,000 mcg by mouth Daily.     • baclofen (LIORESAL) 10 MG tablet Take 10 mg by mouth 3 (Three) Times a Day.     • Blood Glucose Monitoring Suppl (ACCU-CHEK ARGELIA PLUS) w/Device kit      • clonazePAM (KlonoPIN) 0.5 MG tablet Take 0.75 mg by mouth.     • clonazePAM (KlonoPIN) 1 MG tablet Take 1 mg by mouth 3 (Three) Times a Day.     • COMFORT ASSIST INSULIN SYRINGE 31G X 5/16\" 0.3 ML misc      • cyanocobalamin (VITAMIN B-12) 1000 MCG tablet Take 1,000 mcg by mouth Daily.     • EPINEPHrine (EPIPEN) 0.3 MG/0.3ML solution auto-injector injection      • Evolocumab (REPATHA) solution auto-injector SureClick injection Inject 1 mL under the skin into the appropriate area as directed Every 14 (Fourteen) Days. 1 mL 11   • furosemide (LASIX) 20 MG tablet Take 20 mg by mouth Daily.     • gabapentin (NEURONTIN) 100 MG capsule Take 100 mg by mouth 2 (Two) Times a Day.     • glimepiride (AMARYL) 4 MG tablet Take 4 mg by mouth 2 (Two) Times a Day.     • glucose blood test " "strip      • HYDROcodone-acetaminophen (NORCO) 7.5-325 MG per tablet Take 1 tablet by mouth 3 (Three) Times a Day.     • insulin aspart (novoLOG) 100 UNIT/ML injection Inject  under the skin into the appropriate area as directed 3 (Three) Times a Day Before Meals. PT TAKES PER SLIDING SCALE STARTING AT 3 UNITS FOR BLOOD SUGAR 150-200     • isosorbide mononitrate (IMDUR) 60 MG 24 hr tablet Take 1 tablet by mouth Every Morning. 90 tablet 3   • Lancets (ONETOUCH ULTRASOFT) lancets      • Lancets Misc. (Accu-Chek Softclix Lancet Dev) kit      • mirtazapine (REMERON) 7.5 MG half tablet Take 15 mg by mouth Every Night.     • Morphine Sulfate ER 30 MG tablet extended-release 12 hour Take 30 mg by mouth 2 (Two) Times a Day.     • naloxone (Narcan) 4 MG/0.1ML nasal spray 1 spray into the nostril(s) as directed by provider As Needed (opioid overdose) for up to 2 doses. 1 each 1   • propranolol (INDERAL) 20 MG tablet Take 20 mg by mouth 2 (Two) Times a Day. Take 10mg at 1:30, take 20mg bid     • ranolazine (Ranexa) 500 MG 12 hr tablet Take 1 tablet by mouth 2 (Two) Times a Day. 180 tablet 3   • vitamin D (ERGOCALCIFEROL) 14091 UNITS capsule capsule Take 50,000 Units by mouth. Two times monthly     • warfarin (COUMADIN) 5 MG tablet Patient takes 5 mg every nightly except on Tuesday take 7.5mg OR AS DIRECTED 100 tablet 1   • zolpidem (AMBIEN) 5 MG tablet        No current facility-administered medications for this visit.         OBJECTIVE    Pulse 77   Ht 180.3 cm (71\")   Wt 134 kg (295 lb)   SpO2 99%   BMI 41.14 kg/m²       Review of Systems   Constitutional:  Denies recent weight loss, weight gain, fever or chills, no change in exercise tolerance  Musculoskeletal: Toe pain.   Skin:  Thickened nails. Shin discoloration.  Neurological:  Burning sensations to feet b/l.  Psychiatric/Behavioral: Denies depression      Physical Exam    Clint had a diabetic foot exam performed today.      Constitutional: he appears " well-developed and well-nourished.   HEENT: Normocephalic. Atraumatic  CV: No tenderness. RRR  Resp: Non-labored respiration. No wheezes.   Psychiatric: he has a normal mood and affect. his   behavior is normal.      Lower Extremity Exam:  Vascular: DP/PT pulses palpable 1+.   Negative hair growth.   1+ perimalleolar edema  Toes cool  Neuro: Protective sensation diminished to lesser toes, b/l.  DTRs intact  Integument: Right heel wrapped in compressive dressing.  No lesions  Atrophic skin noted b/l with chronic venous stasis dermatitis changes  Mild xerosis b/l  No masses  Musculoskeletal: LE muscle strength 4/5 on left, 3/5 on right  Gait assisted with cane, wheelchair  Semi-rigid hammertoe deformity toes 2-5 b/l.  Nails 1-5 b/l thickened, elongated with subungual debris. +pain on palpation              ASSESSMENT AND PLAN    Diagnoses and all orders for this visit:    1. Diabetic polyneuropathy associated with type 2 diabetes mellitus (HCC) (Primary)    2. Onychomycosis    3. Pain in toes of both feet    4. Deep tissue injury      -Comprehensive DM foot exam performed. Patient educated on importance of tight glucose control and daily foot checks.   -Patient educated on padding techniques for hammertoes.  Proper extra depth diabetic shoe gear.  Limit barefoot walking.  -Nails 1-5 b/l debrided in length and thickness with nail nipper to decrease pain, fungal load and risk of infection  -Follow up 3 months PRN            This document has been electronically signed by Jose C Novak DPM on September 15, 2022 09:24 CDT     EMR Dragon/Transcription disclaimer:   Much of this encounter note is an electronic transcription/translation of spoken language to printed text. The electronic translation of spoken language may permit erroneous, or at times, nonsensical words or phrases to be inadvertently transcribed; Although I have reviewed the note for such errors, some may still exist.    Jose C Novak  KARIN  9/15/2022  09:24 CDT

## 2022-09-15 NOTE — PROGRESS NOTES
Patient states no med changes or bleeding problems or unexplained bruising. Patient instructed to continue current dosing schedule. Verbalizes understanding. Will recheck 1 month.  Patient instructed regarding medication; results given and questions answered. Nutritional counseling given.  Dietary factors affecting therapy addressed.  Patient instructed to monitor for excessive bruising or bleeding. Findings reported by Ketty Moscoso RN.    Today's INR is Lab Results - Last 18 Months   Lab Units 09/15/22  0000   INR  2.80*

## 2022-09-19 ENCOUNTER — OFFICE VISIT (OUTPATIENT)
Dept: WOUND CARE | Facility: HOSPITAL | Age: 67
End: 2022-09-19

## 2022-09-26 ENCOUNTER — OFFICE VISIT (OUTPATIENT)
Dept: WOUND CARE | Facility: HOSPITAL | Age: 67
End: 2022-09-26

## 2022-10-03 ENCOUNTER — OFFICE VISIT (OUTPATIENT)
Dept: WOUND CARE | Facility: HOSPITAL | Age: 67
End: 2022-10-03

## 2022-10-11 ENCOUNTER — ANTICOAGULATION VISIT (OUTPATIENT)
Dept: CARDIAC SURGERY | Facility: CLINIC | Age: 67
End: 2022-10-11

## 2022-10-11 VITALS
BODY MASS INDEX: 41.12 KG/M2 | WEIGHT: 294.8 LBS | OXYGEN SATURATION: 97 % | HEART RATE: 80 BPM | SYSTOLIC BLOOD PRESSURE: 118 MMHG | DIASTOLIC BLOOD PRESSURE: 69 MMHG

## 2022-10-11 DIAGNOSIS — Z79.01 LONG TERM (CURRENT) USE OF ANTICOAGULANTS: Primary | ICD-10-CM

## 2022-10-11 DIAGNOSIS — Z86.711 HISTORY OF PULMONARY EMBOLISM: ICD-10-CM

## 2022-10-11 DIAGNOSIS — Z51.81 ENCOUNTER FOR THERAPEUTIC DRUG MONITORING: ICD-10-CM

## 2022-10-11 LAB — INR PPP: 3.3 (ref 0.9–1.1)

## 2022-10-11 PROCEDURE — 93793 ANTICOAG MGMT PT WARFARIN: CPT | Performed by: NURSE PRACTITIONER

## 2022-10-11 PROCEDURE — 36416 COLLJ CAPILLARY BLOOD SPEC: CPT | Performed by: NURSE PRACTITIONER

## 2022-10-11 PROCEDURE — 85610 PROTHROMBIN TIME: CPT | Performed by: NURSE PRACTITIONER

## 2022-10-11 NOTE — PROGRESS NOTES
Pt denies med changes or bleeding problems. Pt states he hasn't eaten green veggies in a week. Pt states he would like to continue eating green veggies he was just worried about having too much. Dose adjusted today only and pt instructed to have a serving of green veggies today ans then back to twice weekly; pt verbalized. Patient instructed regarding medication; results given and questions answered. Nutritional counseling given.  Dietary factors affecting therapy addressed.  Patient instructed to monitor for excessive bruising or bleeding. Will recheck in 2 weeks. Findings reported by Ketty Moscoso RN.    Today's INR is Lab Results - Last 18 Months   Lab Units 10/11/22  0000   INR  3.30*

## 2022-10-12 ENCOUNTER — OFFICE VISIT (OUTPATIENT)
Dept: WOUND CARE | Facility: HOSPITAL | Age: 67
End: 2022-10-12

## 2022-10-12 PROCEDURE — G0463 HOSPITAL OUTPT CLINIC VISIT: HCPCS

## 2022-10-17 ENCOUNTER — OFFICE VISIT (OUTPATIENT)
Dept: WOUND CARE | Facility: HOSPITAL | Age: 67
End: 2022-10-17

## 2022-10-17 ENCOUNTER — TELEPHONE (OUTPATIENT)
Dept: CARDIOLOGY | Facility: CLINIC | Age: 67
End: 2022-10-17

## 2022-10-17 RX ORDER — ISOSORBIDE MONONITRATE 60 MG/1
60 TABLET, EXTENDED RELEASE ORAL EVERY MORNING
Qty: 90 TABLET | Refills: 3 | Status: SHIPPED | OUTPATIENT
Start: 2022-10-17

## 2022-10-17 RX ORDER — RANOLAZINE 500 MG/1
500 TABLET, EXTENDED RELEASE ORAL 2 TIMES DAILY
Qty: 180 TABLET | Refills: 3 | Status: SHIPPED | OUTPATIENT
Start: 2022-10-17

## 2022-10-17 RX ORDER — METOPROLOL SUCCINATE 50 MG/1
50 TABLET, EXTENDED RELEASE ORAL DAILY
Qty: 90 TABLET | Refills: 3 | Status: SHIPPED | OUTPATIENT
Start: 2022-10-17 | End: 2022-10-24 | Stop reason: ALTCHOICE

## 2022-10-17 NOTE — TELEPHONE ENCOUNTER
----- Message from Keke Kumar MD sent at 10/17/2022  9:00 AM CDT -----  Regarding: FW: refill/dr. helm pt  Contact: [phone]  You can go ahead and refill.  He was on metoprolol succinate.  He usually knows all the medicines that he is taking.  ----- Message -----  From: Epley, Kendall, MA  Sent: 10/17/2022   8:56 AM CDT  To: Keke Kumar MD  Subject: FW: refill/dr. helm pt                           Okay to refill all? I looked at list but couldn't find the metoprolol.    ----- Message -----  From: Nova Wiggins  Sent: 10/17/2022   8:18 AM CDT  To: [facility] [place]  Subject: refill/dr. helm pt                               Clint Mack dob [birth year] wanted a refill on his Isosorbide mononitrate 60 mg, Ranolazine 500 mg, Metoprolol succinate 50 mg, and Repatha. He want a 90 day supply @ [place], [place]. Thxs

## 2022-10-24 ENCOUNTER — ANTICOAGULATION VISIT (OUTPATIENT)
Dept: CARDIAC SURGERY | Facility: CLINIC | Age: 67
End: 2022-10-24

## 2022-10-24 ENCOUNTER — OFFICE VISIT (OUTPATIENT)
Dept: WOUND CARE | Facility: HOSPITAL | Age: 67
End: 2022-10-24

## 2022-10-24 VITALS — OXYGEN SATURATION: 100 % | HEART RATE: 73 BPM

## 2022-10-24 DIAGNOSIS — Z79.01 LONG TERM (CURRENT) USE OF ANTICOAGULANTS: Primary | ICD-10-CM

## 2022-10-24 DIAGNOSIS — Z51.81 ENCOUNTER FOR THERAPEUTIC DRUG MONITORING: ICD-10-CM

## 2022-10-24 DIAGNOSIS — Z86.711 HISTORY OF PULMONARY EMBOLISM: ICD-10-CM

## 2022-10-24 LAB — INR PPP: 2.9 (ref 0.9–1.1)

## 2022-10-24 PROCEDURE — 85610 PROTHROMBIN TIME: CPT | Performed by: NURSE PRACTITIONER

## 2022-10-24 PROCEDURE — 36416 COLLJ CAPILLARY BLOOD SPEC: CPT | Performed by: NURSE PRACTITIONER

## 2022-10-24 PROCEDURE — 93793 ANTICOAG MGMT PT WARFARIN: CPT | Performed by: NURSE PRACTITIONER

## 2022-10-24 NOTE — PROGRESS NOTES
Pt states no meds changes or bleeding problems or unexplained bruising. Pt instructed to continue current dosage schedule. Pt verbalizes understanding. Will recheck in 3 weeks. Patient instructed regarding medication; results given and questions answered. Nutritional counseling given.  Dietary factors affecting therapy addressed.  Patient instructed to monitor for excessive bruising or bleeding.  Findings reported by Sandee Lugo RN  Today's INR is Lab Results - Last 18 Months   Lab Units 10/24/22  0000   INR  2.90*

## 2022-11-01 ENCOUNTER — OFFICE VISIT (OUTPATIENT)
Dept: WOUND CARE | Facility: HOSPITAL | Age: 67
End: 2022-11-01

## 2022-11-01 PROCEDURE — 11055 PARING/CUTG B9 HYPRKER LES 1: CPT

## 2022-11-15 ENCOUNTER — OFFICE VISIT (OUTPATIENT)
Dept: CARDIOLOGY | Facility: CLINIC | Age: 67
End: 2022-11-15

## 2022-11-15 ENCOUNTER — ANTICOAGULATION VISIT (OUTPATIENT)
Dept: CARDIAC SURGERY | Facility: CLINIC | Age: 67
End: 2022-11-15

## 2022-11-15 VITALS — HEART RATE: 80 BPM | OXYGEN SATURATION: 95 % | BODY MASS INDEX: 40.73 KG/M2 | WEIGHT: 292 LBS

## 2022-11-15 VITALS
BODY MASS INDEX: 40.88 KG/M2 | HEIGHT: 71 IN | DIASTOLIC BLOOD PRESSURE: 68 MMHG | TEMPERATURE: 97.5 F | SYSTOLIC BLOOD PRESSURE: 120 MMHG | WEIGHT: 292 LBS | OXYGEN SATURATION: 95 % | HEART RATE: 94 BPM

## 2022-11-15 DIAGNOSIS — J96.11 CHRONIC RESPIRATORY FAILURE WITH HYPOXIA: ICD-10-CM

## 2022-11-15 DIAGNOSIS — Z86.711 HISTORY OF PULMONARY EMBOLISM: ICD-10-CM

## 2022-11-15 DIAGNOSIS — Z51.81 ENCOUNTER FOR THERAPEUTIC DRUG MONITORING: ICD-10-CM

## 2022-11-15 DIAGNOSIS — R42 DIZZINESS: ICD-10-CM

## 2022-11-15 DIAGNOSIS — Z79.01 LONG TERM (CURRENT) USE OF ANTICOAGULANTS: Primary | ICD-10-CM

## 2022-11-15 DIAGNOSIS — Z95.1 S/P CABG (CORONARY ARTERY BYPASS GRAFT): Primary | ICD-10-CM

## 2022-11-15 DIAGNOSIS — I10 PRIMARY HYPERTENSION: Chronic | ICD-10-CM

## 2022-11-15 DIAGNOSIS — E66.01 CLASS 3 SEVERE OBESITY DUE TO EXCESS CALORIES WITH SERIOUS COMORBIDITY AND BODY MASS INDEX (BMI) OF 40.0 TO 44.9 IN ADULT: ICD-10-CM

## 2022-11-15 DIAGNOSIS — E78.2 MIXED HYPERLIPIDEMIA: Chronic | ICD-10-CM

## 2022-11-15 LAB — INR PPP: 2.7 (ref 0.9–1.1)

## 2022-11-15 PROCEDURE — 36416 COLLJ CAPILLARY BLOOD SPEC: CPT | Performed by: NURSE PRACTITIONER

## 2022-11-15 PROCEDURE — 99214 OFFICE O/P EST MOD 30 MIN: CPT | Performed by: INTERNAL MEDICINE

## 2022-11-15 PROCEDURE — 85610 PROTHROMBIN TIME: CPT | Performed by: NURSE PRACTITIONER

## 2022-11-15 RX ORDER — LACTOBACILLUS ACIDOPHILUS 20B CELL
CAPSULE ORAL
COMMUNITY
Start: 2022-08-08 | End: 2023-01-31 | Stop reason: ALTCHOICE

## 2022-11-15 RX ORDER — CLINDAMYCIN HYDROCHLORIDE 300 MG/1
CAPSULE ORAL
COMMUNITY
Start: 2022-08-09 | End: 2023-01-31

## 2022-11-15 RX ORDER — FLURBIPROFEN SODIUM 0.3 MG/ML
SOLUTION/ DROPS OPHTHALMIC SEE ADMIN INSTRUCTIONS
COMMUNITY
Start: 2022-10-16

## 2022-11-15 RX ORDER — LIDOCAINE 50 MG/G
PATCH TOPICAL
COMMUNITY
Start: 2022-08-30 | End: 2023-01-31 | Stop reason: ALTCHOICE

## 2022-11-15 NOTE — PROGRESS NOTES
Pt here today seeing Dr Warren. Patient states no med changes or bleeding problems or unexplained bruising. Patient instructed to continue current dosing schedule. Verbalizes understanding. Will recheck in 5 weeks. Patient instructed regarding medication; results given and questions answered. Nutritional counseling given.  Dietary factors affecting therapy addressed.  Patient instructed to monitor for excessive bruising or bleeding. Findings reported by Ketty Moscoso RN.    Today's INR is Lab Results - Last 18 Months   Lab Units 11/15/22  0000   INR  2.70*

## 2022-11-15 NOTE — PROGRESS NOTES
Clint Mack  67 y.o. male      1. S/P CABG (coronary artery bypass graft)    2. Mixed hyperlipidemia    3. Primary hypertension    4. History of pulmonary embolism    5. Class 3 severe obesity due to excess calories with serious comorbidity and body mass index (BMI) of 40.0 to 44.9 in adult ([facility])    6. Chronic respiratory failure with hypoxia ([facility])        History of Present Illness  Mr. Mack is a 66-year-old male with hypertension, CAD s/p CABG, hyperlipidemia, DVT, pulmonary embolus, obstructive sleep apnea, obesity, tardive dyskinesia, abdominal aortic aneurysm, depression, anxiety, osteoarthritis, CKD3.    He was admitted to [facility] in January 2021 following an episode of angioedema, the reason for which was unclear.  On the day of admission he had taken Praluent injection a little later in the day when the episode occurred at night. The patient has fortunately tolerated Repatha quite well and his insurance will pay for it.      Echocardiogram in November 2021 showed:  · The quality of the study is limited due to limited views obtained and patient body habitus.  · Calculated left ventricular EF = 52% Estimated left ventricular EF was in agreement with the calculated left ventricular EF. Left ventricular systolic function is low normal.  · Left ventricular diastolic function is consistent with (grade Ia w/high LAP) impaired relaxation.  · Right ventricle not well visualized. The right ventricular cavity is mildly dilated.    He has done reasonably well from a cardiac standpoint denies any chest pain at this time.  He has occasional dizziness and wondered if he had any issues with the carotid arteries.    Allergies   Allergen Reactions   • Tetrabenazine Dizziness, Nausea And Vomiting, Nausea Only and Other (See Comments)     Other reaction(s): Vertigo   • Alfuzosin Other (See Comments)     syncope  Syncope   syncope  Syncope   syncope   • Deutetrabenazine Other (See Comments)      Insomnia    Insomnia  Insomnia   • Pregabalin Swelling     Leg swelling   • Fenofibrate Other (See Comments)   • Metoclopramide Other (See Comments)     Tardive dyskinesia  Tardive dyskinesia     • Phenobarbital Other (See Comments)     Other reaction(s): Other/Unknown (See Comments)     • Valbenazine Other (See Comments)   • Celexa [Citalopram Hydrobromide] Dystonia   • Citalopram Other (See Comments)     Other reaction(s): Dystonia  Tardive dyskinesia    Other reaction(s): Respiratory distress   • Crestor [Rosuvastatin Calcium] Myalgia   • Ingrezza [Valbenazine Tosylate] Other (See Comments)     fatigue   • Lipitor [Atorvastatin] Other (See Comments)     Aches and pains all over   • Rosuvastatin Other (See Comments)     Aches and pains all over  Aches and pains all over  Aches and pains all over         Past Medical History:   Diagnosis Date   • Anxiety    • Arthritis     osteoarthritis   • CKD (chronic kidney disease), stage III ([facility])    • COPD (chronic obstructive pulmonary disease) ([facility])    • Coronary artery disease    • Depression    • Diabetes mellitus ([facility])    • DVT (deep venous thrombosis) ([facility])    • Heart disease    • History of embolic filter insertion    • History of stomach ulcers    • Hyperlipidemia    • Hypertension    • Injury of back     back surgery x3    • Lupus (HCC)    • LUCIANA on CPAP    • Pulmonary embolism ([facility])          Past Surgical History:   Procedure Laterality Date   • BACK SURGERY     • CARDIAC SURGERY  2001   • CIRCUMCISION     • COLONOSCOPY N/A 9/11/2018    Procedure: COLONOSCOPY;  Surgeon: Jose C Batres DO;  Location: [facility] ENDOSCOPY;  Service: Gastroenterology   • COLONOSCOPY N/A 6/3/2021    Procedure: COLONOSCOPY;  Surgeon: Jose C Batres DO;  Location: [facility] ENDOSCOPY;  Service: Gastroenterology;  Laterality: N/A;   • ENDOSCOPY N/A 9/11/2018    Procedure: ESOPHAGOGASTRODUODENOSCOPY;  Surgeon: Jose C Batres DO;  Location: [facility] ENDOSCOPY;  Service: Gastroenterology  "  • ENDOSCOPY N/A 3/3/2020    Procedure: ESOPHAGOGASTRODUODENOSCOPY;  Surgeon: Jose C Batres DO;  Location: [facility] ENDOSCOPY;  Service: Gastroenterology;  Laterality: N/A;   • ENDOSCOPY N/A 6/3/2021    Procedure: ESOPHAGOGASTRODUODENOSCOPY;  Surgeon: Jose C Batres DO;  Location: [facility] ENDOSCOPY;  Service: Gastroenterology;  Laterality: N/A;   • GALLBLADDER SURGERY  2000   • HIP SURGERY     • JOINT REPLACEMENT Right 05/02/2016    hip   • PROSTATE SURGERY  12/2021    UROLIFT (METAL IMPLANT)   • SPINE SURGERY           Family History   Problem Relation Age of Onset   • Heart disease Father    • Hypertension Father    • Stroke Father    • Diabetes Sister    • Heart disease Brother    • Hypertension Brother    • COPD Brother    • Lung disease Other          Social History     Socioeconomic History   • Marital status:    Tobacco Use   • Smoking status: Never   • Smokeless tobacco: Never   Vaping Use   • Vaping Use: Never used   Substance and Sexual Activity   • Alcohol use: No   • Drug use: No   • Sexual activity: Defer         Current Outpatient Medications   Medication Sig Dispense Refill   • aspirin 81 MG chewable tablet Chew 81 mg Take As Directed. Take 1 pill by mouth Monday, Wednesday, Friday     • B Complex Vitamins (VITAMIN B COMPLEX PO) Take 1,000 mcg by mouth Daily.     • B-D UF III MINI PEN NEEDLES 31G X 5 MM misc See Admin Instructions.     • baclofen (LIORESAL) 10 MG tablet Take 10 mg by mouth 3 (Three) Times a Day.     • Blood Glucose Monitoring Suppl (ACCU-CHEK ARGELIA PLUS) w/Device kit      • clindamycin (CLEOCIN) 300 MG capsule      • clonazePAM (KlonoPIN) 1 MG tablet Take 1 mg by mouth 3 (Three) Times a Day.     • COMFORT ASSIST INSULIN SYRINGE 31G X 5/16\" 0.3 ML misc      • cyanocobalamin (VITAMIN B-12) 1000 MCG tablet Take 1,000 mcg by mouth Daily.     • EPINEPHrine (EPIPEN) 0.3 MG/0.3ML solution auto-injector injection      • Evolocumab (REPATHA) solution auto-injector SureClick " injection Inject 1 mL under the skin into the appropriate area as directed Every 14 (Fourteen) Days. 6 mL 3   • furosemide (LASIX) 20 MG tablet Take 20 mg by mouth Daily.     • gabapentin (NEURONTIN) 100 MG capsule Take 100 mg by mouth 2 (Two) Times a Day.     • glimepiride (AMARYL) 4 MG tablet Take 4 mg by mouth 2 (Two) Times a Day.     • glucose blood test strip      • HYDROcodone-acetaminophen (NORCO) 7.5-325 MG per tablet Take 1 tablet by mouth 3 (Three) Times a Day.     • insulin aspart (novoLOG) 100 UNIT/ML injection Inject  under the skin into the appropriate area as directed 3 (Three) Times a Day Before Meals. PT TAKES PER SLIDING SCALE STARTING AT 3 UNITS FOR BLOOD SUGAR 150-200     • isosorbide mononitrate (IMDUR) 60 MG 24 hr tablet Take 1 tablet by mouth Every Morning. 90 tablet 3   • Lactobacillus (Florajen Acidophilus) capsule      • Lancets (ONETOUCH ULTRASOFT) lancets      • Lancets Misc. (Accu-Chek Softclix Lancet Dev) kit      • lidocaine (LIDODERM) 5 % APPLY ON THE SKIN TO AFFECTED AREA FOR 12 HOURS AND REMOVE FOR 12 HOURS     • Metoprolol Succinate 50 MG capsule extended-release 24 hour sprinkle Take 50 mg by mouth Daily.     • mirtazapine (REMERON) 7.5 MG half tablet Take 15 mg by mouth Every Night.     • Morphine Sulfate ER 30 MG tablet extended-release 12 hour Take 30 mg by mouth 2 (Two) Times a Day.     • naloxone (Narcan) 4 MG/0.1ML nasal spray 1 spray into the nostril(s) as directed by provider As Needed (opioid overdose) for up to 2 doses. 1 each 1   • propranolol (INDERAL) 20 MG tablet Take 20 mg by mouth 2 (Two) Times a Day. Take 10mg at 1:30, take 20mg bid     • ranolazine (Ranexa) 500 MG 12 hr tablet Take 1 tablet by mouth 2 (Two) Times a Day. 180 tablet 3   • vitamin D (ERGOCALCIFEROL) 52924 UNITS capsule capsule Take 50,000 Units by mouth. Two times monthly     • warfarin (COUMADIN) 5 MG tablet Patient takes 5 mg every nightly except on Tuesday take 7.5mg OR AS DIRECTED 100 tablet 1  "  • zolpidem (AMBIEN) 5 MG tablet        No current facility-administered medications for this visit.         OBJECTIVE    /68 (BP Location: Left arm, Patient Position: Sitting, Cuff Size: Adult)   Pulse 94   Temp 97.5 °F (36.4 °C)   Ht 180.3 cm (71\")   Wt 132 kg (292 lb)   SpO2 95%   BMI 40.73 kg/m²         Review of Systems : The following systems were reviewed and  changes noted as indicated under history of present illness.    Constitutional:  Denies recent weight loss, weight gain, fever or chills     HENT:  Denies any hearing loss, epistaxis, hoarseness, or difficulty speaking.     Eyes: Wears eyeglasses or contact lenses     Respiratory:  Dyspnea with exertion,no cough, wheezing, or hemoptysis.     Cardiovascular: No chest pain, palpitation.    Gastrointestinal:  Denies change in bowel habits, dyspepsia, ulcer disease, hematochezia, or melena.     Endocrine: Negative for cold intolerance, heat intolerance, polydipsia, polyphagia and polyuria.    Genitourinary: Negative.      Musculoskeletal: DJD    Allergic/Immunologic: History of angioedema in the past    Neurological: History of tardive dyskinesia, occasional dizziness    Hematological: Denies any food allergies, seasonal allergies, bleeding disorders, or lymphadenopathy.     Psychiatric/Behavioral: history of anxiety/depression, no substance abuse, or change in cognitive function.     Physical Exam : The following systems were reassessed and no changes noted    Constitutional: Cooperative, alert and oriented, in no acute distress.     HENT:   Head: Normocephalic, normal hair patterns, no masses or tenderness.  Ears, Nose, and Throat: No gross abnormalities. No pallor or cyanosis.   Eyes: EOMS intact, PERRL, conjunctivae and lids unremarkable. Fundoscopic exam and visual fields not performed.   Neck: No palpable masses or adenopathy, no thyromegaly, no JVD, carotid pulses are full and equal bilaterally and without  Bruits.     Cardiovascular: " Regular rhythm, S1 and S2 normal, no S3 or S4.  No murmurs, gallops, or rubs detected.     Pulmonary/Chest: Chest: normal symmetry, normal respiratory excursion, no intercostal retraction, no use of accessory muscles.            Pulmonary: Normal breath sounds. No rales or ronchi.    Abdominal: Abdomen soft, bowel sounds normoactive, no masses, no hepatosplenomegaly, non-tender, no bruits.     Musculoskeletal: No deformities, clubbing, cyanosis, erythema, or edema observed.     Neurological:  tardive dyskinesia improved    Skin: Warm and dry to the touch, no apparent skin lesions or masses noted.     Psychiatric: He has a normal mood and affect. His behavior is normal. Judgment and thought content normal.         Procedures      Lab Results   Component Value Date    WBC 9.29 01/27/2021    HGB 14.8 01/27/2021    HCT 41.9 01/27/2021    MCV 88.2 01/27/2021     01/27/2021     Lab Results   Component Value Date    GLUCOSE 140 (H) 11/08/2021    BUN 23 11/08/2021    CREATININE 1.36 (H) 11/08/2021    EGFRIFNONA 52 (L) 11/08/2021    EGFRIFAFRI 46 12/02/2020    BCR 16.9 11/08/2021    CO2 25.2 11/08/2021    CALCIUM 9.0 11/08/2021    ALBUMIN 3.80 11/08/2021    AST 14 11/08/2021    ALT 20 11/08/2021     Lab Results   Component Value Date    CHOL 229 (H) 11/08/2021    CHOL 171 12/30/2020    CHOL 113 02/21/2019     Lab Results   Component Value Date    TRIG 247 (H) 11/08/2021    TRIG 261 (H) 12/30/2020    TRIG 249 (H) 04/29/2020     Lab Results   Component Value Date    HDL 38 (L) 11/08/2021    HDL 36 (L) 12/30/2020    HDL 36 04/29/2020     No components found for: LDLCALC  Lab Results   Component Value Date     (H) 11/08/2021    LDL 91 12/30/2020     04/29/2020     No results found for: HDLLDLRATIO  No components found for: CHOLHDL  Lab Results   Component Value Date    HGBA1C 6.0 (H) 12/16/2021     Lab Results   Component Value Date    TSH 2.04 07/22/2019           ASSESSMENT AND PLAN  Mr. Mack is a  management challenge secondary to multiple medical issues, comorbidities, medication allergies.  Fortunately he has remained stable on medical management and denied any chest pain at this time.  To make sure that his dizziness is not related to carotid disease a carotid Doppler study is being arranged.  He has been advised to maintain a high fluid intake.  He has tolerated Repatha quite well.  I have continued antiplatelet therapy with aspirin, Antianginal therapy with isosorbide mononitrate, Ranexa, and metoprolol succinate 50 mg daily has been continued.  He has tolerated this medication quite well.  His renal function is being monitored closely by Dr. Holt.  Anticoagulation with Coumadin has been continued.  His PT and INR are in the therapeutic range and was 2.7 today.    Diagnoses and all orders for this visit:    1. S/P CABG (coronary artery bypass graft) (Primary)    2. Mixed hyperlipidemia    3. Primary hypertension    4. History of pulmonary embolism    5. Class 3 severe obesity due to excess calories with serious comorbidity and body mass index (BMI) of 40.0 to 44.9 in adult ([facility])    6. Chronic respiratory failure with hypoxia ([facility])        Patient's Body mass index is 40.73 kg/m². BMI is above normal parameters. Recommendations include: exercise counseling and nutrition counseling.  Patient is a non-smoker      Keke Kumar MD  11/15/2022  09:28 CST

## 2022-11-28 ENCOUNTER — TELEPHONE (OUTPATIENT)
Dept: ORTHOPEDIC SURGERY | Facility: CLINIC | Age: 67
End: 2022-11-28

## 2022-11-28 NOTE — TELEPHONE ENCOUNTER
Ifeoma    Patient called requesting the series of 4 injections for his right knee     Does patient need to be seen in office first?

## 2022-12-02 ENCOUNTER — TELEPHONE (OUTPATIENT)
Dept: CARDIOLOGY | Facility: CLINIC | Age: 67
End: 2022-12-02

## 2022-12-02 NOTE — TELEPHONE ENCOUNTER
Per Dr Warren  Notified and understood----- Message from Keke Kumar MD sent at 12/2/2022  8:00 AM CST -----  Carotid Doppler studies within acceptable normal limits  ----- Message -----  From: Keke Kumar MD  Sent: 12/1/2022   3:09 PM CST  To: Keke Kumar MD

## 2022-12-20 ENCOUNTER — DOCUMENTATION (OUTPATIENT)
Dept: CARDIAC SURGERY | Facility: CLINIC | Age: 67
End: 2022-12-20

## 2022-12-20 ENCOUNTER — ANTICOAGULATION VISIT (OUTPATIENT)
Dept: CARDIAC SURGERY | Facility: CLINIC | Age: 67
End: 2022-12-20

## 2022-12-20 VITALS — HEART RATE: 96 BPM | OXYGEN SATURATION: 97 %

## 2022-12-20 DIAGNOSIS — Z51.81 ENCOUNTER FOR THERAPEUTIC DRUG MONITORING: ICD-10-CM

## 2022-12-20 DIAGNOSIS — Z86.711 HISTORY OF PULMONARY EMBOLISM: ICD-10-CM

## 2022-12-20 DIAGNOSIS — Z79.01 LONG TERM (CURRENT) USE OF ANTICOAGULANTS: Primary | ICD-10-CM

## 2022-12-20 LAB — INR PPP: 2.3 (ref 0.9–1.1)

## 2022-12-20 PROCEDURE — 93793 ANTICOAG MGMT PT WARFARIN: CPT | Performed by: NURSE PRACTITIONER

## 2022-12-20 PROCEDURE — 85610 PROTHROMBIN TIME: CPT | Performed by: NURSE PRACTITIONER

## 2022-12-20 PROCEDURE — 36416 COLLJ CAPILLARY BLOOD SPEC: CPT | Performed by: NURSE PRACTITIONER

## 2022-12-20 RX ORDER — ENOXAPARIN SODIUM 100 MG/ML
INJECTION SUBCUTANEOUS
Qty: 8 ML | Refills: 1 | Status: SHIPPED | OUTPATIENT
Start: 2022-12-20 | End: 2023-01-17 | Stop reason: SDUPTHER

## 2022-12-20 NOTE — PROGRESS NOTES
Pt will be having back injections per Dr Delgadillo. Per DIANA Elaine clearance was faxed for pt to hold coumadin 5 nights with Lovenox bridging 40mg bid (no Lovenox 24 hours prior to procedure). Pt instructed call [facility] as soon as he is scheduled and to  Lovenox; pt verbalized. Pt instructed not to start Lovenox until specifically told by the [facility]; pt verbalized. Findings reported by Ketty Moscoso RN.

## 2022-12-20 NOTE — PROGRESS NOTES
Patient states no med changes or bleeding problems or unexplained bruising. Patient instructed to continue current dosing schedule. Verbalizes understanding. Will recheck in 6 weeks. Patient instructed regarding medication; results given and questions answered. Nutritional counseling given.  Dietary factors affecting therapy addressed.  Patient instructed to monitor for excessive bruising or bleeding. Findings reported by Ketty Moscoso RN.    Today's INR is Lab Results - Last 18 Months   Lab Units 12/20/22  0000   INR  2.30*

## 2022-12-21 ENCOUNTER — DOCUMENTATION (OUTPATIENT)
Dept: CARDIAC SURGERY | Facility: CLINIC | Age: 67
End: 2022-12-21

## 2022-12-21 NOTE — PROGRESS NOTES
Per Maty with Dr Delgadillo's office pt is having injection on 1/11 at 0940. I called pt and set up appt on 1/5 for Lovenox bridging; pt verbalized. Pt reminded he will not start holding coumadin until 1/6 and we will discuss when to start Lovenox at his appt on 1/5; pt verbalized.  Findings reported by Ketty Moscoso RN.

## 2023-01-03 ENCOUNTER — TELEPHONE (OUTPATIENT)
Dept: ORTHOPEDIC SURGERY | Facility: CLINIC | Age: 68
End: 2023-01-03
Payer: MEDICARE

## 2023-01-03 NOTE — TELEPHONE ENCOUNTER
Attempted to schedule patients monovisc injection. He said he was down on his back at the moment, and he would call us back when he is ready to schedule them.

## 2023-01-05 ENCOUNTER — ANTICOAGULATION VISIT (OUTPATIENT)
Dept: CARDIAC SURGERY | Facility: CLINIC | Age: 68
End: 2023-01-05
Payer: MEDICARE

## 2023-01-05 VITALS — OXYGEN SATURATION: 96 % | HEART RATE: 91 BPM

## 2023-01-05 DIAGNOSIS — Z79.01 LONG TERM (CURRENT) USE OF ANTICOAGULANTS: Primary | ICD-10-CM

## 2023-01-05 DIAGNOSIS — Z86.711 HISTORY OF PULMONARY EMBOLISM: ICD-10-CM

## 2023-01-05 DIAGNOSIS — Z51.81 ENCOUNTER FOR THERAPEUTIC DRUG MONITORING: ICD-10-CM

## 2023-01-05 LAB — INR PPP: 2.4 (ref 0.9–1.1)

## 2023-01-05 PROCEDURE — 93793 ANTICOAG MGMT PT WARFARIN: CPT | Performed by: NURSE PRACTITIONER

## 2023-01-05 PROCEDURE — 85610 PROTHROMBIN TIME: CPT | Performed by: NURSE PRACTITIONER

## 2023-01-05 PROCEDURE — 36416 COLLJ CAPILLARY BLOOD SPEC: CPT | Performed by: NURSE PRACTITIONER

## 2023-01-05 NOTE — PROGRESS NOTES
Pt here today for bridging with lovenox for back injections per Dr. Delgadillo on Wednesday, Jan 11th.  Pt will begin holding warfarin on Jan 6th, 5 days prior to procedure.  Pt has done lovenox injections in the past.  Lovenox demonstration sheet given to patient and key highlights reviewed.  I instructed pt to begin lovenox 40mg BID injections on Saturday, 8pm.  Pt will have lovenox am and pm injections on Sunday and Monday, only an 8 am injection on Tuesday the 10th, the day before procedure.  I instructed pt not to give an am injection morning of procedure but to resume injections evening of procedure along with warfarin unless otherwise instructed by physician performing procedure.  Pt will take warfarin and give lovenox injections through Sunday the 15th.  [facility] appt made for Monday the 16th.  Pt verbalizes instructions.  Findings reported by George Lucia RN.   Today's INR is Lab Results - Last 18 Months   Lab Units 01/05/23  0000   INR  2.40*

## 2023-01-16 ENCOUNTER — ANTICOAGULATION VISIT (OUTPATIENT)
Dept: CARDIAC SURGERY | Facility: CLINIC | Age: 68
End: 2023-01-16
Payer: MEDICARE

## 2023-01-16 VITALS — OXYGEN SATURATION: 96 % | HEART RATE: 87 BPM

## 2023-01-16 DIAGNOSIS — Z86.711 HISTORY OF PULMONARY EMBOLISM: ICD-10-CM

## 2023-01-16 DIAGNOSIS — Z51.81 ENCOUNTER FOR THERAPEUTIC DRUG MONITORING: ICD-10-CM

## 2023-01-16 DIAGNOSIS — Z79.01 LONG TERM (CURRENT) USE OF ANTICOAGULANTS: Primary | ICD-10-CM

## 2023-01-16 LAB — INR PPP: 1.3 (ref 0.9–1.1)

## 2023-01-16 PROCEDURE — 93793 ANTICOAG MGMT PT WARFARIN: CPT | Performed by: NURSE PRACTITIONER

## 2023-01-16 PROCEDURE — 36416 COLLJ CAPILLARY BLOOD SPEC: CPT | Performed by: NURSE PRACTITIONER

## 2023-01-16 PROCEDURE — 85610 PROTHROMBIN TIME: CPT | Performed by: NURSE PRACTITIONER

## 2023-01-16 NOTE — PROGRESS NOTES
Pt denied med changes or bleeding problems. Pt states he was able to start coumadin and Lovenox back the night of procedure. Warfarin dose adjusted. Pt instructed on warfarin dosing, to continue Lovenox through Wednesday night, and to hold green veggies; pt verbalized. Patient instructed regarding medication; results given and questions answered. Nutritional counseling given.  Dietary factors affecting therapy addressed.  Patient instructed to monitor for excessive bruising or bleeding. Will recheck in 3 days. Findings reported by Ketty Moscoso RN.    Today's INR is Lab Results - Last 18 Months   Lab Units 01/16/23  0000   INR  1.30*

## 2023-01-17 RX ORDER — ENOXAPARIN SODIUM 100 MG/ML
INJECTION SUBCUTANEOUS
Qty: 4 ML | Refills: 1 | Status: SHIPPED | OUTPATIENT
Start: 2023-01-17 | End: 2023-01-31

## 2023-01-19 ENCOUNTER — ANTICOAGULATION VISIT (OUTPATIENT)
Dept: CARDIAC SURGERY | Facility: CLINIC | Age: 68
End: 2023-01-19
Payer: MEDICARE

## 2023-01-19 VITALS — OXYGEN SATURATION: 96 % | HEART RATE: 84 BPM

## 2023-01-19 DIAGNOSIS — Z79.01 LONG TERM (CURRENT) USE OF ANTICOAGULANTS: Primary | ICD-10-CM

## 2023-01-19 DIAGNOSIS — Z86.711 HISTORY OF PULMONARY EMBOLISM: ICD-10-CM

## 2023-01-19 DIAGNOSIS — Z51.81 ENCOUNTER FOR THERAPEUTIC DRUG MONITORING: ICD-10-CM

## 2023-01-19 LAB — INR PPP: 2 (ref 0.9–1.1)

## 2023-01-19 PROCEDURE — 85610 PROTHROMBIN TIME: CPT | Performed by: NURSE PRACTITIONER

## 2023-01-19 PROCEDURE — 93793 ANTICOAG MGMT PT WARFARIN: CPT | Performed by: NURSE PRACTITIONER

## 2023-01-19 PROCEDURE — 36416 COLLJ CAPILLARY BLOOD SPEC: CPT | Performed by: NURSE PRACTITIONER

## 2023-01-19 NOTE — PROGRESS NOTES
Pt states no meds changes or bleeding problems or unexplained bruising. Pt has had Lovenox BID since last appointment.  Pt instructed to continue current dosage schedule and stop Lovenox injections. Pt verbalizes understanding. Will recheck in 2 weeks. Patient instructed regarding medication; results given and questions answered. Nutritional counseling given.  Dietary factors affecting therapy addressed.  Patient instructed to monitor for excessive bruising or bleeding.  Findings reported by Sandee Lugo RN  Today's INR is Lab Results - Last 18 Months   Lab Units 01/19/23  0000   INR  2.00*

## 2023-01-30 RX ORDER — WARFARIN SODIUM 5 MG/1
TABLET ORAL
Qty: 100 TABLET | Refills: 1 | Status: SHIPPED | OUTPATIENT
Start: 2023-01-30

## 2023-01-31 ENCOUNTER — ANTICOAGULATION VISIT (OUTPATIENT)
Dept: CARDIAC SURGERY | Facility: CLINIC | Age: 68
End: 2023-01-31
Payer: MEDICARE

## 2023-01-31 VITALS — HEART RATE: 84 BPM | OXYGEN SATURATION: 98 %

## 2023-01-31 DIAGNOSIS — Z79.01 LONG TERM (CURRENT) USE OF ANTICOAGULANTS: Primary | ICD-10-CM

## 2023-01-31 DIAGNOSIS — Z51.81 ENCOUNTER FOR THERAPEUTIC DRUG MONITORING: ICD-10-CM

## 2023-01-31 DIAGNOSIS — Z86.711 HISTORY OF PULMONARY EMBOLISM: ICD-10-CM

## 2023-01-31 LAB — INR PPP: 3.5 (ref 0.9–1.1)

## 2023-01-31 PROCEDURE — 93793 ANTICOAG MGMT PT WARFARIN: CPT | Performed by: NURSE PRACTITIONER

## 2023-01-31 PROCEDURE — 36416 COLLJ CAPILLARY BLOOD SPEC: CPT | Performed by: NURSE PRACTITIONER

## 2023-01-31 PROCEDURE — 85610 PROTHROMBIN TIME: CPT | Performed by: NURSE PRACTITIONER

## 2023-01-31 NOTE — PROGRESS NOTES
Pt denied med changes or bleeding problems. Pt states he has not eaten his green veggies since he was here last. Dose adjusted today only and pt instructed to have a serving of green veggies today and then once weekly; pt verbalized. Patient instructed regarding medication; results given and questions answered. Nutritional counseling given.  Dietary factors affecting therapy addressed.  Patient instructed to monitor for excessive bruising or bleeding.  Findings reported by Ketty Moscoso RN.    Today's INR is Lab Results - Last 18 Months   Lab Units 01/31/23  0000   INR  3.50*

## 2023-02-14 ENCOUNTER — ANTICOAGULATION VISIT (OUTPATIENT)
Dept: CARDIAC SURGERY | Facility: CLINIC | Age: 68
End: 2023-02-14
Payer: MEDICARE

## 2023-02-14 VITALS — HEART RATE: 83 BPM | OXYGEN SATURATION: 97 %

## 2023-02-14 DIAGNOSIS — Z51.81 ENCOUNTER FOR THERAPEUTIC DRUG MONITORING: ICD-10-CM

## 2023-02-14 DIAGNOSIS — Z79.01 LONG TERM (CURRENT) USE OF ANTICOAGULANTS: Primary | ICD-10-CM

## 2023-02-14 DIAGNOSIS — Z86.711 HISTORY OF PULMONARY EMBOLISM: ICD-10-CM

## 2023-02-14 LAB — INR PPP: 2.3 (ref 0.9–1.1)

## 2023-02-14 PROCEDURE — 85610 PROTHROMBIN TIME: CPT | Performed by: NURSE PRACTITIONER

## 2023-02-14 PROCEDURE — 36416 COLLJ CAPILLARY BLOOD SPEC: CPT | Performed by: NURSE PRACTITIONER

## 2023-02-14 PROCEDURE — 93793 ANTICOAG MGMT PT WARFARIN: CPT | Performed by: NURSE PRACTITIONER

## 2023-02-14 NOTE — PROGRESS NOTES
Patient states no med changes or bleeding problems or unexplained bruising. Patient instructed to continue current dosing schedule. Verbalizes understanding. Will recheck in 3 wks.  Patient instructed regarding medication; results given and questions answered. Nutritional counseling given.  Dietary factors affecting therapy addressed.  Patient instructed to monitor for excessive bruising or bleeding.  Findings reported by George Lucia RN.   Today's INR is Lab Results - Last 18 Months   Lab Units 02/14/23  0000   INR  2.30*

## 2023-02-15 DIAGNOSIS — M25.561 CHRONIC PAIN OF RIGHT KNEE: Primary | ICD-10-CM

## 2023-02-15 DIAGNOSIS — G89.29 CHRONIC PAIN OF RIGHT KNEE: Primary | ICD-10-CM

## 2023-02-17 ENCOUNTER — CLINICAL SUPPORT (OUTPATIENT)
Dept: ORTHOPEDIC SURGERY | Facility: CLINIC | Age: 68
End: 2023-02-17
Payer: MEDICARE

## 2023-02-17 VITALS — HEIGHT: 71 IN | WEIGHT: 283 LBS | BODY MASS INDEX: 39.62 KG/M2

## 2023-02-17 DIAGNOSIS — M17.11 PRIMARY OSTEOARTHRITIS OF RIGHT KNEE: ICD-10-CM

## 2023-02-17 DIAGNOSIS — M25.561 CHRONIC PAIN OF RIGHT KNEE: Primary | ICD-10-CM

## 2023-02-17 DIAGNOSIS — G89.29 CHRONIC PAIN OF RIGHT KNEE: Primary | ICD-10-CM

## 2023-02-17 PROCEDURE — 20610 DRAIN/INJ JOINT/BURSA W/O US: CPT | Performed by: NURSE PRACTITIONER

## 2023-02-17 NOTE — PROGRESS NOTES
Knee Joint Injection      Patient: Clint Mack    YOB: 1955    Chief Complaint   Patient presents with   • Right Knee - Follow-up, Pain     History of Present Illness: Patient presents to office accompanied by his spouse for follow-up of chronic right knee pain and to receive a Monovisc injection into the right knee.  Initial onset of pain occurred several years ago and has continued to progressively worsen over time.  The patient has not been seen in office since November 2021.  Previous conservative treatments have included intra-articular injections of steroid and viscosupplementation.  In the past, intra-articular injections of steroid only offered improvement for a few days. The patient previously reported that he had good pain relief following a series of Orthovisc injections in November/December 2018.  Patient later had a Monovisc injection in the right knee in November 2020, which offered no pain relief.  Patient has continued to experience chronic right knee pain with intermittent popping sensations and intermittent joint swelling.  Patient also has a history of chronic weakness in his right leg and experiences intermittent giving of the right knee.  As previously noted, the patient has a long history of chronic low back pain and degenerative changes in the lumbar spine with 4 previous back surgeries.  Patient has chronic issues with weakness of his right hip/leg.  Patient has done extensive physical therapy in the past with minimal improvement.  Patient remains under the care of pain management and continues to take MS Contin, Neurontin and Norco 7.5 mg for management of his chronic pain.  No new complaints or concerns noted regarding his right knee since his last office visit.  No recent falls or injuries reported.  Updated x-rays are performed in office today.  Current pain scale is 5/10.    Physical Exam: 67 y.o. male  General Appearance:    Alert, cooperative, in no acute  "distress                   Vitals:    02/17/23 1056   Weight: 128 kg (283 lb)   Height: 180.3 cm (71\")   PainSc:   5        Patient is alert and oriented ×3. No acute distress.  Affect is normal. Respiratory rate is normal and unlabored.  Exam and complaints are unchanged from prior visit.     Procedure:  Large Joint Arthrocentesis: R knee  Date/Time: 2/17/2023 10:57 AM  Consent given by: patient  Timeout: Immediately prior to procedure a time out was called to verify the correct patient, procedure, equipment, support staff and site/side marked as required   Supporting Documentation  Indications: pain and diagnostic evaluation   Procedure Details  Location: knee - R knee  Preparation: Patient was prepped and draped in the usual sterile fashion  Needle size: 22 G  Approach: anterolateral  Medications administered: 88 mg Hyaluronan 88 MG/4ML  Patient tolerance: patient tolerated the procedure well with no immediate complications      Assessment:    Diagnoses and all orders for this visit:    Chronic pain of right knee  -     Large Joint Arthrocentesis: R knee    Primary osteoarthritis of right knee  -     Large Joint Arthrocentesis: R knee    Plan:   Patient is in office today to receive a Monovisc injection in the left knee for further management of chronic osteoarthritic pain.  Patient tolerated the injection well with no immediate complications.  Recommend to slowly increase activity as tolerated.  We discussed the importance of leg strengthening and general conditioning.  We discussed the warning signs of the injection to monitor for.  We discussed that he may experience some increased aching type pain or fullness in the injected knee and if he has any increased pain, this should dissipate.  We discussed that the purpose of the injection is symptom improvement and improved activity.  We also discussed that further treatment options depend upon symptoms at followup and length of time of improvement after injection. "     Recommend the following:     -Rest and activity modification as tolerated and based on pain.  -Modified weightbearing of the affected extremity with use of a cane or walker as needed.  Patient is using his cane in office today.  -Gradual progression of weightbearing and activity as pain and swelling allow.  -Conditioning and strengthening exercises of the bilateral knees/legs.  -Elevation and ice therapy to the affected knee to minimize pain/swelling/inflammation.   -Recommend Tylenol as needed for pain/discomfort.  Patient already takes MS Contin and Norco 7.5 mg for management of his chronic pain as prescribed by his pain management physician. The patient also takes Neurontin 100 mg twice daily for chronic neuropathic pain.  Patient cannot take oral NSAIDs due to his history of chronic kidney disease and his current use of Coumadin.    As previously noted, patient has a long history of chronic low back pain and degenerative changes in the lumbar spine with 4 prior back surgeries.  Patient sustained an initial work-related injury to his low back in 2005. Patient has chronic issues with weakness in his right hip/leg and known significant atrophy of the right psoas muscle.  Patient has done physical therapy in the past with minimal improvement.  Patient remains under the care of pain management we again discussed that some of his right knee pain may be related to lumbar radiculopathy but he does have some specific symptoms to his right knee joint that includes intermittent joint swelling.    Follow-up in 6 weeks for recheck as needed for any new, worsening or persistent symptoms.  Follow-up sooner as needed.  Consider an intra-articular injection of steroid if he does not get any pain relief from the viscosupplementation.    EMR Dragon/Transciption Disclaimer: Some of this note may be an electronic transcription/translation of spoken language to printed text using the Dragon Dictation System.     Return in about  6 weeks (around 3/31/2023), or if symptoms worsen or fail to improve, for Recheck.        This document has been electronically signed by DIANA Kiser on February 18, 2023 20:22 CST      DIANA Kiser

## 2023-03-07 ENCOUNTER — ANTICOAGULATION VISIT (OUTPATIENT)
Dept: CARDIAC SURGERY | Facility: CLINIC | Age: 68
End: 2023-03-07
Payer: MEDICARE

## 2023-03-07 VITALS — HEART RATE: 67 BPM | OXYGEN SATURATION: 98 % | WEIGHT: 284 LBS | BODY MASS INDEX: 39.61 KG/M2

## 2023-03-07 DIAGNOSIS — Z86.711 HISTORY OF PULMONARY EMBOLISM: ICD-10-CM

## 2023-03-07 DIAGNOSIS — Z51.81 ENCOUNTER FOR THERAPEUTIC DRUG MONITORING: ICD-10-CM

## 2023-03-07 DIAGNOSIS — Z79.01 LONG TERM (CURRENT) USE OF ANTICOAGULANTS: Primary | ICD-10-CM

## 2023-03-07 LAB — INR PPP: 2.8 (ref 0.9–1.1)

## 2023-03-07 PROCEDURE — 36416 COLLJ CAPILLARY BLOOD SPEC: CPT | Performed by: NURSE PRACTITIONER

## 2023-03-07 PROCEDURE — 85610 PROTHROMBIN TIME: CPT | Performed by: NURSE PRACTITIONER

## 2023-03-07 PROCEDURE — 93793 ANTICOAG MGMT PT WARFARIN: CPT | Performed by: NURSE PRACTITIONER

## 2023-03-07 NOTE — PROGRESS NOTES
Patient states no med changes or bleeding problems or unexplained bruising. Patient instructed to continue current dosing schedule. Verbalizes understanding. Will recheck 1 month.  Patient instructed regarding medication; results given and questions answered. Nutritional counseling given.  Dietary factors affecting therapy addressed.  Patient instructed to monitor for excessive bruising or bleeding. Findings reported by Ketty Moscoso RN.    Today's INR is Lab Results - Last 18 Months   Lab Units 03/07/23  0000   INR  2.80*

## 2023-04-11 ENCOUNTER — ANTICOAGULATION VISIT (OUTPATIENT)
Dept: CARDIAC SURGERY | Facility: CLINIC | Age: 68
End: 2023-04-11
Payer: MEDICARE

## 2023-04-11 VITALS — WEIGHT: 277.9 LBS | OXYGEN SATURATION: 94 % | HEART RATE: 86 BPM | BODY MASS INDEX: 38.76 KG/M2

## 2023-04-11 DIAGNOSIS — Z51.81 ENCOUNTER FOR THERAPEUTIC DRUG MONITORING: ICD-10-CM

## 2023-04-11 DIAGNOSIS — Z79.01 LONG TERM (CURRENT) USE OF ANTICOAGULANTS: Primary | ICD-10-CM

## 2023-04-11 DIAGNOSIS — Z86.711 HISTORY OF PULMONARY EMBOLISM: ICD-10-CM

## 2023-04-11 LAB — INR PPP: 3.6 (ref 0.9–1.1)

## 2023-04-11 PROCEDURE — 93793 ANTICOAG MGMT PT WARFARIN: CPT | Performed by: NURSE PRACTITIONER

## 2023-04-11 PROCEDURE — 36416 COLLJ CAPILLARY BLOOD SPEC: CPT | Performed by: NURSE PRACTITIONER

## 2023-04-11 PROCEDURE — 85610 PROTHROMBIN TIME: CPT | Performed by: NURSE PRACTITIONER

## 2023-04-11 NOTE — PROGRESS NOTES
Pt states he has not had his salads as he usually doses.  Pt denies medication changes. Pt wishes to reduce today's warfarin dose today only and to increase his Vit K intake today with a dark salad.  Pt requests a two week appt.  Patient instructed regarding medication; results given and questions answered. Nutritional counseling given.  Dietary factors affecting therapy addressed.  Patient instructed to monitor for excessive bruising or bleeding.  Findings reported by George Lucia RN.   Today's INR is Lab Results - Last 18 Months   Lab Units 04/11/23  0000   INR  3.60*

## 2023-04-25 ENCOUNTER — ANTICOAGULATION VISIT (OUTPATIENT)
Dept: CARDIAC SURGERY | Facility: CLINIC | Age: 68
End: 2023-04-25
Payer: MEDICARE

## 2023-04-25 VITALS — OXYGEN SATURATION: 98 % | HEART RATE: 81 BPM

## 2023-04-25 DIAGNOSIS — Z79.01 LONG TERM (CURRENT) USE OF ANTICOAGULANTS: Primary | ICD-10-CM

## 2023-04-25 DIAGNOSIS — Z51.81 ENCOUNTER FOR THERAPEUTIC DRUG MONITORING: ICD-10-CM

## 2023-04-25 DIAGNOSIS — Z86.711 HISTORY OF PULMONARY EMBOLISM: ICD-10-CM

## 2023-04-25 LAB — INR PPP: 3.1 (ref 0.9–1.1)

## 2023-04-25 PROCEDURE — 36416 COLLJ CAPILLARY BLOOD SPEC: CPT | Performed by: NURSE PRACTITIONER

## 2023-04-25 PROCEDURE — 93793 ANTICOAG MGMT PT WARFARIN: CPT | Performed by: NURSE PRACTITIONER

## 2023-04-25 PROCEDURE — 85610 PROTHROMBIN TIME: CPT | Performed by: NURSE PRACTITIONER

## 2023-04-25 NOTE — PROGRESS NOTES
Pt started Xifaxan (increase or decrease) today for 14 days. Pt denied bleeding problems. Dose adjusted today only and pt instructed to have a serving of green veggies today and then every 3 days; pt verbalized. Patient instructed regarding medication; results given and questions answered. Nutritional counseling given.  Dietary factors affecting therapy addressed.  Patient instructed to monitor for excessive bruising or bleeding. Will recheck next week and pt requested the day he will see Dr Warren. Findings reported by Ketty Moscoso RN.    Today's INR is Lab Results - Last 18 Months   Lab Units 04/25/23  0000   INR  3.10*

## 2023-05-03 ENCOUNTER — ANTICOAGULATION VISIT (OUTPATIENT)
Dept: CARDIAC SURGERY | Facility: CLINIC | Age: 68
End: 2023-05-03
Payer: MEDICARE

## 2023-05-03 ENCOUNTER — OFFICE VISIT (OUTPATIENT)
Dept: CARDIOLOGY | Facility: CLINIC | Age: 68
End: 2023-05-03
Payer: MEDICARE

## 2023-05-03 VITALS
HEIGHT: 71 IN | TEMPERATURE: 97.7 F | OXYGEN SATURATION: 95 % | WEIGHT: 281.5 LBS | BODY MASS INDEX: 39.41 KG/M2 | DIASTOLIC BLOOD PRESSURE: 68 MMHG | SYSTOLIC BLOOD PRESSURE: 122 MMHG | HEART RATE: 67 BPM

## 2023-05-03 VITALS — HEART RATE: 67 BPM | OXYGEN SATURATION: 95 %

## 2023-05-03 DIAGNOSIS — I10 PRIMARY HYPERTENSION: Chronic | ICD-10-CM

## 2023-05-03 DIAGNOSIS — I25.10 CORONARY ARTERY DISEASE INVOLVING NATIVE CORONARY ARTERY OF NATIVE HEART WITHOUT ANGINA PECTORIS: ICD-10-CM

## 2023-05-03 DIAGNOSIS — Z86.711 HISTORY OF PULMONARY EMBOLISM: ICD-10-CM

## 2023-05-03 DIAGNOSIS — E66.01 CLASS 3 SEVERE OBESITY DUE TO EXCESS CALORIES WITH SERIOUS COMORBIDITY AND BODY MASS INDEX (BMI) OF 40.0 TO 44.9 IN ADULT: ICD-10-CM

## 2023-05-03 DIAGNOSIS — N18.31 STAGE 3A CHRONIC KIDNEY DISEASE: Chronic | ICD-10-CM

## 2023-05-03 DIAGNOSIS — Z51.81 ENCOUNTER FOR THERAPEUTIC DRUG MONITORING: ICD-10-CM

## 2023-05-03 DIAGNOSIS — Z79.01 LONG TERM (CURRENT) USE OF ANTICOAGULANTS: Primary | ICD-10-CM

## 2023-05-03 DIAGNOSIS — Z95.1 S/P CABG (CORONARY ARTERY BYPASS GRAFT): Primary | ICD-10-CM

## 2023-05-03 DIAGNOSIS — Z79.01 ANTICOAGULATED ON COUMADIN: ICD-10-CM

## 2023-05-03 LAB
INR PPP: 2.8 (ref 0.9–1.1)
QT INTERVAL: 422 MS
QTC INTERVAL: 414 MS

## 2023-05-03 PROCEDURE — 85610 PROTHROMBIN TIME: CPT | Performed by: NURSE PRACTITIONER

## 2023-05-03 PROCEDURE — 36416 COLLJ CAPILLARY BLOOD SPEC: CPT | Performed by: NURSE PRACTITIONER

## 2023-05-03 RX ORDER — ASPIRIN 81 MG/81MG
CAPSULE ORAL
COMMUNITY

## 2023-05-03 RX ORDER — CLONAZEPAM 0.5 MG/1
1.5 TABLET ORAL
COMMUNITY

## 2023-05-03 NOTE — PROGRESS NOTES
Pt here today seeing Dr Kumar. Pt has 5 days left on Xifaxan. Pt denied other med changes or bleeding problems. Pt instructed to continue current dose and appt made for 2 weeks; pt verbalized. Patient instructed regarding medication; results given and questions answered. Nutritional counseling given.  Dietary factors affecting therapy addressed.  Patient instructed to monitor for excessive bruising or bleeding.  Findings reported by Ketty Moscoso RN.    Today's INR is Lab Results - Last 18 Months   Lab Units 05/03/23  0000   INR  2.80*

## 2023-05-03 NOTE — PROGRESS NOTES
Clint Mack  67 y.o. male      1. S/P CABG (coronary artery bypass graft)    2. Class 3 severe obesity due to excess calories with serious comorbidity and body mass index (BMI) of 40.0 to 44.9 in adult (HCC)    3. Anticoagulated on Coumadin    4. Stage 3a chronic kidney disease    5. Primary hypertension        History of Present Illness  Mr. Mack is a 67-year-old male with hypertension, CAD s/p CABG, hyperlipidemia, DVT, pulmonary embolus, obstructive sleep apnea, obesity, tardive dyskinesia, abdominal aortic aneurysm, depression, anxiety, osteoarthritis, CKD3.    He was admitted to [facility] in January 2021 following an episode of angioedema, the reason for which was unclear.  On the day of admission he had taken Praluent injection a little later in the day when the episode occurred at night. The patient has fortunately tolerated Repatha quite well and his insurance will pay for it.      Echocardiogram in November 2021 showed:  · The quality of the study is limited due to limited views obtained and patient body habitus.  · Calculated left ventricular EF = 52% Estimated left ventricular EF was in agreement with the calculated left ventricular EF. Left ventricular systolic function is low normal.  · Left ventricular diastolic function is consistent with (grade Ia w/high LAP) impaired relaxation.  · Right ventricle not well visualized. The right ventricular cavity is mildly dilated.    The patient did not voice any cardiac complaints today.  His predominant issue relates to chronic back pain due to DJD.    Carotid Doppler studies in December 2022 showed:  •  Right internal carotid artery stenosis of 0-49%.  •  Left internal carotid artery stenosis of 0-49%.     EKG today showed sinus rhythm with heart rate of 58 bpm.  Minimal nonspecific T wave changes.  Baseline artifacts.    Allergies   Allergen Reactions   • Tetrabenazine Dizziness, Nausea And Vomiting, Nausea Only and Other (See  Comments)     Other reaction(s): Vertigo   • Alfuzosin Other (See Comments)     syncope  Syncope   syncope  Syncope   syncope   • Deutetrabenazine Other (See Comments)     Insomnia    Insomnia  Insomnia   • Pregabalin Swelling     Leg swelling   • Atorvastatin Calcium Other (See Comments)   • Fenofibrate Other (See Comments)   • Metoclopramide Other (See Comments)     Tardive dyskinesia  Tardive dyskinesia     • Phenobarbital Other (See Comments)     Other reaction(s): Other/Unknown (See Comments)     • Valbenazine Other (See Comments)   • Celexa [Citalopram Hydrobromide] Dystonia   • Citalopram Other (See Comments)     Other reaction(s): Dystonia  Tardive dyskinesia    Other reaction(s): Respiratory distress   • Crestor [Rosuvastatin Calcium] Myalgia   • Ingrezza [Valbenazine Tosylate] Other (See Comments)     fatigue   • Lipitor [Atorvastatin] Other (See Comments)     Aches and pains all over   • Rosuvastatin Other (See Comments)     Aches and pains all over  Aches and pains all over  Aches and pains all over         Past Medical History:   Diagnosis Date   • Anxiety    • Arthritis     osteoarthritis   • CKD (chronic kidney disease), stage III    • COPD (chronic obstructive pulmonary disease)    • Coronary artery disease    • Depression    • Diabetes mellitus    • DVT (deep venous thrombosis)    • Heart disease    • History of embolic filter insertion    • History of stomach ulcers    • Hyperlipidemia    • Hypertension    • Injury of back     back surgery x3    • LUCIANA on CPAP    • Pulmonary embolism          Past Surgical History:   Procedure Laterality Date   • BACK SURGERY     • CARDIAC SURGERY  2001   • CIRCUMCISION     • COLONOSCOPY N/A 9/11/2018    Procedure: COLONOSCOPY;  Surgeon: Jose C Batres DO;  Location: [facility] ENDOSCOPY;  Service: Gastroenterology   • COLONOSCOPY N/A 6/3/2021    Procedure: COLONOSCOPY;  Surgeon: Jose C Batres DO;  Location: [facility] ENDOSCOPY;  Service: Gastroenterology;   "Laterality: N/A;   • ENDOSCOPY N/A 9/11/2018    Procedure: ESOPHAGOGASTRODUODENOSCOPY;  Surgeon: Jose C Batres DO;  Location: [facility] ENDOSCOPY;  Service: Gastroenterology   • ENDOSCOPY N/A 3/3/2020    Procedure: ESOPHAGOGASTRODUODENOSCOPY;  Surgeon: Jose C Batres DO;  Location: [facility] ENDOSCOPY;  Service: Gastroenterology;  Laterality: N/A;   • ENDOSCOPY N/A 6/3/2021    Procedure: ESOPHAGOGASTRODUODENOSCOPY;  Surgeon: Jose C Batres DO;  Location: [facility] ENDOSCOPY;  Service: Gastroenterology;  Laterality: N/A;   • GALLBLADDER SURGERY  2000   • HIP SURGERY     • JOINT REPLACEMENT Right 05/02/2016    hip   • PROSTATE SURGERY  12/2021    UROLIFT (METAL IMPLANT)   • SPINE SURGERY           Family History   Problem Relation Age of Onset   • Heart disease Father    • Hypertension Father    • Stroke Father    • Diabetes Sister    • Heart disease Brother    • Hypertension Brother    • COPD Brother    • Lung disease Other          Social History     Socioeconomic History   • Marital status:    Tobacco Use   • Smoking status: Never   • Smokeless tobacco: Never   Vaping Use   • Vaping Use: Never used   Substance and Sexual Activity   • Alcohol use: No   • Drug use: No   • Sexual activity: Defer         Current Outpatient Medications   Medication Sig Dispense Refill   • aspirin 81 MG chewable tablet Chew 81 mg Take As Directed. Take 1 pill by mouth Monday, Wednesday, Friday     • B-D UF III MINI PEN NEEDLES 31G X 5 MM misc See Admin Instructions.     • baclofen (LIORESAL) 10 MG tablet Take 10 mg by mouth 3 (Three) Times a Day.     • Blood Glucose Monitoring Suppl (ACCU-CHEK ARGELIA PLUS) w/Device kit      • clonazePAM (KlonoPIN) 1 MG tablet Take  by mouth Take As Directed. Takes 1.5 tablets in morning and afternoon and 1 tablet at bedtime     • COMFORT ASSIST INSULIN SYRINGE 31G X 5/16\" 0.3 ML misc      • cyanocobalamin (VITAMIN B-12) 1000 MCG tablet Take 1,000 mcg by mouth Daily.     • Evolocumab (REPATHA) " "solution auto-injector SureClick injection Inject 1 mL under the skin into the appropriate area as directed Every 14 (Fourteen) Days. 6 mL 3   • furosemide (LASIX) 20 MG tablet Take 20 mg by mouth Daily.     • gabapentin (NEURONTIN) 100 MG capsule Take 100 mg by mouth 2 (Two) Times a Day.     • glimepiride (AMARYL) 4 MG tablet Take 4 mg by mouth 2 (Two) Times a Day.     • glucose blood test strip      • HYDROcodone-acetaminophen (NORCO) 7.5-325 MG per tablet Take 1 tablet by mouth 3 (Three) Times a Day.     • insulin aspart (novoLOG) 100 UNIT/ML injection Inject 3 Units under the skin into the appropriate area as directed 3 (Three) Times a Day Before Meals. PT TAKES PER SLIDING SCALE STARTING AT 3 UNITS FOR BLOOD SUGAR OVER 150     • isosorbide mononitrate (IMDUR) 60 MG 24 hr tablet Take 1 tablet by mouth Every Morning. 90 tablet 3   • Lancets (ONETOUCH ULTRASOFT) lancets      • Lancets Misc. (Accu-Chek Softclix Lancet Dev) kit      • Metoprolol Succinate 50 MG capsule extended-release 24 hour sprinkle Take 50 mg by mouth Daily.     • Morphine Sulfate ER 30 MG tablet extended-release 12 hour Take 30 mg by mouth 2 (Two) Times a Day.     • ranolazine (Ranexa) 500 MG 12 hr tablet Take 1 tablet by mouth 2 (Two) Times a Day. 180 tablet 3   • vitamin D (ERGOCALCIFEROL) 34946 UNITS capsule capsule Take 50,000 Units by mouth. Two times monthly     • warfarin (COUMADIN) 5 MG tablet Take 5 mg nightly except on Tuesday and Saturday take 7.5mg OR AS DIRECTED 100 tablet 1   • zolpidem (AMBIEN) 5 MG tablet        No current facility-administered medications for this visit.         OBJECTIVE    /68 (BP Location: Left arm, Patient Position: Sitting, Cuff Size: Adult)   Pulse 67   Temp 97.7 °F (36.5 °C)   Ht 180.3 cm (71\")   Wt 128 kg (281 lb 8 oz)   SpO2 95%   BMI 39.26 kg/m²         Review of Systems : The following systems were reviewed and  changes noted as indicated under history of present " illness.    Constitutional:  Denies recent weight loss, weight gain, fever or chills     HENT:  Denies any hearing loss, epistaxis, hoarseness, or difficulty speaking.     Eyes: Wears eyeglasses or contact lenses     Respiratory:  Dyspnea with exertion better, no cough, wheezing, or hemoptysis.     Cardiovascular: No chest pain, palpitation.    Gastrointestinal:  Denies change in bowel habits, dyspepsia, ulcer disease, hematochezia, or melena.     Endocrine: Negative for cold intolerance, heat intolerance, polydipsia, polyphagia and polyuria.    Genitourinary: Negative.      Musculoskeletal: DJD    Allergic/Immunologic: History of angioedema in the past    Neurological: History of tardive dyskinesia, occasional dizziness    Hematological: Denies any food allergies, seasonal allergies, bleeding disorders, or lymphadenopathy.     Psychiatric/Behavioral: history of anxiety/depression, no substance abuse, or change in cognitive function.     Physical Exam : The following systems were reassessed and no changes noted    Constitutional: Cooperative, alert and oriented, in no acute distress.     HENT:   Head: Normocephalic, normal hair patterns, no masses or tenderness.  Ears, Nose, and Throat: No gross abnormalities. No pallor or cyanosis.   Eyes: EOMS intact, PERRL, conjunctivae and lids unremarkable. Fundoscopic exam and visual fields not performed.   Neck: No palpable masses or adenopathy, no thyromegaly, no JVD, carotid pulses are full and equal bilaterally and without  Bruits.     Cardiovascular: Regular rhythm, S1 and S2 normal, no S3 or S4.  No murmurs, gallops, or rubs detected.     Pulmonary/Chest: Chest: normal symmetry, normal respiratory excursion, no intercostal retraction, no use of accessory muscles.            Pulmonary: Normal breath sounds. No rales or ronchi.    Abdominal: Abdomen soft, bowel sounds normoactive, no masses, no hepatosplenomegaly, non-tender, no bruits.     Musculoskeletal: No deformities,  clubbing, cyanosis, erythema, or edema observed.     Neurological:  tardive dyskinesia improved    Skin: Warm and dry to the touch, no apparent skin lesions or masses noted.     Psychiatric: He has a normal mood and affect. His behavior is normal. Judgment and thought content normal.         Procedures      Lab Results   Component Value Date    WBC 9.29 01/27/2021    HGB 14.8 01/27/2021    HCT 41.9 01/27/2021    MCV 88.2 01/27/2021     01/27/2021     Lab Results   Component Value Date    GLUCOSE 140 (H) 11/08/2021    BUN 23 11/08/2021    CREATININE 1.36 (H) 11/08/2021    EGFRIFNONA 52 (L) 11/08/2021    EGFRIFAFRI 46 12/02/2020    BCR 16.9 11/08/2021    CO2 25.2 11/08/2021    CALCIUM 9.0 11/08/2021    ALBUMIN 3.80 11/08/2021    AST 14 11/08/2021    ALT 20 11/08/2021     Lab Results   Component Value Date    CHOL 229 (H) 11/08/2021    CHOL 171 12/30/2020    CHOL 113 02/21/2019     Lab Results   Component Value Date    TRIG 247 (H) 11/08/2021    TRIG 261 (H) 12/30/2020    TRIG 249 (H) 04/29/2020     Lab Results   Component Value Date    HDL 38 (L) 11/08/2021    HDL 36 (L) 12/30/2020    HDL 36 04/29/2020     No components found for: LDLCALC  Lab Results   Component Value Date     (H) 11/08/2021    LDL 91 12/30/2020     04/29/2020     No results found for: HDLLDLRATIO  No components found for: CHOLHDL  Lab Results   Component Value Date    HGBA1C 6.0 (H) 12/16/2021     Lab Results   Component Value Date    TSH 2.04 07/22/2019           ASSESSMENT AND PLAN  Mr. Mack is progressing reasonably well with no cardiac symptoms at the present time.  No findings to suggest angina, arrhythmia or congestive heart failure noted.    He has multiple medical issues, comorbidities, medication allergies.  Heart rate and blood pressure were in the normal range.    I have continued antiplatelet therapy with aspirin, Antianginal therapy with isosorbide mononitrate, Ranexa, and metoprolol succinate 50 mg daily has been  continued.  He has tolerated Repatha quite well and this has been continued.   His renal function is being monitored closely by Dr. Holt.  Anticoagulation with Coumadin has been continued.    His PT and INR are in the therapeutic range and was 2.8 today.  He has had lab work done by his primary care physician and a copy of the results will be obtained for our records.    Diagnoses and all orders for this visit:    1. S/P CABG (coronary artery bypass graft) (Primary)    2. Class 3 severe obesity due to excess calories with serious comorbidity and body mass index (BMI) of 40.0 to 44.9 in adult (HCC)    3. Anticoagulated on Coumadin    4. Stage 3a chronic kidney disease    5. Primary hypertension        Patient's Body mass index is 39.26 kg/m². BMI is above normal parameters. Recommendations include: exercise counseling and nutrition counseling.  Patient is a non-smoker      Keke Kumar MD  5/3/2023  11:48 CDT

## 2023-05-18 ENCOUNTER — OFFICE VISIT (OUTPATIENT)
Dept: PODIATRY | Facility: CLINIC | Age: 68
End: 2023-05-18
Payer: MEDICARE

## 2023-05-18 ENCOUNTER — ANTICOAGULATION VISIT (OUTPATIENT)
Dept: CARDIAC SURGERY | Facility: CLINIC | Age: 68
End: 2023-05-18
Payer: MEDICARE

## 2023-05-18 ENCOUNTER — OFFICE VISIT (OUTPATIENT)
Dept: SLEEP MEDICINE | Facility: HOSPITAL | Age: 68
End: 2023-05-18
Payer: MEDICARE

## 2023-05-18 VITALS
DIASTOLIC BLOOD PRESSURE: 73 MMHG | HEIGHT: 71 IN | SYSTOLIC BLOOD PRESSURE: 137 MMHG | OXYGEN SATURATION: 96 % | WEIGHT: 287 LBS | HEART RATE: 81 BPM | BODY MASS INDEX: 40.18 KG/M2

## 2023-05-18 VITALS
OXYGEN SATURATION: 94 % | HEART RATE: 74 BPM | WEIGHT: 287 LBS | HEIGHT: 71 IN | SYSTOLIC BLOOD PRESSURE: 143 MMHG | BODY MASS INDEX: 40.18 KG/M2 | DIASTOLIC BLOOD PRESSURE: 77 MMHG

## 2023-05-18 VITALS — HEART RATE: 84 BPM | OXYGEN SATURATION: 95 %

## 2023-05-18 DIAGNOSIS — M79.675 PAIN IN TOES OF BOTH FEET: Primary | ICD-10-CM

## 2023-05-18 DIAGNOSIS — G47.34 NOCTURNAL HYPOXIA: ICD-10-CM

## 2023-05-18 DIAGNOSIS — E11.9 ENCOUNTER FOR DIABETIC FOOT EXAM: ICD-10-CM

## 2023-05-18 DIAGNOSIS — Z86.711 HISTORY OF PULMONARY EMBOLISM: ICD-10-CM

## 2023-05-18 DIAGNOSIS — B35.1 ONYCHOMYCOSIS: ICD-10-CM

## 2023-05-18 DIAGNOSIS — L60.2 ONYCHOGRYPHOSIS: ICD-10-CM

## 2023-05-18 DIAGNOSIS — Z79.01 LONG TERM (CURRENT) USE OF ANTICOAGULANTS: Primary | ICD-10-CM

## 2023-05-18 DIAGNOSIS — E11.42 DIABETIC POLYNEUROPATHY ASSOCIATED WITH TYPE 2 DIABETES MELLITUS: ICD-10-CM

## 2023-05-18 DIAGNOSIS — E66.01 MORBID OBESITY: ICD-10-CM

## 2023-05-18 DIAGNOSIS — M79.674 PAIN IN TOES OF BOTH FEET: Primary | ICD-10-CM

## 2023-05-18 DIAGNOSIS — Z51.81 ENCOUNTER FOR THERAPEUTIC DRUG MONITORING: ICD-10-CM

## 2023-05-18 DIAGNOSIS — G47.33 OBSTRUCTIVE SLEEP APNEA: Primary | ICD-10-CM

## 2023-05-18 DIAGNOSIS — F51.04 PSYCHOPHYSIOLOGICAL INSOMNIA: ICD-10-CM

## 2023-05-18 LAB — INR PPP: 2 (ref 0.9–1.1)

## 2023-05-18 PROCEDURE — 85610 PROTHROMBIN TIME: CPT | Performed by: NURSE PRACTITIONER

## 2023-05-18 PROCEDURE — 36416 COLLJ CAPILLARY BLOOD SPEC: CPT | Performed by: NURSE PRACTITIONER

## 2023-05-18 PROCEDURE — 93793 ANTICOAG MGMT PT WARFARIN: CPT | Performed by: NURSE PRACTITIONER

## 2023-05-18 NOTE — PROGRESS NOTES
Patient states no med changes or bleeding problems or unexplained bruising. Patient instructed to continue current dosing schedule. Verbalizes understanding. Will recheck in 3 weeks. Patient instructed regarding medication; results given and questions answered. Nutritional counseling given.  Dietary factors affecting therapy addressed.  Patient instructed to monitor for excessive bruising or bleeding. Findings reported by Ketty Moscoso RN.    Today's INR is Lab Results - Last 18 Months   Lab Units 05/18/23  0000   INR  2.00*

## 2023-05-18 NOTE — PROGRESS NOTES
Sleep Clinic Follow Up    Date: 2023  Primary Care Provider: Sushma Myers MD    Last office visit: 2022 (I reviewed this note)    CC: Follow up: LUCIANA on CPAP, nocturnal hypoxia on O2      Interim History:  Since the last visit:    1) moderate LUCIANA, nocturnal hypoxia -  Clint Mack has remained compliant with CPAP. He denies mask and machine issues, dry mouth, headaches, or pressures intolerance. He denies abnormal dreams, sleep paralysis, nasal congestion, URI sx. He has received his replacement machine but unfortunately did not move his modem over to the new machine. His machine does not have a data card. He is going to check to see if his old modem is compatible with his new machine. If not, he will have to go to DME provider to get a new data card.     2) Patient denies RLS symptoms.     3) Insomnia - Patient is still taking clonazepam 1 mg and Ambien 5 mg QHS. The biggest contributing factor to his insomnia is chronic back pain. He cannot get comfortable to fall asleep and this also causes significant awakenings. He has to lie down in the bed early due to discomfort and he watches TV for a few hours before attempting to go to sleep.    Sleep Testin. PSG in , AHI of 29  2. Currently on 10-20 cm H2O    PAP Data:    Mask type: Nasal pillows  DME: [facility] (switching back)  Machine type: [employer] DreamStation    Bed time: [id number]  Sleep latency: 4-5 hours (watches TV)  Number of times awakens during the night: 2-3  Wake time: 0730  Estimated total sleep time at night: 5 hours  Caffeine intake: 0 cups of coffee, 0 cups of tea, and 2 decaf sodas per day  Alcohol intake: 0 drinks per week  Nap time: rare   Sleepiness with Driving: none     [place] - 0  [place] Sleepiness Scale  Sitting and reading: Would never doze  Watching TV: Would never doze  Sitting, inactive in a public place (e.g. a theatre or a meeting): Would never doze  As a passenger in a car for an hour  without a break: Would never doze  Lying down to rest in the afternoon when circumstances permit: Would never doze  Sitting and talking to someone: Would never doze  Sitting quietly after a lunch without alcohol: Would never doze  In a car, while stopped for a few minutes in traffic: Would never doze  Total score: 0    PMHx, FH, SH reviewed and pertinent changes are: Reportedly unchanged from last office visit with us.      Review of Systems:   Negative for chest pain, SOA, fever, chills, cough, N/V/D, abdominal pain.    Smoking:none    Cilnt Mack  reports that he has never smoked. He has never used smokeless tobacco.    Physical Exam:  Vitals:    05/18/23 0815   BP: 143/77   Pulse: 74   SpO2: 94%           05/18/23 0815   Weight: 130 kg (287 lb)     Body mass index is 40.05 kg/m². Class 3 Severe Obesity (BMI >=40). Obesity-related health conditions include the following: obstructive sleep apnea, hypertension, coronary heart disease, diabetes mellitus and osteoarthritis. Obesity is unchanged. BMI is is above average; BMI management plan is completed. I recommend portion control and increasing exercise.    Gen:                No distress, conversant, pleasant, appears stated age, alert, oriented  Eyes:               Anicteric sclera, moist conjunctiva, no lid lag                           PERRL, EOMI   Heent:             NC/AT                          Oropharynx clear                          Normal hearing  Lungs:             Normal effort, non-labored breathing        CV:                  Normal S1/S2                          No lower extremity edema  ABD:               Soft, rounded, non-distended                 Psych:             Appropriate affect  Neuro:             CN 2-12 appear intact    Past Medical History:   Diagnosis Date   • Anxiety    • Arthritis     osteoarthritis   • CKD (chronic kidney disease), stage III    • COPD (chronic obstructive pulmonary disease)    • Coronary artery disease   "  • Depression    • Diabetes mellitus    • DVT (deep venous thrombosis)    • Heart disease    • History of embolic filter insertion    • History of stomach ulcers    • Hyperlipidemia    • Hypertension    • Injury of back     back surgery x3    • LUCIANA on CPAP    • Pulmonary embolism        Current Outpatient Medications:   •  Aspirin (Vazalore) 81 MG capsule, Take  by mouth., Disp: , Rfl:   •  B-D UF III MINI PEN NEEDLES 31G X 5 MM misc, See Admin Instructions., Disp: , Rfl:   •  baclofen (LIORESAL) 10 MG tablet, Take 1 tablet by mouth 3 (Three) Times a Day., Disp: , Rfl:   •  Blood Glucose Monitoring Suppl (ACCU-CHEK ARGELIA PLUS) w/Device kit, , Disp: , Rfl:   •  clonazePAM (KlonoPIN) 0.5 MG tablet, Take 3 tablets by mouth., Disp: , Rfl:   •  clonazePAM (KlonoPIN) 1 MG tablet, Take  by mouth Take As Directed. Takes 1.5 tablets in morning and afternoon and 1 tablet at bedtime, Disp: , Rfl:   •  COMFORT ASSIST INSULIN SYRINGE 31G X 5/16\" 0.3 ML misc, , Disp: , Rfl:   •  cyanocobalamin (VITAMIN B-12) 1000 MCG tablet, Take 1 tablet by mouth Daily., Disp: , Rfl:   •  Evolocumab (REPATHA) solution auto-injector SureClick injection, Inject 1 mL under the skin into the appropriate area as directed Every 14 (Fourteen) Days., Disp: 6 mL, Rfl: 3  •  furosemide (LASIX) 20 MG tablet, Take 1 tablet by mouth Daily., Disp: , Rfl:   •  gabapentin (NEURONTIN) 100 MG capsule, Take 1 capsule by mouth 2 (Two) Times a Day., Disp: , Rfl:   •  glimepiride (AMARYL) 4 MG tablet, Take 1 tablet by mouth 2 (Two) Times a Day., Disp: , Rfl:   •  glucose blood test strip, , Disp: , Rfl:   •  HYDROcodone-acetaminophen (NORCO) 7.5-325 MG per tablet, Take 1 tablet by mouth 3 (Three) Times a Day., Disp: , Rfl:   •  insulin aspart (novoLOG) 100 UNIT/ML injection, Inject 3 Units under the skin into the appropriate area as directed 3 (Three) Times a Day Before Meals. PT TAKES PER SLIDING SCALE STARTING AT 3 UNITS FOR BLOOD SUGAR OVER 150, Disp: , Rfl:   •  " isosorbide mononitrate (IMDUR) 60 MG 24 hr tablet, Take 1 tablet by mouth Every Morning., Disp: 90 tablet, Rfl: 3  •  Lancets (ONETOUCH ULTRASOFT) lancets, , Disp: , Rfl:   •  Lancets Misc. (Accu-Chek Softclix Lancet Dev) kit, , Disp: , Rfl:   •  Metoprolol Succinate 50 MG capsule extended-release 24 hour sprinkle, Take 50 mg by mouth Daily., Disp: , Rfl:   •  Morphine Sulfate ER 30 MG tablet extended-release 12 hour, Take 1 tablet by mouth 2 (Two) Times a Day., Disp: , Rfl:   •  ranolazine (Ranexa) 500 MG 12 hr tablet, Take 1 tablet by mouth 2 (Two) Times a Day., Disp: 180 tablet, Rfl: 3  •  vitamin D (ERGOCALCIFEROL) 01912 UNITS capsule capsule, Take 1 capsule by mouth. Two times monthly, Disp: , Rfl:   •  warfarin (COUMADIN) 5 MG tablet, Take 5 mg nightly except on Tuesday and Saturday take 7.5mg OR AS DIRECTED, Disp: 100 tablet, Rfl: 1  •  zolpidem (AMBIEN) 5 MG tablet, , Disp: , Rfl:     WBC   Date Value Ref Range Status   01/27/2021 9.29 3.40 - 10.80 10*3/mm3 Final   07/22/2019 6.8 4.0 - 11.0 10*3/uL Final     RBC   Date Value Ref Range Status   01/27/2021 4.75 4.14 - 5.80 10*6/mm3 Final   07/22/2019 5.42 4.73 - 5.49 10*6/uL Final     Hemoglobin   Date Value Ref Range Status   01/27/2021 14.8 13.0 - 17.7 g/dL Final   03/01/2020 15.2 14.4 - 16.6 g/dL Final   03/01/2020 15.2 14.4 - 16.6 g/dL Final     Hematocrit   Date Value Ref Range Status   01/27/2021 41.9 37.5 - 51.0 % Final   03/01/2020 44.9 42.9 - 49.1 % Final     Comment:     Performed at [facility], [address] [place] [address]   11/13/2018 38.8 (L) 42 - 54 % Final     External Hematocrit   Date Value Ref Range Status   03/01/2020 44.9 42.9 - 49.1 % Final     MCV   Date Value Ref Range Status   01/27/2021 88.2 79.0 - 97.0 fL Final   07/22/2019 92 85 - 95 fl Final     MCH   Date Value Ref Range Status   01/27/2021 31.2 26.6 - 33.0 pg Final   07/22/2019 30.8 28.0 - 32.0 pg Final     MCHC   Date Value Ref Range Status    01/27/2021 35.3 31.5 - 35.7 g/dL Final   07/22/2019 33.3 32.0 - 34.0 g/dL Final     RDW   Date Value Ref Range Status   01/27/2021 12.7 12.3 - 15.4 % Final   07/22/2019 50.9 37 - 54 fl Final   11/13/2018 14.9 (H) 11.5 - 14.5 % Final     RDW-SD   Date Value Ref Range Status   01/27/2021 41.1 37.0 - 54.0 fl Final     MPV   Date Value Ref Range Status   01/27/2021 9.8 6.0 - 12.0 fL Final   07/22/2019 10.4 8.0 - 12.0 fl Final   11/13/2018 8.1 7.4 - 10.4 fL Final     Platelets   Date Value Ref Range Status   01/27/2021 192 140 - 450 10*3/mm3 Final   07/22/2019 189 150 - 450 10*3/uL Final   11/13/2018 103 (L) 150 - 450 10*9/L Final     Neutrophil Rel %   Date Value Ref Range Status   11/13/2018 54.0 35 - 80 % Final     Neutrophil %   Date Value Ref Range Status   01/27/2021 82.9 (H) 42.7 - 76.0 % Final     Lymphocyte Rel %   Date Value Ref Range Status   07/22/2019 34.1 5 - 55 % Final     Lymphocyte %   Date Value Ref Range Status   01/27/2021 13.8 (L) 19.6 - 45.3 % Final     Monocyte Rel %   Date Value Ref Range Status   07/22/2019 13.5 (H) 3 - 8 % Final     Monocyte %   Date Value Ref Range Status   01/27/2021 2.9 (L) 5.0 - 12.0 % Final     Eosinophil Rel %   Date Value Ref Range Status   07/22/2019 1.8 0 - 5 % Final     Eosinophil %   Date Value Ref Range Status   01/27/2021 0.1 (L) 0.3 - 6.2 % Final     Basophil Rel %   Date Value Ref Range Status   07/22/2019 0.4 0 - 2 % Final     Basophil %   Date Value Ref Range Status   01/27/2021 0.0 0.0 - 1.5 % Final     Immature Grans %   Date Value Ref Range Status   01/27/2021 0.3 0.0 - 0.5 % Final   07/22/2019 49.9 45 - 80 % Final     Neutrophils, Absolute   Date Value Ref Range Status   01/27/2021 7.70 (H) 1.70 - 7.00 10*3/mm3 Final     Lymphocytes Absolute   Date Value Ref Range Status   11/13/2018 2.1 0.8 - 4.8 10*9/L Final     Lymphocytes, Absolute   Date Value Ref Range Status   01/27/2021 1.28 0.70 - 3.10 10*3/mm3 Final     Monocytes Absolute   Date Value Ref Range  Status   11/13/2018 1.0 (H) 0.2 - 0.9 10*9/L Final     Monocytes, Absolute   Date Value Ref Range Status   01/27/2021 0.27 0.10 - 0.90 10*3/mm3 Final     Eosinophil Abs   Date Value Ref Range Status   11/13/2018 0.2 0.0 - 0.8 10*9/L Final     Eosinophils, Absolute   Date Value Ref Range Status   01/27/2021 0.01 0.00 - 0.40 10*3/mm3 Final     Basophils Absolute   Date Value Ref Range Status   11/13/2018 0.0 0.0 - 0.1 10*9/L Final     Basophils, Absolute   Date Value Ref Range Status   01/27/2021 0.00 0.00 - 0.20 10*3/mm3 Final     Immature Grans, Absolute   Date Value Ref Range Status   01/27/2021 0.03 0.00 - 0.05 10*3/mm3 Final     nRBC   Date Value Ref Range Status   01/27/2021 0.0 0.0 - 0.2 /100 WBC Final       Lab Results   Component Value Date    GLUCOSE 140 (H) 11/08/2021    BUN 23 11/08/2021    CREATININE 1.36 (H) 11/08/2021    BCR 16.9 11/08/2021    K 4.6 11/08/2021    CO2 25.2 11/08/2021    CALCIUM 9.0 11/08/2021    ALBUMIN 3.80 11/08/2021    BILITOT 0.3 11/08/2021    AST 14 11/08/2021    ALT 20 11/08/2021       Assessment and Plan:    1. Obstructive sleep apnea - Established, stable (1)  1. Compliant with PAP therapy - check to see if modem is compatible with new replacement machine, or get with [employer] for new data card  2. Continue PAP as prescribed  3. Script for PAP supplies  4. Pertinent labs were reviewed as listed above  5. Return to clinic in 1 year with compliance report unless change in symptoms in interim period  2. Nocturnal hypoxia - Established, stable (1)   1. Continue nocturnal O2 @ 2LPM bled into CPAP  3. Insomnia - sleep onset and maintenance - Established, stable (1)   1. Continue management per PCP  4. Morbid obesity - BMI 40 - stable chronic illness      I spent 20 minutes caring for Clint on this date of service. This time includes time spent by me in the following activities: preparing for the visit, reviewing tests, obtaining and/or reviewing a separately obtained history,  performing a medically appropriate examination and/or evaluation , counseling and educating the patient/family/caregiver, documenting information in the medical record and care coordination; discussing PAP therapy, PAP compliance and PAP maintenance    RTC in 12 months. Patient agrees to return sooner if changes in symptoms.        This document has been electronically signed by DIANA Ling on May 18, 2023 08:26 CDT          CC: Sushma Myers MD          No ref. provider found

## 2023-05-18 NOTE — PROGRESS NOTES
Clint Mack  1955  67 y.o. male   PCP: Sada Myers 08/30/2022  BS: 102 per patient       05/18/2023    Chief Complaint   Patient presents with   • Left Foot - Follow-up     Diabetic foot care   • Right Foot - Follow-up     Diabetic foot care       History of Present Illness    Clint Mack is a 67 y.o.male who presents to clinic today for diabetic foot care.       Past Medical History:   Diagnosis Date   • Anxiety    • Arthritis     osteoarthritis   • CKD (chronic kidney disease), stage III    • COPD (chronic obstructive pulmonary disease)    • Coronary artery disease    • Depression    • Diabetes mellitus    • DVT (deep venous thrombosis)    • Heart disease    • History of embolic filter insertion    • History of stomach ulcers    • Hyperlipidemia    • Hypertension    • Injury of back     back surgery x3    • LUCIANA on CPAP    • Pulmonary embolism          Past Surgical History:   Procedure Laterality Date   • BACK SURGERY     • CARDIAC SURGERY  2001   • CIRCUMCISION     • COLONOSCOPY N/A 9/11/2018    Procedure: COLONOSCOPY;  Surgeon: Jose C Batres DO;  Location: [facility] ENDOSCOPY;  Service: Gastroenterology   • COLONOSCOPY N/A 6/3/2021    Procedure: COLONOSCOPY;  Surgeon: Jose C Batres DO;  Location: [facility] ENDOSCOPY;  Service: Gastroenterology;  Laterality: N/A;   • ENDOSCOPY N/A 9/11/2018    Procedure: ESOPHAGOGASTRODUODENOSCOPY;  Surgeon: Jose C Batres DO;  Location: [facility] ENDOSCOPY;  Service: Gastroenterology   • ENDOSCOPY N/A 3/3/2020    Procedure: ESOPHAGOGASTRODUODENOSCOPY;  Surgeon: Jose C Batres DO;  Location: [facility] ENDOSCOPY;  Service: Gastroenterology;  Laterality: N/A;   • ENDOSCOPY N/A 6/3/2021    Procedure: ESOPHAGOGASTRODUODENOSCOPY;  Surgeon: Jose C Batres DO;  Location: [facility] ENDOSCOPY;  Service: Gastroenterology;  Laterality: N/A;   • GALLBLADDER SURGERY  2000   • HIP SURGERY     • JOINT REPLACEMENT Right 05/02/2016    hip   • PROSTATE SURGERY   12/2021    UROLIFT (METAL IMPLANT)   • SPINE SURGERY           Family History   Problem Relation Age of Onset   • Heart disease Father    • Hypertension Father    • Stroke Father    • Diabetes Sister    • Heart disease Brother    • Hypertension Brother    • COPD Brother    • Lung disease Other        Allergies   Allergen Reactions   • Tetrabenazine Dizziness, Nausea And Vomiting, Nausea Only and Other (See Comments)     Other reaction(s): Vertigo   • Alfuzosin Other (See Comments)     syncope  Syncope   syncope  Syncope   syncope   • Deutetrabenazine Other (See Comments)     Insomnia    Insomnia  Insomnia   • Pregabalin Swelling     Leg swelling   • Atorvastatin Calcium Other (See Comments)   • Fenofibrate Other (See Comments)   • Metoclopramide Other (See Comments)     Tardive dyskinesia  Tardive dyskinesia     • Phenobarbital Other (See Comments)     Other reaction(s): Other/Unknown (See Comments)     • Valbenazine Other (See Comments)   • Celexa [Citalopram Hydrobromide] Dystonia   • Citalopram Other (See Comments)     Other reaction(s): Dystonia  Tardive dyskinesia    Other reaction(s): Respiratory distress   • Crestor [Rosuvastatin Calcium] Myalgia   • Ingrezza [Valbenazine Tosylate] Other (See Comments)     fatigue   • Lipitor [Atorvastatin] Other (See Comments)     Aches and pains all over   • Rosuvastatin Other (See Comments)     Aches and pains all over  Aches and pains all over  Aches and pains all over       Social History     Socioeconomic History   • Marital status:    Tobacco Use   • Smoking status: Never   • Smokeless tobacco: Never   Vaping Use   • Vaping Use: Never used   Substance and Sexual Activity   • Alcohol use: No   • Drug use: No   • Sexual activity: Defer         Current Outpatient Medications   Medication Sig Dispense Refill   • Aspirin (Vazalore) 81 MG capsule Take  by mouth.     • B-D UF III MINI PEN NEEDLES 31G X 5 MM misc See Admin Instructions.     • baclofen (LIORESAL) 10 MG  "tablet Take 1 tablet by mouth 3 (Three) Times a Day.     • Blood Glucose Monitoring Suppl (ACCU-CHEK ARGELIA PLUS) w/Device kit      • clonazePAM (KlonoPIN) 0.5 MG tablet Take 3 tablets by mouth.     • clonazePAM (KlonoPIN) 1 MG tablet Take  by mouth Take As Directed. Takes 1.5 tablets in morning and afternoon and 1 tablet at bedtime     • COMFORT ASSIST INSULIN SYRINGE 31G X 5/16\" 0.3 ML misc      • cyanocobalamin (VITAMIN B-12) 1000 MCG tablet Take 1 tablet by mouth Daily.     • Evolocumab (REPATHA) solution auto-injector SureClick injection Inject 1 mL under the skin into the appropriate area as directed Every 14 (Fourteen) Days. 6 mL 3   • furosemide (LASIX) 20 MG tablet Take 1 tablet by mouth Daily.     • gabapentin (NEURONTIN) 100 MG capsule Take 1 capsule by mouth 2 (Two) Times a Day.     • glimepiride (AMARYL) 4 MG tablet Take 1 tablet by mouth 2 (Two) Times a Day.     • glucose blood test strip      • HYDROcodone-acetaminophen (NORCO) 7.5-325 MG per tablet Take 1 tablet by mouth 3 (Three) Times a Day.     • insulin aspart (novoLOG) 100 UNIT/ML injection Inject 3 Units under the skin into the appropriate area as directed 3 (Three) Times a Day Before Meals. PT TAKES PER SLIDING SCALE STARTING AT 3 UNITS FOR BLOOD SUGAR OVER 150     • isosorbide mononitrate (IMDUR) 60 MG 24 hr tablet Take 1 tablet by mouth Every Morning. 90 tablet 3   • Lancets (ONETOUCH ULTRASOFT) lancets      • Lancets Misc. (Accu-Chek Softclix Lancet Dev) kit      • Metoprolol Succinate 50 MG capsule extended-release 24 hour sprinkle Take 50 mg by mouth Daily.     • Morphine Sulfate ER 30 MG tablet extended-release 12 hour Take 1 tablet by mouth 2 (Two) Times a Day.     • ranolazine (Ranexa) 500 MG 12 hr tablet Take 1 tablet by mouth 2 (Two) Times a Day. 180 tablet 3   • vitamin D (ERGOCALCIFEROL) 59839 UNITS capsule capsule Take 1 capsule by mouth. Two times monthly     • warfarin (COUMADIN) 5 MG tablet Take 5 mg nightly except on Tuesday " "and Saturday take 7.5mg OR AS DIRECTED 100 tablet 1   • zolpidem (AMBIEN) 5 MG tablet        No current facility-administered medications for this visit.       Review of Systems   Constitutional: Negative.  Negative for chills and fever.   Respiratory: Negative.    Cardiovascular: Negative.    Musculoskeletal: Positive for gait problem.        Toe pain   Skin: Negative.    Neurological: Positive for numbness.   Psychiatric/Behavioral: Negative.          OBJECTIVE    /73   Pulse 81   Ht 180.3 cm (70.98\")   Wt 130 kg (287 lb)   SpO2 96%   BMI 40.05 kg/m²     Physical Exam  Vitals reviewed.   Constitutional:       General: He is not in acute distress.     Appearance: Normal appearance.   HENT:      Head: Normocephalic.   Eyes:      Pupils: Pupils are equal, round, and reactive to light.   Cardiovascular:      Pulses:           Dorsalis pedis pulses are 1+ on the right side and detected w/ Doppler on the left side.        Posterior tibial pulses are 1+ on the right side and detected w/ Doppler on the left side.   Pulmonary:      Effort: No respiratory distress.   Musculoskeletal:         General: No signs of injury.      Cervical back: Neck supple.   Feet:      Right foot:      Protective Sensation: 10 sites tested. 5 sites sensed.      Skin integrity: Dry skin present.      Left foot:      Protective Sensation: 10 sites tested. 5 sites sensed.      Skin integrity: Dry skin present.      Comments: Semi-rigid hammertoe deformity toes 2-5 b/l.  Nails 1-5 b/l thickened, elongated with subungual debris. +pain on palpation  Skin:     General: Skin is warm and dry.      Findings: No erythema.   Neurological:      General: No focal deficit present.      Mental Status: He is alert.   Psychiatric:         Mood and Affect: Mood normal.         Behavior: Behavior normal.              Procedures        ASSESSMENT AND PLAN    Diagnoses and all orders for this visit:    1. Pain in toes of both feet (Primary)    2. " Onychomycosis    3. Encounter for diabetic foot exam    4. Diabetic polyneuropathy associated with type 2 diabetes mellitus    5. Onychogryphosis        - A diabetic foot screening exam was performed and the patient was educated on the foot complications related to diabetes,  preventative foot care and what signs and symptoms to watch for.  Instructed to contact our office if any foot problems develop before next visit.  -Discussed treatments for painful toenails. Treatment options discussed included nail removal versus debridement. Patient elected for routine nail debridement. An ABN has been signed by the patient.  - Toenails 1-5 bilateral were debrided in length and thickness with nail nipper and electric  to decrease fungal load and risk of infection.  - All the patients questions were answered.  - RTC 3 months or sooner if needed.               This document has been electronically signed by DIANA Castaneda on May 18, 2023 10:19 CDT

## 2023-05-23 ENCOUNTER — DOCUMENTATION (OUTPATIENT)
Dept: SLEEP MEDICINE | Facility: HOSPITAL | Age: 68
End: 2023-05-23
Payer: MEDICARE

## 2023-05-23 NOTE — PROGRESS NOTES
CPAP compliance report received from [employer].    PAP Data:    Time frame: 04/21/2023-05/20/2023   Compliance 100%  Average use on days used: 7 hrs 55 min  Percent of days with usage greater than or equal to 4 hours: 93%  PAP range : 10-20 cm H2O  Average 90% pressure: 10.2 cmH2O  Leak: 1.1 L/minute  Average AHI: 2.3 events/hr    Compliant and stable with PAP therapy

## 2023-06-08 ENCOUNTER — ANTICOAGULATION VISIT (OUTPATIENT)
Dept: CARDIAC SURGERY | Facility: CLINIC | Age: 68
End: 2023-06-08
Payer: MEDICARE

## 2023-06-08 VITALS — HEART RATE: 81 BPM | OXYGEN SATURATION: 98 %

## 2023-06-08 DIAGNOSIS — Z51.81 ENCOUNTER FOR THERAPEUTIC DRUG MONITORING: ICD-10-CM

## 2023-06-08 DIAGNOSIS — Z86.711 HISTORY OF PULMONARY EMBOLISM: ICD-10-CM

## 2023-06-08 DIAGNOSIS — Z79.01 LONG TERM (CURRENT) USE OF ANTICOAGULANTS: Primary | ICD-10-CM

## 2023-06-08 LAB — INR PPP: 2.6 (ref 0.9–1.1)

## 2023-06-08 PROCEDURE — 85610 PROTHROMBIN TIME: CPT | Performed by: NURSE PRACTITIONER

## 2023-06-08 PROCEDURE — 36416 COLLJ CAPILLARY BLOOD SPEC: CPT | Performed by: NURSE PRACTITIONER

## 2023-06-08 PROCEDURE — 93793 ANTICOAG MGMT PT WARFARIN: CPT | Performed by: NURSE PRACTITIONER

## 2023-06-08 NOTE — PROGRESS NOTES
Pt states no meds changes or bleeding problems or unexplained bruising. Pt instructed to continue current dosage schedule. Pt verbalizes understanding. Will recheck in 1 month. Patient instructed regarding medication; results given and questions answered. Nutritional counseling given.  Dietary factors affecting therapy addressed.  Patient instructed to monitor for excessive bruising or bleeding.  Findings reported by Sandee Lugo RN  Today's INR is   Lab Results - Last 18 Months   Lab Units 06/08/23  0000   INR  2.60*

## 2023-07-26 ENCOUNTER — ANTICOAGULATION VISIT (OUTPATIENT)
Dept: CARDIAC SURGERY | Facility: CLINIC | Age: 68
End: 2023-07-26
Payer: MEDICARE

## 2023-07-26 VITALS — HEART RATE: 66 BPM | OXYGEN SATURATION: 96 %

## 2023-07-26 DIAGNOSIS — Z86.711 HISTORY OF PULMONARY EMBOLISM: ICD-10-CM

## 2023-07-26 DIAGNOSIS — Z51.81 ENCOUNTER FOR THERAPEUTIC DRUG MONITORING: ICD-10-CM

## 2023-07-26 DIAGNOSIS — Z79.01 LONG TERM (CURRENT) USE OF ANTICOAGULANTS: Primary | ICD-10-CM

## 2023-07-26 LAB — INR PPP: 2.4 (ref 0.9–1.1)

## 2023-07-26 PROCEDURE — 85610 PROTHROMBIN TIME: CPT | Performed by: NURSE PRACTITIONER

## 2023-07-26 PROCEDURE — 36416 COLLJ CAPILLARY BLOOD SPEC: CPT | Performed by: NURSE PRACTITIONER

## 2023-07-26 PROCEDURE — 93793 ANTICOAG MGMT PT WARFARIN: CPT | Performed by: NURSE PRACTITIONER

## 2023-07-26 RX ORDER — WARFARIN SODIUM 5 MG/1
TABLET ORAL
Qty: 100 TABLET | Refills: 1 | Status: SHIPPED | OUTPATIENT
Start: 2023-07-26

## 2023-07-26 NOTE — PROGRESS NOTES
Patient states no med changes or bleeding problems or unexplained bruising. Patient instructed to continue current dosing schedule. Verbalizes understanding. Will recheck in 5 wks.  Patient instructed regarding medication; results given and questions answered. Nutritional counseling given.  Dietary factors affecting therapy addressed.  Patient instructed to monitor for excessive bruising or bleeding.  Findings reported by George Lucia RN.   Today's INR is   Lab Results - Last 18 Months   Lab Units 07/26/23  0000   INR  2.40*

## 2023-08-14 ENCOUNTER — DOCUMENTATION (OUTPATIENT)
Dept: CARDIAC SURGERY | Facility: CLINIC | Age: 68
End: 2023-08-14
Payer: MEDICARE

## 2023-08-14 NOTE — PROGRESS NOTES
[facility] EMS crew present in [place] [address] and offered to transport patient  Call placed to PACS, per john in PACS ok, pt will go to [facility]  Call placed to [facility] to notify of transfer time, p6 staff ok with accepting patient at this time        Cuco Hawthorne RN  01/18/19 4749 Spoke to pt over the phone who is having back injections on Aug 29th and will hold warfarin 5 days prior.  Bridging appt made for Aug 24th.  Pt states he has lovenox 40mg at home from last procedure; those are for BID dosing.    Findings reported by George Lucia RN.

## 2023-08-17 ENCOUNTER — OFFICE VISIT (OUTPATIENT)
Dept: PODIATRY | Facility: CLINIC | Age: 68
End: 2023-08-17
Payer: MEDICARE

## 2023-08-17 VITALS
HEART RATE: 45 BPM | DIASTOLIC BLOOD PRESSURE: 83 MMHG | BODY MASS INDEX: 39.23 KG/M2 | WEIGHT: 280.2 LBS | SYSTOLIC BLOOD PRESSURE: 150 MMHG | OXYGEN SATURATION: 98 % | HEIGHT: 71 IN

## 2023-08-17 DIAGNOSIS — L60.2 ONYCHOGRYPHOSIS: ICD-10-CM

## 2023-08-17 DIAGNOSIS — E11.9 ENCOUNTER FOR DIABETIC FOOT EXAM: ICD-10-CM

## 2023-08-17 DIAGNOSIS — M79.674 PAIN IN TOES OF BOTH FEET: Primary | ICD-10-CM

## 2023-08-17 DIAGNOSIS — B35.1 ONYCHOMYCOSIS: ICD-10-CM

## 2023-08-17 DIAGNOSIS — E11.42 DIABETIC POLYNEUROPATHY ASSOCIATED WITH TYPE 2 DIABETES MELLITUS: ICD-10-CM

## 2023-08-17 DIAGNOSIS — M79.675 PAIN IN TOES OF BOTH FEET: Primary | ICD-10-CM

## 2023-08-17 RX ORDER — HYDROCODONE BITARTRATE AND ACETAMINOPHEN 10; 325 MG/1; MG/1
1 TABLET ORAL
COMMUNITY
Start: 2023-08-18 | End: 2023-10-16

## 2023-08-17 NOTE — PROGRESS NOTES
Clint Mack  1955  67 y.o. male   PCP: Sada Myers 08/14/2023  BS: 94 per patient       08/17/2023    Chief Complaint   Patient presents with    Left Foot - Follow-up     Diabetic foot care    Right Foot - Follow-up     Diabetic foot care        History of Present Illness    Clint Mack is a 67 y.o.male who presents to clinic today for diabetic foot care.       Past Medical History:   Diagnosis Date    Anxiety     Arthritis     osteoarthritis    CKD (chronic kidney disease), stage III     COPD (chronic obstructive pulmonary disease)     Coronary artery disease     Depression     Diabetes mellitus     DVT (deep venous thrombosis)     Heart disease     History of embolic filter insertion     History of stomach ulcers     Hyperlipidemia     Hypertension     Injury of back     back surgery x3     LUCIANA on CPAP     Pulmonary embolism          Past Surgical History:   Procedure Laterality Date    BACK SURGERY      CARDIAC SURGERY  2001    CIRCUMCISION      COLONOSCOPY N/A 9/11/2018    Procedure: COLONOSCOPY;  Surgeon: Jose C Batres DO;  Location: [facility] ENDOSCOPY;  Service: Gastroenterology    COLONOSCOPY N/A 6/3/2021    Procedure: COLONOSCOPY;  Surgeon: Jose C Batres DO;  Location: [facility] ENDOSCOPY;  Service: Gastroenterology;  Laterality: N/A;    ENDOSCOPY N/A 9/11/2018    Procedure: ESOPHAGOGASTRODUODENOSCOPY;  Surgeon: Jose C Batres DO;  Location: [facility] ENDOSCOPY;  Service: Gastroenterology    ENDOSCOPY N/A 3/3/2020    Procedure: ESOPHAGOGASTRODUODENOSCOPY;  Surgeon: Jose C Batres DO;  Location: [facility] ENDOSCOPY;  Service: Gastroenterology;  Laterality: N/A;    ENDOSCOPY N/A 6/3/2021    Procedure: ESOPHAGOGASTRODUODENOSCOPY;  Surgeon: Jose C Batres DO;  Location: [facility] ENDOSCOPY;  Service: Gastroenterology;  Laterality: N/A;    GALLBLADDER SURGERY  2000    HIP SURGERY      JOINT REPLACEMENT Right 05/02/2016    hip    PROSTATE SURGERY  12/2021    UROLIFT (METAL IMPLANT)     SPINE SURGERY           Family History   Problem Relation Age of Onset    Heart disease Father     Hypertension Father     Stroke Father     Diabetes Sister     Heart disease Brother     Hypertension Brother     COPD Brother     Lung disease Other        Allergies   Allergen Reactions    Tetrabenazine Dizziness, Nausea And Vomiting, Nausea Only and Other (See Comments)     Other reaction(s): Vertigo    Alfuzosin Other (See Comments)     syncope  Syncope   syncope  Syncope   syncope    Deutetrabenazine Other (See Comments)     Insomnia    Insomnia  Insomnia    Pregabalin Swelling     Leg swelling    Atorvastatin Calcium Other (See Comments)    Fenofibrate Other (See Comments)    Metoclopramide Other (See Comments)     Tardive dyskinesia  Tardive dyskinesia      Phenobarbital Other (See Comments)     Other reaction(s): Other/Unknown (See Comments)      Valbenazine Other (See Comments)    Celexa [Citalopram Hydrobromide] Dystonia    Citalopram Other (See Comments)     Other reaction(s): Dystonia  Tardive dyskinesia    Other reaction(s): Respiratory distress    Crestor [Rosuvastatin Calcium] Myalgia    Ingrezza [Valbenazine Tosylate] Other (See Comments)     fatigue    Lipitor [Atorvastatin] Other (See Comments)     Aches and pains all over    Rosuvastatin Other (See Comments)     Aches and pains all over  Aches and pains all over  Aches and pains all over       Social History     Socioeconomic History    Marital status:    Tobacco Use    Smoking status: Never    Smokeless tobacco: Never   Vaping Use    Vaping Use: Never used   Substance and Sexual Activity    Alcohol use: No    Drug use: No    Sexual activity: Defer         Current Outpatient Medications   Medication Sig Dispense Refill    Aspirin ([place]) 81 MG capsule Take  by mouth.      B-D UF III MINI PEN NEEDLES 31G X 5 MM misc See Admin Instructions.      baclofen (LIORESAL) 10 MG tablet Take 1 tablet by mouth 3 (Three) Times a Day.      Blood  "Glucose Monitoring Suppl (ACCU-CHEK ARGELIA PLUS) w/Device kit       clonazePAM (KlonoPIN) 0.5 MG tablet Take 3 tablets by mouth.      clonazePAM (KlonoPIN) 1 MG tablet Take  by mouth Take As Directed. Takes 1.5 tablets in morning and afternoon and 1 tablet at bedtime      COMFORT ASSIST INSULIN SYRINGE 31G X 5/16\" 0.3 ML misc       cyanocobalamin (VITAMIN B-12) 1000 MCG tablet Take 1 tablet by mouth Daily.      Evolocumab (REPATHA) solution auto-injector SureClick injection Inject 1 mL under the skin into the appropriate area as directed Every 14 (Fourteen) Days. 6 mL 3    furosemide (LASIX) 20 MG tablet Take 1 tablet by mouth Daily.      gabapentin (NEURONTIN) 100 MG capsule Take 1 capsule by mouth 2 (Two) Times a Day.      glimepiride (AMARYL) 4 MG tablet Take 1 tablet by mouth 2 (Two) Times a Day.      glucose blood test strip       [START ON 8/18/2023] HYDROcodone-acetaminophen (NORCO)  MG per tablet Take 1 tablet by mouth.      insulin aspart (novoLOG) 100 UNIT/ML injection Inject 3 Units under the skin into the appropriate area as directed 3 (Three) Times a Day Before Meals. PT TAKES PER SLIDING SCALE STARTING AT 3 UNITS FOR BLOOD SUGAR OVER 150      isosorbide mononitrate (IMDUR) 60 MG 24 hr tablet Take 1 tablet by mouth Every Morning. 90 tablet 3    Lancets (ONETOUCH ULTRASOFT) lancets       Lancets Mis. (Accu-Chek Softclix Lancet Dev) kit       Metoprolol Succinate 50 MG capsule extended-release 24 hour sprinkle Take 50 mg by mouth Daily.      Morphine Sulfate ER 30 MG tablet extended-release 12 hour Take 1 tablet by mouth 2 (Two) Times a Day.      ranolazine (Ranexa) 500 MG 12 hr tablet Take 1 tablet by mouth 2 (Two) Times a Day. 180 tablet 3    vitamin D (ERGOCALCIFEROL) 70331 UNITS capsule capsule Take 1 capsule by mouth. Two times monthly      warfarin (COUMADIN) 5 MG tablet Take 5 mg nightly except on Tuesday and Saturday take 7.5mg OR AS DIRECTED 100 tablet 1    zolpidem (AMBIEN) 5 MG tablet    " "   HYDROcodone-acetaminophen (NORCO) 7.5-325 MG per tablet Take 1 tablet by mouth 3 (Three) Times a Day. (Patient not taking: Reported on 8/17/2023)       No current facility-administered medications for this visit.       Review of Systems   Constitutional: Negative.  Negative for chills and fever.   Respiratory: Negative.     Cardiovascular: Negative.    Musculoskeletal:  Positive for gait problem.        Toe pain   Skin: Negative.    Neurological:  Positive for numbness.   Psychiatric/Behavioral: Negative.         OBJECTIVE    /83   Pulse (!) 45   Ht 180.3 cm (70.98\")   Wt 127 kg (280 lb 3.2 oz)   SpO2 98%   BMI 39.10 kg/mý     Physical Exam  Vitals reviewed.   Constitutional:       General: He is not in acute distress.     Appearance: Normal appearance.   HENT:      Head: Normocephalic.   Eyes:      Pupils: Pupils are equal, round, and reactive to light.   Cardiovascular:      Pulses:           Dorsalis pedis pulses are 1+ on the right side and detected w/ Doppler on the left side.        Posterior tibial pulses are 1+ on the right side and detected w/ Doppler on the left side.   Pulmonary:      Effort: No respiratory distress.   Musculoskeletal:         General: No signs of injury.      Cervical back: Neck supple.   Feet:      Right foot:      Protective Sensation: 10 sites tested.  5 sites sensed.      Skin integrity: Dry skin present.      Left foot:      Protective Sensation: 10 sites tested.  5 sites sensed.      Skin integrity: Dry skin present.      Comments: Semi-rigid hammertoe deformity toes 2-5 b/l.  Nails 1-5 b/l thickened, elongated with subungual debris. +pain on palpation  Skin:     General: Skin is warm and dry.      Findings: No erythema.   Neurological:      General: No focal deficit present.      Mental Status: He is alert.   Psychiatric:         Mood and Affect: Mood normal.         Behavior: Behavior normal.            Procedures        ASSESSMENT AND PLAN    Diagnoses and all " orders for this visit:    1. Pain in toes of both feet (Primary)    2. Onychomycosis    3. Encounter for diabetic foot exam    4. Diabetic polyneuropathy associated with type 2 diabetes mellitus    5. Onychogryphosis        - A diabetic foot screening exam was performed and the patient was educated on the foot complications related to diabetes,  preventative foot care and what signs and symptoms to watch for.  Instructed to contact our office if any foot problems develop before next visit.  -Discussed treatments for painful, fungal toenails. Treatment options discussed included nail removal versus debridement. Patient elected for routine nail debridement. An ABN has been signed by the patient.  - Toenails 1-5 bilateral were debrided in length and thickness with nail nipper and electric  to decrease fungal load and risk of infection.  - All the patients questions were answered.  - RTC 3 months or sooner if needed.               This document has been electronically signed by DAINA Castaneda on August 17, 2023 09:35 CDT

## 2023-08-24 ENCOUNTER — ANTICOAGULATION VISIT (OUTPATIENT)
Dept: CARDIAC SURGERY | Facility: CLINIC | Age: 68
End: 2023-08-24
Payer: MEDICARE

## 2023-08-24 VITALS — WEIGHT: 277.9 LBS | HEART RATE: 87 BPM | OXYGEN SATURATION: 96 % | BODY MASS INDEX: 38.78 KG/M2

## 2023-08-24 DIAGNOSIS — Z51.81 ENCOUNTER FOR THERAPEUTIC DRUG MONITORING: ICD-10-CM

## 2023-08-24 DIAGNOSIS — Z86.711 HISTORY OF PULMONARY EMBOLISM: ICD-10-CM

## 2023-08-24 DIAGNOSIS — Z79.01 LONG TERM (CURRENT) USE OF ANTICOAGULANTS: Primary | ICD-10-CM

## 2023-08-24 LAB — INR PPP: 2.6 (ref 0.9–1.1)

## 2023-08-24 PROCEDURE — 85610 PROTHROMBIN TIME: CPT | Performed by: NURSE PRACTITIONER

## 2023-08-24 PROCEDURE — 36416 COLLJ CAPILLARY BLOOD SPEC: CPT | Performed by: NURSE PRACTITIONER

## 2023-08-24 PROCEDURE — 93793 ANTICOAG MGMT PT WARFARIN: CPT | Performed by: NURSE PRACTITIONER

## 2023-08-24 NOTE — PROGRESS NOTES
Pt states no meds changes or bleeding problems or unexplained bruising. Pt here for Lovenox bridging.  Pt has done Lovenox injections in the past. Pt is schedule for epidural steroid injection on 8/29. Instructed to hold Coumadin starting tonight and to have serving of green veggies tomorrow. Pt instructed to begin Coumadin back night of procedure unless instructed otherwise by doctor.  Pt was educated regarding Lovenox administration, safety and disposal.  Pt will give BID injections on 8/26 & 8/27. Instructed to only given AM injection on 8/28 being the day before procedure. Pt was instructed to resume Lovenox injections evening of 8/29 unless otherwise directed by doctor.  Pt was instructed to notify [facility] if given alternate instructions for Coumadin and/or Lovenox.  Pt was provided written instructions on Lovenox administration as well as verbal instructions. Pt was receptive to teaching and asked appropriate questions. Will recheck INR on 9/5. No chief complaint on file.    Findings reported by Sandee Lugo RN  Today's INR is   Lab Results - Last 18 Months   Lab Units 08/24/23  0000   INR  2.60*

## 2023-09-05 ENCOUNTER — ANTICOAGULATION VISIT (OUTPATIENT)
Dept: CARDIAC SURGERY | Facility: CLINIC | Age: 68
End: 2023-09-05
Payer: MEDICARE

## 2023-09-05 VITALS — WEIGHT: 280.2 LBS | HEART RATE: 68 BPM | BODY MASS INDEX: 39.1 KG/M2 | OXYGEN SATURATION: 93 %

## 2023-09-05 DIAGNOSIS — Z86.711 HISTORY OF PULMONARY EMBOLISM: ICD-10-CM

## 2023-09-05 DIAGNOSIS — Z51.81 ENCOUNTER FOR THERAPEUTIC DRUG MONITORING: ICD-10-CM

## 2023-09-05 DIAGNOSIS — Z79.01 LONG TERM (CURRENT) USE OF ANTICOAGULANTS: Primary | ICD-10-CM

## 2023-09-05 LAB — INR PPP: 1.9 (ref 0.9–1.1)

## 2023-09-05 PROCEDURE — 36416 COLLJ CAPILLARY BLOOD SPEC: CPT | Performed by: NURSE PRACTITIONER

## 2023-09-05 PROCEDURE — 85610 PROTHROMBIN TIME: CPT | Performed by: NURSE PRACTITIONER

## 2023-09-05 PROCEDURE — 93793 ANTICOAG MGMT PT WARFARIN: CPT | Performed by: NURSE PRACTITIONER

## 2023-09-05 NOTE — PROGRESS NOTES
Pt states no changes to meds or diet.  Pt denies bleeding issues.  I instructed pt to take one more Lovenox injection then discontinue.  Instructed pt to limit green intake for two days.  Recheck INR next wk prior to spacing appts monthly.  Pt verbalizes.  Patient instructed regarding medication; results given and questions answered. Nutritional counseling given.  Dietary factors affecting therapy addressed.  Patient instructed to monitor for excessive bruising or bleeding.  Findings reported by George Lucia RN.   Today's INR is   Lab Results - Last 18 Months   Lab Units 09/05/23  0000   INR  1.90*

## 2023-09-13 ENCOUNTER — ANTICOAGULATION VISIT (OUTPATIENT)
Dept: CARDIAC SURGERY | Facility: CLINIC | Age: 68
End: 2023-09-13
Payer: MEDICARE

## 2023-09-13 VITALS — OXYGEN SATURATION: 95 % | HEART RATE: 67 BPM

## 2023-09-13 DIAGNOSIS — Z86.711 HISTORY OF PULMONARY EMBOLISM: ICD-10-CM

## 2023-09-13 DIAGNOSIS — Z51.81 ENCOUNTER FOR THERAPEUTIC DRUG MONITORING: ICD-10-CM

## 2023-09-13 DIAGNOSIS — Z79.01 LONG TERM (CURRENT) USE OF ANTICOAGULANTS: Primary | ICD-10-CM

## 2023-09-13 LAB — INR PPP: 3.3 (ref 0.9–1.1)

## 2023-09-13 PROCEDURE — 85610 PROTHROMBIN TIME: CPT | Performed by: NURSE PRACTITIONER

## 2023-09-13 PROCEDURE — 93793 ANTICOAG MGMT PT WARFARIN: CPT | Performed by: NURSE PRACTITIONER

## 2023-09-13 PROCEDURE — 36416 COLLJ CAPILLARY BLOOD SPEC: CPT | Performed by: NURSE PRACTITIONER

## 2023-09-13 NOTE — PROGRESS NOTES
Pt denies any medication changes or bleeding issues.  Reports he has not had any green veggies since last visit. Dose adjusted today only and instructed to have darker green salad today.  Will recheck in 2 weeks. Pt verbalized understanding.  Patient instructed regarding medication; results given and questions answered. Nutritional counseling given.  Dietary factors affecting therapy addressed.  Patient instructed to monitor for excessive bruising or bleeding.  Findings reported by Sandee Lugo RN  Today's INR is   Lab Results - Last 18 Months   Lab Units 09/13/23  0000   INR  3.30*

## 2023-09-25 ENCOUNTER — TELEPHONE (OUTPATIENT)
Dept: CARDIOLOGY | Facility: CLINIC | Age: 68
End: 2023-09-25

## 2023-09-25 ENCOUNTER — ANTICOAGULATION VISIT (OUTPATIENT)
Dept: CARDIAC SURGERY | Facility: CLINIC | Age: 68
End: 2023-09-25

## 2023-09-25 VITALS — WEIGHT: 277.2 LBS | HEART RATE: 63 BPM | OXYGEN SATURATION: 94 % | BODY MASS INDEX: 38.68 KG/M2

## 2023-09-25 DIAGNOSIS — Z86.711 HISTORY OF PULMONARY EMBOLISM: ICD-10-CM

## 2023-09-25 DIAGNOSIS — Z79.01 LONG TERM (CURRENT) USE OF ANTICOAGULANTS: Primary | ICD-10-CM

## 2023-09-25 DIAGNOSIS — Z51.81 ENCOUNTER FOR THERAPEUTIC DRUG MONITORING: ICD-10-CM

## 2023-09-25 LAB — INR PPP: 3 (ref 0.9–1.1)

## 2023-09-25 PROCEDURE — 93793 ANTICOAG MGMT PT WARFARIN: CPT | Performed by: NURSE PRACTITIONER

## 2023-09-25 PROCEDURE — 36416 COLLJ CAPILLARY BLOOD SPEC: CPT | Performed by: NURSE PRACTITIONER

## 2023-09-25 PROCEDURE — 85610 PROTHROMBIN TIME: CPT | Performed by: NURSE PRACTITIONER

## 2023-09-25 RX ORDER — RANOLAZINE 500 MG/1
500 TABLET, EXTENDED RELEASE ORAL 2 TIMES DAILY
Qty: 180 TABLET | Refills: 3 | Status: SHIPPED | OUTPATIENT
Start: 2023-09-25

## 2023-09-25 RX ORDER — ISOSORBIDE MONONITRATE 60 MG/1
60 TABLET, EXTENDED RELEASE ORAL EVERY MORNING
Qty: 90 TABLET | Refills: 3 | Status: SHIPPED | OUTPATIENT
Start: 2023-09-25

## 2023-09-25 NOTE — TELEPHONE ENCOUNTER
----- Message from George Lucia RN sent at 9/25/2023 11:04 AM CDT -----  Regarding: Refills  Saw pt in [facility] today.  Pt asking for refills on Repatha, Metoprolol, Ranexa, and Isosorbide.    Thank you

## 2023-09-25 NOTE — PROGRESS NOTES
Patient states no med changes or bleeding problems or unexplained bruising. Patient instructed to continue current dosing schedule. Verbalizes understanding. Will recheck in 5 wks.  Patient instructed regarding medication; results given and questions answered. Nutritional counseling given.  Dietary factors affecting therapy addressed.  Patient instructed to monitor for excessive bruising or bleeding.  Findings reported by George Lucia RN.   Today's INR is   Lab Results - Last 18 Months   Lab Units 09/25/23  0000   INR  3.00*

## 2023-09-28 RX ORDER — METOPROLOL SUCCINATE 50 MG/1
50 TABLET, EXTENDED RELEASE ORAL DAILY
Qty: 90 TABLET | Refills: 3 | Status: SHIPPED | OUTPATIENT
Start: 2023-09-28

## (undated) DEVICE — SINGLE-USE BIOPSY FORCEPS: Brand: RADIAL JAW 4

## (undated) DEVICE — TRAP,MUCUS SPECIMEN,40CC: Brand: MEDLINE

## (undated) DEVICE — BITEBLOCK ENDO W/STRAP 60F A/ LF DISP

## (undated) DEVICE — CANN SMPL SOFTECH BIFLO ETCO2 A/M 7FT

## (undated) DEVICE — Device: Brand: DISPOSABLE ELECTROSURGICAL SNARE

## (undated) DEVICE — TRAP SXN POLYP QUICKCATCH LF